# Patient Record
Sex: MALE | Race: OTHER | Employment: UNEMPLOYED | URBAN - METROPOLITAN AREA
[De-identification: names, ages, dates, MRNs, and addresses within clinical notes are randomized per-mention and may not be internally consistent; named-entity substitution may affect disease eponyms.]

---

## 2017-03-31 PROBLEM — G89.29 CHRONIC BILATERAL LOW BACK PAIN WITH SCIATICA: Chronic | Status: ACTIVE | Noted: 2017-03-31

## 2017-03-31 PROBLEM — M54.40 CHRONIC BILATERAL LOW BACK PAIN WITH SCIATICA: Chronic | Status: ACTIVE | Noted: 2017-03-31

## 2018-03-28 ENCOUNTER — OFFICE VISIT (OUTPATIENT)
Dept: INTERNAL MEDICINE | Age: 53
End: 2018-03-28
Payer: COMMERCIAL

## 2018-03-28 VITALS
HEIGHT: 72 IN | BODY MASS INDEX: 33.44 KG/M2 | SYSTOLIC BLOOD PRESSURE: 131 MMHG | RESPIRATION RATE: 16 BRPM | DIASTOLIC BLOOD PRESSURE: 82 MMHG | HEART RATE: 86 BPM | WEIGHT: 246.9 LBS

## 2018-03-28 DIAGNOSIS — Z02.89 ENCOUNTER FOR PHYSICAL EXAMINATION RELATED TO EMPLOYMENT: Primary | ICD-10-CM

## 2018-03-28 DIAGNOSIS — K43.9 VENTRAL HERNIA WITHOUT OBSTRUCTION OR GANGRENE: ICD-10-CM

## 2018-03-28 PROCEDURE — 3017F COLORECTAL CA SCREEN DOC REV: CPT | Performed by: INTERNAL MEDICINE

## 2018-03-28 PROCEDURE — 99213 OFFICE O/P EST LOW 20 MIN: CPT | Performed by: INTERNAL MEDICINE

## 2018-03-28 PROCEDURE — G8417 CALC BMI ABV UP PARAM F/U: HCPCS | Performed by: INTERNAL MEDICINE

## 2018-03-28 PROCEDURE — 99212 OFFICE O/P EST SF 10 MIN: CPT | Performed by: INTERNAL MEDICINE

## 2018-03-28 PROCEDURE — G8482 FLU IMMUNIZE ORDER/ADMIN: HCPCS | Performed by: INTERNAL MEDICINE

## 2018-03-28 PROCEDURE — G8427 DOCREV CUR MEDS BY ELIG CLIN: HCPCS | Performed by: INTERNAL MEDICINE

## 2018-03-28 PROCEDURE — 1036F TOBACCO NON-USER: CPT | Performed by: INTERNAL MEDICINE

## 2018-03-28 ASSESSMENT — ENCOUNTER SYMPTOMS
DIARRHEA: 0
ABDOMINAL PAIN: 0
CONSTIPATION: 0
SHORTNESS OF BREATH: 0
PHOTOPHOBIA: 0
COUGH: 0
NAUSEA: 0

## 2018-03-28 NOTE — PROGRESS NOTES
Subjective:      Patient ID: Deanne Parekh is a 47 y.o. male. HPI  Chief Complaint   Patient presents with    Employment Physical     during work physical pft abnormal and needs checked for abdominal hernia     Here at  to follow about pre employment exam. He was interviewing for job at paint [employer] was asked to blow in tube and told he did not pass test. He denied any respiratory symptoms. He walk with his daughter every other day for bout 2 miles. Denied any GARCIA or chest pain. He never smoked. His mother has emphysema and was smoker so he had second hand exposure here and there. No chest pain or orthopnea or LL swelling     Review of Systems   Constitutional: Negative for fever and unexpected weight change. HENT: Negative. Eyes: Negative for photophobia and visual disturbance. Respiratory: Negative for cough and shortness of breath. Cardiovascular: Negative for chest pain, palpitations and leg swelling. Gastrointestinal: Negative for abdominal pain, constipation, diarrhea and nausea. Genitourinary: Negative for dysuria. Objective:   Physical Exam   Constitutional: He is oriented to person, place, and time. HENT:   Head: Normocephalic and atraumatic. Eyes: Pupils are equal, round, and reactive to light. Cardiovascular: Normal rate and regular rhythm. No murmur heard. Musculoskeletal: Normal range of motion. He exhibits no edema. Neurological: He is alert and oriented to person, place, and time.        Assessment:      Abnormal pre employment exam:  Asymptomatic abdominal wall hernia       Plan:      /82   Pulse 86   Resp 16   Ht 6' (1.829 m)   Wt 246 lb 14.4 oz (112 kg)   BMI 33.49 kg/m²       Abnormal pre employment exam:  -Pt can start job with no restriction  -Letter provided  -will obtain PFT to rule out any disease     Asymptomatic abdominal wall hernia   -advised about weight loss  -Report any symptoms      F/u with PCP as scheduled

## 2018-04-04 DIAGNOSIS — G43.009 MIGRAINE WITHOUT AURA AND WITHOUT STATUS MIGRAINOSUS, NOT INTRACTABLE: ICD-10-CM

## 2018-04-05 RX ORDER — IBUPROFEN 800 MG/1
800 TABLET ORAL EVERY 6 HOURS PRN
Qty: 30 TABLET | Refills: 0 | Status: SHIPPED | OUTPATIENT
Start: 2018-04-05 | End: 2018-06-05

## 2018-06-04 DIAGNOSIS — E11.42 TYPE 2 DIABETES MELLITUS WITH DIABETIC POLYNEUROPATHY, WITHOUT LONG-TERM CURRENT USE OF INSULIN (HCC): Chronic | ICD-10-CM

## 2018-06-04 DIAGNOSIS — E78.5 HYPERLIPIDEMIA ASSOCIATED WITH TYPE 2 DIABETES MELLITUS (HCC): Chronic | ICD-10-CM

## 2018-06-04 DIAGNOSIS — E11.69 HYPERLIPIDEMIA ASSOCIATED WITH TYPE 2 DIABETES MELLITUS (HCC): Chronic | ICD-10-CM

## 2018-06-05 RX ORDER — SIMVASTATIN 20 MG
20 TABLET ORAL EVERY EVENING
Qty: 30 TABLET | Refills: 1 | Status: SHIPPED | OUTPATIENT
Start: 2018-06-05 | End: 2018-08-07 | Stop reason: SDUPTHER

## 2018-06-05 RX ORDER — ALOGLIPTIN 25 MG/1
TABLET, FILM COATED ORAL
Qty: 30 TABLET | Refills: 1 | Status: SHIPPED | OUTPATIENT
Start: 2018-06-05 | End: 2018-08-07 | Stop reason: SDUPTHER

## 2018-08-07 DIAGNOSIS — E78.5 HYPERLIPIDEMIA ASSOCIATED WITH TYPE 2 DIABETES MELLITUS (HCC): Chronic | ICD-10-CM

## 2018-08-07 DIAGNOSIS — E11.42 TYPE 2 DIABETES MELLITUS WITH DIABETIC POLYNEUROPATHY, WITHOUT LONG-TERM CURRENT USE OF INSULIN (HCC): Chronic | ICD-10-CM

## 2018-08-07 DIAGNOSIS — E11.69 HYPERLIPIDEMIA ASSOCIATED WITH TYPE 2 DIABETES MELLITUS (HCC): Chronic | ICD-10-CM

## 2018-08-09 RX ORDER — ACETAMINOPHEN/DIPHENHYDRAMINE 500MG-25MG
TABLET ORAL
Qty: 30 TABLET | Refills: 0 | Status: SHIPPED | OUTPATIENT
Start: 2018-08-09 | End: 2018-09-05 | Stop reason: SDUPTHER

## 2018-08-09 RX ORDER — SIMVASTATIN 20 MG
20 TABLET ORAL EVERY EVENING
Qty: 30 TABLET | Refills: 0 | Status: SHIPPED | OUTPATIENT
Start: 2018-08-09 | End: 2018-09-05 | Stop reason: SDUPTHER

## 2018-08-09 RX ORDER — ALOGLIPTIN 25 MG/1
TABLET, FILM COATED ORAL
Qty: 30 TABLET | Refills: 0 | Status: SHIPPED | OUTPATIENT
Start: 2018-08-09 | End: 2018-09-05 | Stop reason: SDUPTHER

## 2018-08-15 ENCOUNTER — TELEPHONE (OUTPATIENT)
Dept: INTERNAL MEDICINE | Age: 53
End: 2018-08-15

## 2018-08-15 NOTE — TELEPHONE ENCOUNTER
Received notice needed prior auth for Alogliptin. After [facility] and forth [facility] it was determined [employer] is current and no prior auth required.

## 2018-09-05 ENCOUNTER — OFFICE VISIT (OUTPATIENT)
Dept: INTERNAL MEDICINE | Age: 53
End: 2018-09-05
Payer: COMMERCIAL

## 2018-09-05 VITALS
SYSTOLIC BLOOD PRESSURE: 138 MMHG | OXYGEN SATURATION: 97 % | DIASTOLIC BLOOD PRESSURE: 82 MMHG | RESPIRATION RATE: 20 BRPM | HEART RATE: 80 BPM | WEIGHT: 246 LBS | BODY MASS INDEX: 33.36 KG/M2

## 2018-09-05 DIAGNOSIS — E11.42 TYPE 2 DIABETES MELLITUS WITH DIABETIC POLYNEUROPATHY, WITHOUT LONG-TERM CURRENT USE OF INSULIN (HCC): Chronic | ICD-10-CM

## 2018-09-05 DIAGNOSIS — Z23 NEED FOR PROPHYLACTIC VACCINATION AND INOCULATION AGAINST VARICELLA: Primary | ICD-10-CM

## 2018-09-05 DIAGNOSIS — E78.5 HYPERLIPIDEMIA ASSOCIATED WITH TYPE 2 DIABETES MELLITUS (HCC): Chronic | ICD-10-CM

## 2018-09-05 DIAGNOSIS — K64.9 HEMORRHOIDS, UNSPECIFIED HEMORRHOID TYPE: ICD-10-CM

## 2018-09-05 DIAGNOSIS — M75.51 BURSITIS OF RIGHT SHOULDER: ICD-10-CM

## 2018-09-05 DIAGNOSIS — E11.69 HYPERLIPIDEMIA ASSOCIATED WITH TYPE 2 DIABETES MELLITUS (HCC): Chronic | ICD-10-CM

## 2018-09-05 LAB — HBA1C MFR BLD: 6.7 %

## 2018-09-05 PROCEDURE — 99213 OFFICE O/P EST LOW 20 MIN: CPT | Performed by: INTERNAL MEDICINE

## 2018-09-05 PROCEDURE — 83036 HEMOGLOBIN GLYCOSYLATED A1C: CPT | Performed by: INTERNAL MEDICINE

## 2018-09-05 PROCEDURE — 3044F HG A1C LEVEL LT 7.0%: CPT | Performed by: INTERNAL MEDICINE

## 2018-09-05 PROCEDURE — G8417 CALC BMI ABV UP PARAM F/U: HCPCS | Performed by: INTERNAL MEDICINE

## 2018-09-05 PROCEDURE — 2022F DILAT RTA XM EVC RTNOPTHY: CPT | Performed by: INTERNAL MEDICINE

## 2018-09-05 PROCEDURE — 3017F COLORECTAL CA SCREEN DOC REV: CPT | Performed by: INTERNAL MEDICINE

## 2018-09-05 PROCEDURE — 99214 OFFICE O/P EST MOD 30 MIN: CPT | Performed by: INTERNAL MEDICINE

## 2018-09-05 PROCEDURE — 1036F TOBACCO NON-USER: CPT | Performed by: INTERNAL MEDICINE

## 2018-09-05 PROCEDURE — G8427 DOCREV CUR MEDS BY ELIG CLIN: HCPCS | Performed by: INTERNAL MEDICINE

## 2018-09-05 RX ORDER — ASPIRIN 81 MG/1
TABLET ORAL
Qty: 30 TABLET | Refills: 0 | Status: SHIPPED | OUTPATIENT
Start: 2018-09-05 | End: 2018-10-12 | Stop reason: SDUPTHER

## 2018-09-05 RX ORDER — ALOGLIPTIN 25 MG/1
TABLET, FILM COATED ORAL
Qty: 30 TABLET | Refills: 0 | Status: SHIPPED | OUTPATIENT
Start: 2018-09-05 | End: 2018-10-12 | Stop reason: SDUPTHER

## 2018-09-05 RX ORDER — SIMVASTATIN 20 MG
20 TABLET ORAL EVERY EVENING
Qty: 30 TABLET | Refills: 0 | Status: SHIPPED | OUTPATIENT
Start: 2018-09-05 | End: 2018-10-12 | Stop reason: SDUPTHER

## 2018-09-05 RX ORDER — GABAPENTIN 300 MG/1
300 CAPSULE ORAL 2 TIMES DAILY
Qty: 60 CAPSULE | Refills: 2 | Status: SHIPPED | OUTPATIENT
Start: 2018-09-05 | End: 2019-01-07 | Stop reason: CLARIF

## 2018-09-05 ASSESSMENT — PATIENT HEALTH QUESTIONNAIRE - PHQ9
2. FEELING DOWN, DEPRESSED OR HOPELESS: 0
SUM OF ALL RESPONSES TO PHQ QUESTIONS 1-9: 0
1. LITTLE INTEREST OR PLEASURE IN DOING THINGS: 0
SUM OF ALL RESPONSES TO PHQ QUESTIONS 1-9: 0
SUM OF ALL RESPONSES TO PHQ9 QUESTIONS 1 & 2: 0

## 2018-09-05 NOTE — PROGRESS NOTES
[facility]    Attending Physician Statement:  Deana Carver M.D., F.A.C.P. I have discussed the case, including pertinent history and exam findings with the resident. I have seen and examined the patient and the key elements of the encounter have been performed by me. I agree with the assessment, plan and orders as documented by the resident. Patient is seen for fu visit today. Last office notes reviewed, relative labs and imaging. R SAB, he didn't follow up PT-- will represcribe    dm2-- aic 6.7  Same metformin/gliptan  ascvd 11% - statin and asa-- ldl 70-- defer inc to moderate or high intensity    New rectal mass, possbile non thrombosed hemorrhoid -he would like rectal surgical opinion  Health maintenance, shingrix  Remainder of medical problems as per resident note.
masses, organomegaly, no guarding rebound or rigidity. Extremities: No edema, Peripheral pulses palpable 2/4    ASSESSMENT/PLAN:  Macario Jimenez was seen today for established new doctor, hemorrhoids and fatigue. Diagnoses and all orders for this visit:    Need for prophylactic vaccination and inoculation against varicella  -     zoster recombinant adjuvanted vaccine (SHINGRIX) 50 MCG SUSR injection; 50 MCG IM then repeat 2-6 months. Type 2 diabetes mellitus with diabetic polyneuropathy, without long-term current use of insulin (HCC)  -     aspirin (RA ASPIRIN EC) 81 MG EC tablet; take 1 tablet by mouth once daily  -     metFORMIN (GLUCOPHAGE) 1000 MG tablet; take 1 tablet by mouth twice a day with food  -     alogliptin (NESINA) 25 MG TABS tablet; take 1 tablet by mouth once daily  -     gabapentin (NEURONTIN) 300 MG capsule; Take 1 capsule by mouth 2 times daily for 90 days. .  -     POCT glycosylated hemoglobin (Hb A1C)  -      DIABETES FOOT EXAM    Hyperlipidemia associated with type 2 diabetes mellitus (HCC)  -     simvastatin (ZOCOR) 20 MG tablet;  Take 1 tablet by mouth every evening  The 10-year ASCVD risk score (Syl Will., et al., 2013) is: 11.8%    Values used to calculate the score:      Age: 47 years      Sex: Male      Is Non- : Yes      Diabetic: Yes      Tobacco smoker: No      Systolic Blood Pressure: 002 mmHg      Is BP treated: No      HDL Cholesterol: 47 mg/dL      Total Cholesterol: 140 mg/dL    Hemorrhoids, unspecified hemorrhoid type  -     [facility]- Osmin Sarkar MD    Bursitis of right shoulder  -     External Referral To Physical Therapy      RTC: 3 months    I have reviewed my findings and recommendations with Ivy Stoddard and Dr Kevon Stapleton MD PGY-1   9/5/2018 2:40 PM

## 2018-09-07 ENCOUNTER — TELEPHONE (OUTPATIENT)
Dept: SURGERY | Age: 53
End: 2018-09-07

## 2018-09-07 NOTE — TELEPHONE ENCOUNTER
Patient was referred by Dr. Natacha Moses for hemorrhoids, previous colonoscopy by Dr. Emre Kennedy on 9/30/18. Patient was scheduled on 10/19/18 in [facility] office @ 10:15am with Dr. Emre Kennedy. Patient instructed that information packet with appointment letter and health questionnaire will be put in the mail today. Patient instructed to complete health questionnaire prior to scheduled appointment and use return envelope in packet to mail it in; if patient cannot mail it back to us prior to appointment, pt instructed to bring it with him to scheduled appt on 10/19/18. Patient also instructed that a co-pay is due at the time of visit, and if the patient is not insured there is a $40.00 fee at the time of the appointment. Patient was instructed to bring a photo ID, insurance card (if applicable), and list of any current medications. Patient instructed to arrive 15 minutes prior for registration. Patient verbalized understanding of instructions.     Electronically signed by Cony Braun CMA on 9/7/18 at 2:51 PM

## 2018-10-10 ENCOUNTER — OFFICE VISIT (OUTPATIENT)
Dept: INTERNAL MEDICINE | Age: 53
End: 2018-10-10
Payer: COMMERCIAL

## 2018-10-10 VITALS
RESPIRATION RATE: 16 BRPM | SYSTOLIC BLOOD PRESSURE: 132 MMHG | DIASTOLIC BLOOD PRESSURE: 76 MMHG | TEMPERATURE: 98.7 F | BODY MASS INDEX: 33.59 KG/M2 | HEIGHT: 72 IN | WEIGHT: 248 LBS | HEART RATE: 91 BPM

## 2018-10-10 DIAGNOSIS — G43.009 MIGRAINE WITHOUT AURA AND WITHOUT STATUS MIGRAINOSUS, NOT INTRACTABLE: ICD-10-CM

## 2018-10-10 DIAGNOSIS — M75.41 ROTATOR CUFF IMPINGEMENT SYNDROME OF RIGHT SHOULDER: Primary | ICD-10-CM

## 2018-10-10 DIAGNOSIS — E11.42 TYPE 2 DIABETES MELLITUS WITH DIABETIC POLYNEUROPATHY, WITHOUT LONG-TERM CURRENT USE OF INSULIN (HCC): Chronic | ICD-10-CM

## 2018-10-10 PROCEDURE — 1036F TOBACCO NON-USER: CPT | Performed by: INTERNAL MEDICINE

## 2018-10-10 PROCEDURE — 3017F COLORECTAL CA SCREEN DOC REV: CPT | Performed by: INTERNAL MEDICINE

## 2018-10-10 PROCEDURE — G8484 FLU IMMUNIZE NO ADMIN: HCPCS | Performed by: INTERNAL MEDICINE

## 2018-10-10 PROCEDURE — 20610 DRAIN/INJ JOINT/BURSA W/O US: CPT | Performed by: INTERNAL MEDICINE

## 2018-10-10 PROCEDURE — G8417 CALC BMI ABV UP PARAM F/U: HCPCS | Performed by: INTERNAL MEDICINE

## 2018-10-10 PROCEDURE — 3044F HG A1C LEVEL LT 7.0%: CPT | Performed by: INTERNAL MEDICINE

## 2018-10-10 PROCEDURE — G8427 DOCREV CUR MEDS BY ELIG CLIN: HCPCS | Performed by: INTERNAL MEDICINE

## 2018-10-10 PROCEDURE — 99212 OFFICE O/P EST SF 10 MIN: CPT | Performed by: INTERNAL MEDICINE

## 2018-10-10 PROCEDURE — 99214 OFFICE O/P EST MOD 30 MIN: CPT | Performed by: INTERNAL MEDICINE

## 2018-10-10 PROCEDURE — 2022F DILAT RTA XM EVC RTNOPTHY: CPT | Performed by: INTERNAL MEDICINE

## 2018-10-10 RX ORDER — TRIAMCINOLONE ACETONIDE 40 MG/ML
40 INJECTION, SUSPENSION INTRA-ARTICULAR; INTRAMUSCULAR ONCE
Status: COMPLETED | OUTPATIENT
Start: 2018-10-10 | End: 2018-10-10

## 2018-10-10 RX ORDER — LIDOCAINE HYDROCHLORIDE 10 MG/ML
1 INJECTION, SOLUTION EPIDURAL; INFILTRATION; INTRACAUDAL; PERINEURAL ONCE
Status: COMPLETED | OUTPATIENT
Start: 2018-10-10 | End: 2018-10-10

## 2018-10-10 RX ADMIN — TRIAMCINOLONE ACETONIDE 40 MG: 40 INJECTION, SUSPENSION INTRA-ARTICULAR; INTRAMUSCULAR at 14:20

## 2018-10-10 RX ADMIN — LIDOCAINE HYDROCHLORIDE 1 ML: 10 INJECTION, SOLUTION EPIDURAL; INFILTRATION; INTRACAUDAL; PERINEURAL at 14:20

## 2018-10-10 NOTE — PROGRESS NOTES
Discharge instructions reviewed with patient. Patient verbalizes understanding. AVS given to patient.

## 2018-10-10 NOTE — PROGRESS NOTES
Right Shoulder Pain  Patient is here for chronic right shoulder pain for a few years. worsening  Last few months. daily anterior shoulder pain 8-9/10 pain when attempts to lift above shoulder.- repetitve acitivities  Also constant ache when lying on it sleepyin    +[facility]   injection  1.5 years ago [facility]-- 2-2.5months  He reports he did do few visits PT       Previous injections provided relief for 3 months. Has daily pain that is constant all day. Cannot lay on right shoulder. Pain is worse raising arm over his head. Pain improves when laying in bed and lifting arm into external rotation. Takes 800 mg ibprofeun twice every day.      Review of Systems   Constitutional: Negative for fatigue and fever. HENT: Negative for rhinorrhea and sore throat. Eyes: Negative for visual disturbance. Respiratory: Negative for cough and shortness of breath. Cardiovascular: Negative for chest pain. Gastrointestinal: Negative for constipation and diarrhea. Positive for acid reflux. Endocrine: Negative for polyuria. Genitourinary: Negative for difficulty urinating. Musculoskeletal: Positive for arthralgias (right shoulder pain). Neurological: Negative for light-headedness.       /76 (Site: Left Upper Arm, Position: Sitting, Cuff Size: Large Adult)   Pulse 91   Temp 98.7 °F (37.1 °C) (Oral)   Resp 16   Ht 6' (1.829 m)   Wt 248 lb (112.5 kg)   BMI 33.63 kg/m²   Constitutional: He is oriented to person, place, and time. He appears well-developed and well-nourished. HENT:   Head: Normocephalic and atraumatic. Eyes: Conjunctivae are normal. Pupils are equal, round, and reactive to light. No scleral icterus. Neck: Normal range of motion. Cardiovascular: Normal rate, regular rhythm and normal heart sounds. Pulmonary/Chest: Effort normal. He has no wheezes. Abdominal: Soft. There is no tenderness. Musculoskeletal: Normal range of motion. He exhibits no edema.  Tenderness: over

## 2018-10-10 NOTE — PATIENT INSTRUCTIONS
Administration of injection may cause short term discomfort, soreness or redness to area. However if area worsens with increased redness or swelling after 3 days of injection, call us at [phone]. Ask for the nurse or go to the Emergency Room for possible antibiotic therapy.

## 2018-10-12 DIAGNOSIS — E11.69 HYPERLIPIDEMIA ASSOCIATED WITH TYPE 2 DIABETES MELLITUS (HCC): Chronic | ICD-10-CM

## 2018-10-12 DIAGNOSIS — E78.5 HYPERLIPIDEMIA ASSOCIATED WITH TYPE 2 DIABETES MELLITUS (HCC): Chronic | ICD-10-CM

## 2018-10-12 DIAGNOSIS — E11.42 TYPE 2 DIABETES MELLITUS WITH DIABETIC POLYNEUROPATHY, WITHOUT LONG-TERM CURRENT USE OF INSULIN (HCC): Chronic | ICD-10-CM

## 2018-10-12 RX ORDER — ASPIRIN 81 MG/1
TABLET ORAL
Qty: 30 TABLET | Refills: 2 | Status: SHIPPED | OUTPATIENT
Start: 2018-10-12 | End: 2019-01-12 | Stop reason: SDUPTHER

## 2018-10-12 RX ORDER — ALOGLIPTIN 25 MG/1
TABLET, FILM COATED ORAL
Qty: 30 TABLET | Refills: 2 | Status: SHIPPED | OUTPATIENT
Start: 2018-10-12 | End: 2019-01-12 | Stop reason: SDUPTHER

## 2018-10-12 RX ORDER — SIMVASTATIN 20 MG
20 TABLET ORAL EVERY EVENING
Qty: 30 TABLET | Refills: 2 | Status: SHIPPED | OUTPATIENT
Start: 2018-10-12 | End: 2019-01-12 | Stop reason: SDUPTHER

## 2018-11-15 ENCOUNTER — APPOINTMENT (OUTPATIENT)
Dept: GENERAL RADIOLOGY | Age: 53
End: 2018-11-15
Payer: COMMERCIAL

## 2018-11-15 ENCOUNTER — HOSPITAL ENCOUNTER (EMERGENCY)
Age: 53
Discharge: HOME | End: 2018-11-15
Payer: COMMERCIAL

## 2018-11-15 VITALS
SYSTOLIC BLOOD PRESSURE: 132 MMHG | BODY MASS INDEX: 33.18 KG/M2 | DIASTOLIC BLOOD PRESSURE: 79 MMHG | HEIGHT: 72 IN | WEIGHT: 245 LBS | OXYGEN SATURATION: 96 % | HEART RATE: 86 BPM | RESPIRATION RATE: 18 BRPM | TEMPERATURE: 98.1 F

## 2018-11-15 DIAGNOSIS — M25.562 ACUTE PAIN OF LEFT KNEE: Primary | ICD-10-CM

## 2018-11-15 DIAGNOSIS — M23.91 INTERNAL DERANGEMENT OF RIGHT KNEE: ICD-10-CM

## 2018-11-15 PROCEDURE — 99283 EMERGENCY DEPT VISIT LOW MDM: CPT

## 2018-11-15 PROCEDURE — 73562 X-RAY EXAM OF KNEE 3: CPT

## 2018-11-15 RX ORDER — NAPROXEN 500 MG/1
500 TABLET ORAL 2 TIMES DAILY
Qty: 30 TABLET | Refills: 0 | Status: SHIPPED | OUTPATIENT
Start: 2018-11-15 | End: 2019-01-07 | Stop reason: CLARIF

## 2018-11-15 RX ORDER — HYDROCODONE BITARTRATE AND ACETAMINOPHEN 5; 325 MG/1; MG/1
1 TABLET ORAL EVERY 6 HOURS PRN
Qty: 6 TABLET | Refills: 0 | Status: SHIPPED | OUTPATIENT
Start: 2018-11-15 | End: 2018-11-18

## 2018-11-15 ASSESSMENT — PAIN SCALES - GENERAL: PAINLEVEL_OUTOF10: 8

## 2018-11-15 NOTE — ED PROVIDER NOTES
Independent MLP      HPI:  11/15/18,   Time: 12:38 PM         Desi Coelho is a 46 y.o. male presenting to the ED for left knee pain, beginning few days ago. The complaint has been persistent, moderate in severity, and worsened by walking. Patient states that he's had issues with his left knee before. He reports he had surgery by Dr. Andrez Albarado in the past on a small tear in his meniscus. He states the other day he was working in the garden and thinks he might have tweaked it has now he's having increased knee pain. There is no marked swelling or redness. He states that it hurts more when he bears weight and bends the joint. Sometimes it feels like it's can of pop or snap. The patient has tried ibuprofen wiithout relief. No fall or direct trauma. States he has appointment to see the orthopedist on Monday      ROS:     Constitutional: Negative for fever and chills  HENT: Negative for ear pain, sore throat and sinus pressure  Eyes: Negative for pain, discharge and redness  Respiratory:  Negative for shortness of breath, cough and wheezing  Cardiovascular: Negative for CP, edema or palpitations  Gastrointestinal: Negative for nausea, vomiting, diarrhea and abdominal distention  Genitourinary: Negative for dysuria and frequency  Musculoskeletal: See HPI  Skin: Negative for rash and wound  Neurological: Negative for weakness and headaches  Hematological: Negative for adenopathy    All other systems reviewed and are negative      -------------------------------- PAST HISTORY ----------------------------------  Past Medical History:  has a past medical history of Anxiety; Blood in stool; Chronic back pain; Constipation; Headache(784.0); History of dental problems; Hyperlipidemia; Musculoskeletal symptom; Neck pain; Neck stiffness; Type II or unspecified type diabetes mellitus without mention of complication, not stated as uncontrolled; and Weakness.     Past Surgical History:  has a past surgical history that

## 2018-12-14 ENCOUNTER — TELEPHONE (OUTPATIENT)
Dept: INTERNAL MEDICINE | Age: 53
End: 2018-12-14

## 2018-12-19 NOTE — TELEPHONE ENCOUNTER
Called pt and informed Dr. Minerva Meyers did get his message and referred him to either f/u with his PCP or Ortho

## 2019-01-07 ENCOUNTER — OFFICE VISIT (OUTPATIENT)
Dept: FAMILY MEDICINE CLINIC | Age: 54
End: 2019-01-07
Payer: COMMERCIAL

## 2019-01-07 VITALS
HEART RATE: 84 BPM | DIASTOLIC BLOOD PRESSURE: 84 MMHG | BODY MASS INDEX: 34 KG/M2 | OXYGEN SATURATION: 98 % | TEMPERATURE: 98.5 F | RESPIRATION RATE: 20 BRPM | HEIGHT: 72 IN | WEIGHT: 251 LBS | SYSTOLIC BLOOD PRESSURE: 150 MMHG

## 2019-01-07 DIAGNOSIS — M25.511 ACUTE PAIN OF RIGHT SHOULDER: ICD-10-CM

## 2019-01-07 DIAGNOSIS — Z23 NEEDS FLU SHOT: ICD-10-CM

## 2019-01-07 DIAGNOSIS — R03.0 ELEVATED BLOOD PRESSURE READING: ICD-10-CM

## 2019-01-07 DIAGNOSIS — M54.2 NECK PAIN: ICD-10-CM

## 2019-01-07 DIAGNOSIS — Z76.89 ENCOUNTER TO ESTABLISH CARE: Primary | ICD-10-CM

## 2019-01-07 DIAGNOSIS — E78.5 HYPERLIPIDEMIA ASSOCIATED WITH TYPE 2 DIABETES MELLITUS (HCC): ICD-10-CM

## 2019-01-07 DIAGNOSIS — E11.69 HYPERLIPIDEMIA ASSOCIATED WITH TYPE 2 DIABETES MELLITUS (HCC): ICD-10-CM

## 2019-01-07 DIAGNOSIS — E66.9 OBESITY (BMI 30.0-34.9): Chronic | ICD-10-CM

## 2019-01-07 DIAGNOSIS — E11.42 TYPE 2 DIABETES MELLITUS WITH DIABETIC POLYNEUROPATHY, WITHOUT LONG-TERM CURRENT USE OF INSULIN (HCC): Chronic | ICD-10-CM

## 2019-01-07 LAB — HBA1C MFR BLD: 8.1 %

## 2019-01-07 PROCEDURE — 2022F DILAT RTA XM EVC RTNOPTHY: CPT | Performed by: FAMILY MEDICINE

## 2019-01-07 PROCEDURE — 3045F PR MOST RECENT HEMOGLOBIN A1C LEVEL 7.0-9.0%: CPT | Performed by: FAMILY MEDICINE

## 2019-01-07 PROCEDURE — G8427 DOCREV CUR MEDS BY ELIG CLIN: HCPCS | Performed by: FAMILY MEDICINE

## 2019-01-07 PROCEDURE — 83036 HEMOGLOBIN GLYCOSYLATED A1C: CPT | Performed by: FAMILY MEDICINE

## 2019-01-07 PROCEDURE — 90686 IIV4 VACC NO PRSV 0.5 ML IM: CPT | Performed by: FAMILY MEDICINE

## 2019-01-07 PROCEDURE — G8417 CALC BMI ABV UP PARAM F/U: HCPCS | Performed by: FAMILY MEDICINE

## 2019-01-07 PROCEDURE — 99214 OFFICE O/P EST MOD 30 MIN: CPT | Performed by: FAMILY MEDICINE

## 2019-01-07 PROCEDURE — 3017F COLORECTAL CA SCREEN DOC REV: CPT | Performed by: FAMILY MEDICINE

## 2019-01-07 PROCEDURE — 1036F TOBACCO NON-USER: CPT | Performed by: FAMILY MEDICINE

## 2019-01-07 PROCEDURE — G8482 FLU IMMUNIZE ORDER/ADMIN: HCPCS | Performed by: FAMILY MEDICINE

## 2019-01-07 PROCEDURE — 90471 IMMUNIZATION ADMIN: CPT | Performed by: FAMILY MEDICINE

## 2019-01-07 RX ORDER — NAPROXEN 500 MG/1
500 TABLET ORAL 2 TIMES DAILY WITH MEALS
Qty: 60 TABLET | Refills: 0 | Status: SHIPPED | OUTPATIENT
Start: 2019-01-07 | End: 2019-02-18 | Stop reason: CLARIF

## 2019-01-07 ASSESSMENT — PATIENT HEALTH QUESTIONNAIRE - PHQ9
2. FEELING DOWN, DEPRESSED OR HOPELESS: 0
SUM OF ALL RESPONSES TO PHQ9 QUESTIONS 1 & 2: 0
1. LITTLE INTEREST OR PLEASURE IN DOING THINGS: 0
SUM OF ALL RESPONSES TO PHQ QUESTIONS 1-9: 0
SUM OF ALL RESPONSES TO PHQ QUESTIONS 1-9: 0

## 2019-01-08 ASSESSMENT — ENCOUNTER SYMPTOMS
CONSTIPATION: 0
NAUSEA: 0
COUGH: 0
RHINORRHEA: 0
SINUS PAIN: 0
VOMITING: 0
SORE THROAT: 0
ABDOMINAL PAIN: 0
SHORTNESS OF BREATH: 0
DIARRHEA: 0

## 2019-01-11 ENCOUNTER — HOSPITAL ENCOUNTER (OUTPATIENT)
Age: 54
Discharge: HOME | End: 2019-01-13
Payer: COMMERCIAL

## 2019-01-11 DIAGNOSIS — E11.42 TYPE 2 DIABETES MELLITUS WITH DIABETIC POLYNEUROPATHY, WITHOUT LONG-TERM CURRENT USE OF INSULIN (HCC): Chronic | ICD-10-CM

## 2019-01-11 LAB
ALBUMIN SERPL-MCNC: 4.7 G/DL (ref 3.5–5.2)
ALP BLD-CCNC: 75 U/L (ref 40–129)
ALT SERPL-CCNC: 61 U/L (ref 0–40)
ANION GAP SERPL CALCULATED.3IONS-SCNC: 18 MMOL/L (ref 7–16)
AST SERPL-CCNC: 34 U/L (ref 0–39)
BILIRUB SERPL-MCNC: 0.5 MG/DL (ref 0–1.2)
BUN BLDV-MCNC: 14 MG/DL (ref 6–20)
CALCIUM SERPL-MCNC: 9 MG/DL (ref 8.6–10.2)
CHLORIDE BLD-SCNC: 104 MMOL/L (ref 98–107)
CHOLESTEROL, TOTAL: 145 MG/DL (ref 0–199)
CO2: 21 MMOL/L (ref 22–29)
CREAT SERPL-MCNC: 0.7 MG/DL (ref 0.7–1.2)
GFR AFRICAN AMERICAN: >60
GFR NON-AFRICAN AMERICAN: >60 ML/MIN/1.73
GLUCOSE BLD-MCNC: 114 MG/DL (ref 74–99)
HCT VFR BLD CALC: 42.5 % (ref 37–54)
HDLC SERPL-MCNC: 46 MG/DL
HEMOGLOBIN: 14.1 G/DL (ref 12.5–16.5)
LDL CHOLESTEROL CALCULATED: 77 MG/DL (ref 0–99)
MCH RBC QN AUTO: 28.7 PG (ref 26–35)
MCHC RBC AUTO-ENTMCNC: 33.2 % (ref 32–34.5)
MCV RBC AUTO: 86.6 FL (ref 80–99.9)
PDW BLD-RTO: 12.9 FL (ref 11.5–15)
PLATELET # BLD: 264 E9/L (ref 130–450)
PMV BLD AUTO: 11.4 FL (ref 7–12)
POTASSIUM SERPL-SCNC: 4.2 MMOL/L (ref 3.5–5)
RBC # BLD: 4.91 E12/L (ref 3.8–5.8)
SODIUM BLD-SCNC: 143 MMOL/L (ref 132–146)
TOTAL PROTEIN: 7.5 G/DL (ref 6.4–8.3)
TRIGL SERPL-MCNC: 109 MG/DL (ref 0–149)
TSH SERPL DL<=0.05 MIU/L-ACNC: 2.06 UIU/ML (ref 0.27–4.2)
VLDLC SERPL CALC-MCNC: 22 MG/DL
WBC # BLD: 7.9 E9/L (ref 4.5–11.5)

## 2019-01-11 PROCEDURE — 86703 HIV-1/HIV-2 1 RESULT ANTBDY: CPT

## 2019-01-11 PROCEDURE — 85027 COMPLETE CBC AUTOMATED: CPT

## 2019-01-11 PROCEDURE — 36415 COLL VENOUS BLD VENIPUNCTURE: CPT

## 2019-01-11 PROCEDURE — 80053 COMPREHEN METABOLIC PANEL: CPT

## 2019-01-11 PROCEDURE — 80061 LIPID PANEL: CPT

## 2019-01-11 PROCEDURE — 84443 ASSAY THYROID STIM HORMONE: CPT

## 2019-01-12 DIAGNOSIS — E11.42 TYPE 2 DIABETES MELLITUS WITH DIABETIC POLYNEUROPATHY, WITHOUT LONG-TERM CURRENT USE OF INSULIN (HCC): Chronic | ICD-10-CM

## 2019-01-12 DIAGNOSIS — E78.5 HYPERLIPIDEMIA ASSOCIATED WITH TYPE 2 DIABETES MELLITUS (HCC): Chronic | ICD-10-CM

## 2019-01-12 DIAGNOSIS — E11.69 HYPERLIPIDEMIA ASSOCIATED WITH TYPE 2 DIABETES MELLITUS (HCC): Chronic | ICD-10-CM

## 2019-01-14 LAB — HIV-1 AND HIV-2 ANTIBODIES: NORMAL

## 2019-01-18 RX ORDER — SIMVASTATIN 20 MG
20 TABLET ORAL EVERY EVENING
Qty: 30 TABLET | Refills: 1 | Status: SHIPPED | OUTPATIENT
Start: 2019-01-18 | End: 2019-03-25 | Stop reason: SDUPTHER

## 2019-01-18 RX ORDER — ACETAMINOPHEN/DIPHENHYDRAMINE 500MG-25MG
TABLET ORAL
Qty: 30 TABLET | Refills: 1 | Status: SHIPPED | OUTPATIENT
Start: 2019-01-18 | End: 2019-03-25 | Stop reason: SDUPTHER

## 2019-01-18 RX ORDER — ALOGLIPTIN 25 MG/1
TABLET, FILM COATED ORAL
Qty: 30 TABLET | Refills: 1 | Status: SHIPPED | OUTPATIENT
Start: 2019-01-18 | End: 2019-03-25 | Stop reason: SDUPTHER

## 2019-01-23 ENCOUNTER — TELEPHONE (OUTPATIENT)
Dept: RHEUMATOLOGY | Age: 54
End: 2019-01-23

## 2019-01-23 PROBLEM — M25.511 ACUTE PAIN OF RIGHT SHOULDER: Status: ACTIVE | Noted: 2019-01-23

## 2019-01-23 PROBLEM — M54.2 NECK PAIN: Status: ACTIVE | Noted: 2019-01-23

## 2019-02-18 ENCOUNTER — OFFICE VISIT (OUTPATIENT)
Dept: FAMILY MEDICINE CLINIC | Age: 54
End: 2019-02-18
Payer: COMMERCIAL

## 2019-02-18 VITALS
OXYGEN SATURATION: 98 % | BODY MASS INDEX: 33.18 KG/M2 | DIASTOLIC BLOOD PRESSURE: 78 MMHG | HEIGHT: 72 IN | HEART RATE: 83 BPM | SYSTOLIC BLOOD PRESSURE: 144 MMHG | WEIGHT: 245 LBS | RESPIRATION RATE: 18 BRPM | TEMPERATURE: 98.2 F

## 2019-02-18 DIAGNOSIS — E11.42 TYPE 2 DIABETES MELLITUS WITH DIABETIC POLYNEUROPATHY, WITHOUT LONG-TERM CURRENT USE OF INSULIN (HCC): Primary | Chronic | ICD-10-CM

## 2019-02-18 DIAGNOSIS — E78.5 HYPERLIPIDEMIA ASSOCIATED WITH TYPE 2 DIABETES MELLITUS (HCC): Chronic | ICD-10-CM

## 2019-02-18 DIAGNOSIS — E66.9 OBESITY (BMI 30.0-34.9): Chronic | ICD-10-CM

## 2019-02-18 DIAGNOSIS — E11.69 HYPERLIPIDEMIA ASSOCIATED WITH TYPE 2 DIABETES MELLITUS (HCC): Chronic | ICD-10-CM

## 2019-02-18 DIAGNOSIS — M47.22 OSTEOARTHRITIS OF SPINE WITH RADICULOPATHY, CERVICAL REGION: ICD-10-CM

## 2019-02-18 DIAGNOSIS — M25.511 ACUTE PAIN OF RIGHT SHOULDER: ICD-10-CM

## 2019-02-18 DIAGNOSIS — R03.0 ELEVATED BLOOD PRESSURE READING: ICD-10-CM

## 2019-02-18 PROCEDURE — 99214 OFFICE O/P EST MOD 30 MIN: CPT | Performed by: FAMILY MEDICINE

## 2019-02-18 PROCEDURE — 3017F COLORECTAL CA SCREEN DOC REV: CPT | Performed by: FAMILY MEDICINE

## 2019-02-18 PROCEDURE — G8427 DOCREV CUR MEDS BY ELIG CLIN: HCPCS | Performed by: FAMILY MEDICINE

## 2019-02-18 PROCEDURE — 3045F PR MOST RECENT HEMOGLOBIN A1C LEVEL 7.0-9.0%: CPT | Performed by: FAMILY MEDICINE

## 2019-02-18 PROCEDURE — G8482 FLU IMMUNIZE ORDER/ADMIN: HCPCS | Performed by: FAMILY MEDICINE

## 2019-02-18 PROCEDURE — 2022F DILAT RTA XM EVC RTNOPTHY: CPT | Performed by: FAMILY MEDICINE

## 2019-02-18 PROCEDURE — 1036F TOBACCO NON-USER: CPT | Performed by: FAMILY MEDICINE

## 2019-02-18 PROCEDURE — G8417 CALC BMI ABV UP PARAM F/U: HCPCS | Performed by: FAMILY MEDICINE

## 2019-02-18 RX ORDER — CYCLOBENZAPRINE HCL 10 MG
10 TABLET ORAL 3 TIMES DAILY PRN
Qty: 30 TABLET | Refills: 0 | Status: SHIPPED | OUTPATIENT
Start: 2019-02-18 | End: 2019-02-20 | Stop reason: SINTOL

## 2019-02-18 RX ORDER — IBUPROFEN 800 MG/1
TABLET ORAL
Refills: 0 | COMMUNITY
Start: 2019-02-04 | End: 2019-03-01

## 2019-02-18 ASSESSMENT — ENCOUNTER SYMPTOMS
DIARRHEA: 0
SHORTNESS OF BREATH: 0
SINUS PAIN: 0
CONSTIPATION: 0
VOMITING: 0
NAUSEA: 0
ABDOMINAL PAIN: 0
RHINORRHEA: 0
COUGH: 0
SORE THROAT: 0

## 2019-02-20 ENCOUNTER — TELEPHONE (OUTPATIENT)
Dept: FAMILY MEDICINE CLINIC | Age: 54
End: 2019-02-20

## 2019-02-27 ENCOUNTER — TELEPHONE (OUTPATIENT)
Dept: ORTHOPEDIC SURGERY | Age: 54
End: 2019-02-27

## 2019-02-27 NOTE — TELEPHONE ENCOUNTER
Jude cannot make it to 11:00 am new patient due to conflicting appointment.   He has been rescheduled for 3/1/19 at 2:00 pm.

## 2019-03-01 ENCOUNTER — OFFICE VISIT (OUTPATIENT)
Dept: ORTHOPEDIC SURGERY | Age: 54
End: 2019-03-01
Payer: COMMERCIAL

## 2019-03-01 VITALS — HEIGHT: 72 IN | WEIGHT: 245 LBS | BODY MASS INDEX: 33.18 KG/M2

## 2019-03-01 DIAGNOSIS — M75.101 TEAR OF RIGHT ROTATOR CUFF, UNSPECIFIED TEAR EXTENT: Primary | ICD-10-CM

## 2019-03-01 DIAGNOSIS — M54.12 CERVICAL RADICULOPATHY: ICD-10-CM

## 2019-03-01 DIAGNOSIS — S83.242A TEAR OF MEDIAL MENISCUS OF LEFT KNEE, CURRENT, UNSPECIFIED TEAR TYPE, INITIAL ENCOUNTER: ICD-10-CM

## 2019-03-01 PROCEDURE — G8482 FLU IMMUNIZE ORDER/ADMIN: HCPCS | Performed by: ORTHOPAEDIC SURGERY

## 2019-03-01 PROCEDURE — 99204 OFFICE O/P NEW MOD 45 MIN: CPT | Performed by: ORTHOPAEDIC SURGERY

## 2019-03-01 PROCEDURE — G8417 CALC BMI ABV UP PARAM F/U: HCPCS | Performed by: ORTHOPAEDIC SURGERY

## 2019-03-01 PROCEDURE — 3017F COLORECTAL CA SCREEN DOC REV: CPT | Performed by: ORTHOPAEDIC SURGERY

## 2019-03-01 PROCEDURE — 1036F TOBACCO NON-USER: CPT | Performed by: ORTHOPAEDIC SURGERY

## 2019-03-01 PROCEDURE — G8427 DOCREV CUR MEDS BY ELIG CLIN: HCPCS | Performed by: ORTHOPAEDIC SURGERY

## 2019-03-01 RX ORDER — MELOXICAM 15 MG/1
15 TABLET ORAL DAILY
Qty: 30 TABLET | Refills: 3 | Status: SHIPPED | OUTPATIENT
Start: 2019-03-01 | End: 2019-07-23 | Stop reason: ALTCHOICE

## 2019-03-20 ENCOUNTER — HOSPITAL ENCOUNTER (OUTPATIENT)
Dept: MRI IMAGING | Age: 54
Discharge: HOME | End: 2019-03-22
Payer: COMMERCIAL

## 2019-03-20 DIAGNOSIS — S83.242A TEAR OF MEDIAL MENISCUS OF LEFT KNEE, CURRENT, UNSPECIFIED TEAR TYPE, INITIAL ENCOUNTER: ICD-10-CM

## 2019-03-20 DIAGNOSIS — M75.101 TEAR OF RIGHT ROTATOR CUFF, UNSPECIFIED TEAR EXTENT: ICD-10-CM

## 2019-03-20 PROCEDURE — 73221 MRI JOINT UPR EXTREM W/O DYE: CPT

## 2019-03-20 PROCEDURE — 73721 MRI JNT OF LWR EXTRE W/O DYE: CPT

## 2019-03-25 DIAGNOSIS — E11.42 TYPE 2 DIABETES MELLITUS WITH DIABETIC POLYNEUROPATHY, WITHOUT LONG-TERM CURRENT USE OF INSULIN (HCC): Chronic | ICD-10-CM

## 2019-03-25 DIAGNOSIS — E11.69 HYPERLIPIDEMIA ASSOCIATED WITH TYPE 2 DIABETES MELLITUS (HCC): Chronic | ICD-10-CM

## 2019-03-25 DIAGNOSIS — E78.5 HYPERLIPIDEMIA ASSOCIATED WITH TYPE 2 DIABETES MELLITUS (HCC): Chronic | ICD-10-CM

## 2019-03-25 RX ORDER — ALOGLIPTIN 25 MG/1
TABLET, FILM COATED ORAL
Qty: 30 TABLET | Refills: 3 | Status: SHIPPED | OUTPATIENT
Start: 2019-03-25 | End: 2019-05-20 | Stop reason: SDUPTHER

## 2019-03-25 RX ORDER — AMMONIUM LACTATE 12 G/100G
LOTION TOPICAL
Refills: 0 | COMMUNITY
Start: 2019-02-17 | End: 2020-04-30

## 2019-03-25 RX ORDER — ASPIRIN 81 MG/1
TABLET ORAL
Qty: 30 TABLET | Refills: 3 | Status: SHIPPED | OUTPATIENT
Start: 2019-03-25 | End: 2019-07-20 | Stop reason: SDUPTHER

## 2019-03-25 RX ORDER — SIMVASTATIN 20 MG
20 TABLET ORAL EVERY EVENING
Qty: 30 TABLET | Refills: 3 | Status: SHIPPED | OUTPATIENT
Start: 2019-03-25 | End: 2019-05-20 | Stop reason: SDUPTHER

## 2019-03-28 ENCOUNTER — TELEPHONE (OUTPATIENT)
Dept: ADMINISTRATIVE | Age: 54
End: 2019-03-28

## 2019-03-28 NOTE — TELEPHONE ENCOUNTER
Patient called today stating he is not able to keep the appointment and needed to r/s. He is r/s for 4/16 at 3:30. He needed an afternoon appointment.

## 2019-04-04 ENCOUNTER — OFFICE VISIT (OUTPATIENT)
Dept: ORTHOPEDIC SURGERY | Age: 54
End: 2019-04-04
Payer: COMMERCIAL

## 2019-04-04 VITALS — BODY MASS INDEX: 33.18 KG/M2 | HEIGHT: 72 IN | WEIGHT: 245 LBS

## 2019-04-04 DIAGNOSIS — M17.12 PRIMARY OSTEOARTHRITIS OF LEFT KNEE: ICD-10-CM

## 2019-04-04 DIAGNOSIS — M75.111 INCOMPLETE TEAR OF RIGHT ROTATOR CUFF: Primary | ICD-10-CM

## 2019-04-04 PROCEDURE — G8427 DOCREV CUR MEDS BY ELIG CLIN: HCPCS | Performed by: ORTHOPAEDIC SURGERY

## 2019-04-04 PROCEDURE — 1036F TOBACCO NON-USER: CPT | Performed by: ORTHOPAEDIC SURGERY

## 2019-04-04 PROCEDURE — 3017F COLORECTAL CA SCREEN DOC REV: CPT | Performed by: ORTHOPAEDIC SURGERY

## 2019-04-04 PROCEDURE — 20610 DRAIN/INJ JOINT/BURSA W/O US: CPT | Performed by: ORTHOPAEDIC SURGERY

## 2019-04-04 PROCEDURE — 99214 OFFICE O/P EST MOD 30 MIN: CPT | Performed by: ORTHOPAEDIC SURGERY

## 2019-04-04 PROCEDURE — 99401 PREV MED CNSL INDIV APPRX 15: CPT | Performed by: ORTHOPAEDIC SURGERY

## 2019-04-04 PROCEDURE — G8417 CALC BMI ABV UP PARAM F/U: HCPCS | Performed by: ORTHOPAEDIC SURGERY

## 2019-04-04 RX ORDER — TRIAMCINOLONE ACETONIDE 40 MG/ML
40 INJECTION, SUSPENSION INTRA-ARTICULAR; INTRAMUSCULAR ONCE
Status: COMPLETED | OUTPATIENT
Start: 2019-04-04 | End: 2019-04-04

## 2019-04-04 RX ADMIN — TRIAMCINOLONE ACETONIDE 40 MG: 40 INJECTION, SUSPENSION INTRA-ARTICULAR; INTRAMUSCULAR at 16:05

## 2019-04-04 NOTE — PROGRESS NOTES
Chief Complaint   Patient presents with    Knee Pain     Left knee MRI follow up    Shoulder Pain     Right shoulder MRI follow up       HPI:    Patient is 46 y.o. male complaining of right shoulder and arm pain. He  admits to constant pain for 6+ months without injury. He also states that all his fingers are tingling. He denies any specific injury. Previous treatments include injections Dr. Michelle Turcios. He states that he did receive relief after the shoulder injections but it increasingly started to diminish. He states the only thing that makes him feel relief at this point is elevating his hand over his head. Patient also complains of left knee pain for years. Patient states he was diagnosed with a meniscus tear and treated by another physician but patient still complains of pain and instability despite taking ibuprofen. He returns today for results of MRI of shoulder and knee. ROS:    Skin: (-) rash,(-) psoriasis,(-) eczema, (-)skin cancer. Neurologic: (-)numbness, (-)tingling, (-)headaches, (-) LOC. Cardiovascular: (-) Chest pain, (-) swelling in legs/feet, (-) SOB, (-) cramping in legs/feet with walking.     All other review of systems negative except stated above or in HPI      Past Medical History:   Diagnosis Date    Anxiety     Blood in stool     Chronic back pain     Constipation     Headache(784.0)     History of dental problems     Hyperlipidemia     Musculoskeletal symptom     Neck pain     Neck stiffness     Type II or unspecified type diabetes mellitus without mention of complication, not stated as uncontrolled     Weakness      Past Surgical History:   Procedure Laterality Date    ENDOSCOPY, COLON, DIAGNOSTIC      FRACTURE SURGERY Left     femur    KNEE ARTHROSCOPY Left 07/18/2016    medial lateral menisectomy/synovectomy/chodroplasty    TONSILLECTOMY         Current Outpatient Medications:     metFORMIN (GLUCOPHAGE) 1000 MG tablet, take 1 tablet by mouth twice a day with food, Disp: 60 tablet, Rfl: 3    simvastatin (ZOCOR) 20 MG tablet, Take 1 tablet by mouth every evening, Disp: 30 tablet, Rfl: 3    aspirin (RA ASPIRIN EC ADULT LOW ST) 81 MG EC tablet, take 1 tablet by mouth once daily, Disp: 30 tablet, Rfl: 3    alogliptin (NESINA) 25 MG TABS tablet, take 1 tablet by mouth once daily, Disp: 30 tablet, Rfl: 3    ammonium lactate (LAC-HYDRIN) 12 % lotion, apply to affected area twice a day, Disp: , Rfl: 0    meloxicam (MOBIC) 15 MG tablet, Take 1 tablet by mouth daily, Disp: 30 tablet, Rfl: 3    diclofenac sodium 1 % GEL, Apply 2 g topically 4 times daily, Disp: 2 Tube, Rfl: 0    Glucose Blood (BLOOD GLUCOSE TEST STRIPS) STRP, FBS daily. Please dispense Freestyle brand, Disp: 100 strip, Rfl: 11    Alcohol Swabs PADS, 1 Units by Does not apply route as needed (checking glucose), Disp: 100 each, Rfl: 11    Lancets MISC, Test once daily, Disp: 100 each, Rfl: 3  Allergies   Allergen Reactions    Tobramycin Swelling     Social History     Socioeconomic History    Marital status:      Spouse name: Not on file    Number of children: Not on file    Years of education: Not on file    Highest education level: Not on file   Occupational History    Occupation: [employer]   Social Needs    Financial resource strain: Not on file    Food insecurity:     Worry: Not on file     Inability: Not on file    Transportation needs:     Medical: Not on file     Non-medical: Not on file   Tobacco Use    Smoking status: Never Smoker    Smokeless tobacco: Never Used   Substance and Sexual Activity    Alcohol use:  Yes     Alcohol/week: 0.0 oz     Comment: rare    Drug use: No    Sexual activity: Yes     Partners: Female   Lifestyle    Physical activity:     Days per week: Not on file     Minutes per session: Not on file    Stress: Not on file   Relationships    Social connections:     Talks on phone: Not on file     Gets together: Not on file     Attends Oriental orthodox service: Not on file     Active member of club or organization: Not on file     Attends meetings of clubs or organizations: Not on file     Relationship status: Not on file    Intimate partner violence:     Fear of current or ex partner: Not on file     Emotionally abused: Not on file     Physically abused: Not on file     Forced sexual activity: Not on file   Other Topics Concern    Not on file   Social History Narrative    Not on file     Family History   Problem Relation Age of Onset    Diabetes Father     High Blood Pressure Father     Heart Disease Father     Stroke Father     Diabetes Mother     Heart Disease Mother     Cancer Brother 54        colon    High Blood Pressure Brother     Cancer Maternal Cousin 40        colon    Cancer Sister     Diabetes Sister     Heart Disease Sister     Stroke Sister     Diabetes Maternal Aunt     Heart Disease Maternal Aunt     High Blood Pressure Maternal Aunt     High Cholesterol Maternal Aunt     Stroke Maternal Aunt     Diabetes Maternal Uncle     Heart Disease Maternal Uncle     High Blood Pressure Maternal Uncle     High Cholesterol Maternal Uncle     Stroke Maternal Uncle     Diabetes Paternal Aunt     Heart Disease Paternal Aunt     High Blood Pressure Paternal Aunt     High Cholesterol Paternal Aunt     Stroke Paternal Aunt     Diabetes Paternal Uncle     Heart Disease Paternal Uncle     High Blood Pressure Paternal Uncle     High Cholesterol Paternal Uncle     Stroke Paternal Uncle     Diabetes Maternal Grandmother     High Blood Pressure Maternal Grandmother     High Cholesterol Maternal Grandmother     Cancer Maternal Grandfather     Diabetes Maternal Grandfather     High Blood Pressure Maternal Grandfather     High Cholesterol Maternal Grandfather     Stroke Maternal Grandfather     Diabetes Paternal Grandfather     High Blood Pressure Paternal Grandfather     High Cholesterol Paternal Grandfather            Physical Exam:    Ht 6' (1.829 m)   Wt 245 lb (111.1 kg)   BMI 33.23 kg/m²     GENERAL: alert, appears stated age, cooperative, no acute distress    HEENT: Head is normocephalic, atraumatic. PERRLA. SKIN: Clean, dry, intact. There is not any cellulitis or cutaneous lesions noted in the lower extremities    PULMONARY: breathing is regular and unlabored, no acute distress    CV: The bilateral upper and lower extremities are warm and well-perfused with brisk capillary refill. 2+ pulses UE and LE bilateral.     PSYCHIATRY: Pleasant mood, appropriate behavior, follows commands    NEURO: Sensation is intact distally with light touch with no alteration. Motor exam of the lower extremities show quadriceps, hamstrings, foot dorsiflexion and plantarflexion grossly intact 5/5. LYMPH: No lymphedema present distally in upper or lower extremity. MUSCULOSKELETAL:  Shoulder Exam:  Examination of the right shoulder shows: There is not a deformity. There is not erythema. There is not soft tissue swelling. Deltoid region is not tender to palpation. AC Joint is not tender to palpation. Clavicle is not tender to palpation. Bicipital Groove is not tender to palpation. Pectoralis  is not tender to palpation. Scapula/ trapezius is  tender to palpation. There is  weakness with supraspinatus testing. There is  pain with supraspinatus testing. Yergason Test negative. Drop Arm Test positive. Apprehension Test negative. Cross Arm Test positive. Supraspinatus Test positive. Shoulder ROM- 140/140/60    Spurling test is positive      Examination left knee reveals range motion 0- 120° with pain. Pain along the posterior medial joint line. Celestina's test is positive. No varus valgus instability. Lachman test is negative.     [facility]agin2019  LOCATION: 100       EXAM: XR SHOULDER RIGHT (MIN 2 VIEWS)       COMPARISON: None       HISTORY: Extremity injury with pain.        TECHNIQUE: 3 views of the right shoulder were obtained.      FINDINGS:   The bones, joints, and soft tissues are within normal limits. There is   no evidence of fracture or malalignment. No radiopaque foreign body   noted.          1/15/2018    Impression   No acute findings. Reading location:  200       HISTORY: left knee pain       3 views of the left knee were obtained. There is no acute fracture   dislocation left knee. No joint effusion is noted. There are mild   degenerative changes of the right knee involving the patellofemoral   joint and medial compartment.       There is stable exostosis arising from the superior medial femoral   condyle likely representing  old trauma.           Impression   1. There is no acute fracture or dislocation of the left knee. .   2. Mild degenerative changes of the left knee. Mri Shoulder Right Wo Contrast    Result Date: 3/20/2019  Reading location:  200 Indication: Right shoulder pain. Comparison: None available. Technique: Multiplanar, multisequence MR imaging of the right shoulder was performed without administration of intravenous or intra-articular contrast. FINDINGS: The supraspinatus, subscapularis, and teres minor tendons are intact. There is mild increased thickening of the distal fibers of the infraspinatus tendon, compatible with moderate tendinopathy. No full-thickness rotator cuff tear is identified. The muscles of the rotator cuff demonstrate normal muscular volume. The extra-articular biceps tendon is within the bicipital groove. There is mild increased signal of the intra-articular biceps tendon, suggestive of mild tendinopathy. Lack of intra-articular contrast limits evaluation of the labrum. There is heterogeneity of the superior labrum extending anteriorly and posteriorly with a questionable paralabral cyst along the anterosuperior labrum. There is suggestion of an additional small paralabral cyst along the posterior labrum (series 3, image 15). The articular cartilage is maintained.  There is no significant joint effusion. There is a trace amount of fluid within the subacromial/subdeltoid bursa. The inferior glenohumeral ligament is intact. There is moderate acromioclavicular joint arthrosis. There is no evidence of fracture or osteonecrosis. 1. Moderate tendinopathy of the infraspinatus tendon. No full-thickness rotator cuff tear is identified. 2. Mild tendinopathy of the intra-articular biceps tendon. 3. Heterogeneity of the labrum with a small paralabral cyst along the posterior labrum and likely additional paralabral cyst along the anterosuperior labrum, compatible with a tear. 3. Moderate acromioclavicular joint arthrosis with trace subacromial/subdeltoid bursitis. Mri Knee Left Wo Contrast    Result Date: 3/21/2019  Reading location:  200 Indication: Left knee pain, evaluate for medial meniscus tear, history of previous surgery. Comparison: Left knee radiograph from 11/15/2018. Technique: Multiplanar, multisequence MR imaging of the left knee was performed without administration of intravenous contrast. FINDINGS: The anterior cruciate ligament is intact with diffuse increased signals, suggestive of mucoid degeneration. The posterior cruciate ligament is intact. There is susceptibility artifact at the femoral attachment of the medial collateral ligament related to previous repair. The medial collateral ligament is grossly intact. The iliotibial band, fibular collateral ligament, and biceps femoris tendon are intact. The popliteus muscle and tendon are intact. There is increased signal within the body and posterior horn of the medial meniscus, possibly related to postsurgical changes. There is blunting of the free edge of the body. There is increased linear fluid signal within the free edge of the posterior horn (series 6, image 11), suspicious for a recurrent tear. The posterior root attachment is intact.  There is diffuse full-thickness cartilage loss along the medial femoral condyle and lateral tibial plateau with focal areas of subchondral reactive change. The lateral meniscus and posterior root attachment are intact. The lateral compartment cartilage is intact. There is diffuse full-thickness cartilage loss along the medial patellar facet. The trochlear cartilage is intact. The quadriceps and patellar tendons are intact. There is a small joint effusion. There is no Baker's cyst. There are linear areas of fibrosis within Hoffa's fat pad related to previous surgery. There is no evidence of fracture or osteonecrosis. There is an area of heterogeneous signal within the distal femur medially with susceptibility artifact (series 4, image 17), possibly related to postsurgical changes or an enchondroma. No definite corresponding sclerotic lesion is identified on the previous left knee radiographs. 1. Postsurgical changes from prior medial collateral ligament repair. 2. Suggestion of postsurgical changes of the medial meniscus with a fluid-filled defect at the free edge of the posterior horn, suspicious for a recurrent tear. 3. Diffuse grade 3/4 chondrosis within the medial compartment and diffuse grade 3 chondrosis along the medial patellar facet. 4. Heterogeneous signal within the distal femur medially with susceptibility artifact, possibly related to postsurgical changes or an enchondroma. 5. Mucoid degeneration of the anterior cruciate ligament. Akila Tho was seen today for knee pain and shoulder pain. Diagnoses and all orders for this visit:    Incomplete tear of right rotator cuff  -     MN ARTHROCENTESIS ASPIR&/INJ MAJOR JT/BURSA W/O US    Primary osteoarthritis of left knee  -     MN ARTHROCENTESIS ASPIR&/INJ MAJOR JT/BURSA W/O US    BMI 33.0-33.9,adult    Other orders  -     triamcinolone acetonide (KENALOG-40) injection 40 mg  -     triamcinolone acetonide (KENALOG-40) injection 40 mg      Patient seen and examined. X-rays reviewed of the left knee and right shoulder.  Patient appears to have pain consistent with cervical radiculopathy but also may have a component of rotator cuff tendinitis due to having good results with cortisone injections in the past. At this point however patient should follow-up with physical medicine for evaluation of cervical radiculopathy before proceeding with anything surgical regards to the shoulder. MRI recommended the meantime for evaluation of shoulder pathology. Patient complains of left knee pain and instability stemming from a possible meniscus tear several years ago. MRI were reviewed today in detail. She will options discussed. Patient like to see if cortisone will relieve his pain. Procedure Note Cortisone Injection to Shoulder    The right shoulder was identified as the injection site. The risk and benefits of a cortisone injection were explained and the patient consented to the injection. Under sterile conditions, the shoulder subacromial space was injected with a mixture of 40mg of Kenelog and Marcaine without complication. A sterile bandage was applied. Procedure Note Knee Cortisone Injection    The left knee was identified as the injection site. The risk and benefits of a cortisone injection were explained and the patient consented to the injection. Under sterile conditions, the knee was injected with a mixture of 40 mg of Kenalog and Marcaine without complication. A sterile bandage was applied. Saba Pain Cream    Return to clinic as needed    In a 15 minute assessment and discussion, patient was counseled on continued weight loss, diet, and physical activity relating to this condition. He was educated with options in detail including nutrition, joining a health club/ weight loss program, and use of cardio equipment such as the Arc Trainer and the importance of use as well as range of motion and HEP exercises for weight loss. Francisco Daugherty, DO      25 minutes was spent with patient. 50% or greater was spent counseling the patient.

## 2019-05-20 ENCOUNTER — OFFICE VISIT (OUTPATIENT)
Dept: FAMILY MEDICINE CLINIC | Age: 54
End: 2019-05-20
Payer: COMMERCIAL

## 2019-05-20 VITALS
WEIGHT: 241.5 LBS | OXYGEN SATURATION: 98 % | HEART RATE: 97 BPM | BODY MASS INDEX: 32.71 KG/M2 | SYSTOLIC BLOOD PRESSURE: 138 MMHG | TEMPERATURE: 98 F | RESPIRATION RATE: 16 BRPM | DIASTOLIC BLOOD PRESSURE: 82 MMHG | HEIGHT: 72 IN

## 2019-05-20 DIAGNOSIS — Z20.818 STREP THROAT EXPOSURE: ICD-10-CM

## 2019-05-20 DIAGNOSIS — E78.5 HYPERLIPIDEMIA ASSOCIATED WITH TYPE 2 DIABETES MELLITUS (HCC): Chronic | ICD-10-CM

## 2019-05-20 DIAGNOSIS — J02.9 ACUTE PHARYNGITIS, UNSPECIFIED ETIOLOGY: ICD-10-CM

## 2019-05-20 DIAGNOSIS — E11.69 HYPERLIPIDEMIA ASSOCIATED WITH TYPE 2 DIABETES MELLITUS (HCC): Chronic | ICD-10-CM

## 2019-05-20 DIAGNOSIS — E11.42 TYPE 2 DIABETES MELLITUS WITH DIABETIC POLYNEUROPATHY, WITHOUT LONG-TERM CURRENT USE OF INSULIN (HCC): Primary | Chronic | ICD-10-CM

## 2019-05-20 DIAGNOSIS — M12.811 ROTATOR CUFF ARTHROPATHY OF RIGHT SHOULDER: ICD-10-CM

## 2019-05-20 PROBLEM — M12.812 ROTATOR CUFF ARTHROPATHY OF LEFT SHOULDER: Status: ACTIVE | Noted: 2019-05-20

## 2019-05-20 PROBLEM — M25.511 ACUTE PAIN OF RIGHT SHOULDER: Status: RESOLVED | Noted: 2019-01-23 | Resolved: 2019-05-20

## 2019-05-20 LAB
CREATININE URINE POCT: 300
HBA1C MFR BLD: 9.3 %
MICROALBUMIN/CREAT 24H UR: 80 MG/G{CREAT}
MICROALBUMIN/CREAT UR-RTO: NORMAL
S PYO AG THROAT QL: NORMAL

## 2019-05-20 PROCEDURE — 1036F TOBACCO NON-USER: CPT | Performed by: FAMILY MEDICINE

## 2019-05-20 PROCEDURE — 3046F HEMOGLOBIN A1C LEVEL >9.0%: CPT | Performed by: FAMILY MEDICINE

## 2019-05-20 PROCEDURE — 3017F COLORECTAL CA SCREEN DOC REV: CPT | Performed by: FAMILY MEDICINE

## 2019-05-20 PROCEDURE — 2022F DILAT RTA XM EVC RTNOPTHY: CPT | Performed by: FAMILY MEDICINE

## 2019-05-20 PROCEDURE — 99214 OFFICE O/P EST MOD 30 MIN: CPT | Performed by: FAMILY MEDICINE

## 2019-05-20 PROCEDURE — 82044 UR ALBUMIN SEMIQUANTITATIVE: CPT | Performed by: FAMILY MEDICINE

## 2019-05-20 PROCEDURE — G8417 CALC BMI ABV UP PARAM F/U: HCPCS | Performed by: FAMILY MEDICINE

## 2019-05-20 PROCEDURE — 83036 HEMOGLOBIN GLYCOSYLATED A1C: CPT | Performed by: FAMILY MEDICINE

## 2019-05-20 PROCEDURE — G8427 DOCREV CUR MEDS BY ELIG CLIN: HCPCS | Performed by: FAMILY MEDICINE

## 2019-05-20 PROCEDURE — 87880 STREP A ASSAY W/OPTIC: CPT | Performed by: FAMILY MEDICINE

## 2019-05-20 RX ORDER — GLIPIZIDE 2.5 MG/1
2.5 TABLET, EXTENDED RELEASE ORAL DAILY
Qty: 30 TABLET | Refills: 3 | Status: SHIPPED | OUTPATIENT
Start: 2019-05-20 | End: 2019-08-20 | Stop reason: SDUPTHER

## 2019-05-20 RX ORDER — SIMVASTATIN 20 MG
20 TABLET ORAL EVERY EVENING
Qty: 30 TABLET | Refills: 3 | Status: SHIPPED | OUTPATIENT
Start: 2019-05-20 | End: 2019-08-20 | Stop reason: SDUPTHER

## 2019-05-20 RX ORDER — ALOGLIPTIN 25 MG/1
TABLET, FILM COATED ORAL
Qty: 30 TABLET | Refills: 3 | Status: SHIPPED | OUTPATIENT
Start: 2019-05-20 | End: 2019-08-20 | Stop reason: SDUPTHER

## 2019-05-20 ASSESSMENT — ENCOUNTER SYMPTOMS
COUGH: 1
NAUSEA: 0
SINUS PAIN: 0
SINUS PRESSURE: 0
CONSTIPATION: 0
DIARRHEA: 0
RHINORRHEA: 1
VOMITING: 0
SORE THROAT: 1
SHORTNESS OF BREATH: 0

## 2019-05-20 NOTE — PROGRESS NOTES
Negative for chills and fever. HENT: Positive for congestion, rhinorrhea and sore throat. Negative for sinus pressure and sinus pain. Respiratory: Positive for cough. Negative for shortness of breath. Cardiovascular: Negative for chest pain. Gastrointestinal: Negative for constipation, diarrhea, nausea and vomiting. Musculoskeletal: Positive for arthralgias (improved). Prior to Visit Medications    Medication Sig Taking? Authorizing Provider   metFORMIN (GLUCOPHAGE) 1000 MG tablet take 1 tablet by mouth twice a day with food Yes Tae Carbajal DO   simvastatin (ZOCOR) 20 MG tablet Take 1 tablet by mouth every evening Yes Tae Carbajal DO   aspirin (RA ASPIRIN EC ADULT LOW ST) 81 MG EC tablet take 1 tablet by mouth once daily Yes Tae Carbajal DO   alogliptin (NESINA) 25 MG TABS tablet take 1 tablet by mouth once daily Yes Tae Carbajal DO   ammonium lactate (LAC-HYDRIN) 12 % lotion apply to affected area twice a day Yes Historical Provider, MD   diclofenac sodium 1 % GEL Apply 2 g topically 4 times daily Yes Tae Carbajal DO   Glucose Blood (BLOOD GLUCOSE TEST STRIPS) STRP FBS daily. Please dispense Freestyle brand Yes Chiara Cables, DO   Alcohol Swabs PADS 1 Units by Does not apply route as needed (checking glucose) Yes Chiara Cables, DO   Lancets MISC Test once daily Yes Jeraline Asp, DO   meloxicam (MOBIC) 15 MG tablet Take 1 tablet by mouth daily  Yessenia Anna, DO        Social History     Tobacco Use    Smoking status: Never Smoker    Smokeless tobacco: Never Used   Substance Use Topics    Alcohol use:  Yes     Alcohol/week: 0.0 oz     Comment: rare        Past Surgical History:   Procedure Laterality Date    ENDOSCOPY, COLON, DIAGNOSTIC      FRACTURE SURGERY Left     femur    KNEE ARTHROSCOPY Left 07/18/2016    medial lateral menisectomy/synovectomy/chodroplasty    TONSILLECTOMY         Vitals:    05/20/19 1717   BP: 138/82   Pulse: 97   Resp: 16   Temp: 98 °F Date Time Provider Riri Perez   8/20/2019  4:30 PM DO [facility]         --Ovidio Owens DO on 5/20/2019 at 5:26 PM

## 2019-05-20 NOTE — PATIENT INSTRUCTIONS
Patient Education        Learning About Diabetes Food Guidelines  Your Care Instructions    Meal planning is important to manage diabetes. It helps keep your blood sugar at a target level (which you set with your doctor). You don't have to eat special foods. You can eat what your family eats, including sweets once in a while. But you do have to pay attention to how often you eat and how much you eat of certain foods. You may want to work with a dietitian or a certified diabetes educator (CDE) to help you plan meals and snacks. A dietitian or CDE can also help you lose weight if that is one of your goals. What should you know about eating carbs? Managing the amount of carbohydrate (carbs) you eat is an important part of healthy meals when you have diabetes. Carbohydrate is found in many foods. · Learn which foods have carbs. And learn the amounts of carbs in different foods. ? Bread, cereal, pasta, and rice have about 15 grams of carbs in a serving. A serving is 1 slice of bread (1 ounce), ½ cup of cooked cereal, or 1/3 cup of cooked pasta or rice. ? Fruits have 15 grams of carbs in a serving. A serving is 1 small fresh fruit, such as an apple or orange; ½ of a banana; ½ cup of cooked or canned fruit; ½ cup of fruit juice; 1 cup of melon or raspberries; or 2 tablespoons of dried fruit. ? Milk and no-sugar-added yogurt have 15 grams of carbs in a serving. A serving is 1 cup of milk or 2/3 cup of no-sugar-added yogurt. ? Starchy vegetables have 15 grams of carbs in a serving. A serving is ½ cup of mashed potatoes or sweet potato; 1 cup winter squash; ½ of a small baked potato; ½ cup of cooked beans; or ½ cup cooked corn or green peas. · Learn how much carbs to eat each day and at each meal. A dietitian or CDE can teach you how to keep track of the amount of carbs you eat. This is called carbohydrate counting. · If you are not sure how to count carbohydrate grams, use the Plate Method to plan meals.  It is a good, quick way to make sure that you have a balanced meal. It also helps you spread carbs throughout the day. ? Divide your plate by types of foods. Put non-starchy vegetables on half the plate, meat or other protein food on one-quarter of the plate, and a grain or starchy vegetable in the final quarter of the plate. To this you can add a small piece of fruit and 1 cup of milk or yogurt, depending on how many carbs you are supposed to eat at a meal.  · Try to eat about the same amount of carbs at each meal. Do not \"save up\" your daily allowance of carbs to eat at one meal.  · Proteins have very little or no carbs per serving. Examples of proteins are beef, chicken, turkey, fish, eggs, tofu, cheese, cottage cheese, and peanut butter. A serving size of meat is 3 ounces, which is about the size of a deck of cards. Examples of meat substitute serving sizes (equal to 1 ounce of meat) are 1/4 cup of cottage cheese, 1 egg, 1 tablespoon of peanut butter, and ½ cup of tofu. How can you eat out and still eat healthy? · Learn to estimate the serving sizes of foods that have carbohydrate. If you measure food at home, it will be easier to estimate the amount in a serving of restaurant food. · If the meal you order has too much carbohydrate (such as potatoes, corn, or baked beans), ask to have a low-carbohydrate food instead. Ask for a salad or green vegetables. · If you use insulin, check your blood sugar before and after eating out to help you plan how much to eat in the future. · If you eat more carbohydrate at a meal than you had planned, take a walk or do other exercise. This will help lower your blood sugar. What else should you know? · Limit saturated fat, such as the fat from meat and dairy products. This is a healthy choice because people who have diabetes are at higher risk of heart disease. So choose lean cuts of meat and nonfat or low-fat dairy products.  Use olive or canola oil instead of butter or shortening when cooking. · Don't skip meals. Your blood sugar may drop too low if you skip meals and take insulin or certain medicines for diabetes. · Check with your doctor before you drink alcohol. Alcohol can cause your blood sugar to drop too low. Alcohol can also cause a bad reaction if you take certain diabetes medicines. Follow-up care is a key part of your treatment and safety. Be sure to make and go to all appointments, and call your doctor if you are having problems. It's also a good idea to know your test results and keep a list of the medicines you take. Where can you learn more? Go to https://Rocket.Lapepiceweb.[employer]. org and sign in to your [employer] account. Enter H833 in the MoPix box to learn more about \"Learning About Diabetes Food Guidelines. \"     If you do not have an account, please click on the \"Sign Up Now\" link. Current as of: July 25, 2018  Content Version: 12.0  © 4993-9210 [employer]. Care instructions adapted under license by [facility] ([facility]). If you have questions about a medical condition or this instruction, always ask your healthcare professional. [place] [address] any warranty or liability for your use of this information.

## 2019-06-19 ENCOUNTER — TELEPHONE (OUTPATIENT)
Dept: ADMINISTRATIVE | Age: 54
End: 2019-06-19

## 2019-07-03 ENCOUNTER — OFFICE VISIT (OUTPATIENT)
Dept: ORTHOPEDIC SURGERY | Age: 54
End: 2019-07-03
Payer: COMMERCIAL

## 2019-07-03 VITALS — HEIGHT: 72 IN | BODY MASS INDEX: 32.51 KG/M2 | TEMPERATURE: 98 F | WEIGHT: 240 LBS

## 2019-07-03 DIAGNOSIS — M17.12 PRIMARY OSTEOARTHRITIS OF LEFT KNEE: ICD-10-CM

## 2019-07-03 DIAGNOSIS — M75.111 NONTRAUMATIC INCOMPLETE TEAR OF RIGHT ROTATOR CUFF: Primary | ICD-10-CM

## 2019-07-03 PROCEDURE — G8417 CALC BMI ABV UP PARAM F/U: HCPCS | Performed by: ORTHOPAEDIC SURGERY

## 2019-07-03 PROCEDURE — 99214 OFFICE O/P EST MOD 30 MIN: CPT | Performed by: ORTHOPAEDIC SURGERY

## 2019-07-03 PROCEDURE — 3017F COLORECTAL CA SCREEN DOC REV: CPT | Performed by: ORTHOPAEDIC SURGERY

## 2019-07-03 PROCEDURE — 20610 DRAIN/INJ JOINT/BURSA W/O US: CPT | Performed by: ORTHOPAEDIC SURGERY

## 2019-07-03 PROCEDURE — G8427 DOCREV CUR MEDS BY ELIG CLIN: HCPCS | Performed by: ORTHOPAEDIC SURGERY

## 2019-07-03 PROCEDURE — 1036F TOBACCO NON-USER: CPT | Performed by: ORTHOPAEDIC SURGERY

## 2019-07-03 RX ORDER — TRIAMCINOLONE ACETONIDE 40 MG/ML
40 INJECTION, SUSPENSION INTRA-ARTICULAR; INTRAMUSCULAR ONCE
Status: COMPLETED | OUTPATIENT
Start: 2019-07-03 | End: 2019-07-03

## 2019-07-03 RX ADMIN — TRIAMCINOLONE ACETONIDE 40 MG: 40 INJECTION, SUSPENSION INTRA-ARTICULAR; INTRAMUSCULAR at 15:52

## 2019-07-03 NOTE — PROGRESS NOTES
Talks on phone: Not on file     Gets together: Not on file     Attends Hoahaoism service: Not on file     Active member of club or organization: Not on file     Attends meetings of clubs or organizations: Not on file     Relationship status: Not on file    Intimate partner violence:     Fear of current or ex partner: Not on file     Emotionally abused: Not on file     Physically abused: Not on file     Forced sexual activity: Not on file   Other Topics Concern    Not on file   Social History Narrative    Not on file     Family History   Problem Relation Age of Onset    Diabetes Father     High Blood Pressure Father     Heart Disease Father     Stroke Father     Diabetes Mother     Heart Disease Mother     Cancer Brother 54        colon    High Blood Pressure Brother     Cancer Maternal Cousin 40        colon    Cancer Sister     Diabetes Sister     Heart Disease Sister     Stroke Sister     Diabetes Maternal Aunt     Heart Disease Maternal Aunt     High Blood Pressure Maternal Aunt     High Cholesterol Maternal Aunt     Stroke Maternal Aunt     Diabetes Maternal Uncle     Heart Disease Maternal Uncle     High Blood Pressure Maternal Uncle     High Cholesterol Maternal Uncle     Stroke Maternal Uncle     Diabetes Paternal Aunt     Heart Disease Paternal Aunt     High Blood Pressure Paternal Aunt     High Cholesterol Paternal Aunt     Stroke Paternal Aunt     Diabetes Paternal Uncle     Heart Disease Paternal Uncle     High Blood Pressure Paternal Uncle     High Cholesterol Paternal Uncle     Stroke Paternal Uncle     Diabetes Maternal Grandmother     High Blood Pressure Maternal Grandmother     High Cholesterol Maternal Grandmother     Cancer Maternal Grandfather     Diabetes Maternal Grandfather     High Blood Pressure Maternal Grandfather     High Cholesterol Maternal Grandfather     Stroke Maternal Grandfather     Diabetes Paternal Grandfather     High Blood Pressure attachment is intact. There is diffuse full-thickness cartilage loss along the medial femoral condyle and lateral tibial plateau with focal areas of subchondral reactive change. The lateral meniscus and posterior root attachment are intact. The lateral compartment cartilage is intact. There is diffuse full-thickness cartilage loss along the medial patellar facet. The trochlear cartilage is intact. The quadriceps and patellar tendons are intact. There is a small joint effusion. There is no Baker's cyst. There are linear areas of fibrosis within Hoffa's fat pad related to previous surgery. There is no evidence of fracture or osteonecrosis. There is an area of heterogeneous signal within the distal femur medially with susceptibility artifact (series 4, image 17), possibly related to postsurgical changes or an enchondroma. No definite corresponding sclerotic lesion is identified on the previous left knee radiographs. 1. Postsurgical changes from prior medial collateral ligament repair. 2. Suggestion of postsurgical changes of the medial meniscus with a fluid-filled defect at the free edge of the posterior horn, suspicious for a recurrent tear. 3. Diffuse grade 3/4 chondrosis within the medial compartment and diffuse grade 3 chondrosis along the medial patellar facet. 4. Heterogeneous signal within the distal femur medially with susceptibility artifact, possibly related to postsurgical changes or an enchondroma. 5. Mucoid degeneration of the anterior cruciate ligament. Andrew Severino was seen today for shoulder pain and knee pain. Diagnoses and all orders for this visit:    Nontraumatic incomplete tear of right rotator cuff  -     [place] ARTHROCENTESIS ASPIR&/INJ MAJOR JT/BURSA W/O US    Primary osteoarthritis of left knee    Other orders  -     triamcinolone acetonide (KENALOG-40) injection 40 mg      Patient seen and examined. X-rays reviewed of the left knee and right shoulder.  Patient appears to have pain consistent with cervical radiculopathy but also may have a component of rotator cuff tendinitis due to having good results with cortisone injections in the past. At this point however patient should follow-up with physical medicine for evaluation of cervical radiculopathy before proceeding with anything surgical regards to the shoulder. MRI recommended the meantime for evaluation of shoulder pathology. Patient complains of left knee pain and instability stemming from a possible meniscus tear several years ago. MRI were reviewed today in detail. options discussed. Patient like to see if cortisone will relieve his pain. Procedure Note Cortisone Injection to Shoulder    The right shoulder was identified as the injection site. The risk and benefits of a cortisone injection were explained and the patient consented to the injection. Under sterile conditions, the shoulder subacromial space was injected with a mixture of 40mg of Kenelog and Marcaine without complication. A sterile bandage was applied. The patient has failed conservative measures such as NSAIDS, HEP, and cortisone injections. He is an excellent candidate for viscous supplementation injections including Suparz in the Left knee. We will contact the patient's insurance company and see them back in the office once we have received approval.          Nia Rom, DO      25 minutes was spent with patient. 50% or greater was spent counseling the patient.

## 2019-07-23 DIAGNOSIS — M17.12 PRIMARY OSTEOARTHRITIS OF LEFT KNEE: Primary | ICD-10-CM

## 2019-08-05 DIAGNOSIS — M17.12 PRIMARY OSTEOARTHRITIS OF LEFT KNEE: Primary | ICD-10-CM

## 2019-08-05 DIAGNOSIS — G43.009 MIGRAINE WITHOUT AURA AND WITHOUT STATUS MIGRAINOSUS, NOT INTRACTABLE: ICD-10-CM

## 2019-08-05 RX ORDER — IBUPROFEN 800 MG/1
800 TABLET ORAL EVERY 6 HOURS PRN
Qty: 30 TABLET | Refills: 0 | Status: SHIPPED | OUTPATIENT
Start: 2019-08-05 | End: 2019-08-20 | Stop reason: SDUPTHER

## 2019-08-20 ENCOUNTER — OFFICE VISIT (OUTPATIENT)
Dept: FAMILY MEDICINE CLINIC | Age: 54
End: 2019-08-20
Payer: COMMERCIAL

## 2019-08-20 ENCOUNTER — OFFICE VISIT (OUTPATIENT)
Dept: ORTHOPEDIC SURGERY | Age: 54
End: 2019-08-20
Payer: COMMERCIAL

## 2019-08-20 VITALS
WEIGHT: 247 LBS | BODY MASS INDEX: 33.46 KG/M2 | HEIGHT: 72 IN | DIASTOLIC BLOOD PRESSURE: 66 MMHG | HEART RATE: 82 BPM | TEMPERATURE: 97.1 F | OXYGEN SATURATION: 96 % | SYSTOLIC BLOOD PRESSURE: 126 MMHG

## 2019-08-20 DIAGNOSIS — E66.09 CLASS 1 OBESITY DUE TO EXCESS CALORIES WITH SERIOUS COMORBIDITY AND BODY MASS INDEX (BMI) OF 33.0 TO 33.9 IN ADULT: ICD-10-CM

## 2019-08-20 DIAGNOSIS — E11.69 HYPERLIPIDEMIA ASSOCIATED WITH TYPE 2 DIABETES MELLITUS (HCC): Chronic | ICD-10-CM

## 2019-08-20 DIAGNOSIS — M19.90 ARTHRITIS: ICD-10-CM

## 2019-08-20 DIAGNOSIS — E11.42 TYPE 2 DIABETES MELLITUS WITH DIABETIC POLYNEUROPATHY, WITHOUT LONG-TERM CURRENT USE OF INSULIN (HCC): Primary | Chronic | ICD-10-CM

## 2019-08-20 DIAGNOSIS — E78.5 HYPERLIPIDEMIA ASSOCIATED WITH TYPE 2 DIABETES MELLITUS (HCC): Chronic | ICD-10-CM

## 2019-08-20 DIAGNOSIS — M17.12 PRIMARY OSTEOARTHRITIS OF LEFT KNEE: Primary | ICD-10-CM

## 2019-08-20 LAB — HBA1C MFR BLD: 8.1 %

## 2019-08-20 PROCEDURE — 99214 OFFICE O/P EST MOD 30 MIN: CPT | Performed by: FAMILY MEDICINE

## 2019-08-20 PROCEDURE — 2022F DILAT RTA XM EVC RTNOPTHY: CPT | Performed by: FAMILY MEDICINE

## 2019-08-20 PROCEDURE — 99999 PR OFFICE/OUTPT VISIT,PROCEDURE ONLY: CPT | Performed by: ORTHOPAEDIC SURGERY

## 2019-08-20 PROCEDURE — 3045F PR MOST RECENT HEMOGLOBIN A1C LEVEL 7.0-9.0%: CPT | Performed by: FAMILY MEDICINE

## 2019-08-20 PROCEDURE — 1036F TOBACCO NON-USER: CPT | Performed by: FAMILY MEDICINE

## 2019-08-20 PROCEDURE — 3017F COLORECTAL CA SCREEN DOC REV: CPT | Performed by: FAMILY MEDICINE

## 2019-08-20 PROCEDURE — 20610 DRAIN/INJ JOINT/BURSA W/O US: CPT | Performed by: ORTHOPAEDIC SURGERY

## 2019-08-20 PROCEDURE — G8427 DOCREV CUR MEDS BY ELIG CLIN: HCPCS | Performed by: FAMILY MEDICINE

## 2019-08-20 PROCEDURE — 83036 HEMOGLOBIN GLYCOSYLATED A1C: CPT | Performed by: FAMILY MEDICINE

## 2019-08-20 PROCEDURE — G8417 CALC BMI ABV UP PARAM F/U: HCPCS | Performed by: FAMILY MEDICINE

## 2019-08-20 RX ORDER — LANCETS 30 GAUGE
EACH MISCELLANEOUS
Qty: 100 EACH | Refills: 3 | Status: SHIPPED | OUTPATIENT
Start: 2019-08-20

## 2019-08-20 RX ORDER — ALOGLIPTIN 25 MG/1
TABLET, FILM COATED ORAL
Qty: 30 TABLET | Refills: 3 | Status: SHIPPED
Start: 2019-08-20 | End: 2020-03-24

## 2019-08-20 RX ORDER — GLIPIZIDE 2.5 MG/1
2.5 TABLET, EXTENDED RELEASE ORAL DAILY
Qty: 30 TABLET | Refills: 3 | Status: SHIPPED | OUTPATIENT
Start: 2019-08-20 | End: 2019-12-02

## 2019-08-20 RX ORDER — SIMVASTATIN 20 MG
20 TABLET ORAL EVERY EVENING
Qty: 30 TABLET | Refills: 3 | Status: SHIPPED
Start: 2019-08-20 | End: 2020-03-24

## 2019-08-20 RX ORDER — IBUPROFEN 800 MG/1
800 TABLET ORAL EVERY 6 HOURS PRN
Qty: 30 TABLET | Refills: 0 | Status: SHIPPED | OUTPATIENT
Start: 2019-08-20 | End: 2019-12-02 | Stop reason: SDUPTHER

## 2019-08-20 RX ORDER — GLUCOSAMINE HCL/CHONDROITIN SU 500-400 MG
CAPSULE ORAL
Qty: 100 STRIP | Refills: 11 | Status: SHIPPED | OUTPATIENT
Start: 2019-08-20

## 2019-08-20 RX ORDER — ASPIRIN 81 MG/1
TABLET ORAL
Qty: 30 TABLET | Refills: 11 | Status: SHIPPED
Start: 2019-08-20 | End: 2020-04-30 | Stop reason: SDUPTHER

## 2019-08-20 RX ORDER — BLOOD PRESSURE TEST KIT
1 KIT MISCELLANEOUS PRN
Qty: 100 EACH | Refills: 11 | Status: SHIPPED | OUTPATIENT
Start: 2019-08-20

## 2019-08-20 ASSESSMENT — ENCOUNTER SYMPTOMS
NAUSEA: 0
RHINORRHEA: 0
SHORTNESS OF BREATH: 0
SINUS PRESSURE: 0
DIARRHEA: 0
CONSTIPATION: 0
COUGH: 0
SORE THROAT: 0
VOMITING: 0
SINUS PAIN: 0

## 2019-08-20 NOTE — PROGRESS NOTES
Chief Complaint   Patient presents with    Injections         The following is a well known to me that is here for Left injections. He is here for Supartz#1. The knee was prepped in sterile fashion. Euflexxa 20 mg injected to Left knee. The patient tolerated the injections well and I will see the patient back in 1 week for repeat injections. Sarkis Doan was seen today for injections.     Diagnoses and all orders for this visit:    Primary osteoarthritis of left knee  -     [place] ARTHROCENTESIS ASPIR&/INJ MAJOR JT/BURSA W/O US    Other orders  -     sodium hyaluronate (SUPARTZ) injection 25 mg

## 2019-08-20 NOTE — PROGRESS NOTES
Past Surgical History:   Procedure Laterality Date    ENDOSCOPY, COLON, DIAGNOSTIC      FRACTURE SURGERY Left     femur    KNEE ARTHROSCOPY Left 07/18/2016    medial lateral menisectomy/synovectomy/chodroplasty    TONSILLECTOMY         Vitals:    08/20/19 1624   BP: 126/66   Pulse: 82   Temp: 97.1 °F (36.2 °C)   SpO2: 96%   Weight: 247 lb (112 kg)   Height: 6' (1.829 m)     Estimated body mass index is 33.5 kg/m² as calculated from the following:    Height as of this encounter: 6' (1.829 m). Weight as of this encounter: 247 lb (112 kg). Physical Exam   Constitutional: He is oriented to person, place, and time. He appears well-developed and well-nourished. No distress. HENT:   Head: Normocephalic and atraumatic. Right Ear: Hearing, tympanic membrane, external ear and ear canal normal.   Left Ear: Hearing, tympanic membrane and ear canal normal.   Mouth/Throat: Posterior oropharyngeal erythema present. No oropharyngeal exudate. Eyes: EOM are normal.   Neck: Normal range of motion. Cardiovascular: Normal rate and regular rhythm. No murmur heard. Pulmonary/Chest: Effort normal and breath sounds normal. No respiratory distress. He has no wheezes. Abdominal: Soft. He exhibits no distension. There is no tenderness. Musculoskeletal: He exhibits no edema or deformity. Neurological: He is alert and oriented to person, place, and time. Skin: Skin is warm. Psychiatric: He has a normal mood and affect. ASSESSMENT/PLAN:  Lisa Lipscomb was seen today for diabetes. Diagnoses and all orders for this visit:    Type 2 diabetes mellitus with diabetic polyneuropathy, without long-term current use of insulin (HCC)  -     Alcohol Swabs PADS; 1 Units by Does not apply route as needed (checking glucose)  -     alogliptin (NESINA) 25 MG TABS tablet; take 1 tablet by mouth once daily  -     blood glucose monitor strips; FBS daily.  Please dispense Freestyle brand  -     aspirin (RA ASPIRIN EC ADULT LOW ST)

## 2019-08-20 NOTE — PATIENT INSTRUCTIONS
studies, dulaglutide caused thyroid tumors or thyroid cancer. It is not known whether these effects would occur in people using regular doses. Ask your doctor about your risk. It is not known whether this medicine will harm an unborn baby. Tell your doctor if you are pregnant or plan to become pregnant. It may not be safe to breast-feed while using this medicine. Ask your doctor about any risk. Dulaglutide is not approved for use by anyone younger than 25years old. How should I use dulaglutide? Follow all directions on your prescription label and read all medication guides or instruction sheets. Use the medicine exactly as directed. Dulaglutide is injected under the skin once per week. Use dulaglutide on the same day each week at the same time of day. If you change your dosing day, allow at least 3 days to pass between doses. You may use dulaglutide with or without food. Read and carefully follow any Instructions for Use provided with your medicine. Do not use dulaglutide if you don't understand all instructions for proper use. Ask your doctor or pharmacist if you have questions. Your care provider will show you where to on your body to inject dulaglutide. Use a different place each time you give an injection. Do not inject into the same place two times in a row. Low blood sugar (hypoglycemia) can happen to everyone who has diabetes. Symptoms include headache, hunger, sweating, irritability, dizziness, nausea, fast heart rate, and feeling anxious or shaky. To quickly treat low blood sugar, always keep a fast-acting source of sugar with you such as fruit juice, hard candy, crackers, raisins, or non-diet soda. Your doctor can prescribe a glucagon emergency injection kit to use in case you have severe hypoglycemia and cannot eat or drink. Be sure your family and close friends know how to give you this injection in an emergency.   Also watch for signs of high blood sugar (hyperglycemia) such as increased

## 2019-08-27 ENCOUNTER — OFFICE VISIT (OUTPATIENT)
Dept: ORTHOPEDIC SURGERY | Age: 54
End: 2019-08-27
Payer: COMMERCIAL

## 2019-08-27 DIAGNOSIS — M17.12 PRIMARY OSTEOARTHRITIS OF LEFT KNEE: Primary | ICD-10-CM

## 2019-08-27 PROCEDURE — 20610 DRAIN/INJ JOINT/BURSA W/O US: CPT | Performed by: ORTHOPAEDIC SURGERY

## 2019-08-27 PROCEDURE — 99024 POSTOP FOLLOW-UP VISIT: CPT | Performed by: ORTHOPAEDIC SURGERY

## 2019-08-27 NOTE — PROGRESS NOTES
Chief Complaint   Patient presents with    Injections         The following is a well known to me that is here for Left injections. He is here for Supartz#1. The knee was prepped in sterile fashion. Supartz 25 mg injected to Left knee. The patient tolerated the injections well and I will see the patient back in 1 week for repeat injections. Aravind Reid was seen today for injections.     Diagnoses and all orders for this visit:    Primary osteoarthritis of left knee  -     [place] ARTHROCENTESIS ASPIR&/INJ MAJOR JT/BURSA W/O US    Other orders  -     sodium hyaluronate (SUPARTZ) injection 25 mg

## 2019-09-06 ENCOUNTER — OFFICE VISIT (OUTPATIENT)
Dept: ORTHOPEDIC SURGERY | Age: 54
End: 2019-09-06
Payer: COMMERCIAL

## 2019-09-06 DIAGNOSIS — M17.12 PRIMARY OSTEOARTHRITIS OF LEFT KNEE: Primary | ICD-10-CM

## 2019-09-06 PROCEDURE — 20610 DRAIN/INJ JOINT/BURSA W/O US: CPT | Performed by: ORTHOPAEDIC SURGERY

## 2019-09-06 NOTE — PROGRESS NOTES
Chief Complaint   Patient presents with    Injections     left knee supartz         The following is a well known to me that is here for Left injections. He is here for Supartz#3. The knee was prepped in sterile fashion. Supartz 25 mg injected to Left knee. The patient tolerated the injections well and I will see the patient back in 1 week for repeat injections. Obed Alvarez was seen today for injections.     Diagnoses and all orders for this visit:    Primary osteoarthritis of left knee  -     [place] ARTHROCENTESIS ASPIR&/INJ MAJOR JT/BURSA W/O US    Other orders  -     sodium hyaluronate (SUPARTZ) injection 25 mg

## 2019-09-13 ENCOUNTER — OFFICE VISIT (OUTPATIENT)
Dept: ORTHOPEDIC SURGERY | Age: 54
End: 2019-09-13
Payer: COMMERCIAL

## 2019-09-13 DIAGNOSIS — M17.12 PRIMARY OSTEOARTHRITIS OF LEFT KNEE: Primary | ICD-10-CM

## 2019-09-13 PROCEDURE — 99999 PR OFFICE/OUTPT VISIT,PROCEDURE ONLY: CPT | Performed by: ORTHOPAEDIC SURGERY

## 2019-09-13 PROCEDURE — 20610 DRAIN/INJ JOINT/BURSA W/O US: CPT | Performed by: ORTHOPAEDIC SURGERY

## 2019-09-20 ENCOUNTER — OFFICE VISIT (OUTPATIENT)
Dept: ORTHOPEDIC SURGERY | Age: 54
End: 2019-09-20
Payer: COMMERCIAL

## 2019-09-20 DIAGNOSIS — M17.12 PRIMARY OSTEOARTHRITIS OF LEFT KNEE: Primary | ICD-10-CM

## 2019-09-20 PROCEDURE — 20610 DRAIN/INJ JOINT/BURSA W/O US: CPT | Performed by: ORTHOPAEDIC SURGERY

## 2019-10-09 ENCOUNTER — OFFICE VISIT (OUTPATIENT)
Dept: ORTHOPEDIC SURGERY | Age: 54
End: 2019-10-09
Payer: COMMERCIAL

## 2019-10-09 VITALS — WEIGHT: 244 LBS | HEIGHT: 72 IN | BODY MASS INDEX: 33.05 KG/M2 | TEMPERATURE: 98.5 F

## 2019-10-09 DIAGNOSIS — M75.41 IMPINGEMENT SYNDROME OF RIGHT SHOULDER: Primary | ICD-10-CM

## 2019-10-09 PROCEDURE — G8428 CUR MEDS NOT DOCUMENT: HCPCS | Performed by: ORTHOPAEDIC SURGERY

## 2019-10-09 PROCEDURE — 1036F TOBACCO NON-USER: CPT | Performed by: ORTHOPAEDIC SURGERY

## 2019-10-09 PROCEDURE — G8417 CALC BMI ABV UP PARAM F/U: HCPCS | Performed by: ORTHOPAEDIC SURGERY

## 2019-10-09 PROCEDURE — 20610 DRAIN/INJ JOINT/BURSA W/O US: CPT | Performed by: ORTHOPAEDIC SURGERY

## 2019-10-09 PROCEDURE — 99214 OFFICE O/P EST MOD 30 MIN: CPT | Performed by: ORTHOPAEDIC SURGERY

## 2019-10-09 PROCEDURE — G8484 FLU IMMUNIZE NO ADMIN: HCPCS | Performed by: ORTHOPAEDIC SURGERY

## 2019-10-09 PROCEDURE — 3017F COLORECTAL CA SCREEN DOC REV: CPT | Performed by: ORTHOPAEDIC SURGERY

## 2019-10-09 RX ORDER — TRIAMCINOLONE ACETONIDE 40 MG/ML
40 INJECTION, SUSPENSION INTRA-ARTICULAR; INTRAMUSCULAR ONCE
Status: COMPLETED | OUTPATIENT
Start: 2019-10-09 | End: 2019-10-09

## 2019-10-09 RX ADMIN — TRIAMCINOLONE ACETONIDE 40 MG: 40 INJECTION, SUSPENSION INTRA-ARTICULAR; INTRAMUSCULAR at 16:27

## 2019-11-29 DIAGNOSIS — M17.12 PRIMARY OSTEOARTHRITIS OF LEFT KNEE: ICD-10-CM

## 2019-12-02 ENCOUNTER — OFFICE VISIT (OUTPATIENT)
Dept: FAMILY MEDICINE CLINIC | Age: 54
End: 2019-12-02
Payer: COMMERCIAL

## 2019-12-02 VITALS
TEMPERATURE: 97.3 F | HEIGHT: 72 IN | SYSTOLIC BLOOD PRESSURE: 136 MMHG | DIASTOLIC BLOOD PRESSURE: 70 MMHG | WEIGHT: 246 LBS | BODY MASS INDEX: 33.32 KG/M2 | OXYGEN SATURATION: 97 % | HEART RATE: 81 BPM

## 2019-12-02 DIAGNOSIS — M62.08 RECTUS DIASTASIS: ICD-10-CM

## 2019-12-02 DIAGNOSIS — E11.69 HYPERLIPIDEMIA ASSOCIATED WITH TYPE 2 DIABETES MELLITUS (HCC): Chronic | ICD-10-CM

## 2019-12-02 DIAGNOSIS — Z23 FLU VACCINE NEED: ICD-10-CM

## 2019-12-02 DIAGNOSIS — E78.5 HYPERLIPIDEMIA ASSOCIATED WITH TYPE 2 DIABETES MELLITUS (HCC): Chronic | ICD-10-CM

## 2019-12-02 DIAGNOSIS — E66.09 CLASS 1 OBESITY DUE TO EXCESS CALORIES WITH SERIOUS COMORBIDITY AND BODY MASS INDEX (BMI) OF 33.0 TO 33.9 IN ADULT: ICD-10-CM

## 2019-12-02 DIAGNOSIS — K21.9 GASTROESOPHAGEAL REFLUX DISEASE, ESOPHAGITIS PRESENCE NOT SPECIFIED: ICD-10-CM

## 2019-12-02 DIAGNOSIS — E11.42 TYPE 2 DIABETES MELLITUS WITH DIABETIC POLYNEUROPATHY, WITHOUT LONG-TERM CURRENT USE OF INSULIN (HCC): Primary | Chronic | ICD-10-CM

## 2019-12-02 DIAGNOSIS — M19.90 ARTHRITIS: ICD-10-CM

## 2019-12-02 LAB — HBA1C MFR BLD: 7.3 %

## 2019-12-02 PROCEDURE — 3051F HG A1C>EQUAL 7.0%<8.0%: CPT | Performed by: FAMILY MEDICINE

## 2019-12-02 PROCEDURE — G8427 DOCREV CUR MEDS BY ELIG CLIN: HCPCS | Performed by: FAMILY MEDICINE

## 2019-12-02 PROCEDURE — 90686 IIV4 VACC NO PRSV 0.5 ML IM: CPT | Performed by: FAMILY MEDICINE

## 2019-12-02 PROCEDURE — G8417 CALC BMI ABV UP PARAM F/U: HCPCS | Performed by: FAMILY MEDICINE

## 2019-12-02 PROCEDURE — 2022F DILAT RTA XM EVC RTNOPTHY: CPT | Performed by: FAMILY MEDICINE

## 2019-12-02 PROCEDURE — 3017F COLORECTAL CA SCREEN DOC REV: CPT | Performed by: FAMILY MEDICINE

## 2019-12-02 PROCEDURE — 83036 HEMOGLOBIN GLYCOSYLATED A1C: CPT | Performed by: FAMILY MEDICINE

## 2019-12-02 PROCEDURE — 1036F TOBACCO NON-USER: CPT | Performed by: FAMILY MEDICINE

## 2019-12-02 PROCEDURE — G8482 FLU IMMUNIZE ORDER/ADMIN: HCPCS | Performed by: FAMILY MEDICINE

## 2019-12-02 PROCEDURE — 99214 OFFICE O/P EST MOD 30 MIN: CPT | Performed by: FAMILY MEDICINE

## 2019-12-02 PROCEDURE — 90471 IMMUNIZATION ADMIN: CPT | Performed by: FAMILY MEDICINE

## 2019-12-02 RX ORDER — PANTOPRAZOLE SODIUM 20 MG/1
20 TABLET, DELAYED RELEASE ORAL NIGHTLY PRN
Qty: 30 TABLET | Refills: 3 | Status: SHIPPED
Start: 2019-12-02 | End: 2021-01-25 | Stop reason: SDUPTHER

## 2019-12-02 RX ORDER — IBUPROFEN 800 MG/1
800 TABLET ORAL EVERY 6 HOURS PRN
Qty: 30 TABLET | Refills: 3 | Status: SHIPPED | OUTPATIENT
Start: 2019-12-02 | End: 2019-12-03 | Stop reason: SDUPTHER

## 2019-12-03 RX ORDER — IBUPROFEN 800 MG/1
TABLET ORAL
Qty: 30 TABLET | Refills: 0 | Status: ON HOLD
Start: 2019-12-03 | End: 2020-02-11 | Stop reason: HOSPADM

## 2019-12-03 ASSESSMENT — ENCOUNTER SYMPTOMS
COUGH: 0
ABDOMINAL PAIN: 0
SHORTNESS OF BREATH: 0
NAUSEA: 0
DIARRHEA: 0
VOMITING: 0
CONSTIPATION: 0

## 2019-12-19 ENCOUNTER — OFFICE VISIT (OUTPATIENT)
Dept: ORTHOPEDIC SURGERY | Age: 54
End: 2019-12-19
Payer: COMMERCIAL

## 2019-12-19 VITALS — HEIGHT: 72 IN | BODY MASS INDEX: 33.05 KG/M2 | WEIGHT: 244 LBS

## 2019-12-19 DIAGNOSIS — M75.41 IMPINGEMENT SYNDROME OF RIGHT SHOULDER: Primary | ICD-10-CM

## 2019-12-19 PROCEDURE — G8482 FLU IMMUNIZE ORDER/ADMIN: HCPCS | Performed by: ORTHOPAEDIC SURGERY

## 2019-12-19 PROCEDURE — 1036F TOBACCO NON-USER: CPT | Performed by: ORTHOPAEDIC SURGERY

## 2019-12-19 PROCEDURE — G8417 CALC BMI ABV UP PARAM F/U: HCPCS | Performed by: ORTHOPAEDIC SURGERY

## 2019-12-19 PROCEDURE — G8427 DOCREV CUR MEDS BY ELIG CLIN: HCPCS | Performed by: ORTHOPAEDIC SURGERY

## 2019-12-19 PROCEDURE — 20610 DRAIN/INJ JOINT/BURSA W/O US: CPT | Performed by: ORTHOPAEDIC SURGERY

## 2019-12-19 PROCEDURE — 99214 OFFICE O/P EST MOD 30 MIN: CPT | Performed by: ORTHOPAEDIC SURGERY

## 2019-12-19 PROCEDURE — 3017F COLORECTAL CA SCREEN DOC REV: CPT | Performed by: ORTHOPAEDIC SURGERY

## 2019-12-19 RX ORDER — TRIAMCINOLONE ACETONIDE 40 MG/ML
40 INJECTION, SUSPENSION INTRA-ARTICULAR; INTRAMUSCULAR ONCE
Status: COMPLETED | OUTPATIENT
Start: 2019-12-19 | End: 2019-12-19

## 2019-12-19 RX ADMIN — TRIAMCINOLONE ACETONIDE 40 MG: 40 INJECTION, SUSPENSION INTRA-ARTICULAR; INTRAMUSCULAR at 15:15

## 2019-12-30 DIAGNOSIS — E11.42 TYPE 2 DIABETES MELLITUS WITH DIABETIC POLYNEUROPATHY, WITHOUT LONG-TERM CURRENT USE OF INSULIN (HCC): Chronic | ICD-10-CM

## 2019-12-31 RX ORDER — DULAGLUTIDE 0.75 MG/.5ML
INJECTION, SOLUTION SUBCUTANEOUS
Qty: 2 ML | Refills: 3 | Status: SHIPPED
Start: 2019-12-31 | End: 2020-04-30 | Stop reason: DRUGHIGH

## 2020-01-03 ENCOUNTER — OFFICE VISIT (OUTPATIENT)
Dept: ORTHOPEDIC SURGERY | Age: 55
End: 2020-01-03
Payer: COMMERCIAL

## 2020-01-03 VITALS — BODY MASS INDEX: 33.05 KG/M2 | WEIGHT: 244 LBS | HEIGHT: 72 IN

## 2020-01-03 PROCEDURE — 99213 OFFICE O/P EST LOW 20 MIN: CPT | Performed by: ORTHOPAEDIC SURGERY

## 2020-01-03 PROCEDURE — G8427 DOCREV CUR MEDS BY ELIG CLIN: HCPCS | Performed by: ORTHOPAEDIC SURGERY

## 2020-01-03 PROCEDURE — G8417 CALC BMI ABV UP PARAM F/U: HCPCS | Performed by: ORTHOPAEDIC SURGERY

## 2020-01-03 PROCEDURE — 20610 DRAIN/INJ JOINT/BURSA W/O US: CPT | Performed by: ORTHOPAEDIC SURGERY

## 2020-01-03 PROCEDURE — 1036F TOBACCO NON-USER: CPT | Performed by: ORTHOPAEDIC SURGERY

## 2020-01-03 PROCEDURE — 3017F COLORECTAL CA SCREEN DOC REV: CPT | Performed by: ORTHOPAEDIC SURGERY

## 2020-01-03 PROCEDURE — G8482 FLU IMMUNIZE ORDER/ADMIN: HCPCS | Performed by: ORTHOPAEDIC SURGERY

## 2020-01-03 RX ORDER — TRIAMCINOLONE ACETONIDE 40 MG/ML
40 INJECTION, SUSPENSION INTRA-ARTICULAR; INTRAMUSCULAR ONCE
Status: COMPLETED | OUTPATIENT
Start: 2020-01-03 | End: 2020-01-03

## 2020-01-03 RX ADMIN — TRIAMCINOLONE ACETONIDE 40 MG: 40 INJECTION, SUSPENSION INTRA-ARTICULAR; INTRAMUSCULAR at 16:08

## 2020-01-03 NOTE — PROGRESS NOTES
Chief Complaint   Patient presents with    Follow-up     follow up fromleft knee pain, patient is requesting any injection and he states his sholder injection did not help       HPI:    Patient is 53 y.o. male complaining of right shoulder and arm pain. He  admits to constant pain for 6+ months without injury. He also states that all his fingers are tingling. He denies any specific injury. Previous treatments include injections Dr. Selma Sanchez. He states that he did receive relief after the shoulder injections but it increasingly started to diminish. He states the only thing that makes him feel relief at this point is elevating his hand over his head. Patient also complains of left knee pain for years. Patient states he was diagnosed with a meniscus tear and treated by another physician but patient still complains of pain and instability despite taking ibuprofen. ROS:    Skin: (-) rash,(-) psoriasis,(-) eczema, (-)skin cancer. Neurologic: (-)numbness, (-)tingling, (-)headaches, (-) LOC. Cardiovascular: (-) Chest pain, (-) swelling in legs/feet, (-) SOB, (-) cramping in legs/feet with walking.     All other review of systems negative except stated above or in HPI      Past Medical History:   Diagnosis Date    Anxiety     Blood in stool     Chronic back pain     Constipation     Headache(784.0)     History of dental problems     Hyperlipidemia     Neck pain     Neck stiffness     Type II or unspecified type diabetes mellitus without mention of complication, not stated as uncontrolled      Past Surgical History:   Procedure Laterality Date    ENDOSCOPY, COLON, DIAGNOSTIC      FRACTURE SURGERY Left     femur    KNEE ARTHROSCOPY Left 07/18/2016    medial lateral menisectomy/synovectomy/chodroplasty    TONSILLECTOMY         Current Outpatient Medications:     TRULICITY 1.07 RH/2.5JV SOPN, INJECT 0.75 MILLIGRAMS INTO THE SKIN ONCE A WEEK, Disp: 2 mL, Rfl: 3    ibuprofen (ADVIL;MOTRIN) 800 MG tablet, per session: Not on file    Stress: Not on file   Relationships    Social connections:     Talks on phone: Not on file     Gets together: Not on file     Attends Taoism service: Not on file     Active member of club or organization: Not on file     Attends meetings of clubs or organizations: Not on file     Relationship status: Not on file    Intimate partner violence:     Fear of current or ex partner: Not on file     Emotionally abused: Not on file     Physically abused: Not on file     Forced sexual activity: Not on file   Other Topics Concern    Not on file   Social History Narrative    Not on file     Family History   Problem Relation Age of Onset    Diabetes Father     High Blood Pressure Father     Heart Disease Father     Stroke Father     Diabetes Mother     Heart Disease Mother     Cancer Brother 54        colon    High Blood Pressure Brother     Cancer Maternal Cousin 40        colon    Cancer Sister     Diabetes Sister     Heart Disease Sister     Stroke Sister     Diabetes Maternal Aunt     Heart Disease Maternal Aunt     High Blood Pressure Maternal Aunt     High Cholesterol Maternal Aunt     Stroke Maternal Aunt     Diabetes Maternal Uncle     Heart Disease Maternal Uncle     High Blood Pressure Maternal Uncle     High Cholesterol Maternal Uncle     Stroke Maternal Uncle     Diabetes Paternal Aunt     Heart Disease Paternal Aunt     High Blood Pressure Paternal Aunt     High Cholesterol Paternal Aunt     Stroke Paternal Aunt     Diabetes Paternal Uncle     Heart Disease Paternal Uncle     High Blood Pressure Paternal Uncle     High Cholesterol Paternal Uncle     Stroke Paternal Uncle     Diabetes Maternal Grandmother     High Blood Pressure Maternal Grandmother     High Cholesterol Maternal Grandmother     Cancer Maternal Grandfather     Diabetes Maternal Grandfather     High Blood Pressure Maternal Grandfather     High Cholesterol Maternal Grandfather  Stroke Maternal Grandfather     Diabetes Paternal Grandfather     High Blood Pressure Paternal Grandfather     High Cholesterol Paternal Grandfather            Physical Exam:    Ht 6' (1.829 m)   Wt 244 lb (110.7 kg)   BMI 33.09 kg/m²     GENERAL: alert, appears stated age, cooperative, no acute distress    HEENT: Head is normocephalic, atraumatic. PERRLA. SKIN: Clean, dry, intact. There is not any cellulitis or cutaneous lesions noted in the lower extremities    PULMONARY: breathing is regular and unlabored, no acute distress    CV: The bilateral upper and lower extremities are warm and well-perfused with brisk capillary refill. 2+ pulses UE and LE bilateral.     PSYCHIATRY: Pleasant mood, appropriate behavior, follows commands    NEURO: Sensation is intact distally with light touch with no alteration. Motor exam of the lower extremities show quadriceps, hamstrings, foot dorsiflexion and plantarflexion grossly intact 5/5. LYMPH: No lymphedema present distally in upper or lower extremity. MUSCULOSKELETAL:  Shoulder Exam:  Examination of the right shoulder shows: There is not a deformity. There is not erythema. There is not soft tissue swelling. Deltoid region is not tender to palpation. AC Joint is not tender to palpation. Clavicle is not tender to palpation. Bicipital Groove is not tender to palpation. Pectoralis  is not tender to palpation. Scapula/ trapezius is  tender to palpation. There is  weakness with supraspinatus testing. There is  pain with supraspinatus testing. Yergason Test negative. Drop Arm Test positive. Apprehension Test negative. Cross Arm Test positive. Supraspinatus Test positive. Shoulder ROM- 140/140/60    Spurling test is positive      Examination left knee reveals range motion 0- 120° with pain. Pain along the posterior medial joint line. Celestina's test is positive. No varus valgus instability. Lachman test is negative.     Imaging:

## 2020-01-22 ENCOUNTER — OFFICE VISIT (OUTPATIENT)
Dept: ORTHOPEDIC SURGERY | Age: 55
End: 2020-01-22
Payer: COMMERCIAL

## 2020-01-22 VITALS — HEIGHT: 72 IN | WEIGHT: 244 LBS | BODY MASS INDEX: 33.05 KG/M2

## 2020-01-22 PROCEDURE — 1036F TOBACCO NON-USER: CPT | Performed by: ORTHOPAEDIC SURGERY

## 2020-01-22 PROCEDURE — G8482 FLU IMMUNIZE ORDER/ADMIN: HCPCS | Performed by: ORTHOPAEDIC SURGERY

## 2020-01-22 PROCEDURE — G8417 CALC BMI ABV UP PARAM F/U: HCPCS | Performed by: ORTHOPAEDIC SURGERY

## 2020-01-22 PROCEDURE — 3017F COLORECTAL CA SCREEN DOC REV: CPT | Performed by: ORTHOPAEDIC SURGERY

## 2020-01-22 PROCEDURE — G8427 DOCREV CUR MEDS BY ELIG CLIN: HCPCS | Performed by: ORTHOPAEDIC SURGERY

## 2020-01-22 PROCEDURE — 99214 OFFICE O/P EST MOD 30 MIN: CPT | Performed by: ORTHOPAEDIC SURGERY

## 2020-01-22 NOTE — PROGRESS NOTES
Chief Complaint   Patient presents with    Shoulder Pain     Right shoulder follow up. No relief from injection. HPI:    Patient is 53 y.o. male complaining of right shoulder and arm pain. He  admits to constant pain for 6+ months without injury. He also states that all his fingers are tingling. He denies any specific injury. Previous treatments include injections Dr. Ivy Jha. He states that he did receive relief after the shoulder injections but it increasingly started to diminish. Patient states the last injection provided little to no relief. ROS:    Skin: (-) rash,(-) psoriasis,(-) eczema, (-)skin cancer. Neurologic: (-)numbness, (-)tingling, (-)headaches, (-) LOC. Cardiovascular: (-) Chest pain, (-) swelling in legs/feet, (-) SOB, (-) cramping in legs/feet with walking.     All other review of systems negative except stated above or in HPI      Past Medical History:   Diagnosis Date    Anxiety     Blood in stool     Chronic back pain     Constipation     Headache(784.0)     History of dental problems     Hyperlipidemia     Neck pain     Neck stiffness     Type II or unspecified type diabetes mellitus without mention of complication, not stated as uncontrolled      Past Surgical History:   Procedure Laterality Date    ENDOSCOPY, COLON, DIAGNOSTIC      FRACTURE SURGERY Left     femur    KNEE ARTHROSCOPY Left 07/18/2016    medial lateral menisectomy/synovectomy/chodroplasty    TONSILLECTOMY         Current Outpatient Medications:     TRULICITY 7.71 NH/8.5HR SOPN, INJECT 0.75 MILLIGRAMS INTO THE SKIN ONCE A WEEK, Disp: 2 mL, Rfl: 3    ibuprofen (ADVIL;MOTRIN) 800 MG tablet, take 1 tablet by mouth every 6 hours if needed for pain, Disp: 30 tablet, Rfl: 0    pantoprazole (PROTONIX) 20 MG tablet, Take 1 tablet by mouth nightly as needed (heartburn), Disp: 30 tablet, Rfl: 3    Alcohol Swabs PADS, 1 Units by Does not apply route as needed (checking glucose), Disp: 100 each, Rfl: 11   Relationship status: Not on file    Intimate partner violence:     Fear of current or ex partner: Not on file     Emotionally abused: Not on file     Physically abused: Not on file     Forced sexual activity: Not on file   Other Topics Concern    Not on file   Social History Narrative    Not on file     Family History   Problem Relation Age of Onset    Diabetes Father     High Blood Pressure Father     Heart Disease Father     Stroke Father     Diabetes Mother     Heart Disease Mother     Cancer Brother 54        colon    High Blood Pressure Brother     Cancer Maternal Cousin 40        colon    Cancer Sister     Diabetes Sister     Heart Disease Sister     Stroke Sister     Diabetes Maternal Aunt     Heart Disease Maternal Aunt     High Blood Pressure Maternal Aunt     High Cholesterol Maternal Aunt     Stroke Maternal Aunt     Diabetes Maternal Uncle     Heart Disease Maternal Uncle     High Blood Pressure Maternal Uncle     High Cholesterol Maternal Uncle     Stroke Maternal Uncle     Diabetes Paternal Aunt     Heart Disease Paternal Aunt     High Blood Pressure Paternal Aunt     High Cholesterol Paternal Adam Veronica     Stroke Paternal Aunt     Diabetes Paternal Uncle     Heart Disease Paternal Uncle     High Blood Pressure Paternal Uncle     High Cholesterol Paternal Uncle     Stroke Paternal Uncle     Diabetes Maternal Grandmother     High Blood Pressure Maternal Grandmother     High Cholesterol Maternal Grandmother     Cancer Maternal Grandfather     Diabetes Maternal Grandfather     High Blood Pressure Maternal Grandfather     High Cholesterol Maternal Grandfather     Stroke Maternal Grandfather     Diabetes Paternal Grandfather     High Blood Pressure Paternal Grandfather     High Cholesterol Paternal Grandfather            Physical Exam:    Ht 6' (1.829 m)   Wt 244 lb (110.7 kg)   BMI 33.09 kg/m²     GENERAL: alert, appears stated age, cooperative, no acute distress    HEENT: Head is normocephalic, atraumatic. PERRLA. SKIN: Clean, dry, intact. There is not any cellulitis or cutaneous lesions noted in the lower extremities    PULMONARY: breathing is regular and unlabored, no acute distress    CV: The bilateral upper and lower extremities are warm and well-perfused with brisk capillary refill. 2+ pulses UE and LE bilateral.     PSYCHIATRY: Pleasant mood, appropriate behavior, follows commands    NEURO: Sensation is intact distally with light touch with no alteration. Motor exam of the lower extremities show quadriceps, hamstrings, foot dorsiflexion and plantarflexion grossly intact 5/5. LYMPH: No lymphedema present distally in upper or lower extremity. MUSCULOSKELETAL:  Shoulder Exam:  Examination of the right shoulder shows: There is not a deformity. There is not erythema. There is not soft tissue swelling. Deltoid region is not tender to palpation. AC Joint is not tender to palpation. Clavicle is not tender to palpation. Bicipital Groove is not tender to palpation. Pectoralis  is not tender to palpation. Scapula/ trapezius is  tender to palpation. There is  weakness with supraspinatus testing. There is  pain with supraspinatus testing. Yergason Test negative. Drop Arm Test positive. Apprehension Test negative. Cross Arm Test positive. Supraspinatus Test positive. Shoulder ROM- 140/140/60      [facility]  LOCATION: 100       EXAM: XR SHOULDER RIGHT (MIN 2 VIEWS)       COMPARISON: None       HISTORY: Extremity injury with pain.        TECHNIQUE: 3 views of the right shoulder were obtained.        FINDINGS:   The bones, joints, and soft tissues are within normal limits. There is   no evidence of fracture or malalignment. No radiopaque foreign body   noted.          1/15/2018    Impression   No acute findings. Mri Shoulder Right Wo Contrast    Result Date: 3/20/2019  Reading location:  200 Indication: Right shoulder pain. Comparison: None available. Technique: Multiplanar, multisequence MR imaging of the right shoulder was performed without administration of intravenous or intra-articular contrast. FINDINGS: The supraspinatus, subscapularis, and teres minor tendons are intact. There is mild increased thickening of the distal fibers of the infraspinatus tendon, compatible with moderate tendinopathy. No full-thickness rotator cuff tear is identified. The muscles of the rotator cuff demonstrate normal muscular volume. The extra-articular biceps tendon is within the bicipital groove. There is mild increased signal of the intra-articular biceps tendon, suggestive of mild tendinopathy. Lack of intra-articular contrast limits evaluation of the labrum. There is heterogeneity of the superior labrum extending anteriorly and posteriorly with a questionable paralabral cyst along the anterosuperior labrum. There is suggestion of an additional small paralabral cyst along the posterior labrum (series 3, image 15). The articular cartilage is maintained. There is no significant joint effusion. There is a trace amount of fluid within the subacromial/subdeltoid bursa. The inferior glenohumeral ligament is intact. There is moderate acromioclavicular joint arthrosis. There is no evidence of fracture or osteonecrosis. 1. Moderate tendinopathy of the infraspinatus tendon. No full-thickness rotator cuff tear is identified. 2. Mild tendinopathy of the intra-articular biceps tendon. 3. Heterogeneity of the labrum with a small paralabral cyst along the posterior labrum and likely additional paralabral cyst along the anterosuperior labrum, compatible with a tear. 3. Moderate acromioclavicular joint arthrosis with trace subacromial/subdeltoid bursitis. Virginia Wilkerson was seen today for shoulder pain. Diagnoses and all orders for this visit:    Nontraumatic incomplete tear of right rotator cuff      Patient seen and examined.   MRI reviewed with patient in

## 2020-01-22 NOTE — PATIENT INSTRUCTIONS
Patient Education        Shoulder Arthroscopy: Before Your Surgery  What is shoulder arthroscopy? Shoulder arthroscopy is a type of surgery. It lets a doctor repair shoulder problems without making a large cut (incision). To do this surgery, the doctor puts a lighted tube through small incisions in your shoulder. The tube is called an arthroscope or scope. Next, the doctor puts some surgical tools in the scope to help make any repairs. The incisions will leave scars that usually fade with time. This type of surgery is used to treat many shoulder problems. · Osteoarthritis happens when your cartilage breaks down. Cartilage is the hard, thick tissue that cushions the joints. For this problem, the doctor shaves and smooths rough surfaces on the shoulder joint. · A loose body is a loose piece of bone or cartilage. It's often caused by an injury. The doctor may put the loose piece back in place. Sometimes the piece is removed. · Impingement syndrome happens when shoulder tissue begins to swell and rub against a bone. This can occur in the tendons of the rotator cuff. Or it may happen in the tendons that connect the bicep to the shoulder. It can also occur in the bursa, the sac between the rotator cuff and the top of the shoulder blade. To fix this problem, your doctor removes the bursa and part of the bone from the point of your shoulder. This increases the space in the shoulder area. In a few weeks, the bursa re-forms. Shoulder arthroscopy is also used for other problems. These include rotator cuff problems, bicep tendon tears, and shoulder instability. This information does not cover these surgeries. Most people go home on the day of the surgery. When you can go back to work or your usual activities depends on your shoulder problem. You will probably need about 6 weeks or longer to recover. Follow-up care is a key part of your treatment and safety.  Be sure to make and go to all appointments, and call your doctor if you are having problems. It's also a good idea to know your test results and keep a list of the medicines you take. What happens before surgery?   Surgery can be stressful. This information will help you understand what you can expect. And it will help you safely prepare for surgery.   Preparing for surgery    · Understand exactly what surgery is planned, along with the risks, benefits, and other options. · Tell your doctors ALL the medicines, vitamins, supplements, and herbal remedies you take. Some of these can increase the risk of bleeding or interact with anesthesia.     · If you take aspirin or some other blood thinner, ask your doctor if you should stop taking it before your surgery. Make sure that you understand exactly what your doctor wants you to do. These medicines increase the risk of bleeding.     · Your doctor will tell you which medicines to take or stop before your surgery. You may need to stop taking certain medicines a week or more before surgery. So talk to your doctor as soon as you can.     · If you have an advance directive, let your doctor know. It may include a living will and a durable power of  for health care. Bring a copy to the hospital. If you don't have one, you may want to prepare one. It lets your doctor and loved ones know your health care wishes. Doctors advise that everyone prepare these papers before any type of surgery or procedure. What happens on the day of surgery? · Follow the instructions exactly about when to stop eating and drinking. If you don't, your surgery may be canceled. If your doctor told you to take your medicines on the day of surgery, take them with only a sip of water.     · Take a bath or shower before you come in for your surgery. Do not apply lotions, perfumes, deodorants, or nail polish.     · Do not shave the surgical site yourself.     · Take off all jewelry and piercings.  And take out contact lenses, if you wear them.    At the

## 2020-01-29 ENCOUNTER — OFFICE VISIT (OUTPATIENT)
Dept: FAMILY MEDICINE CLINIC | Age: 55
End: 2020-01-29
Payer: COMMERCIAL

## 2020-01-29 VITALS
HEART RATE: 88 BPM | SYSTOLIC BLOOD PRESSURE: 126 MMHG | DIASTOLIC BLOOD PRESSURE: 82 MMHG | OXYGEN SATURATION: 98 % | WEIGHT: 244 LBS | TEMPERATURE: 97.2 F | BODY MASS INDEX: 33.05 KG/M2 | HEIGHT: 72 IN

## 2020-01-29 PROCEDURE — G8482 FLU IMMUNIZE ORDER/ADMIN: HCPCS | Performed by: FAMILY MEDICINE

## 2020-01-29 PROCEDURE — 93000 ELECTROCARDIOGRAM COMPLETE: CPT | Performed by: FAMILY MEDICINE

## 2020-01-29 PROCEDURE — G8417 CALC BMI ABV UP PARAM F/U: HCPCS | Performed by: FAMILY MEDICINE

## 2020-01-29 PROCEDURE — 99214 OFFICE O/P EST MOD 30 MIN: CPT | Performed by: FAMILY MEDICINE

## 2020-01-29 PROCEDURE — 3017F COLORECTAL CA SCREEN DOC REV: CPT | Performed by: FAMILY MEDICINE

## 2020-01-29 PROCEDURE — 1036F TOBACCO NON-USER: CPT | Performed by: FAMILY MEDICINE

## 2020-01-29 PROCEDURE — 3046F HEMOGLOBIN A1C LEVEL >9.0%: CPT | Performed by: FAMILY MEDICINE

## 2020-01-29 PROCEDURE — 2022F DILAT RTA XM EVC RTNOPTHY: CPT | Performed by: FAMILY MEDICINE

## 2020-01-29 PROCEDURE — G8427 DOCREV CUR MEDS BY ELIG CLIN: HCPCS | Performed by: FAMILY MEDICINE

## 2020-01-29 ASSESSMENT — ENCOUNTER SYMPTOMS
SINUS PAIN: 0
CONSTIPATION: 0
SHORTNESS OF BREATH: 0
NAUSEA: 0
ABDOMINAL PAIN: 0
RHINORRHEA: 0
SORE THROAT: 0
DIARRHEA: 0
COUGH: 0
VOMITING: 0

## 2020-01-29 ASSESSMENT — PATIENT HEALTH QUESTIONNAIRE - PHQ9
SUM OF ALL RESPONSES TO PHQ QUESTIONS 1-9: 0
1. LITTLE INTEREST OR PLEASURE IN DOING THINGS: 0
SUM OF ALL RESPONSES TO PHQ QUESTIONS 1-9: 0
SUM OF ALL RESPONSES TO PHQ9 QUESTIONS 1 & 2: 0
2. FEELING DOWN, DEPRESSED OR HOPELESS: 0

## 2020-01-29 NOTE — PROGRESS NOTES
numbness. Hematological: Does not bruise/bleed easily. Prior to Visit Medications    Medication Sig Taking? Authorizing Provider   TRULICITY 6.33 FG/4.1MD SOPN INJECT 0.75 MILLIGRAMS INTO THE SKIN ONCE A WEEK Yes Tae Carbajal DO   ibuprofen (ADVIL;MOTRIN) 800 MG tablet take 1 tablet by mouth every 6 hours if needed for pain Yes Kanika Mccollume, DO   pantoprazole (PROTONIX) 20 MG tablet Take 1 tablet by mouth nightly as needed (heartburn) Yes Tae Carbajal DO   Alcohol Swabs PADS 1 Units by Does not apply route as needed (checking glucose) Yes Tae Carbajal DO   alogliptin (NESINA) 25 MG TABS tablet take 1 tablet by mouth once daily Yes Tae Carbajal DO   diclofenac sodium 1 % GEL Apply 2 g topically 4 times daily Yes Tae Carbajal, DO   blood glucose monitor strips FBS daily. Please dispense Freestyle brand Yes Tae Carbajal DO   aspirin (RA ASPIRIN EC ADULT LOW ST) 81 MG EC tablet take 1 tablet by mouth once daily Yes Tracy Carlisle,    Lancets MISC Test once daily Yes Tae Carbajal DO   metFORMIN (GLUCOPHAGE) 1000 MG tablet take 1 tablet by mouth twice a day with food Yes Tae Carbajal DO   simvastatin (ZOCOR) 20 MG tablet Take 1 tablet by mouth every evening Yes Tae Carbajal DO   ammonium lactate (LAC-HYDRIN) 12 % lotion apply to affected area twice a day Yes Historical Provider, MD        Social History     Tobacco Use    Smoking status: Never Smoker    Smokeless tobacco: Never Used   Substance Use Topics    Alcohol use:  Yes     Alcohol/week: 0.0 standard drinks     Comment: rare        Past Surgical History:   Procedure Laterality Date    ENDOSCOPY, COLON, DIAGNOSTIC      FRACTURE SURGERY Left     femur    KNEE ARTHROSCOPY Left 07/18/2016    medial lateral menisectomy/synovectomy/chodroplasty    TONSILLECTOMY         Vitals:    01/29/20 1448   BP: 126/82   Site: Left Upper Arm   Position: Sitting   Pulse: 88   Temp: 97.2 °F (36.2 °C)   TempSrc: Temporal   SpO2: 98%   Weight: 244 lb (110.7 kg)   Height: 6' (1.829 m)     Estimated body mass index is 33.09 kg/m² as calculated from the following:    Height as of this encounter: 6' (1.829 m). Weight as of this encounter: 244 lb (110.7 kg). Physical Exam  Constitutional:       General: He is not in acute distress. Appearance: He is well-developed. HENT:      Head: Normocephalic and atraumatic. Right Ear: External ear normal.      Left Ear: External ear normal.      Nose: Nose normal. No congestion or rhinorrhea. Eyes:      Extraocular Movements: Extraocular movements intact. Pupils: Pupils are equal, round, and reactive to light. Neck:      Musculoskeletal: Normal range of motion. Thyroid: No thyromegaly. Cardiovascular:      Rate and Rhythm: Normal rate and regular rhythm. Pulmonary:      Effort: Pulmonary effort is normal. No respiratory distress. Breath sounds: Normal breath sounds. No wheezing or rales. Abdominal:      General: There is no distension. Palpations: Abdomen is soft. Tenderness: There is no abdominal tenderness. Musculoskeletal:      Right lower leg: No edema. Left lower leg: No edema. Skin:     General: Skin is warm. Findings: No rash. Neurological:      General: No focal deficit present. Mental Status: He is alert. Mental status is at baseline. Psychiatric:         Mood and Affect: Mood normal.         Behavior: Behavior normal.       Revised Cardiac Risk Index for Pre-Operative Risk from [email or web address]  on 1/29/2020  ** All calculations should be rechecked by clinician prior to use **    RESULT SUMMARY:  0 points  Class I Risk    3.9 %  30-day risk of death, MI, or cardiac arrest    From [employer] 2017, based on pooled data from 5 high quality external validations (4 prospective). These numbers are higher than those often quoted from the now-outdated original study ([employer] 3963). See Evidence for details.       INPUTS:  High-risk surgery --> 0 = No  History of

## 2020-01-31 ENCOUNTER — HOSPITAL ENCOUNTER (OUTPATIENT)
Age: 55
Discharge: HOME | End: 2020-02-02
Payer: COMMERCIAL

## 2020-01-31 LAB
ALBUMIN SERPL-MCNC: 4.2 G/DL (ref 3.5–5.2)
ALP BLD-CCNC: 80 U/L (ref 40–129)
ALT SERPL-CCNC: 51 U/L (ref 0–40)
ANION GAP SERPL CALCULATED.3IONS-SCNC: 17 MMOL/L (ref 7–16)
AST SERPL-CCNC: 36 U/L (ref 0–39)
BILIRUB SERPL-MCNC: 0.4 MG/DL (ref 0–1.2)
BUN BLDV-MCNC: 17 MG/DL (ref 6–20)
CALCIUM SERPL-MCNC: 9 MG/DL (ref 8.6–10.2)
CHLORIDE BLD-SCNC: 102 MMOL/L (ref 98–107)
CHOLESTEROL, TOTAL: 151 MG/DL (ref 0–199)
CO2: 22 MMOL/L (ref 22–29)
CREAT SERPL-MCNC: 0.7 MG/DL (ref 0.7–1.2)
GFR AFRICAN AMERICAN: >60
GFR NON-AFRICAN AMERICAN: >60 ML/MIN/1.73
GLUCOSE BLD-MCNC: 150 MG/DL (ref 74–99)
HBA1C MFR BLD: 8 % (ref 4–5.6)
HCT VFR BLD CALC: 44.9 % (ref 37–54)
HDLC SERPL-MCNC: 44 MG/DL
HEMOGLOBIN: 13.9 G/DL (ref 12.5–16.5)
LDL CHOLESTEROL CALCULATED: 78 MG/DL (ref 0–99)
MCH RBC QN AUTO: 28.4 PG (ref 26–35)
MCHC RBC AUTO-ENTMCNC: 31 % (ref 32–34.5)
MCV RBC AUTO: 91.6 FL (ref 80–99.9)
PDW BLD-RTO: 12.9 FL (ref 11.5–15)
PLATELET # BLD: 248 E9/L (ref 130–450)
PMV BLD AUTO: 11 FL (ref 7–12)
POTASSIUM SERPL-SCNC: 4.5 MMOL/L (ref 3.5–5)
RBC # BLD: 4.9 E12/L (ref 3.8–5.8)
SODIUM BLD-SCNC: 141 MMOL/L (ref 132–146)
TOTAL PROTEIN: 7.3 G/DL (ref 6.4–8.3)
TRIGL SERPL-MCNC: 147 MG/DL (ref 0–149)
TSH SERPL DL<=0.05 MIU/L-ACNC: 2.17 UIU/ML (ref 0.27–4.2)
VLDLC SERPL CALC-MCNC: 29 MG/DL
WBC # BLD: 8.2 E9/L (ref 4.5–11.5)

## 2020-01-31 PROCEDURE — 85027 COMPLETE CBC AUTOMATED: CPT

## 2020-01-31 PROCEDURE — 80053 COMPREHEN METABOLIC PANEL: CPT

## 2020-01-31 PROCEDURE — 84443 ASSAY THYROID STIM HORMONE: CPT

## 2020-01-31 PROCEDURE — 80061 LIPID PANEL: CPT

## 2020-01-31 PROCEDURE — 83036 HEMOGLOBIN GLYCOSYLATED A1C: CPT

## 2020-01-31 PROCEDURE — 36415 COLL VENOUS BLD VENIPUNCTURE: CPT

## 2020-02-10 ENCOUNTER — ANESTHESIA EVENT (OUTPATIENT)
Dept: OPERATING ROOM | Age: 55
End: 2020-02-10
Payer: COMMERCIAL

## 2020-02-10 NOTE — ANESTHESIA PRE PROCEDURE
Department of Anesthesiology  Preprocedure Note       Name:  Chivo Cartwright   Age:  53 y.o.  :  1965                                          MRN:  [id number]         Date:  2/10/2020      Surgeon: Katie Bates): Kenya Barron DO    Procedure: RIGHT SHOULDER ARTHROSCOPY SUBACROMIAL DECOMPRESSION DEBRIDEMENT (Right )    Medications prior to admission:   Prior to Admission medications    Medication Sig Start Date End Date Taking? Authorizing Provider   TRULICITY 6.01 CY/3.9FW SOPN INJECT 0.75 MILLIGRAMS INTO THE SKIN ONCE A WEEK  Patient taking differently: 0.75 mg once a week 19  Yes Tae Carbajal DO   ibuprofen (ADVIL;MOTRIN) 800 MG tablet take 1 tablet by mouth every 6 hours if needed for pain 12/3/19  Yes Kenya Barron, DO   pantoprazole (PROTONIX) 20 MG tablet Take 1 tablet by mouth nightly as needed (heartburn) 19  Yes Tae Carbajal DO   alogliptin (NESINA) 25 MG TABS tablet take 1 tablet by mouth once daily 19  Yes Tae Carbajal DO   aspirin (RA ASPIRIN EC ADULT LOW ST) 81 MG EC tablet take 1 tablet by mouth once daily  Patient taking differently: Take 81 mg by mouth daily Stopped 6 days pre op 19  Yes Mya Sewell DO   metFORMIN (GLUCOPHAGE) 1000 MG tablet take 1 tablet by mouth twice a day with food 19  Yes Tae Carbajal DO   simvastatin (ZOCOR) 20 MG tablet Take 1 tablet by mouth every evening 19  Yes Tae Carbajal DO   Alcohol Swabs PADS 1 Units by Does not apply route as needed (checking glucose) 19   Tae Carbajal DO   diclofenac sodium 1 % GEL Apply 2 g topically 4 times daily 19   Tae Carbajal DO   blood glucose monitor strips FBS daily.  Please dispense Freestyle brand 19   Mya Sewell, DO   Lancets MISC Test once daily 19   Mya Sewell DO   ammonium lactate (LAC-HYDRIN) 12 % lotion apply to affected area twice a day 19   Historical Provider, MD       Current medications:    No current facility-administered medications for this encounter. Current Outpatient Medications   Medication Sig Dispense Refill    TRULICITY 6.47 AO/5.1WD SOPN INJECT 0.75 MILLIGRAMS INTO THE SKIN ONCE A WEEK (Patient taking differently: 0.75 mg once a week mondays) 2 mL 3    ibuprofen (ADVIL;MOTRIN) 800 MG tablet take 1 tablet by mouth every 6 hours if needed for pain 30 tablet 0    pantoprazole (PROTONIX) 20 MG tablet Take 1 tablet by mouth nightly as needed (heartburn) 30 tablet 3    alogliptin (NESINA) 25 MG TABS tablet take 1 tablet by mouth once daily 30 tablet 3    aspirin (RA ASPIRIN EC ADULT LOW ST) 81 MG EC tablet take 1 tablet by mouth once daily (Patient taking differently: Take 81 mg by mouth daily Stopped 6 days pre op) 30 tablet 11    metFORMIN (GLUCOPHAGE) 1000 MG tablet take 1 tablet by mouth twice a day with food 60 tablet 3    simvastatin (ZOCOR) 20 MG tablet Take 1 tablet by mouth every evening 30 tablet 3    Alcohol Swabs PADS 1 Units by Does not apply route as needed (checking glucose) 100 each 11    diclofenac sodium 1 % GEL Apply 2 g topically 4 times daily 2 Tube 0    blood glucose monitor strips FBS daily. Please dispense Freestyle brand 100 strip 11    Lancets MISC Test once daily 100 each 3    ammonium lactate (LAC-HYDRIN) 12 % lotion apply to affected area twice a day  0       Allergies:     Allergies   Allergen Reactions    Tobramycin Swelling       Problem List:    Patient Active Problem List   Diagnosis Code    Migraine G43.909    Type 2 diabetes mellitus with diabetic polyneuropathy, without long-term current use of insulin ([facility]) E11.42    Hyperlipidemia associated with type 2 diabetes mellitus ([facility]) E11.69, E78.5    Chronic bilateral low back pain with sciatica M54.40, G89.29    Neck pain M54.2    Rotator cuff arthropathy of right shoulder M12.811    Class 1 obesity due to excess calories with serious comorbidity and body mass index (BMI) of 33.0 to 33.9 in adult E66.09, Z68.33       Past ALT 51 01/31/2020       POC Tests: No results for input(s): POCGLU, POCNA, POCK, POCCL, POCBUN, POCHEMO, POCHCT in the last 72 hours. Coags: No results found for: PROTIME, INR, APTT    HCG (If Applicable): No results found for: PREGTESTUR, PREGSERUM, HCG, HCGQUANT     ABGs: No results found for: PHART, PO2ART, VVY9CZL, BJN4DIE, BEART, [id number]     Type & Screen (If Applicable):  No results found for: LABABO, [address]    Anesthesia Evaluation  Patient summary reviewed and Nursing notes reviewed no history of anesthetic complications:   Airway: Mallampati: II  TM distance: >3 FB   Neck ROM: full  Mouth opening: > = 3 FB Dental: normal exam         Pulmonary:Negative Pulmonary ROS breath sounds clear to auscultation                             Cardiovascular:  Exercise tolerance: good (>4 METS),   (+) hyperlipidemia        Rhythm: regular  Rate: normal                    Neuro/Psych:   (+) neuromuscular disease:, headaches:,              ROS comment: Bilateral chronic low back pain with sciatica  polyneuropahty GI/Hepatic/Renal:   (+) GERD:,           Endo/Other:    (+) DiabetesType II DM, using insulin, . Abdominal:           Vascular: negative vascular ROS. Anesthesia Plan      general, other and regional     ASA 2       Induction: intravenous. MIPS: Postoperative opioids intended and Prophylactic antiemetics administered. Anesthetic plan and risks discussed with patient, spouse and child/children.       Plan discussed with CRNA and surgical team.    Attending anesthesiologist reviewed and agrees with Pre Eval content      PAT Chart Review:  Chart reviewed per routine on February 10, 2020 at 7:32 AM by Gena Mcardle, DO.  (Final assessment and plan per day of surgery team.)      Gena Mcardle, DO   2/10/2020      DOS STAFF ADDENDUM:    Pt seen and examined, physical exam updated, chart reviewed including anesthesia, drug and allergy

## 2020-02-11 ENCOUNTER — HOSPITAL ENCOUNTER (OUTPATIENT)
Age: 55
Setting detail: OUTPATIENT SURGERY
Discharge: HOME | End: 2020-02-11
Attending: ORTHOPAEDIC SURGERY | Admitting: ORTHOPAEDIC SURGERY
Payer: COMMERCIAL

## 2020-02-11 ENCOUNTER — ANESTHESIA (OUTPATIENT)
Dept: OPERATING ROOM | Age: 55
End: 2020-02-11
Payer: COMMERCIAL

## 2020-02-11 VITALS
TEMPERATURE: 98 F | HEART RATE: 97 BPM | OXYGEN SATURATION: 93 % | SYSTOLIC BLOOD PRESSURE: 148 MMHG | HEIGHT: 72 IN | RESPIRATION RATE: 16 BRPM | WEIGHT: 244 LBS | BODY MASS INDEX: 33.05 KG/M2 | DIASTOLIC BLOOD PRESSURE: 88 MMHG

## 2020-02-11 VITALS
TEMPERATURE: 97.2 F | SYSTOLIC BLOOD PRESSURE: 140 MMHG | RESPIRATION RATE: 21 BRPM | DIASTOLIC BLOOD PRESSURE: 84 MMHG | OXYGEN SATURATION: 94 %

## 2020-02-11 LAB — METER GLUCOSE: 119 MG/DL (ref 74–99)

## 2020-02-11 PROCEDURE — 3700000000 HC ANESTHESIA ATTENDED CARE: Performed by: ORTHOPAEDIC SURGERY

## 2020-02-11 PROCEDURE — 2500000003 HC RX 250 WO HCPCS: Performed by: NURSE ANESTHETIST, CERTIFIED REGISTERED

## 2020-02-11 PROCEDURE — 29823 SHO ARTHRS SRG XTNSV DBRDMT: CPT | Performed by: ORTHOPAEDIC SURGERY

## 2020-02-11 PROCEDURE — 2720000010 HC SURG SUPPLY STERILE: Performed by: ORTHOPAEDIC SURGERY

## 2020-02-11 PROCEDURE — 2500000003 HC RX 250 WO HCPCS: Performed by: ORTHOPAEDIC SURGERY

## 2020-02-11 PROCEDURE — 29824 SHO ARTHRS SRG DSTL CLAVICLC: CPT | Performed by: ORTHOPAEDIC SURGERY

## 2020-02-11 PROCEDURE — 6360000002 HC RX W HCPCS: Performed by: NURSE ANESTHETIST, CERTIFIED REGISTERED

## 2020-02-11 PROCEDURE — 3600000014 HC SURGERY LEVEL 4 ADDTL 15MIN: Performed by: ORTHOPAEDIC SURGERY

## 2020-02-11 PROCEDURE — 82962 GLUCOSE BLOOD TEST: CPT | Performed by: ORTHOPAEDIC SURGERY

## 2020-02-11 PROCEDURE — 6370000000 HC RX 637 (ALT 250 FOR IP): Performed by: ANESTHESIOLOGY

## 2020-02-11 PROCEDURE — C1713 ANCHOR/SCREW BN/BN,TIS/BN: HCPCS | Performed by: ORTHOPAEDIC SURGERY

## 2020-02-11 PROCEDURE — 29828 SHO ARTHRS SRG BICP TENODSIS: CPT | Performed by: ORTHOPAEDIC SURGERY

## 2020-02-11 PROCEDURE — 6360000002 HC RX W HCPCS: Performed by: ORTHOPAEDIC SURGERY

## 2020-02-11 PROCEDURE — 2709999900 HC NON-CHARGEABLE SUPPLY: Performed by: ORTHOPAEDIC SURGERY

## 2020-02-11 PROCEDURE — 3600000004 HC SURGERY LEVEL 4 BASE: Performed by: ORTHOPAEDIC SURGERY

## 2020-02-11 PROCEDURE — 6360000002 HC RX W HCPCS: Performed by: ANESTHESIOLOGY

## 2020-02-11 PROCEDURE — 7100000010 HC PHASE II RECOVERY - FIRST 15 MIN: Performed by: ORTHOPAEDIC SURGERY

## 2020-02-11 PROCEDURE — 3700000001 HC ADD 15 MINUTES (ANESTHESIA): Performed by: ORTHOPAEDIC SURGERY

## 2020-02-11 PROCEDURE — 2580000003 HC RX 258: Performed by: ORTHOPAEDIC SURGERY

## 2020-02-11 PROCEDURE — 29826 SHO ARTHRS SRG DECOMPRESSION: CPT | Performed by: ORTHOPAEDIC SURGERY

## 2020-02-11 PROCEDURE — 82962 GLUCOSE BLOOD TEST: CPT

## 2020-02-11 PROCEDURE — 2580000003 HC RX 258: Performed by: ANESTHESIOLOGY

## 2020-02-11 PROCEDURE — 7100000000 HC PACU RECOVERY - FIRST 15 MIN: Performed by: ORTHOPAEDIC SURGERY

## 2020-02-11 PROCEDURE — 76942 ECHO GUIDE FOR BIOPSY: CPT | Performed by: ANESTHESIOLOGY

## 2020-02-11 PROCEDURE — 7100000001 HC PACU RECOVERY - ADDTL 15 MIN: Performed by: ORTHOPAEDIC SURGERY

## 2020-02-11 PROCEDURE — 7100000011 HC PHASE II RECOVERY - ADDTL 15 MIN: Performed by: ORTHOPAEDIC SURGERY

## 2020-02-11 DEVICE — ANCHOR SUTURE BIOCOMP 4.75X22 MM DBL LD WHT SWIVELOCK C: Type: IMPLANTABLE DEVICE | Status: FUNCTIONAL

## 2020-02-11 RX ORDER — OXYCODONE HYDROCHLORIDE AND ACETAMINOPHEN 5; 325 MG/1; MG/1
1 TABLET ORAL ONCE
Status: COMPLETED | OUTPATIENT
Start: 2020-02-11 | End: 2020-02-11

## 2020-02-11 RX ORDER — LIDOCAINE HYDROCHLORIDE 20 MG/ML
INJECTION, SOLUTION INTRAVENOUS PRN
Status: DISCONTINUED | OUTPATIENT
Start: 2020-02-11 | End: 2020-02-11 | Stop reason: SDUPTHER

## 2020-02-11 RX ORDER — KETOROLAC TROMETHAMINE 30 MG/ML
INJECTION, SOLUTION INTRAMUSCULAR; INTRAVENOUS PRN
Status: DISCONTINUED | OUTPATIENT
Start: 2020-02-11 | End: 2020-02-11 | Stop reason: SDUPTHER

## 2020-02-11 RX ORDER — PROMETHAZINE HYDROCHLORIDE 25 MG/ML
6.25 INJECTION, SOLUTION INTRAMUSCULAR; INTRAVENOUS
Status: DISCONTINUED | OUTPATIENT
Start: 2020-02-11 | End: 2020-02-11 | Stop reason: HOSPADM

## 2020-02-11 RX ORDER — GLYCOPYRROLATE 1 MG/5 ML
SYRINGE (ML) INTRAVENOUS PRN
Status: DISCONTINUED | OUTPATIENT
Start: 2020-02-11 | End: 2020-02-11 | Stop reason: SDUPTHER

## 2020-02-11 RX ORDER — BUPIVACAINE HYDROCHLORIDE AND EPINEPHRINE 2.5; 5 MG/ML; UG/ML
INJECTION, SOLUTION EPIDURAL; INFILTRATION; INTRACAUDAL; PERINEURAL PRN
Status: DISCONTINUED | OUTPATIENT
Start: 2020-02-11 | End: 2020-02-11 | Stop reason: ALTCHOICE

## 2020-02-11 RX ORDER — ROCURONIUM BROMIDE 10 MG/ML
INJECTION, SOLUTION INTRAVENOUS PRN
Status: DISCONTINUED | OUTPATIENT
Start: 2020-02-11 | End: 2020-02-11 | Stop reason: SDUPTHER

## 2020-02-11 RX ORDER — HYDROMORPHONE HYDROCHLORIDE 1 MG/ML
0.25 INJECTION, SOLUTION INTRAMUSCULAR; INTRAVENOUS; SUBCUTANEOUS EVERY 5 MIN PRN
Status: DISCONTINUED | OUTPATIENT
Start: 2020-02-11 | End: 2020-02-11 | Stop reason: HOSPADM

## 2020-02-11 RX ORDER — KETOROLAC TROMETHAMINE 10 MG/1
10 TABLET, FILM COATED ORAL EVERY 6 HOURS PRN
Qty: 20 TABLET | Refills: 0 | Status: SHIPPED | OUTPATIENT
Start: 2020-02-11 | End: 2020-02-26

## 2020-02-11 RX ORDER — ONDANSETRON 4 MG/1
4 TABLET, FILM COATED ORAL EVERY 6 HOURS PRN
Qty: 20 TABLET | Refills: 1 | Status: SHIPPED | OUTPATIENT
Start: 2020-02-11 | End: 2020-04-30

## 2020-02-11 RX ORDER — PROPOFOL 10 MG/ML
INJECTION, EMULSION INTRAVENOUS PRN
Status: DISCONTINUED | OUTPATIENT
Start: 2020-02-11 | End: 2020-02-11 | Stop reason: SDUPTHER

## 2020-02-11 RX ORDER — DOCUSATE SODIUM 100 MG/1
100 CAPSULE, LIQUID FILLED ORAL 2 TIMES DAILY PRN
Qty: 20 CAPSULE | Refills: 1 | Status: SHIPPED | OUTPATIENT
Start: 2020-02-11 | End: 2020-04-30

## 2020-02-11 RX ORDER — CEFAZOLIN SODIUM 2 G/50ML
2 SOLUTION INTRAVENOUS ONCE
Status: COMPLETED | OUTPATIENT
Start: 2020-02-11 | End: 2020-02-11

## 2020-02-11 RX ORDER — DEXAMETHASONE SODIUM PHOSPHATE 10 MG/ML
INJECTION, SOLUTION INTRAMUSCULAR; INTRAVENOUS PRN
Status: DISCONTINUED | OUTPATIENT
Start: 2020-02-11 | End: 2020-02-11 | Stop reason: SDUPTHER

## 2020-02-11 RX ORDER — OXYCODONE HYDROCHLORIDE AND ACETAMINOPHEN 5; 325 MG/1; MG/1
1 TABLET ORAL EVERY 6 HOURS PRN
Qty: 28 TABLET | Refills: 0 | Status: SHIPPED | OUTPATIENT
Start: 2020-02-11 | End: 2020-02-18

## 2020-02-11 RX ORDER — NEOSTIGMINE METHYLSULFATE 1 MG/ML
INJECTION, SOLUTION INTRAVENOUS PRN
Status: DISCONTINUED | OUTPATIENT
Start: 2020-02-11 | End: 2020-02-11 | Stop reason: SDUPTHER

## 2020-02-11 RX ORDER — HYDROMORPHONE HYDROCHLORIDE 1 MG/ML
0.5 INJECTION, SOLUTION INTRAMUSCULAR; INTRAVENOUS; SUBCUTANEOUS EVERY 5 MIN PRN
Status: DISCONTINUED | OUTPATIENT
Start: 2020-02-11 | End: 2020-02-11 | Stop reason: HOSPADM

## 2020-02-11 RX ORDER — MIDAZOLAM HYDROCHLORIDE 1 MG/ML
INJECTION INTRAMUSCULAR; INTRAVENOUS PRN
Status: DISCONTINUED | OUTPATIENT
Start: 2020-02-11 | End: 2020-02-11 | Stop reason: SDUPTHER

## 2020-02-11 RX ORDER — FENTANYL CITRATE 50 UG/ML
INJECTION, SOLUTION INTRAMUSCULAR; INTRAVENOUS PRN
Status: DISCONTINUED | OUTPATIENT
Start: 2020-02-11 | End: 2020-02-11 | Stop reason: SDUPTHER

## 2020-02-11 RX ORDER — SODIUM CHLORIDE, SODIUM LACTATE, POTASSIUM CHLORIDE, CALCIUM CHLORIDE 600; 310; 30; 20 MG/100ML; MG/100ML; MG/100ML; MG/100ML
INJECTION, SOLUTION INTRAVENOUS CONTINUOUS
Status: DISCONTINUED | OUTPATIENT
Start: 2020-02-11 | End: 2020-02-11 | Stop reason: HOSPADM

## 2020-02-11 RX ORDER — ONDANSETRON 2 MG/ML
INJECTION INTRAMUSCULAR; INTRAVENOUS PRN
Status: DISCONTINUED | OUTPATIENT
Start: 2020-02-11 | End: 2020-02-11 | Stop reason: SDUPTHER

## 2020-02-11 RX ADMIN — Medication 0.6 MG: at 11:32

## 2020-02-11 RX ADMIN — DEXAMETHASONE SODIUM PHOSPHATE 10 MG: 10 INJECTION, SOLUTION INTRAMUSCULAR; INTRAVENOUS at 09:50

## 2020-02-11 RX ADMIN — LIDOCAINE HYDROCHLORIDE 50 MG: 20 INJECTION, SOLUTION INTRAVENOUS at 09:43

## 2020-02-11 RX ADMIN — PROPOFOL 200 MG: 10 INJECTION, EMULSION INTRAVENOUS at 09:43

## 2020-02-11 RX ADMIN — FENTANYL CITRATE 50 MCG: 50 INJECTION, SOLUTION INTRAMUSCULAR; INTRAVENOUS at 09:20

## 2020-02-11 RX ADMIN — MIDAZOLAM 2 MG: 1 INJECTION INTRAMUSCULAR; INTRAVENOUS at 09:18

## 2020-02-11 RX ADMIN — ONDANSETRON HYDROCHLORIDE 4 MG: 2 INJECTION, SOLUTION INTRAMUSCULAR; INTRAVENOUS at 10:37

## 2020-02-11 RX ADMIN — HYDROMORPHONE HYDROCHLORIDE 1 MG: 1 INJECTION, SOLUTION INTRAMUSCULAR; INTRAVENOUS; SUBCUTANEOUS at 12:19

## 2020-02-11 RX ADMIN — Medication 50 MG: at 09:43

## 2020-02-11 RX ADMIN — OXYCODONE HYDROCHLORIDE AND ACETAMINOPHEN 1 TABLET: 5; 325 TABLET ORAL at 13:24

## 2020-02-11 RX ADMIN — Medication 3 MG: at 11:32

## 2020-02-11 RX ADMIN — CEFAZOLIN SODIUM 2 G: 2 SOLUTION INTRAVENOUS at 09:15

## 2020-02-11 RX ADMIN — FENTANYL CITRATE 50 MCG: 50 INJECTION, SOLUTION INTRAMUSCULAR; INTRAVENOUS at 09:23

## 2020-02-11 RX ADMIN — FENTANYL CITRATE 50 MCG: 50 INJECTION, SOLUTION INTRAMUSCULAR; INTRAVENOUS at 09:29

## 2020-02-11 RX ADMIN — KETOROLAC TROMETHAMINE 30 MG: 30 INJECTION, SOLUTION INTRAMUSCULAR; INTRAVENOUS at 11:28

## 2020-02-11 RX ADMIN — FENTANYL CITRATE 50 MCG: 50 INJECTION, SOLUTION INTRAMUSCULAR; INTRAVENOUS at 09:26

## 2020-02-11 RX ADMIN — SODIUM CHLORIDE, POTASSIUM CHLORIDE, SODIUM LACTATE AND CALCIUM CHLORIDE: 600; 310; 30; 20 INJECTION, SOLUTION INTRAVENOUS at 08:35

## 2020-02-11 ASSESSMENT — PAIN DESCRIPTION - DESCRIPTORS
DESCRIPTORS: ACHING
DESCRIPTORS: CRUSHING;DISCOMFORT
DESCRIPTORS: ACHING;BURNING
DESCRIPTORS: ACHING
DESCRIPTORS: ACHING;BURNING
DESCRIPTORS: ACHING
DESCRIPTORS: ACHING

## 2020-02-11 ASSESSMENT — PULMONARY FUNCTION TESTS
PIF_VALUE: 28
PIF_VALUE: 24
PIF_VALUE: 24
PIF_VALUE: 23
PIF_VALUE: 24
PIF_VALUE: 23
PIF_VALUE: 23
PIF_VALUE: 22
PIF_VALUE: 27
PIF_VALUE: 23
PIF_VALUE: 24
PIF_VALUE: 23
PIF_VALUE: 23
PIF_VALUE: 24
PIF_VALUE: 23
PIF_VALUE: 0
PIF_VALUE: 24
PIF_VALUE: 24
PIF_VALUE: 23
PIF_VALUE: 25
PIF_VALUE: 23
PIF_VALUE: 23
PIF_VALUE: 20
PIF_VALUE: 24
PIF_VALUE: 23
PIF_VALUE: 24
PIF_VALUE: 0
PIF_VALUE: 25
PIF_VALUE: 0
PIF_VALUE: 0
PIF_VALUE: 23
PIF_VALUE: 3
PIF_VALUE: 0
PIF_VALUE: 23
PIF_VALUE: 37
PIF_VALUE: 3
PIF_VALUE: 23
PIF_VALUE: 24
PIF_VALUE: 0
PIF_VALUE: 23
PIF_VALUE: 22
PIF_VALUE: 2
PIF_VALUE: 3
PIF_VALUE: 23
PIF_VALUE: 24
PIF_VALUE: 23
PIF_VALUE: 21
PIF_VALUE: 23
PIF_VALUE: 24
PIF_VALUE: 23
PIF_VALUE: 24
PIF_VALUE: 23
PIF_VALUE: 24
PIF_VALUE: 23
PIF_VALUE: 1
PIF_VALUE: 3
PIF_VALUE: 0
PIF_VALUE: 3
PIF_VALUE: 24
PIF_VALUE: 23
PIF_VALUE: 24
PIF_VALUE: 23
PIF_VALUE: 23
PIF_VALUE: 0
PIF_VALUE: 24
PIF_VALUE: 3
PIF_VALUE: 24
PIF_VALUE: 23
PIF_VALUE: 22
PIF_VALUE: 23
PIF_VALUE: 22
PIF_VALUE: 24
PIF_VALUE: 23
PIF_VALUE: 24
PIF_VALUE: 23
PIF_VALUE: 0
PIF_VALUE: 0
PIF_VALUE: 24
PIF_VALUE: 23
PIF_VALUE: 22
PIF_VALUE: 24
PIF_VALUE: 24
PIF_VALUE: 22
PIF_VALUE: 0
PIF_VALUE: 24
PIF_VALUE: 24
PIF_VALUE: 23
PIF_VALUE: 0
PIF_VALUE: 0
PIF_VALUE: 23
PIF_VALUE: 3
PIF_VALUE: 23
PIF_VALUE: 24
PIF_VALUE: 0
PIF_VALUE: 7
PIF_VALUE: 37
PIF_VALUE: 1
PIF_VALUE: 24
PIF_VALUE: 2
PIF_VALUE: 24
PIF_VALUE: 24
PIF_VALUE: 23
PIF_VALUE: 23
PIF_VALUE: 24
PIF_VALUE: 1
PIF_VALUE: 24
PIF_VALUE: 25
PIF_VALUE: 23
PIF_VALUE: 23
PIF_VALUE: 22
PIF_VALUE: 23
PIF_VALUE: 23
PIF_VALUE: 0
PIF_VALUE: 0
PIF_VALUE: 24
PIF_VALUE: 3
PIF_VALUE: 0
PIF_VALUE: 24
PIF_VALUE: 21
PIF_VALUE: 27
PIF_VALUE: 22
PIF_VALUE: 23
PIF_VALUE: 23
PIF_VALUE: 24
PIF_VALUE: 23
PIF_VALUE: 23
PIF_VALUE: 24
PIF_VALUE: 1
PIF_VALUE: 4
PIF_VALUE: 0
PIF_VALUE: 24
PIF_VALUE: 24
PIF_VALUE: 23
PIF_VALUE: 23
PIF_VALUE: 24
PIF_VALUE: 1
PIF_VALUE: 0
PIF_VALUE: 23
PIF_VALUE: 0

## 2020-02-11 ASSESSMENT — PAIN SCALES - GENERAL
PAINLEVEL_OUTOF10: 10
PAINLEVEL_OUTOF10: 8
PAINLEVEL_OUTOF10: 6
PAINLEVEL_OUTOF10: 10
PAINLEVEL_OUTOF10: 5
PAINLEVEL_OUTOF10: 5
PAINLEVEL_OUTOF10: 7
PAINLEVEL_OUTOF10: 8
PAINLEVEL_OUTOF10: 7

## 2020-02-11 ASSESSMENT — PAIN DESCRIPTION - PROGRESSION
CLINICAL_PROGRESSION: NOT CHANGED
CLINICAL_PROGRESSION: GRADUALLY IMPROVING
CLINICAL_PROGRESSION: NOT CHANGED
CLINICAL_PROGRESSION: GRADUALLY IMPROVING
CLINICAL_PROGRESSION: GRADUALLY WORSENING

## 2020-02-11 ASSESSMENT — PAIN DESCRIPTION - FREQUENCY
FREQUENCY: CONTINUOUS

## 2020-02-11 ASSESSMENT — PAIN DESCRIPTION - ONSET
ONSET: ON-GOING

## 2020-02-11 ASSESSMENT — PAIN - FUNCTIONAL ASSESSMENT
PAIN_FUNCTIONAL_ASSESSMENT: PREVENTS OR INTERFERES SOME ACTIVE ACTIVITIES AND ADLS
PAIN_FUNCTIONAL_ASSESSMENT: 0-10
PAIN_FUNCTIONAL_ASSESSMENT: PREVENTS OR INTERFERES SOME ACTIVE ACTIVITIES AND ADLS

## 2020-02-11 ASSESSMENT — PAIN DESCRIPTION - LOCATION
LOCATION: SHOULDER

## 2020-02-11 ASSESSMENT — PAIN DESCRIPTION - PAIN TYPE
TYPE: SURGICAL PAIN

## 2020-02-11 ASSESSMENT — PAIN DESCRIPTION - ORIENTATION
ORIENTATION: RIGHT

## 2020-02-11 NOTE — H&P
I have reviewed the history and physical and examined the patient and find no relevant changes. I have reviewed with the patient and/or family the risks, benefits, and alternatives to the procedure.     Kanika Nava,   2/11/2020

## 2020-02-11 NOTE — OP NOTE
Pre-operative Diagnosis:   1. Right rotator cuff tear  2. Right shoulder impingement syndrome  3. Right shoulder AC OA  4. Right glenohumeral arthritis        Post-operative Diagnosis:  1. Right rotator cuff tear  2. Right shoulder impingement syndrome  3. Right shoulder AC OA  4. Right glenohumeral arthritis  5. Right biceps long head tendon tear        Procedure:   1. Right arthroscopic rotator cuff repair  2. Right shoulder arthroscopic Subacromial decompression  3. Right shoulder arthroscopic distal clavicle resection  4. Right shoulder arthroscopic partial synovectomy  5. Right shoulder extensive debridement  6. Right shoulder arthroscopic long head biceps tenodesis        Anesthesia: General and Nerve Block     Surgeons/Assistants: Dewey Resendiz DO        Estimated Blood Loss: less than 50      Complications: None     Specimens: Was Not Obtained        INDICATIONS:   The patient is a 53 male who has progressive pain and weakness, loss of function of his right shoulder. MRI demonstrated near full-thickness tear of the rotator cuff with tendinosis and impingment. He has failed conservative management . He elects to undergo the above-stated procedure after understanding the risks and benefits including but not limited to neurovascular injury, infection, continued pain, failed repair, need for revision, and loss of function. PROCEDURE:   The patient was taken to the operating room, placed supine on the operating table, underwent general anesthesia without difficulty. Right shoulder motion was ranged to check for any adhesive capsulitis under anesthesia. he received preoperative antibiotics. Right shoulder was prepped and draped in a sterile fashion. The arthroscope was inserted into the posterior portal, anterior portal was established in the rotator interval. The biceps tendon and anchor were intact but extensive fraying and inflammation noted with 50% involvement.   Noting this, the biceps tendon was condition.

## 2020-02-11 NOTE — H&P
Smoking status: Never Smoker    Smokeless tobacco: Never Used   Substance and Sexual Activity    Alcohol use:  Yes     Alcohol/week: 0.0 standard drinks     Comment: rare    Drug use: No    Sexual activity: Yes     Partners: Female   Lifestyle    Physical activity:     Days per week: Not on file     Minutes per session: Not on file    Stress: Not on file   Relationships    Social connections:     Talks on phone: Not on file     Gets together: Not on file     Attends Holiness service: Not on file     Active member of club or organization: Not on file     Attends meetings of clubs or organizations: Not on file     Relationship status: Not on file    Intimate partner violence:     Fear of current or ex partner: Not on file     Emotionally abused: Not on file     Physically abused: Not on file     Forced sexual activity: Not on file   Other Topics Concern    Not on file   Social History Narrative    Not on file     Family History   Problem Relation Age of Onset    Diabetes Father     High Blood Pressure Father     Heart Disease Father     Stroke Father     Diabetes Mother     Heart Disease Mother     Cancer Brother 54        colon    High Blood Pressure Brother     Cancer Maternal Cousin 40        colon    Cancer Sister     Diabetes Sister     Heart Disease Sister     Stroke Sister     Diabetes Maternal Aunt     Heart Disease Maternal Aunt     High Blood Pressure Maternal Aunt     High Cholesterol Maternal Aunt     Stroke Maternal Aunt     Diabetes Maternal Uncle     Heart Disease Maternal Uncle     High Blood Pressure Maternal Uncle     High Cholesterol Maternal Uncle     Stroke Maternal Uncle     Diabetes Paternal Aunt     Heart Disease Paternal Aunt     High Blood Pressure Paternal Aunt     High Cholesterol Paternal Aunt     Stroke Paternal Aunt     Diabetes Paternal Uncle     Heart Disease Paternal Uncle     High Blood Pressure Paternal Uncle     High Cholesterol Paternal Uncle     Stroke Paternal Uncle     Diabetes Maternal Grandmother     High Blood Pressure Maternal Grandmother     High Cholesterol Maternal Grandmother     Cancer Maternal Grandfather     Diabetes Maternal Grandfather     High Blood Pressure Maternal Grandfather     High Cholesterol Maternal Grandfather     Stroke Maternal Grandfather     Diabetes Paternal Grandfather     High Blood Pressure Paternal Grandfather     High Cholesterol Paternal Grandfather            Physical Exam:    /80   Pulse 80   Temp 98 °F (36.7 °C) (Skin)   Resp 16   Ht 6' (1.829 m)   Wt 244 lb (110.7 kg)   SpO2 96%   BMI 33.09 kg/m²     GENERAL: alert, appears stated age, cooperative, no acute distress    HEENT: Head is normocephalic, atraumatic. PERRLA. SKIN: Clean, dry, intact. There is not any cellulitis or cutaneous lesions noted in the lower extremities    PULMONARY: breathing is regular and unlabored, no acute distress    CV: The bilateral upper and lower extremities are warm and well-perfused with brisk capillary refill. 2+ pulses UE and LE bilateral.     PSYCHIATRY: Pleasant mood, appropriate behavior, follows commands    NEURO: Sensation is intact distally with light touch with no alteration. Motor exam of the lower extremities show quadriceps, hamstrings, foot dorsiflexion and plantarflexion grossly intact 5/5. LYMPH: No lymphedema present distally in upper or lower extremity. MUSCULOSKELETAL:  Shoulder Exam:  Examination of the right shoulder shows: There is not a deformity. There is not erythema. There is not soft tissue swelling. Deltoid region is not tender to palpation. AC Joint is not tender to palpation. Clavicle is not tender to palpation. Bicipital Groove is not tender to palpation. Pectoralis  is not tender to palpation. Scapula/ trapezius is  tender to palpation. There is  weakness with supraspinatus testing. There is  pain with supraspinatus testing. Yergason Test negative. no evidence of fracture or osteonecrosis. 1. Moderate tendinopathy of the infraspinatus tendon. No full-thickness rotator cuff tear is identified. 2. Mild tendinopathy of the intra-articular biceps tendon. 3. Heterogeneity of the labrum with a small paralabral cyst along the posterior labrum and likely additional paralabral cyst along the anterosuperior labrum, compatible with a tear. 3. Moderate acromioclavicular joint arthrosis with trace subacromial/subdeltoid bursitis. Tala Gilmore was seen today for shoulder pain. Diagnoses and all orders for this visit:    Nontraumatic incomplete tear of right rotator cuff      Patient seen and examined. MRI reviewed with patient in detail. Natural history and course discussed with patient in long discussion  Treatment options discussed with patient in detail including risks and benefits. I discussed the risks and benefits of the shoulder arthroscopy with the patient. The risks include but are not limited to: infection, injuries to blood vessels and nerves, non relief of symptoms, intraoperative fracture, need for further operative intervention, blood loss, arthrofibrosis of shoulder, DVT/PE, MI and death. The patient understands these risks and wishes to proceed with surgery. I will perform a Right shoulder arthroscopy, SAD and debridement. Medical clearance will be obtained from PCP prior to procedure. Yary Santana DO      25 minutes was spent with patient. 50% or greater was spent counseling the patient.

## 2020-02-11 NOTE — BRIEF OP NOTE
Brief Postoperative Note  ______________________________________________________________    Patient: Joseph Christine  YOB: 1965  MRN: [id number]  Date of Procedure: 2/11/2020    Pre-Op Diagnosis: RIGHT ROTATOR CUFF TEAR    Post-Op Diagnosis: Same       Procedure(s):  RIGHT SHOULDER ARTHROSCOPY SUBACHROMIAL DECOMPRESSION DEBRIDEMENT, BICEP TENODESIS ,LABRAL DEBRIDEMENT AND KHALIDA    Anesthesia: Regional, General, Other    Surgeon(s): Forest Handy DO    Assistant:     Estimated Blood Loss (mL): less than 50     Complications: None    Specimens:   * No specimens in log *    Implants:  Implant Name Type Inv.  Item Serial No.  Lot No. LRB No. Used   [facility]/[address] [place] [address] [id number] BIOCOMPOSITE Fastener [address] 4.75X22 [address] [place] [address] [id number] Right 1         Drains: * No LDAs found *    Findings: see report    Forest Handy DO  Date: 2/11/2020  Time: 12:07 PM

## 2020-02-11 NOTE — ANESTHESIA POSTPROCEDURE EVALUATION
Department of Anesthesiology  Postprocedure Note    Patient: Adriana Mendiola  MRN: [id number]  YOB: 1965  Date of evaluation: 2/11/2020  Time:  1:12 PM     Procedure Summary     Date:  02/11/20 Room / Location:  [address] [place] [address] 01 / [facility]    Anesthesia Start:  0915 Anesthesia Stop:  8489    Procedure:  RIGHT SHOULDER ARTHROSCOPY SUBACHROMIAL DECOMPRESSION DEBRIDEMENT, BICEP TENODESIS ,LABRAL DEBRIDEMENT AND KHALIDA (Right ) Diagnosis:  (RIGHT ROTATOR CUFF TEAR)    Surgeon:  Anat Mesa DO Responsible Provider:  Joaquin Parker MD    Anesthesia Type:  general, other, regional ASA Status:  2          Anesthesia Type: general, other, regional    Yesi Phase I: Yesi Score: 7    Yesi Phase II: Yesi Score: 10    Last vitals: Reviewed and per EMR flowsheets.        Anesthesia Post Evaluation    Patient location during evaluation: PACU  Patient participation: complete - patient participated  Level of consciousness: awake and alert  Pain score: 5  Airway patency: patent  Nausea & Vomiting: no vomiting and no nausea  Complications: no  Cardiovascular status: hemodynamically stable  Respiratory status: spontaneous ventilation  Hydration status: stable

## 2020-02-11 NOTE — ANESTHESIA PROCEDURE NOTES
Peripheral Block    Patient location during procedure: OR  Start time: 2/11/2020 9:23 AM  End time: 2/11/2020 9:41 AM  Staffing  Anesthesiologist: Dawn Still MD  Performed: anesthesiologist   Preanesthetic Checklist  Completed: patient identified, site marked, surgical consent, pre-op evaluation, timeout performed, IV checked, risks and benefits discussed, monitors and equipment checked, anesthesia consent given, oxygen available and patient being monitored  Peripheral Block  Patient position: supine  Prep: ChloraPrep  Patient monitoring: cardiac monitor, continuous pulse ox, continuous capnometry, frequent blood pressure checks and IV access  Block type: Brachial plexus  Laterality: right  Injection technique: single-shot  Procedures: ultrasound guided  Local infiltration: ropivacaine  Infiltration strength: 0.5 %  Dose: 30 mL  Interscalene  Provider prep: mask and sterile gloves  Local infiltration: ropivacaine  Needle  Needle type: combined needle/nerve stimulator   Needle gauge: 21 G  Needle length: 2 inch. Needle localization: ultrasound guidance  Assessment  Injection assessment: negative aspiration for heme, no paresthesia on injection and local visualized surrounding nerve on ultrasound  Paresthesia pain: none  Slow fractionated injection: yes  Hemodynamics: stable  Additional Notes  Patient tolerated procedure well. VSS.    Reason for block: post-op pain management and at surgeon's request

## 2020-02-26 ENCOUNTER — OFFICE VISIT (OUTPATIENT)
Dept: ORTHOPEDIC SURGERY | Age: 55
End: 2020-02-26

## 2020-02-26 VITALS — WEIGHT: 244 LBS | BODY MASS INDEX: 33.05 KG/M2 | HEIGHT: 72 IN

## 2020-02-26 PROCEDURE — 99024 POSTOP FOLLOW-UP VISIT: CPT | Performed by: ORTHOPAEDIC SURGERY

## 2020-02-26 RX ORDER — ONDANSETRON 4 MG/1
4 TABLET, FILM COATED ORAL EVERY 6 HOURS PRN
Qty: 20 TABLET | Refills: 1 | Status: SHIPPED
Start: 2020-02-26 | End: 2020-04-30

## 2020-02-26 RX ORDER — DOCUSATE SODIUM 100 MG/1
100 CAPSULE, LIQUID FILLED ORAL 2 TIMES DAILY PRN
Qty: 20 CAPSULE | Refills: 1 | Status: SHIPPED
Start: 2020-02-26 | End: 2020-04-30

## 2020-02-26 RX ORDER — DOXYCYCLINE HYCLATE 100 MG
100 TABLET ORAL 2 TIMES DAILY
Qty: 14 TABLET | Refills: 0 | Status: SHIPPED | OUTPATIENT
Start: 2020-02-26 | End: 2020-03-04

## 2020-02-26 NOTE — PROGRESS NOTES
Chief Complaint   Patient presents with    Shoulder Pain     Right shoulder 2 week post op arthroscopy follow up. DOS 2/11/2020. Would like refill on colace, Mateusz Cesar is here for follow-up after right shoulder arthroscopy. Findings at surgery:  Biceps long head tendon tear. Pain is controlled without any medications. The patient denies fever, wound drainage, increasing redness, pus, increasing pain, increasing swelling. Post op problems reported: none per patient's wife states that he has been scratching his torso and chest.       Shoulder exam - The incisions are clean, dry and intact. right normal; 100/100/60 range of motion, no pain on motion, no tenderness or deformity noted. Motor and sensory exam is grossly intact in B/L upper extremities. Special test results are as follow:  Impingement negative, Arrington negative, Speeds negative, Apprehension negative, Ross negative, Load Shiftnegative, Cole manuver negative, Cross arm test negative. Encounter Diagnosis   Name Primary?  Status post arthroscopy of shoulder Yes       Plan:  Patient seen examined. Surgical pictures reviewed with patient today. Sutures removed under sterile conditions. Local wound care discussed with education  especially for scratching around the incisions. The patient will continue with gentle ROM exercises and being activities as tolerated. The patient was educated about natural history and postoperative course and to continue physical therapy. Sling will be used for comfort ONLY otherwise discontinue. Patient is to continue analgesics and needed and use ice for pain. We will see the pain back in 4 weeks time for repeat evaluation.

## 2020-03-02 ENCOUNTER — EVALUATION (OUTPATIENT)
Dept: PHYSICAL THERAPY | Age: 55
End: 2020-03-02
Payer: COMMERCIAL

## 2020-03-02 PROCEDURE — 97110 THERAPEUTIC EXERCISES: CPT | Performed by: PHYSICAL THERAPIST

## 2020-03-02 PROCEDURE — 97162 PT EVAL MOD COMPLEX 30 MIN: CPT | Performed by: PHYSICAL THERAPIST

## 2020-03-02 NOTE — PROGRESS NOTES
ondansetron (ZOFRAN) 4 MG tablet Take 1 tablet by mouth every 6 hours as needed for Nausea or Vomiting 20 tablet 1    docusate sodium (COLACE) 100 MG capsule Take 1 capsule by mouth 2 times daily as needed for Constipation 20 capsule 1    doxycycline hyclate (VIBRA-TABS) 100 MG tablet Take 1 tablet by mouth 2 times daily for 7 days 14 tablet 0    docusate sodium (COLACE) 100 MG capsule Take 1 capsule by mouth 2 times daily as needed for Constipation 20 capsule 1    ondansetron (ZOFRAN) 4 MG tablet Take 1 tablet by mouth every 6 hours as needed for Nausea or Vomiting 20 tablet 1    TRULICITY 1.08 FO/6.7IL SOPN INJECT 0.75 MILLIGRAMS INTO THE SKIN ONCE A WEEK (Patient taking differently: 0.75 mg once a week mondays) 2 mL 3    pantoprazole (PROTONIX) 20 MG tablet Take 1 tablet by mouth nightly as needed (heartburn) 30 tablet 3    Alcohol Swabs PADS 1 Units by Does not apply route as needed (checking glucose) 100 each 11    alogliptin (NESINA) 25 MG TABS tablet take 1 tablet by mouth once daily 30 tablet 3    blood glucose monitor strips FBS daily. Please dispense Freestyle brand 100 strip 11    aspirin (RA ASPIRIN EC ADULT LOW ST) 81 MG EC tablet take 1 tablet by mouth once daily (Patient taking differently: Take 81 mg by mouth daily Stopped 6 days pre op) 30 tablet 11    Lancets MISC Test once daily 100 each 3    metFORMIN (GLUCOPHAGE) 1000 MG tablet take 1 tablet by mouth twice a day with food 60 tablet 3    simvastatin (ZOCOR) 20 MG tablet Take 1 tablet by mouth every evening 30 tablet 3    ammonium lactate (LAC-HYDRIN) 12 % lotion apply to affected area twice a day  0     No current facility-administered medications for this visit. Occupation: not working due to present condition . Physical demands include: lifting, carrying, pushing, pulling heavy weighted items. Status: full time.     Exercise regimen: walks 2 miles a day when weather permits     Hobbies: yard work    Patient Goals: pain relief, full use of arm    Precautions/Contraindications: recent surgery    OBJECTIVE:     Observations: well nourished male, normal affect    Inspection: normal orthopedic exam.  Incision: edges approximated, no purulent drainage, no redness, no swelling, no tenderness and not hot to touch. Palpation: Tender to palpation AC joint     Joint/Motion:  Right Shoulder:  AROM: 60° Forward elevation,  45° ER,  IR to R PSIS  PROM: 90° Forward elevation,  50° ER,  20° IR    Left Shoulder:  AROM: 155° Forward elevation,  90° ER,  IR to T11  PROM: 175° Forward elevation,  95° ER , 55° IR    Strength:  Right Shoulder: 3-4/5 marked pain on testing    Left Shoulder: Flexion 5/5,  Abduction 5/5, ER 5/5, IR 5/5       Special Tests/Functional Screens:  unable to assess secondary to pain  [] Vinicio []+ / [] -  [] Ashley's []+ / [] -   []  Tony's []+ / [] -    []GH drawer []+ / [] -    [] Bicep Load []+ / [] -   [] Crank []+ / [] -  [] Adriana Mallet []+ / [] -   [] Elbow Varus []+ / [] -  [] Neer's []+ / [] -      [] Speed's []+ / [] -   []Sulcus Sign []+ / [] -   [] Apprehension []+ / [] -   [] Bicep Load II []+ / [] -   [] Bowie [place] []+ / [] -   [] Elbow Valgus []+ / [] -     [] Other: []+ / [] -         ASSESSMENT     Outcome Measure:   QuickDASH (Disorders of the Arm, Shoulder, and Hand) 82% disability    Problems:    Pain reported 10/10   ROM decreased   Strength decreased   Decreased functional ability with use of right upper extremity    [x] There are no barriers affecting plan of care or recovery    [] Barriers to this patient's plan of care or recovery include.     Domestic Concerns:  [x] No  [] Yes:    Short Term goals (2-3 weeks)   Decrease reported pain to 5/10   Increase ROM to 135/75/L4   Increase Strength to 4/5    QuickDASH (Disorders of the Arm, Shoulder, and Hand) 54% disability    Long Term goals (4-6 weeks)   Decrease reported pain to 2/10   Increase ROM to WNl w/o pain   Increase

## 2020-03-05 ENCOUNTER — TELEPHONE (OUTPATIENT)
Dept: ORTHOPEDIC SURGERY | Age: 55
End: 2020-03-05

## 2020-03-05 RX ORDER — IBUPROFEN 800 MG/1
800 TABLET ORAL EVERY 8 HOURS PRN
Qty: 90 TABLET | Refills: 0 | Status: SHIPPED
Start: 2020-03-05 | End: 2020-04-30 | Stop reason: SDUPTHER

## 2020-03-05 NOTE — TELEPHONE ENCOUNTER
Patient called in asking if he could have a prescription for Ibuprofen 800 to help with his pain instead of taking any pain medication.      He uses pharmacy:    [facility] [address] [place] [address] [id number] Kenna RODRIGUEZ [phone]

## 2020-03-06 ENCOUNTER — TREATMENT (OUTPATIENT)
Dept: PHYSICAL THERAPY | Age: 55
End: 2020-03-06
Payer: COMMERCIAL

## 2020-03-06 PROCEDURE — 97110 THERAPEUTIC EXERCISES: CPT | Performed by: PHYSICAL THERAPIST

## 2020-03-06 PROCEDURE — G0283 ELEC STIM OTHER THAN WOUND: HCPCS | Performed by: PHYSICAL THERAPIST

## 2020-03-06 NOTE — PROGRESS NOTES
[facility]  Physical Therapy Daily Treatment Note    Date: 3/6/2020  Patient Name: Lisette Liriano  : 1965   MRN: [id number]  DOInjury:  Insidious   DOSx: 2020  Referring Provider: Medical Diagnosis:  Dewey Resendiz, [address] [facility] [address], [facility]         Diagnosis Orders   1. Status post arthroscopy of right shoulder           S:  Pt reports no change in c/o soreness/pain. O: Pt given written HEP  Time 1401-4576     Visit 2/  Repeat outcome measure at mid point and end. Pain 6 /10     ROM      Modalities      MH + ES to R shoulder X 15 min  Post ex's  MO   Manual                  Stretch      Table slides      Wall Flexion      Wall ER stretch      Towel IR stretch      IR reaching behind back      Exercise      Shrugs AROM      Pendulum Ex      UBE      Pulleys - flex X 5 min   TE   Pulleys-IR            Supine wand chest press 2 x 20   TE   Supine wand flex 2 x 20  TE   Supine wand ER/IR 2 x 20  TE   Supine flexion      S-lying ER      Standing wand flex      Standing flexion            ROWS: H Functional activities  To aid in ROM and strength needed for reaching , lifting ,pushing and pulling at home/work    ROWS: M  \"    ROWS: L  \"    ER  \"    IR  \"                A:  Tolerated well. Above added to written HEP.   P: Continue with rehab plan  Danelle Romero PTA    Treatment Charges: Mins Units   Initial Evaluation     Re-Evaluation     Ther Exercise         TE 30 2   Manual Therapy     MT     Ther Activities        TA     Gait Training          GT     Neuro Re-education NR     Modalities ES x 15  1   Non-Billable Service Time     Other     Total Time/Units 45 3

## 2020-03-10 ENCOUNTER — TELEPHONE (OUTPATIENT)
Dept: PHYSICAL THERAPY | Age: 55
End: 2020-03-10

## 2020-03-17 ENCOUNTER — TREATMENT (OUTPATIENT)
Dept: PHYSICAL THERAPY | Age: 55
End: 2020-03-17
Payer: COMMERCIAL

## 2020-03-17 PROBLEM — Z98.890 STATUS POST ARTHROSCOPY OF RIGHT SHOULDER: Status: ACTIVE | Noted: 2020-03-17

## 2020-03-17 PROCEDURE — 97110 THERAPEUTIC EXERCISES: CPT

## 2020-03-17 PROCEDURE — G0283 ELEC STIM OTHER THAN WOUND: HCPCS

## 2020-03-17 NOTE — PROGRESS NOTES
[facility]  Physical Therapy Daily Treatment Note    Date: 3/17/2020  Patient Name: Josesito Del Rio  : 1965   MRN: [id number]  [facility]:  Insidious   DOSx: 2020  Referring Provider: Medical Diagnosis:  Kristi Garcia, [address] [facility] [address], [facility]         Diagnosis Orders   1. Status post arthroscopy of right shoulder           S:  Pt reports being sore . O: Pt given written HEP  Time 5893-0335      Visit 3 /  Repeat outcome measure at mid point and end. Pain 6 /10     ROM      Modalities      MH + ES to R shoulder X 15 min  Post ex's  MO   Manual                  Stretch      Table slides      Wall Flexion      Wall ER stretch      Towel IR stretch      IR reaching behind back      Exercise      Shrugs AROM      Pendulum Ex      UBE      Pulleys - flex X 5 min   TE    Wand swing  X 3 min  TE         Supine wand chest press 2 x 20   TE   Supine wand flex 2 x 20  TE   Supine wand ER/IR 2 x 20  TE   Supine flexion      S-lying ER      Standing wand flex      Standing flexion            ROWS: H Functional activities                  Green 2 x 20   To aid in ROM and strength needed for reaching , lifting ,pushing and pulling at home/work    ROWS: M Green 2 x 20   pt given green/ red t tubing for HEP     ROWS: L Green 2 x 20      ER      IR  \"                A:  Tolerated well. Above added to written HEP.   P: Continue with rehab plan  Kamryn Nj PTA    Treatment Charges: Mins Units   Initial Evaluation     Re-Evaluation     Ther Exercise         TE 35 2   Manual Therapy     MT     Ther Activities        TA     Gait Training          GT     Neuro Re-education NR     Modalities ES x 15  1   Non-Billable Service Time     Other     Total Time/Units 50 3

## 2020-03-19 ENCOUNTER — TREATMENT (OUTPATIENT)
Dept: PHYSICAL THERAPY | Age: 55
End: 2020-03-19
Payer: COMMERCIAL

## 2020-03-19 PROCEDURE — 97110 THERAPEUTIC EXERCISES: CPT

## 2020-03-19 PROCEDURE — G0283 ELEC STIM OTHER THAN WOUND: HCPCS

## 2020-03-20 ENCOUNTER — TELEPHONE (OUTPATIENT)
Dept: ORTHOPEDIC SURGERY | Age: 55
End: 2020-03-20

## 2020-03-20 RX ORDER — OXYCODONE HYDROCHLORIDE AND ACETAMINOPHEN 5; 325 MG/1; MG/1
1 TABLET ORAL EVERY 6 HOURS PRN
Qty: 24 TABLET | Refills: 0 | Status: SHIPPED | OUTPATIENT
Start: 2020-03-20 | End: 2020-03-27 | Stop reason: SDUPTHER

## 2020-03-20 NOTE — TELEPHONE ENCOUNTER
Last appointment Visit date not found  Next appointment   Future Appointments   Date Time Provider Riri Perez   3/24/2020  1:00 PM Elisabet Sanchez [facility] PT [facility]   3/25/2020  2:30 PM DO Bri Rodrigues [facility]   3/26/2020  1:00 PM Elisabet Sanchez [facility] PT [facility]   4/30/2020  2:00 PM DO Andie Pandya [facility]      Last refill 2/11/2020  DOS: 2/11/2020       Patient called in requesting pain medication           Patient was informed of the potential for addiction of long term use of narcotic medication for pain control. I encouraged the patient to gradually lessen the frequency of the dosage due to the long term addictive effects.

## 2020-03-24 ENCOUNTER — TELEPHONE (OUTPATIENT)
Dept: PHYSICAL THERAPY | Age: 55
End: 2020-03-24

## 2020-03-24 RX ORDER — ALOGLIPTIN 25 MG/1
TABLET, FILM COATED ORAL
Qty: 30 TABLET | Refills: 3 | Status: SHIPPED
Start: 2020-03-24 | End: 2020-04-30 | Stop reason: SDUPTHER

## 2020-03-24 RX ORDER — SIMVASTATIN 20 MG
20 TABLET ORAL EVERY EVENING
Qty: 30 TABLET | Refills: 3 | Status: SHIPPED
Start: 2020-03-24 | End: 2020-04-30 | Stop reason: SDUPTHER

## 2020-03-26 ENCOUNTER — TELEPHONE (OUTPATIENT)
Dept: PHYSICAL THERAPY | Age: 55
End: 2020-03-26

## 2020-03-27 ENCOUNTER — OFFICE VISIT (OUTPATIENT)
Dept: ORTHOPEDIC SURGERY | Age: 55
End: 2020-03-27

## 2020-03-27 VITALS — BODY MASS INDEX: 33.05 KG/M2 | WEIGHT: 244 LBS | HEIGHT: 72 IN

## 2020-03-27 PROCEDURE — 99024 POSTOP FOLLOW-UP VISIT: CPT | Performed by: ORTHOPAEDIC SURGERY

## 2020-03-27 RX ORDER — OXYCODONE HYDROCHLORIDE AND ACETAMINOPHEN 5; 325 MG/1; MG/1
1 TABLET ORAL EVERY 6 HOURS PRN
Qty: 28 TABLET | Refills: 0 | Status: SHIPPED | OUTPATIENT
Start: 2020-03-27 | End: 2020-04-03

## 2020-03-31 ENCOUNTER — TREATMENT (OUTPATIENT)
Dept: PHYSICAL THERAPY | Age: 55
End: 2020-03-31
Payer: COMMERCIAL

## 2020-03-31 PROCEDURE — 97110 THERAPEUTIC EXERCISES: CPT

## 2020-03-31 PROCEDURE — G0283 ELEC STIM OTHER THAN WOUND: HCPCS

## 2020-04-02 ENCOUNTER — TREATMENT (OUTPATIENT)
Dept: PHYSICAL THERAPY | Age: 55
End: 2020-04-02
Payer: COMMERCIAL

## 2020-04-02 PROCEDURE — 97530 THERAPEUTIC ACTIVITIES: CPT

## 2020-04-02 PROCEDURE — G0283 ELEC STIM OTHER THAN WOUND: HCPCS

## 2020-04-02 PROCEDURE — 97110 THERAPEUTIC EXERCISES: CPT

## 2020-04-02 NOTE — PROGRESS NOTES
[facility]  Physical Therapy Daily Treatment Note    Date: 2020  Patient Name: Yesi Giordano  : 1965   MRN: [id number]  DOInjury:  Insidious   DOSx: 2020  Referring Provider: Medical Diagnosis:  Talia Hills, [address], [facility]         Diagnosis Orders   1. Status post arthroscopy of right shoulder           S:  Pt reports performing HEP, sore . O: Pt given written HEP  Time 6092-9836     Visit  Repeat outcome measure at mid point and end. Pain 7 /10     ROM      Modalities      MH + ES to R shoulder X 20 min  Post ex's  MO   Manual                  Stretch      Table slides      Wall Flexion X 5   TE   Wall ER stretch X 5   TE   Towel IR stretch      IR reaching behind back      Exercise      Shrugs AROM      Pendulum Ex      UBE      Pulleys - flex X 5 min   TE    Wand swing  X 3 min  TE         Supine wand chest press 2 x 20   TE   Supine wand flex 2 x 20  TE   Supine wand ER/IR 2 x 20  TE   Supine flexion      S-lying ER      Standing wand flex      Standing flexion            ROWS: H Functional activities                  Green 2 x 20   To aid in ROM and strength needed for reaching , lifting ,pushing and pulling at home/work    ROWS: M Green 2 x 20   pt given green/ red t tubing for HEP     ROWS: L Green 2 x 20      ER      IR  \"          Biceps curls  Green 2 x 20      Tricep ext  Green 2 x 20     A:  Tolerated well. Above added to written HEP.   P: Continue with rehab plan  Marilee Christiansen PTA    Treatment Charges: Mins Units   Initial Evaluation     Re-Evaluation     Ther Exercise         TE 15 1   Manual Therapy     MT     Ther Activities        TA 20 1   Gait Training          GT     Neuro Re-education NR     Modalities ES x 20  1   Non-Billable Service Time     Other     Total Time/Units 55 3

## 2020-04-09 ENCOUNTER — TREATMENT (OUTPATIENT)
Dept: PHYSICAL THERAPY | Age: 55
End: 2020-04-09
Payer: COMMERCIAL

## 2020-04-09 PROCEDURE — G0283 ELEC STIM OTHER THAN WOUND: HCPCS

## 2020-04-09 PROCEDURE — 97530 THERAPEUTIC ACTIVITIES: CPT

## 2020-04-09 PROCEDURE — 97110 THERAPEUTIC EXERCISES: CPT

## 2020-04-14 ENCOUNTER — TREATMENT (OUTPATIENT)
Dept: PHYSICAL THERAPY | Age: 55
End: 2020-04-14
Payer: COMMERCIAL

## 2020-04-14 PROCEDURE — G0283 ELEC STIM OTHER THAN WOUND: HCPCS

## 2020-04-14 PROCEDURE — 97530 THERAPEUTIC ACTIVITIES: CPT

## 2020-04-14 PROCEDURE — 97110 THERAPEUTIC EXERCISES: CPT

## 2020-04-14 NOTE — PROGRESS NOTES
[facility]  Physical Therapy Daily Treatment Note    Date: 2020  Patient Name: Ifeanyi Jasso  : 1965   MRN: [id number]  DOInjury:  Insidious   DOSx: 2020  Referring Provider: Medical Diagnosis:  Lorenzo Sarmiento, [address], [facility]         Diagnosis Orders   1. Status post arthroscopy of right shoulder           S:  Pt reports feeling better since the start of rx sessions. .  O: Pt given written HEP  Time 4221-5218      Visit  Repeat outcome measure at mid point and end. Pain 3-4  10     ROM      Modalities      MH + ES to R shoulder X 20 min  Post ex's  MO   Manual                  Stretch      Table slides      Wall Flexion X 5   TE   Wall ER stretch X 5   TE   Towel IR stretch      IR reaching behind back      Exercise      Shrugs AROM      Pendulum Ex      UBE        TE    Wand swing   TE         Shoulder press ups  2 x 20 seated      Supine wand chest press 2 x 20   TE   Supine wand flex 2 x 20  TE   Supine wand ER/IR 2 x 20  TE   Supine flexion      S-lying ER      Standing wand flex      Standing flexion            ROWS: H Functional activities                  Green 2 x 20   To aid in ROM and strength needed for reaching , lifting ,pushing and pulling at home/work    ROWS: M Green 2 x 20   pt given green/ red t tubing for HEP     ROWS: L Green 2 x 20      ER Red 2 x 20      IR Red 2 x 20 \"          Biceps curls  Green 2 x 20      Tricep ext  Green 2 x 20     A:  Tolerated well. Above added to written HEP.   P: Continue with rehab plan  Fely Almonte PTA    Treatment Charges: Mins Units   Initial Evaluation     Re-Evaluation     Ther Exercise         TE 20 1   Manual Therapy     MT     Ther Activities        TA 20 1   Gait Training          GT     Neuro Re-education NR     Modalities ES x 20  1   Non-Billable Service Time     Other     Total Time/Units 60 3

## 2020-04-16 ENCOUNTER — TREATMENT (OUTPATIENT)
Dept: PHYSICAL THERAPY | Age: 55
End: 2020-04-16
Payer: COMMERCIAL

## 2020-04-16 PROCEDURE — 97530 THERAPEUTIC ACTIVITIES: CPT

## 2020-04-16 PROCEDURE — 97110 THERAPEUTIC EXERCISES: CPT

## 2020-04-16 PROCEDURE — G0283 ELEC STIM OTHER THAN WOUND: HCPCS

## 2020-04-29 ENCOUNTER — TELEPHONE (OUTPATIENT)
Dept: PHYSICAL THERAPY | Age: 55
End: 2020-04-29

## 2020-04-30 ENCOUNTER — VIRTUAL VISIT (OUTPATIENT)
Dept: FAMILY MEDICINE CLINIC | Age: 55
End: 2020-04-30
Payer: COMMERCIAL

## 2020-04-30 VITALS — BODY MASS INDEX: 33.18 KG/M2 | WEIGHT: 245 LBS | HEIGHT: 72 IN

## 2020-04-30 PROCEDURE — 2022F DILAT RTA XM EVC RTNOPTHY: CPT | Performed by: FAMILY MEDICINE

## 2020-04-30 PROCEDURE — 3017F COLORECTAL CA SCREEN DOC REV: CPT | Performed by: FAMILY MEDICINE

## 2020-04-30 PROCEDURE — 3052F HG A1C>EQUAL 8.0%<EQUAL 9.0%: CPT | Performed by: FAMILY MEDICINE

## 2020-04-30 PROCEDURE — G8427 DOCREV CUR MEDS BY ELIG CLIN: HCPCS | Performed by: FAMILY MEDICINE

## 2020-04-30 PROCEDURE — 99214 OFFICE O/P EST MOD 30 MIN: CPT | Performed by: FAMILY MEDICINE

## 2020-04-30 RX ORDER — ALOGLIPTIN 25 MG/1
25 TABLET, FILM COATED ORAL DAILY
Qty: 30 TABLET | Refills: 5 | Status: SHIPPED
Start: 2020-04-30 | End: 2020-07-30 | Stop reason: SDUPTHER

## 2020-04-30 RX ORDER — IBUPROFEN 800 MG/1
800 TABLET ORAL EVERY 8 HOURS PRN
Qty: 90 TABLET | Refills: 0 | Status: SHIPPED
Start: 2020-04-30 | End: 2020-07-30 | Stop reason: SDUPTHER

## 2020-04-30 RX ORDER — SIMVASTATIN 20 MG
20 TABLET ORAL EVERY EVENING
Qty: 30 TABLET | Refills: 5 | Status: SHIPPED
Start: 2020-04-30 | End: 2021-02-02 | Stop reason: SDUPTHER

## 2020-04-30 RX ORDER — ASPIRIN 81 MG/1
TABLET ORAL
Qty: 30 TABLET | Refills: 11 | Status: SHIPPED
Start: 2020-04-30 | End: 2020-07-30 | Stop reason: SDUPTHER

## 2020-04-30 NOTE — PROGRESS NOTES
TeleMedicine Patient Consent    This visit was performed as a virtual video visit using a synchronous, two-way, audio-video telehealth technology platform. Patient identification was verified at the start of the visit, including the patient's telephone number and physical location. I discussed with the patient the nature of our telehealth visits, that:     1. Due to the nature of an audio- video modality, the only components of a physical exam that could be done are the elements supported by direct observation. 2. I would evaluate the patient and recommend diagnostics and treatments based on my assessment. 3. If it was felt that the patient should be evaluated in clinic or an emergency room setting, then they would be directed there. 4. Our sessions are not being recorded and that personal health information is protected. 5. Our team would provide follow up care in person if/when the patient needs it. Patient does agree to proceed with telemedicine consultation. Patient's location: home address in [place] [address]  location other address in [facility]) other people involved in call: none    2020     Sheldon Chowdhury (:  1965) is a 53 y.o. male, with a:  Past Medical History:   Diagnosis Date    Anxiety     Blood in stool     Chronic back pain     Constipation     Headache(784.0)     History of dental problems     Hyperlipidemia     Neck pain     Neck stiffness     Type II or unspecified type diabetes mellitus without mention of complication, not stated as uncontrolled        Here for evaluation of the following medical concerns:  Chief Complaint   Patient presents with    3 Month Follow-Up     Patient is requesting a stress test due to family hx of heart issues. Would also like to be tested for factor 5 blood clotting disease.       He also follows with orthopedics     F/U of chronic problem(s) and recent complaint of GARCIA, family history of heart disease and blood clots  Chronic problems reviewed today include:  Hyperlipidemia, Diabetes mellitus and Obesity  Current status of this/these condition(s):  stable  Tolerating meds: Yes     Diabetes Mellitus Type 2: Current symptoms/problems include none.     Current regimen includes Metformin 1000 mg BID, alogliptin 25 mg daily, and trulicity 7.01 mg weekly  Last HbA1c 8.0     Medication compliance:  compliant all of the time  Diabetic diet compliance:  compliant most of the time,  Weight trend: increasing due to pandemic (currently 248)  Current exercise: no regular exercise, walking more frequently     Home blood sugar records: worsening, typically 250s  Any episodes of hypoglycemia? no  Eye exam current (within one year): unknown   reports that he has never smoked. He has never used smokeless tobacco.   Daily Aspirin? Yes:     Hyperlipidemia:  No new myalgias or GI upset on atorvastatin (Lipitor). Medication compliance: compliant all of the time. Patient is not following a low fat, low cholesterol diet.  He is not exercising regularly. The 10-year ASCVD risk score (Re Pablo, et al., 2013) is: 10.9%    Values used to calculate the score:      Age: 53 years      Sex: Male      Is Non- : No      Diabetic: Yes      Tobacco smoker: No      Systolic Blood Pressure: 506 mmHg      Is BP treated: No      HDL Cholesterol: 44 mg/dL      Total Cholesterol: 151 mg/dL      Review of Systems   Constitutional: Negative for chills and fever. Respiratory: Positive for shortness of breath. Negative for cough. Cardiovascular: Negative for chest pain and leg swelling. Gastrointestinal: Negative for abdominal pain, constipation, diarrhea, nausea and vomiting. Genitourinary: Negative for dysuria. Musculoskeletal: Positive for arthralgias. Neurological: Negative for headaches. Prior to Visit Medications    Medication Sig Taking?  Authorizing Provider   metFORMIN (GLUCOPHAGE) 1000 MG tablet

## 2020-05-01 ENCOUNTER — TELEPHONE (OUTPATIENT)
Dept: FAMILY MEDICINE CLINIC | Age: 55
End: 2020-05-01

## 2020-05-01 ENCOUNTER — TREATMENT (OUTPATIENT)
Dept: PHYSICAL THERAPY | Age: 55
End: 2020-05-01
Payer: COMMERCIAL

## 2020-05-01 PROCEDURE — 97530 THERAPEUTIC ACTIVITIES: CPT

## 2020-05-01 PROCEDURE — 97110 THERAPEUTIC EXERCISES: CPT

## 2020-05-01 PROCEDURE — G0283 ELEC STIM OTHER THAN WOUND: HCPCS

## 2020-05-01 ASSESSMENT — ENCOUNTER SYMPTOMS
SHORTNESS OF BREATH: 1
NAUSEA: 0
VOMITING: 0
DIARRHEA: 0
CONSTIPATION: 0
COUGH: 0
ABDOMINAL PAIN: 0

## 2020-05-01 NOTE — TELEPHONE ENCOUNTER
I called the patient to schedule his follow up apt and  Left him a message to call me back    RTO in 3 months or sooner if needed

## 2020-05-01 NOTE — PROGRESS NOTES
[facility]  Physical Therapy Daily Treatment Note    Date: 2020  Patient Name: Janet Martin  : 1965   MRN: [id number]  DOInjury:  Insidious   DOSx: 2020  Referring Provider: Medical Diagnosis:  Lidya Andres, [address], [facility]      Medical Diagnosis:     S05.766 (ICD-10-CM) - Status post arthroscopy of shoulder         S:  Pt reports 8/10 pain, reports feeling like his pain is getting worse. Pt states he ices daily at home. O: Pt given written HEP  Time 3430-9160654     Visit 10  / 12-18 Repeat outcome measure at mid point and end. Pain 8   /10     ROM      Modalities      MH + ES to R shoulder X 20 min  Post ex's  MO   Manual                  Stretch      Table slides      Wall Flexion 10x/10s  TE   Wall ER stretch 10x/10s  TE   Towel IR stretch 10/x/10s     IR reaching behind back      Exercise      Shrugs AROM      Pendulum Ex      UBE X 3/3      Pulleys - flex X 5 min   TE   Pulleys-IR   X 5 min  TE         Shoulder press ups      Supine wand chest press  TE   Supine wand flex  TE   Supine wand ER/IR  TE   Supine flexion      S-lying ER      Standing wand flex      Standing flexion            ROWS: H Functional activities                  Green 2 x 20   To aid in ROM and strength needed for reaching , lifting ,pushing and pulling at home/work    ROWS: M Green 2 x 20   pt given green/ red t tubing for HEP     ROWS: L Green 2 x 20      ER Red 2 x 20      IR Red 2 x 20 \"          Biceps curls      Tricep ext      A:  Tolerated well 4-5/10 pain after skilled interventions. Above added to written HEP.   P: Continue with rehab plan  Prasanna Dove PTA    Treatment Charges: Mins Units   Initial Evaluation     Re-Evaluation     Ther Exercise         TE 15 1   Manual Therapy     MT     Ther Activities        TA 25 2   Gait Training          GT     Neuro Re-education NR     Modalities ES x 20  1   Non-Billable Service Time     Other     Total Time/Units 60 4

## 2020-05-05 ENCOUNTER — TELEPHONE (OUTPATIENT)
Dept: CARDIOLOGY CLINIC | Age: 55
End: 2020-05-05

## 2020-05-08 ENCOUNTER — TREATMENT (OUTPATIENT)
Dept: PHYSICAL THERAPY | Age: 55
End: 2020-05-08
Payer: COMMERCIAL

## 2020-05-08 PROCEDURE — 97530 THERAPEUTIC ACTIVITIES: CPT | Performed by: PHYSICAL THERAPIST

## 2020-05-08 PROCEDURE — 97110 THERAPEUTIC EXERCISES: CPT | Performed by: PHYSICAL THERAPIST

## 2020-05-08 NOTE — PROGRESS NOTES
[facility]  Physical Therapy Daily Treatment Note    Date: 2020  Patient Name: Alejandro Jasmine  : 1965   MRN: [id number]  DOInjury:  Insidious   DOSx: 2020  Referring Provider: Medical Diagnosis:  Darcy Magdaleno, [address], [facility]      Medical Diagnosis:     F57.450 (ICD-10-CM) - Status post arthroscopy of shoulder         S:  Pt reports doing exercises maybe 1x/day and states exercise makes him feel better  O: A/PROM see below, stiff and painful end range  Time 4059-9266     Visit - Repeat outcome measure at mid point and end. Pain 6  /10     ROM A/PROM  125/ 138 FE , 70/90 ER, L1/45     Modalities      MH + ES to R shoulder X 20 min  Post ex's  MO   Manual                  Stretch      Table slides      Wall Flexion 2 x 30sec  TE   Wall ER stretch 2 x 30sec  TE   Towel IR stretch 2 x 30sec     IR reaching behind back      Exercise      Shrugs AROM      Pendulum Ex      UBE X 3/3      Pulleys - flex X 5 min   TE   Pulleys-IR   X 5 min  TE         Shoulder press weighted wand 5lb 3 x 10 TE      TE   Supine 5lb wand flex 2 x 15  TE   Supine 5 lb wand ER/IR 2 x 15  TE   Supine flexion      S-lying ER      Standing wand flex      Standing flexion            ROWS: H Functional activities                Blue 2 x 20   To aid in ROM and strength needed for reaching , lifting ,pushing and pulling at home/work TA   ROWS: M Blue 2 x 20   pt given green/ red t tubing for HEP  TA   ROWS: L Blue 2 x 20      ER Red 2 x 20      IR Red 2 x 20 \"          Biceps curls      Tricep ext      A:  Tolerated well.  Improving ROM just needs to exercise more, discussed this with him at length  P: Continue with rehab plan  Floyd Merida, PT    Treatment Charges: Mins Units   Initial Evaluation     Re-Evaluation     Ther Exercise         TE 40 3   Manual Therapy     MT     Ther Activities        TA 20 1   Gait Training          GT     Neuro Re-education NR Modalities     Non-Billable Service Time     Other     Total Time/Units 60 4

## 2020-05-15 ENCOUNTER — TELEPHONE (OUTPATIENT)
Dept: PHYSICAL THERAPY | Age: 55
End: 2020-05-15

## 2020-06-03 ENCOUNTER — OFFICE VISIT (OUTPATIENT)
Dept: ORTHOPEDIC SURGERY | Age: 55
End: 2020-06-03
Payer: COMMERCIAL

## 2020-06-03 VITALS — WEIGHT: 244 LBS | HEIGHT: 72 IN | TEMPERATURE: 98 F | BODY MASS INDEX: 33.05 KG/M2

## 2020-06-03 PROCEDURE — 3017F COLORECTAL CA SCREEN DOC REV: CPT | Performed by: ORTHOPAEDIC SURGERY

## 2020-06-03 PROCEDURE — 20610 DRAIN/INJ JOINT/BURSA W/O US: CPT | Performed by: ORTHOPAEDIC SURGERY

## 2020-06-03 PROCEDURE — G8427 DOCREV CUR MEDS BY ELIG CLIN: HCPCS | Performed by: ORTHOPAEDIC SURGERY

## 2020-06-03 PROCEDURE — 1036F TOBACCO NON-USER: CPT | Performed by: ORTHOPAEDIC SURGERY

## 2020-06-03 PROCEDURE — G8417 CALC BMI ABV UP PARAM F/U: HCPCS | Performed by: ORTHOPAEDIC SURGERY

## 2020-06-03 PROCEDURE — 99214 OFFICE O/P EST MOD 30 MIN: CPT | Performed by: ORTHOPAEDIC SURGERY

## 2020-06-03 RX ORDER — OXYCODONE HYDROCHLORIDE AND ACETAMINOPHEN 5; 325 MG/1; MG/1
1 TABLET ORAL EVERY 8 HOURS PRN
Qty: 21 TABLET | Refills: 0 | Status: SHIPPED | OUTPATIENT
Start: 2020-06-03 | End: 2020-06-10

## 2020-06-03 RX ORDER — TRIAMCINOLONE ACETONIDE 40 MG/ML
40 INJECTION, SUSPENSION INTRA-ARTICULAR; INTRAMUSCULAR ONCE
Status: COMPLETED | OUTPATIENT
Start: 2020-06-03 | End: 2020-06-03

## 2020-06-03 RX ADMIN — TRIAMCINOLONE ACETONIDE 40 MG: 40 INJECTION, SUSPENSION INTRA-ARTICULAR; INTRAMUSCULAR at 14:53

## 2020-06-03 NOTE — PROGRESS NOTES
Chief Complaint   Patient presents with    Shoulder Pain     Right Shoulder Arthroscopy DOS 2/11/2020         HPI:    Patient is 53 y.o. malepresents today for 3-month follow-up after Right shoulder arthroscopy. Patient reports pain and clicking in the shoulder. Patient states his pain is somewhat better overall but continues to complain of deep-seated shoulder pain consistent with arthritis. He is also here today for follow-up of left knee arthritis. ROS:    Skin: (-) rash,(-) psoriasis,(-) eczema, (-)skin cancer. Neurologic: (-)numbness, (-)tingling, (-)headaches, (-) LOC. Cardiovascular: (-) Chest pain, (-) swelling in legs/feet, (-) SOB, (-) cramping in legs/feet with walking. All other review of systems negative except stated above or in HPI      Past Medical History:   Diagnosis Date    Anxiety     Blood in stool     Chronic back pain     Constipation     Headache(784.0)     History of dental problems     Hyperlipidemia     Neck pain     Neck stiffness     Type II or unspecified type diabetes mellitus without mention of complication, not stated as uncontrolled      Past Surgical History:   Procedure Laterality Date    ENDOSCOPY, COLON, DIAGNOSTIC      FRACTURE SURGERY Left     femur    KNEE ARTHROSCOPY Left 07/18/2016    medial lateral menisectomy/synovectomy/chodroplasty    SHOULDER ARTHROSCOPY Right 02/11/2020    SHOULDER ARTHROSCOPY Right 2/11/2020    RIGHT SHOULDER ARTHROSCOPY SUBACHROMIAL DECOMPRESSION DEBRIDEMENT, BICEP TENODESIS ,LABRAL DEBRIDEMENT AND KHALIDA performed by Tamera Rodriguez DO at [facility]         Current Outpatient Medications:     oxyCODONE-acetaminophen (PERCOCET) 5-325 MG per tablet, Take 1 tablet by mouth every 8 hours as needed for Pain for up to 7 days. , Disp: 21 tablet, Rfl: 0    metFORMIN (GLUCOPHAGE) 1000 MG tablet, Take 1 tablet by mouth 2 times daily (with meals), Disp: 60 tablet, Rfl: 5    aspirin (RA ASPIRIN EC ADULT LOW

## 2020-06-08 ENCOUNTER — TELEPHONE (OUTPATIENT)
Dept: CARDIOLOGY CLINIC | Age: 55
End: 2020-06-08

## 2020-06-26 ENCOUNTER — HOSPITAL ENCOUNTER (OUTPATIENT)
Age: 55
Discharge: HOME | End: 2020-06-28
Payer: COMMERCIAL

## 2020-06-26 ENCOUNTER — OFFICE VISIT (OUTPATIENT)
Dept: PAIN MANAGEMENT | Age: 55
End: 2020-06-26
Payer: COMMERCIAL

## 2020-06-26 VITALS
HEIGHT: 72 IN | RESPIRATION RATE: 16 BRPM | WEIGHT: 248 LBS | TEMPERATURE: 97.8 F | HEART RATE: 72 BPM | BODY MASS INDEX: 33.59 KG/M2 | DIASTOLIC BLOOD PRESSURE: 76 MMHG | SYSTOLIC BLOOD PRESSURE: 144 MMHG

## 2020-06-26 PROBLEM — M47.816 LUMBAR SPONDYLOSIS: Status: ACTIVE | Noted: 2020-06-26

## 2020-06-26 PROBLEM — M19.011 PRIMARY OSTEOARTHRITIS OF RIGHT SHOULDER: Status: ACTIVE | Noted: 2020-06-26

## 2020-06-26 PROBLEM — M47.816 LUMBAR FACET ARTHROPATHY: Status: ACTIVE | Noted: 2020-06-26

## 2020-06-26 PROBLEM — M17.12 PRIMARY OSTEOARTHRITIS OF LEFT KNEE: Status: ACTIVE | Noted: 2020-06-26

## 2020-06-26 PROBLEM — G89.4 CHRONIC PAIN SYNDROME: Status: ACTIVE | Noted: 2020-06-26

## 2020-06-26 LAB — SPECIFIC GRAVITY UA: >=1.03 (ref 1–1.03)

## 2020-06-26 PROCEDURE — G0480 DRUG TEST DEF 1-7 CLASSES: HCPCS

## 2020-06-26 PROCEDURE — 1036F TOBACCO NON-USER: CPT | Performed by: PAIN MEDICINE

## 2020-06-26 PROCEDURE — 3017F COLORECTAL CA SCREEN DOC REV: CPT | Performed by: PAIN MEDICINE

## 2020-06-26 PROCEDURE — 80307 DRUG TEST PRSMV CHEM ANLYZR: CPT

## 2020-06-26 PROCEDURE — G8427 DOCREV CUR MEDS BY ELIG CLIN: HCPCS | Performed by: PAIN MEDICINE

## 2020-06-26 PROCEDURE — G8417 CALC BMI ABV UP PARAM F/U: HCPCS | Performed by: PAIN MEDICINE

## 2020-06-26 PROCEDURE — 99204 OFFICE O/P NEW MOD 45 MIN: CPT | Performed by: PAIN MEDICINE

## 2020-06-26 RX ORDER — OXYCODONE HYDROCHLORIDE AND ACETAMINOPHEN 5; 325 MG/1; MG/1
1 TABLET ORAL PRN
COMMUNITY
End: 2020-07-30

## 2020-06-26 NOTE — PROGRESS NOTES
[address]        [address] [facility], [address] [facility]      [phone]          Consult Note      Patient:  Danika Alex,  1965    Date of Service:  20    Requesting Physician:  Dalia Ceballos DO    Reason for Consult:      Patient presents with complaints of right shoulder and Left knee pain that started a long time ago and has been progressively getting worse. HISTORY OF PRESENT ILLNESS:      Pain does not radiate. He  has numbness, tingling of bilateral hands and does not have bladder or bowel dysfunction. He has been on anticoagulation medications to include ASA. The patient  has not been on herbal supplements. The patient is diabetic. Imaging:   Left knee MRI 2019:  1. Postsurgical changes from prior medial collateral ligament repair. 2. Suggestion of postsurgical changes of the medial meniscus with a   fluid-filled defect at the free edge of the posterior horn, suspicious   for a recurrent tear. 3. Diffuse grade 3/4 chondrosis within the medial compartment and   diffuse grade 3 chondrosis along the medial patellar facet. 4. Heterogeneous signal within the distal femur medially with   susceptibility artifact, possibly related to postsurgical changes or   an enchondroma. 5. Mucoid degeneration of the anterior cruciate ligament. Right shoulder MRI 2019  1. Moderate tendinopathy of the infraspinatus tendon. No   full-thickness rotator cuff tear is identified. 2. Mild tendinopathy of the intra-articular biceps tendon. 3. Heterogeneity of the labrum with a small paralabral cyst along the   posterior labrum and likely additional paralabral cyst along the   anterosuperior labrum, compatible with a tear. 3. Moderate acromioclavicular joint arthrosis with trace   subacromial/subdeltoid bursitis. Previous treatments: Physical Therapy, Surgery right shoulder arthroscopy and medications.   Right shoulder and knee Nakul Espinoza, DO   Lancets MISC Test once daily 8/20/19  Yes Tae Carbajal DO   ibuprofen (ADVIL;MOTRIN) 800 MG tablet Take 1 tablet by mouth every 8 hours as needed for Pain 4/30/20 5/30/20  Nakul Espinoza DO     Allergies   Allergen Reactions    Tobramycin Swelling     Social History     Socioeconomic History    Marital status:      Spouse name: Not on file    Number of children: Not on file    Years of education: Not on file    Highest education level: Not on file   Occupational History    Occupation: [employer]   Social Needs    Financial resource strain: Not on file    Food insecurity     Worry: Not on file     Inability: Not on file   [employer] needs     Medical: Not on file     Non-medical: Not on file   Tobacco Use    Smoking status: Never Smoker    Smokeless tobacco: Never Used   Substance and Sexual Activity    Alcohol use:  Yes     Alcohol/week: 0.0 standard drinks     Comment: rare    Drug use: No    Sexual activity: Yes     Partners: Female   Lifestyle    Physical activity     Days per week: Not on file     Minutes per session: Not on file    Stress: Not on file   Relationships    Social connections     Talks on phone: Not on file     Gets together: Not on file     Attends [employer]ism service: Not on file     Active member of club or organization: Not on file     Attends meetings of clubs or organizations: Not on file     Relationship status: Not on file    Intimate partner violence     Fear of current or ex partner: Not on file     Emotionally abused: Not on file     Physically abused: Not on file     Forced sexual activity: Not on file   Other Topics Concern    Not on file   Social History Narrative    Not on file     Family History   Problem Relation Age of Onset    Diabetes Father     High Blood Pressure Father     Heart Disease Father     Stroke Father     Diabetes Mother     Heart Disease Mother     Cancer Brother 54        colon    High Blood Pressure reflex2+  Right Achilles reflex2+  Left Achilles reflex2+  Gait:antalgic    Dermatology:    Skin:no unusual rashes and no skin lesions    Impression:  Right shoulder/Left knee  And low back pain with h/o right shoulder and Left knee surgery. Patient is a candidate for right shoulder replacement. Patient had tried and failed interventional procedures for his shoulder and knee pain. Plan:  Multiple joint and axial low back pain. Reviewed imaging studies and discussed with the patient. Reviewed orthopedic notes and recommendations. Discussed long term side effects of opioids and expectations from pain management. Will schedule patient for lumbar spine Xray. Will start patient on Percocet 5/325 QD PRN after reviewing his urine screen. Urine screen today. OARRS report reviewed 06/2020. Patient encouraged to stay active and to lose weight. Patient had tried and failed physical therapy in the past.  Treatment plan discussed with the patient including medications side effects. We discussed with the patient that combining opioids, benzodiazepines, alcohol, illicit drugs or sleep aids increases the risk of respiratory depression including death. We discussed that these medications may cause drowsiness, sedation or dizziness and have counseled the patient not to drive or operate machinery. We have discussed that these medications will be prescribed only by one provider. We have discussed with the patient about age related risk factors and have thoroughly discussed the importance of taking these medications as prescribed. The patient verbalizes understanding. melanie Cole M.D.

## 2020-06-26 NOTE — PROGRESS NOTES
Do you currently have any of the following:    Fever: No  Headache:  Yes  Cough: No  Shortness of breath: No  Exposed to anyone with these symptoms: No                                                                                                                Shwetha Krishnamurthy presents to the [facility] on 6/26/2020. Selma Reyes is complaining of pain in his right shoulder and left knee. The pain is intermittent. The pain is described as throbbing in his knee and throbbing in his shoulder. Pain is rated on his best day at a 7, on his worst day at a 10, and on average at a 8 on the VAS scale. He took his last dose of Percocet 4 days ago. Olman Villarreal does not have issues with constipation. Any procedures since your last visit: No    He is  on NSAIDS and  is not on anticoagulation medications to include ASA and is managed by Dr Dennise Mejia.     Pacemaker or defibrilator: No.       BP (!) 144/76   Pulse 72   Temp 97.8 °F (36.6 °C) (Oral)   Resp 16   Ht 6' (1.829 m)   Wt 248 lb (112.5 kg)   BMI 33.63 kg/m²      No LMP for male patient.

## 2020-06-30 LAB
7-AMINOCLONAZEPAM, URINE: <5 NG/ML
ALPHA-HYDROXYALPRAZOLAM, URINE: <5 NG/ML
ALPHA-HYDROXYMIDAZOLAM, URINE: <20 NG/ML
ALPRAZOLAM, URINE: <5 NG/ML
CHLORDIAZEPOXIDE, URINE: <20 NG/ML
CLONAZEPAM, URINE: <5 NG/ML
DIAZEPAM, URINE: <20 NG/ML
LORAZEPAM, URINE: <20 NG/ML
Lab: NORMAL
MIDAZOLAM, URINE: <20 NG/ML
NORDIAZEPAM, URINE: <20 NG/ML
OXAZEPAM, URINE: <20 NG/ML
REPORT: NORMAL
TEMAZEPAM, URINE: <20 NG/ML
THIS TEST SENT TO: NORMAL

## 2020-07-02 LAB
6AM URINE: <10 NG/ML
CODEINE, URINE: <20 NG/ML
HYDROCODONE, URINE: <20 NG/ML
HYDROMORPHONE, URINE: <20 NG/ML
MORPHINE URINE: <20 NG/ML
NORHYDROCODONE, URINE: <20 NG/ML
NOROXYCODONE, URINE: <20 NG/ML
NOROXYMORPHONE, URINE: <20 NG/ML
OXYCODONE, URINE CONFIRMATION: <20 NG/ML
OXYMORPHONE, URINE: <20 NG/ML

## 2020-07-22 ENCOUNTER — HOSPITAL ENCOUNTER (OUTPATIENT)
Age: 55
Discharge: HOME | End: 2020-07-24
Payer: COMMERCIAL

## 2020-07-22 LAB
ANION GAP SERPL CALCULATED.3IONS-SCNC: 13 MMOL/L (ref 7–16)
BUN BLDV-MCNC: 16 MG/DL (ref 6–20)
CALCIUM SERPL-MCNC: 9.2 MG/DL (ref 8.6–10.2)
CHLORIDE BLD-SCNC: 102 MMOL/L (ref 98–107)
CO2: 23 MMOL/L (ref 22–29)
CREAT SERPL-MCNC: 0.7 MG/DL (ref 0.7–1.2)
GFR AFRICAN AMERICAN: >60
GFR NON-AFRICAN AMERICAN: >60 ML/MIN/1.73
GLUCOSE BLD-MCNC: 175 MG/DL (ref 74–99)
HBA1C MFR BLD: 9.6 % (ref 4–5.6)
POTASSIUM SERPL-SCNC: 4.2 MMOL/L (ref 3.5–5)
SODIUM BLD-SCNC: 138 MMOL/L (ref 132–146)

## 2020-07-22 PROCEDURE — 36415 COLL VENOUS BLD VENIPUNCTURE: CPT

## 2020-07-22 PROCEDURE — 81241 F5 GENE: CPT

## 2020-07-22 PROCEDURE — 83036 HEMOGLOBIN GLYCOSYLATED A1C: CPT

## 2020-07-22 PROCEDURE — 80048 BASIC METABOLIC PNL TOTAL CA: CPT

## 2020-07-29 ENCOUNTER — OFFICE VISIT (OUTPATIENT)
Dept: PAIN MANAGEMENT | Age: 55
End: 2020-07-29
Payer: COMMERCIAL

## 2020-07-29 VITALS
WEIGHT: 244 LBS | TEMPERATURE: 98.6 F | HEART RATE: 72 BPM | HEIGHT: 72 IN | DIASTOLIC BLOOD PRESSURE: 74 MMHG | SYSTOLIC BLOOD PRESSURE: 118 MMHG | OXYGEN SATURATION: 97 % | RESPIRATION RATE: 16 BRPM | BODY MASS INDEX: 33.05 KG/M2

## 2020-07-29 LAB
FACTOR V LEIDEN: NEGATIVE
SPECIMEN: NORMAL

## 2020-07-29 PROCEDURE — G8417 CALC BMI ABV UP PARAM F/U: HCPCS | Performed by: PAIN MEDICINE

## 2020-07-29 PROCEDURE — 99214 OFFICE O/P EST MOD 30 MIN: CPT | Performed by: PAIN MEDICINE

## 2020-07-29 PROCEDURE — 3017F COLORECTAL CA SCREEN DOC REV: CPT | Performed by: PAIN MEDICINE

## 2020-07-29 PROCEDURE — 99213 OFFICE O/P EST LOW 20 MIN: CPT | Performed by: PAIN MEDICINE

## 2020-07-29 PROCEDURE — G8427 DOCREV CUR MEDS BY ELIG CLIN: HCPCS | Performed by: PAIN MEDICINE

## 2020-07-29 PROCEDURE — 1036F TOBACCO NON-USER: CPT | Performed by: PAIN MEDICINE

## 2020-07-29 RX ORDER — OXYCODONE HYDROCHLORIDE AND ACETAMINOPHEN 5; 325 MG/1; MG/1
1 TABLET ORAL DAILY PRN
Qty: 30 TABLET | Refills: 0 | Status: SHIPPED | OUTPATIENT
Start: 2020-07-29 | End: 2020-08-28

## 2020-07-29 NOTE — PROGRESS NOTES
Do you currently have any of the following:    Fever: No  Headache:  No  Cough: No  Shortness of breath: No  Exposed to anyone with these symptoms: No                                                                                                                Cee Dahl presents to the [address] [place] [address] on 7/29/2020. Milton Weston is complaining of pain right shoulder and left knee. . The pain is constant. The pain is described as aching and throbbing. Pain is rated on his best day at a 4, on his worst day at a 10, and on average at a 6 on the VAS scale. He took his last dose of Percocet a couple weeks. Chloe Artist does not have issues with constipation. Any procedures since your last visit: No,       He is not on NSAIDS and  is on anticoagulation medications to include ASA and is managed by Amy Masterson DO  . Pacemaker or defibrilator: No Physician managing device is NA.       /74   Pulse 72   Temp 98.6 °F (37 °C) (Infrared)   Resp 16   Ht 6' (1.829 m)   Wt 244 lb (110.7 kg)   SpO2 97%   BMI 33.09 kg/m²      No LMP for male patient.

## 2020-07-29 NOTE — PROGRESS NOTES
[address] [facility], [address] [place] [address] [place]  [phone]    Follow up Note      Cee Dahl     Date of Visit:  7/29/2020    CC:  Patient presents for follow up   Chief Complaint   Patient presents with    Shoulder Pain     knee pain     HPI:    Pain is unchanged. Appropriate analgesia with current medications regimen: yes - fair. Change in quality of symptoms:no. Medication side effects:none. Recent diagnostic testing:Lumbar spine Xray. Recent interventional procedures:none. .    He has been on anticoagulation medications to include ASA. The patient  has not been on herbal supplements. The patient is diabetic.     Imaging:   Left knee MRI 2019:  1. Postsurgical changes from prior medial collateral ligament repair. 2. Suggestion of postsurgical changes of the medial meniscus with a   fluid-filled defect at the free edge of the posterior horn, suspicious   for a recurrent tear. 3. Diffuse grade 3/4 chondrosis within the medial compartment and   diffuse grade 3 chondrosis along the medial patellar facet. 4. Heterogeneous signal within the distal femur medially with   susceptibility artifact, possibly related to postsurgical changes or   an enchondroma. 5. Mucoid degeneration of the anterior cruciate ligament.      Right shoulder MRI 2019  1. Moderate tendinopathy of the infraspinatus tendon. No   full-thickness rotator cuff tear is identified. 2. Mild tendinopathy of the intra-articular biceps tendon. 3. Heterogeneity of the labrum with a small paralabral cyst along the   posterior labrum and likely additional paralabral cyst along the   anterosuperior labrum, compatible with a tear. 3. Moderate acromioclavicular joint arthrosis with trace   subacromial/subdeltoid bursitis.      Lumbar spine Xray 2020  Spondylosis most prominent at the L3-4 and L4-5 levels.     Previous treatments: Physical Therapy, Surgery right shoulder arthroscopy and medications. Right shoulder and knee injections. Potential Aberrant Drug-Related Behavior:    None    Urine Drug Screening:  First office visit urine screen showed no narcotics which is consistent. OARRS report:  07/2020 consistent. Past Medical History:   Diagnosis Date    Anxiety     Blood in stool     Chronic back pain     Constipation     Headache(784.0)     History of dental problems     Hyperlipidemia     Neck pain     Neck stiffness     Type II or unspecified type diabetes mellitus without mention of complication, not stated as uncontrolled      Past Surgical History:   Procedure Laterality Date    ENDOSCOPY, COLON, DIAGNOSTIC      FRACTURE SURGERY Left     femur    KNEE ARTHROSCOPY Left 07/18/2016    medial lateral menisectomy/synovectomy/chodroplasty    SHOULDER ARTHROSCOPY Right 02/11/2020    SHOULDER ARTHROSCOPY Right 2/11/2020    RIGHT SHOULDER ARTHROSCOPY SUBACHROMIAL DECOMPRESSION DEBRIDEMENT, BICEP TENODESIS ,LABRAL DEBRIDEMENT AND KHALIDA performed by Simone Villatoro DO at [facility]       Prior to Admission medications    Medication Sig Start Date End Date Taking? Authorizing Provider   oxyCODONE-acetaminophen (PERCOCET) 5-325 MG per tablet Take 1 tablet by mouth as needed for Pain.    Yes Historical Provider, MD   metFORMIN (GLUCOPHAGE) 1000 MG tablet Take 1 tablet by mouth 2 times daily (with meals) 4/30/20  Yes Tae Carbajal DO   aspirin (RA ASPIRIN EC ADULT LOW ST) 81 MG EC tablet take 1 tablet by mouth once daily 4/30/20  Yes Tae Carbajal DO   simvastatin (ZOCOR) 20 MG tablet Take 1 tablet by mouth every evening 4/30/20  Yes Tae Carbajal DO   alogliptin (NESINA) 25 MG TABS tablet Take 1 tablet by mouth daily 4/30/20  Yes Tae Carbajal DO   Dulaglutide 1.5 MG/0.5ML SOPN Inject 1.5 mg into the skin once a week 4/30/20  Yes Tae Carbajal DO   pantoprazole (PROTONIX) 20 MG tablet Take 1 tablet by mouth nightly as needed (heartburn) 12/2/19 Relation Age of Onset    Diabetes Father     High Blood Pressure Father     Heart Disease Father     Stroke Father     Diabetes Mother     Heart Disease Mother     Cancer Brother 54        colon    High Blood Pressure Brother     Cancer Maternal Cousin 40        colon    Cancer Sister     Diabetes Sister     Heart Disease Sister     Stroke Sister     Diabetes Maternal Aunt     Heart Disease Maternal Aunt     High Blood Pressure Maternal Aunt     High Cholesterol Maternal Aunt     Stroke Maternal Aunt     Diabetes Maternal Uncle     Heart Disease Maternal Uncle     High Blood Pressure Maternal Uncle     High Cholesterol Maternal Uncle     Stroke Maternal Uncle     Diabetes Paternal Aunt     Heart Disease Paternal Aunt     High Blood Pressure Paternal Aunt     High Cholesterol Paternal Aunt     Stroke Paternal Aunt     Diabetes Paternal Uncle     Heart Disease Paternal Uncle     High Blood Pressure Paternal Uncle     High Cholesterol Paternal Uncle     Stroke Paternal Uncle     Diabetes Maternal Grandmother     High Blood Pressure Maternal Grandmother     High Cholesterol Maternal Grandmother     Cancer Maternal Grandfather     Diabetes Maternal Grandfather     High Blood Pressure Maternal Grandfather     High Cholesterol Maternal Grandfather     Stroke Maternal Grandfather     Diabetes Paternal Grandfather     High Blood Pressure Paternal Grandfather     High Cholesterol Paternal Grandfather      REVIEW OF SYSTEMS:     [place] denies fever/chills, chest pain, shortness of breath, new bowel or bladder complaints. All other review of systems was negative. PHYSICAL EXAMINATION:      /74   Pulse 72   Temp 98.6 °F (37 °C) (Infrared)   Resp 16   Ht 6' (1.829 m)   Wt 244 lb (110.7 kg)   SpO2 97%   BMI 33.09 kg/m²     General:       General appearance:   pleasant and well-hydrated.    , in moderate discomfort and A & O x3  Build:Normal Weight     HEENT:     Head:normocephalic and atraumatic  Sclera: icterus absent,      Lungs:     Breathing:Breathing Pattern: regular, no distress     Abdomen:     Shape:non-distended and normal  Tenderness:none     Lumbar spine:     Spine inspection:normal   CVA tenderness:No   Palpation:tenderness paravertebral muscles, facet loading, left, right, positive and tenderness. Range of motion:abnormal moderately Lateral bending, flexion, extension rotation bilateral and is  painful.     Musculoskeletal:     Trigger points in Paraveteral:absent bilaterally  SI joint tenderness:negative right, negative left              NIKOLAY test:negative right, negative  left  Piriformis tenderness:negative right, negative left  Trochanteric bursa tenderness:negative right, negative left  SLR:negative right, negative left, sitting      Extremities:     Tremors:None bilaterally upper and lower  Range of motion:Limited ROM of right shoulder/negative drop test/negative AC joint tenderness and bicipital groove tenderness. , pain with internal rotation of hips negative. Intact:Yes  Edema:Normal     Knee:     Inspection:symmetric, swelling none bilaterally  Tenderness of Bony Landmarks:Medial, left  Drawer Test:negative  Effusion:absent bilaterally  Crepitus:absent bilaterally  ROM:Left limited by pain  Right normal      Neurological:     Sensory:normal to light touch bilateral upper and lower extremities.   Motor:              Right Bicep5/5           Left Bicep5/5              Right Triceps5/5       Left Triceps5/5          Right Deltoid5/5     Left Deltoid5/5                  Right Quadriceps5/5          Left Quadriceps5/5           Right Gastrocnemius5/5    Left Gastrocnemius5/5  Right Ant Tibialis5/5  Left Ant Tibialis5/5  Reflexes:    Right Brachioradialis reflex2+  Left Brachioradialis reflex2+  Right Biceps reflex2+  Left Biceps reflex2+  Right Triceps reflex2+  Left Triceps reflex2+  Right Quadriceps reflex2+  Left Quadriceps reflex2+  Right Achilles reflex2+  Left Achilles reflex2+  Gait:antalgic     Dermatology:     Skin:no unusual rashes and no skin lesions     Impression:  Right shoulder/Left knee  and low back pain with h/o right shoulder and Left knee surgery. Patient is a candidate for right shoulder replacement. Patient had tried and failed interventional procedures for his shoulder and knee pain. Plan:  Follow up on his multiple joint and axial low back pain. Results of lumbar spine Xray were discussed with the patient. Discussed treatment options will manage conservatively. Will start Percocet 5/325 QD PRN for moderate to severe pain. Re-discussed long term side effects of opioids and expectations from pain management. Reviewed first office visit urine screen. OARRS report reviewed 07/2020. Patient encouraged to stay active and to lose weight. Patient had tried and failed physical therapy in the past.  Treatment plan discussed with the patient including medications side effects. Time spend with the patient 25 minutes. We discussed with the patient that combining opioids, benzodiazepines, alcohol, illicit drugs or sleep aids increases the risk of respiratory depression including death. We discussed that these medications may cause drowsiness, sedation or dizziness and have counseled the patient not to drive or operate machinery. We have discussed that these medications will be prescribed only by one provider. We have discussed with the patient about age related risk factors and have thoroughly discussed the importance of taking these medications as prescribed. The patient verbalizes understanding. melanie Dumont M.D.

## 2020-07-30 ENCOUNTER — OFFICE VISIT (OUTPATIENT)
Dept: FAMILY MEDICINE CLINIC | Age: 55
End: 2020-07-30
Payer: COMMERCIAL

## 2020-07-30 VITALS
TEMPERATURE: 98.4 F | WEIGHT: 243 LBS | OXYGEN SATURATION: 98 % | HEIGHT: 72 IN | HEART RATE: 77 BPM | RESPIRATION RATE: 18 BRPM | DIASTOLIC BLOOD PRESSURE: 82 MMHG | SYSTOLIC BLOOD PRESSURE: 136 MMHG | BODY MASS INDEX: 32.91 KG/M2

## 2020-07-30 PROCEDURE — G8427 DOCREV CUR MEDS BY ELIG CLIN: HCPCS | Performed by: FAMILY MEDICINE

## 2020-07-30 PROCEDURE — 3017F COLORECTAL CA SCREEN DOC REV: CPT | Performed by: FAMILY MEDICINE

## 2020-07-30 PROCEDURE — G8417 CALC BMI ABV UP PARAM F/U: HCPCS | Performed by: FAMILY MEDICINE

## 2020-07-30 PROCEDURE — 3046F HEMOGLOBIN A1C LEVEL >9.0%: CPT | Performed by: FAMILY MEDICINE

## 2020-07-30 PROCEDURE — 2022F DILAT RTA XM EVC RTNOPTHY: CPT | Performed by: FAMILY MEDICINE

## 2020-07-30 PROCEDURE — 1036F TOBACCO NON-USER: CPT | Performed by: FAMILY MEDICINE

## 2020-07-30 PROCEDURE — 99214 OFFICE O/P EST MOD 30 MIN: CPT | Performed by: FAMILY MEDICINE

## 2020-07-30 RX ORDER — ASPIRIN 81 MG/1
TABLET ORAL
Qty: 30 TABLET | Refills: 11 | Status: SHIPPED
Start: 2020-07-30 | End: 2021-07-26

## 2020-07-30 RX ORDER — ALOGLIPTIN 25 MG/1
25 TABLET, FILM COATED ORAL DAILY
Qty: 30 TABLET | Refills: 5 | Status: SHIPPED
Start: 2020-07-30 | End: 2021-02-02 | Stop reason: SDUPTHER

## 2020-07-30 RX ORDER — GLIPIZIDE 5 MG/1
5 TABLET, FILM COATED, EXTENDED RELEASE ORAL DAILY
Qty: 30 TABLET | Refills: 5 | Status: SHIPPED
Start: 2020-07-30 | End: 2021-02-02 | Stop reason: SDUPTHER

## 2020-07-30 RX ORDER — IBUPROFEN 800 MG/1
800 TABLET ORAL EVERY 8 HOURS PRN
Qty: 90 TABLET | Refills: 0 | Status: SHIPPED
Start: 2020-07-30 | End: 2020-11-05 | Stop reason: SDUPTHER

## 2020-07-30 ASSESSMENT — ENCOUNTER SYMPTOMS
NAUSEA: 0
SHORTNESS OF BREATH: 0
VOMITING: 0
COUGH: 0
DIARRHEA: 0
ABDOMINAL PAIN: 0
CONSTIPATION: 0

## 2020-07-30 NOTE — PROGRESS NOTES
2020     Pam Cross (:  1965) is a 53 y.o. male, with a:  Past Medical History:   Diagnosis Date    Anxiety     Blood in stool     Chronic back pain     Constipation     Headache(784.0)     History of dental problems     Hyperlipidemia     Neck pain     Neck stiffness     Type II or unspecified type diabetes mellitus without mention of complication, not stated as uncontrolled        Here for evaluation of the following medical concerns:  Chief Complaint   Patient presents with   Naty Davalos at pain [facility].     Diabetes    Results     He also follows with orthopedics  He has previously seen Rheumatology  He recently established with pain management    F/U of chronic problem(s)   Chronic problems reviewed today include:  Hyperlipidemia, Diabetes mellitus and Obesity  Current status of this/these condition(s):  stable  Tolerating meds: Yes    Diabetes Mellitus Type 2   Current treatment: Metformin 1000 mg BID, alogliptin 25 mg daily, and trulicity 1.5 mg weekly  Recent medication changes: trulicity increased  Last HbA1c: 9.6    Home blood sugar records: every 2-3 days, random readings 200s    Has done diabetic education years ago, would be willing to go back    Medication compliance:  compliant most of the time  Diabetic diet compliance:  noncompliant: :      Lab Results   Component Value Date    LABA1C 9.6 (H) 2020    LABA1C 8.0 (H) 2020    LABA1C 7.3 2019     Lab Results   Component Value Date    LABMICR <12.0 2018    CREATININE 0.7 2020     Lab Results   Component Value Date    ALT 51 (H) 2020    AST 36 2020     Lab Results   Component Value Date    CHOL 151 2020    TRIG 147 2020    HDL 44 2020    LDLCALC 78 2020          Hyperlipidemia  Current treatment: Simvastatin 20 mg nightly  Recent medication changes: None  No new myalgias or GI upset    Lab Results   Component Value Date    CHOL 151 01/31/2020    TRIG 147 01/31/2020    HDL 44 01/31/2020    LDLCALC 78 01/31/2020     Lab Results   Component Value Date    ALT 51 (H) 01/31/2020    AST 36 01/31/2020          Obesity with BMI and comorbidities as noted above. Signs of hypothyroidism: none  Signs of hypercortisolism: none  Contraindications to weight loss: none  Patient readiness to commit to diet and activity changes: fair  · Barriers to weight loss: none       Review of Systems   Constitutional: Negative for chills and fever. Respiratory: Negative for cough and shortness of breath. Cardiovascular: Negative for chest pain and leg swelling. Gastrointestinal: Negative for abdominal pain, constipation, diarrhea, nausea and vomiting. Genitourinary: Negative for dysuria. Musculoskeletal: Positive for arthralgias. Neurological: Positive for headaches. Prior to Visit Medications    Medication Sig Taking? Authorizing Provider   oxyCODONE-acetaminophen (PERCOCET) 5-325 MG per tablet Take 1 tablet by mouth daily as needed for Pain for up to 30 days.  Yes Elva Lutz MD   ibuprofen (ADVIL;MOTRIN) 800 MG tablet Take 1 tablet by mouth every 8 hours as needed for Pain Yes Tae Carbajal DO   metFORMIN (GLUCOPHAGE) 1000 MG tablet Take 1 tablet by mouth 2 times daily (with meals) Yes Tae Carbajal DO   aspirin (RA ASPIRIN EC ADULT LOW ST) 81 MG EC tablet take 1 tablet by mouth once daily Yes Tae Carbajal DO   simvastatin (ZOCOR) 20 MG tablet Take 1 tablet by mouth every evening Yes Tae Carbajal DO   alogliptin (NESINA) 25 MG TABS tablet Take 1 tablet by mouth daily Yes Tae Carbajal DO   Dulaglutide 1.5 MG/0.5ML SOPN Inject 1.5 mg into the skin once a week Yes Tae Carbajal DO   pantoprazole (PROTONIX) 20 MG tablet Take 1 tablet by mouth nightly as needed (heartburn) Yes Tae Carbajal DO   Alcohol Swabs PADS 1 Units by Does not apply route as needed (checking glucose) Yes Tae Carbajal, DO   blood glucose monitor strips FBS Abdomen is soft. Tenderness: There is no abdominal tenderness. Musculoskeletal:         General: No swelling or deformity. Skin:     General: Skin is warm. Findings: No rash. Neurological:      General: No focal deficit present. Mental Status: He is alert. Mental status is at baseline. Psychiatric:         Mood and Affect: Mood normal.         Behavior: Behavior normal.         ASSESSMENT/PLAN:  Nando Vela was seen today for 3 month follow-up, diabetes and results. Diagnoses and all orders for this visit:    Hyperlipidemia associated with type 2 diabetes mellitus ([facility] [address]    Type 2 diabetes mellitus with diabetic polyneuropathy, without long-term current use of insulin (HCC)  -     alogliptin (NESINA) 25 MG TABS tablet; Take 1 tablet by mouth daily  -     aspirin (RA ASPIRIN EC ADULT LOW ST) 81 MG EC tablet; take 1 tablet by mouth once daily  -     Dulaglutide 1.5 MG/0.5ML SOPN; Inject 1.5 mg into the skin once a week  -     metFORMIN (GLUCOPHAGE) 1000 MG tablet; Take 1 tablet by mouth 2 times daily (with meals)  -     glipiZIDE (GLUCOTROL XL) 5 MG extended release tablet; Take 1 tablet by mouth daily  -     [facility]    Class 1 obesity due to excess calories with serious comorbidity and body mass index (BMI) of 33.0 to 33.9 in adult    Medication refill  -     ibuprofen (ADVIL;MOTRIN) 800 MG tablet; Take 1 tablet by mouth every 8 hours as needed for Pain         Additional plan and future considerations:   As above. Start glipizide and refer back to diabetic education. Counseling on improve diet, regular exercise, and weight loss. Return to office in 3 months for diabetes follow-up or sooner if needed    Educational materials and/or home exercises printed for patient's review and were included in patient instructions on his/her After Visit Summary and given to patient at the end of visit.       Counseled regarding above diagnosis, including possible risks and

## 2020-07-30 NOTE — PATIENT INSTRUCTIONS
Patient Education        glipizide  Pronunciation:  GLISHELLEY hinojosa  Brand:  Glucotrol  What is the most important information I should know about glipizide? You should not use glipizide if you have diabetic ketoacidosis (call your doctor for treatment). What is glipizide? Glipizide is an oral diabetes medicine that helps control blood sugar levels by helping your pancreas produce insulin. Glipizide is used together with diet and exercise to improve blood sugar control in adults with type 2 diabetes mellitus. Glipizide is not for treating type 1 diabetes. Glipizide may also be used for purposes not listed in this medication guide. What should I discuss with my healthcare provider before taking glipizide? You should not use this medicine if you are allergic to glipizide, or if you have diabetic ketoacidosis (call your doctor for treatment). Tell your doctor if you have ever had:  · liver or kidney disease;  · chronic diarrhea, or a blockage in your intestines; or  · an enzyme deficiency called glucose-6-phosphate dehydrogenase deficiency (G6PD). Follow your doctor's instructions about using this medicine if you are pregnant. Blood sugar control is very important during pregnancy, and your dose needs may be different during each trimester. You should not take glipizide during the last 2 weeks of pregnancy. It may not be safe to breast-feed while using this medicine. Ask your doctor about any risk. How should I take glipizide? Follow all directions on your prescription label. Your doctor may occasionally change your dose. Do not take this medicine in larger or smaller amounts or for longer than recommended. Take the glipizide regular tablet 30 minutes before your first meal of the day. Take the glipizide extended-release tablet with your first meal of the day. Swallow the tablet whole and do not crush, chew, or break it.   Your blood sugar will need to be checked often, and you may need other blood tests at your doctor's office. Low blood sugar (hypoglycemia) can happen to everyone who has diabetes. Symptoms include headache, hunger, sweating, irritability, dizziness, nausea, fast heart rate, and feeling anxious or shaky. To quickly treat low blood sugar, always keep a fast-acting source of sugar with you such as fruit juice, hard candy, crackers, raisins, or non-diet soda. Your doctor can prescribe a glucagon emergency injection kit to use in case you have severe hypoglycemia and cannot eat or drink. Be sure your family and close friends know how to give you this injection in an emergency. Also watch for signs of high blood sugar (hyperglycemia) such as increased thirst or urination, blurred vision, headache, and tiredness. Blood sugar levels can be affected by stress, illness, surgery, exercise, alcohol use, or skipping meals. Ask your doctor before changing your dose or medication schedule. Some forms of glipizide are made with a shell that is not absorbed or melted in the body. Part of the tablet shell may appear in your stool. This is a normal side effect and will not make the medication less effective. Store at room temperature away from moisture, heat, and light. What happens if I miss a dose? Take your dose as soon as you can, but only if you are getting ready to eat a meal.  If you skip a meal, skip the missed dose and wait until your next meal. Do not take two doses at one time. Get your prescription refilled before you run out of medicine completely. What happens if I overdose? Seek emergency medical attention or call the Poison Help line at 1-226.269.1258. A glipizide overdose can cause life-threatening hypoglycemia. Symptoms of severe hypoglycemia include extreme weakness, blurred vision, sweating, trouble speaking, tremors, stomach pain, confusion, and seizure (convulsions). What should I avoid while taking glipizide? Avoid drinking alcohol.  It lowers blood sugar and can cause side effects. Avoid driving or operating machinery until you know how this medicine will affect you. What are the possible side effects of glipizide? Get emergency medical help if you have signs of an allergic reaction: hives; difficulty breathing; swelling of your face, lips, tongue, or throat. Call your doctor at once if you have symptoms of low blood sugar:  · headache, irritability  · sweating, fast heart rate;  · dizziness, nausea; or  · hunger, feeling anxious or shaky. Common side effects may include:  · diarrhea, constipation, gas;  · dizziness, drowsiness;  · tremors; or  · skin rash, redness, or itching. This is not a complete list of side effects and others may occur. Call your doctor for medical advice about side effects. You may report side effects to FDA at 5-598-FDA-0162. What other drugs will affect glipizide? Sometimes it is not safe to use certain medications at the same time. Some drugs can affect your blood levels of other drugs you take, which may increase side effects or make the medications less effective. Many drugs can affect glipizide. This includes prescription and over-the-counter medicines, vitamins, and herbal products. Not all possible interactions are listed here. Tell your doctor about all your current medicines and any medicine you start or stop using. Where can I get more information? Your pharmacist can provide more information about glipizide. Remember, keep this and all other medicines out of the reach of children, never share your medicines with others, and use this medication only for the indication prescribed. Every effort has been made to ensure that the information provided by [address] is accurate, up-to-date, and complete, but no guarantee is made to that effect. Drug information contained herein may be time sensitive.  [employer] information has been compiled for use by healthcare practitioners and consumers in the [place] and therefore [employer] does not warrant that uses outside of the [place] are appropriate, unless specifically indicated otherwise. [facility]'s drug information does not endorse drugs, diagnose patients or recommend therapy. [facility] drug information is an informational resource designed to assist licensed healthcare practitioners in caring for their patients and/or to serve consumers viewing this service as a supplement to, and not a substitute for, the expertise, skill, knowledge and judgment of healthcare practitioners. The absence of a warning for a given drug or drug combination in no way should be construed to indicate that the drug or drug combination is safe, effective or appropriate for any given patient. [facility] does not assume any responsibility for any aspect of healthcare administered with the aid of information [facility] provides. The information contained herein is not intended to cover all possible uses, directions, precautions, warnings, drug interactions, allergic reactions, or adverse effects. If you have questions about the drugs you are taking, check with your doctor, nurse or pharmacist.  Copyright 5877-3978 [address]. Version: 11.01. Revision date: 8/16/2018. Care instructions adapted under license by [facility] ([facility] If you have questions about a medical condition or this instruction, always ask your healthcare professional. [place] [address] any warranty or liability for your use of this information.

## 2020-08-14 ENCOUNTER — OFFICE VISIT (OUTPATIENT)
Dept: CARDIOLOGY CLINIC | Age: 55
End: 2020-08-14
Payer: COMMERCIAL

## 2020-08-14 VITALS
HEART RATE: 91 BPM | DIASTOLIC BLOOD PRESSURE: 78 MMHG | RESPIRATION RATE: 16 BRPM | HEIGHT: 72 IN | BODY MASS INDEX: 33.18 KG/M2 | OXYGEN SATURATION: 96 % | WEIGHT: 245 LBS | SYSTOLIC BLOOD PRESSURE: 136 MMHG

## 2020-08-14 PROBLEM — R07.2 PRECORDIAL PAIN: Status: ACTIVE | Noted: 2020-08-14

## 2020-08-14 PROCEDURE — G8417 CALC BMI ABV UP PARAM F/U: HCPCS | Performed by: INTERNAL MEDICINE

## 2020-08-14 PROCEDURE — 3017F COLORECTAL CA SCREEN DOC REV: CPT | Performed by: INTERNAL MEDICINE

## 2020-08-14 PROCEDURE — 99204 OFFICE O/P NEW MOD 45 MIN: CPT | Performed by: INTERNAL MEDICINE

## 2020-08-14 PROCEDURE — 93000 ELECTROCARDIOGRAM COMPLETE: CPT | Performed by: INTERNAL MEDICINE

## 2020-08-14 PROCEDURE — 1036F TOBACCO NON-USER: CPT | Performed by: INTERNAL MEDICINE

## 2020-08-14 PROCEDURE — G8427 DOCREV CUR MEDS BY ELIG CLIN: HCPCS | Performed by: INTERNAL MEDICINE

## 2020-08-14 NOTE — PROGRESS NOTES
Chief Complaint   Patient presents with    Chest Pain       Patient Active Problem List    Diagnosis Date Noted    Precordial pain 08/14/2020    Lumbar spondylosis 06/26/2020    Lumbar facet arthropathy 06/26/2020    Chronic pain syndrome 06/26/2020    Primary osteoarthritis of left knee 06/26/2020    Primary osteoarthritis of right shoulder 06/26/2020    Status post arthroscopy of right shoulder 03/17/2020    Class 1 obesity due to excess calories with serious comorbidity and body mass index (BMI) of 33.0 to 33.9 in adult 08/20/2019    Rotator cuff arthropathy of right shoulder 05/20/2019    Neck pain 01/23/2019    Chronic bilateral low back pain with sciatica 03/31/2017    Type 2 diabetes mellitus with diabetic polyneuropathy, without long-term current use of insulin ([facility] [address] 08/12/2016    Hyperlipidemia associated with type 2 diabetes mellitus ([facility] 08/12/2016    Migraine 01/20/2016       Current Outpatient Medications   Medication Sig Dispense Refill    alogliptin (NESINA) 25 MG TABS tablet Take 1 tablet by mouth daily 30 tablet 5    aspirin (RA ASPIRIN EC ADULT LOW ST) 81 MG EC tablet take 1 tablet by mouth once daily 30 tablet 11    Dulaglutide 1.5 MG/0.5ML SOPN Inject 1.5 mg into the skin once a week 4 pen 2    ibuprofen (ADVIL;MOTRIN) 800 MG tablet Take 1 tablet by mouth every 8 hours as needed for Pain 90 tablet 0    metFORMIN (GLUCOPHAGE) 1000 MG tablet Take 1 tablet by mouth 2 times daily (with meals) 60 tablet 5    glipiZIDE (GLUCOTROL XL) 5 MG extended release tablet Take 1 tablet by mouth daily 30 tablet 5    oxyCODONE-acetaminophen (PERCOCET) 5-325 MG per tablet Take 1 tablet by mouth daily as needed for Pain for up to 30 days.  30 tablet 0    simvastatin (ZOCOR) 20 MG tablet Take 1 tablet by mouth every evening 30 tablet 5    pantoprazole (PROTONIX) 20 MG tablet Take 1 tablet by mouth nightly as needed (heartburn) 30 tablet 3    Alcohol Swabs PADS 1 Units by Does not apply route as needed (checking glucose) 100 each 11    blood glucose monitor strips FBS daily. Please dispense Freestyle brand 100 strip 11    Lancets MISC Test once daily 100 each 3     No current facility-administered medications for this visit. Allergies   Allergen Reactions    Tobramycin Swelling       Vitals:    08/14/20 1317   BP: 136/78   Site: Right Upper Arm   Position: Sitting   Cuff Size: Large Adult   Pulse: 91   Resp: 16   SpO2: 96%   Weight: 245 lb (111.1 kg)   Height: 6' (1.829 m)       Social History     Socioeconomic History    Marital status:      Spouse name: Not on file    Number of children: Not on file    Years of education: Not on file    Highest education level: Not on file   Occupational History    Occupation: [employer]   Social Needs    Financial resource strain: Not on file    Food insecurity     Worry: Not on file     Inability: Not on file   [employer] needs     Medical: Not on file     Non-medical: Not on file   Tobacco Use    Smoking status: Never Smoker    Smokeless tobacco: Never Used   Substance and Sexual Activity    Alcohol use: Yes     Alcohol/week: 0.0 standard drinks     Comment: rarely .   Coffee occasionally    Drug use: No    Sexual activity: Yes     Partners: Female   Lifestyle    Physical activity     Days per week: Not on file     Minutes per session: Not on file    Stress: Not on file   Relationships    Social connections     Talks on phone: Not on file     Gets together: Not on file     Attends Mu-ism service: Not on file     Active member of club or organization: Not on file     Attends meetings of clubs or organizations: Not on file     Relationship status: Not on file    Intimate partner violence     Fear of current or ex partner: Not on file     Emotionally abused: Not on file     Physically abused: Not on file     Forced sexual activity: Not on file   Other Topics Concern    Not on file   Social History Narrative    Not on file Family History   Problem Relation Age of Onset    Diabetes Father     High Blood Pressure Father     Heart Disease Father     Stroke Father     Heart Attack Father     Diabetes Mother     Heart Disease Mother     Emphysema Mother     Cancer Brother 54        colon    High Blood Pressure Brother     Cancer Maternal Cousin 40        colon    Cancer Sister     Diabetes Sister     Heart Disease Sister     Stroke Sister     Diabetes Maternal Aunt     Heart Disease Maternal Aunt     High Blood Pressure Maternal Aunt     High Cholesterol Maternal Aunt     Stroke Maternal Aunt     Diabetes Maternal Uncle     Heart Disease Maternal Uncle     High Blood Pressure Maternal Uncle     High Cholesterol Maternal Uncle     Stroke Maternal Uncle     Diabetes Paternal Aunt     Heart Disease Paternal Aunt     High Blood Pressure Paternal Aunt     High Cholesterol Paternal [de-identified]     Stroke Paternal Aunt     Diabetes Paternal Uncle     Heart Disease Paternal Uncle     High Blood Pressure Paternal Uncle     High Cholesterol Paternal Uncle     Stroke Paternal Uncle     Diabetes Maternal Grandmother     High Blood Pressure Maternal Grandmother     High Cholesterol Maternal Grandmother     Cancer Maternal Grandfather     Diabetes Maternal Grandfather     High Blood Pressure Maternal Grandfather     High Cholesterol Maternal Grandfather     Stroke Maternal Grandfather     Diabetes Paternal Grandfather     High Blood Pressure Paternal Grandfather     High Cholesterol Paternal Grandfather          SUBJECTIVE: Angélica Alexandra presents to the office today for consult - dr. Tommy Workman - for cardiac evaluation. Multiple risk factors for CAD, no hx of ACS. He is concerned about his cardiac risk, as his sister  not too long ago, and there are other family members with CAD. Collette Ruts   He complains of no cardiac symptoms and denies   chest pain, chest pressure/discomfort, claudication, dyspnea, exertional chest pressure/discomfort, fatigue, irregular heart beat, lower extremity edema, near-syncope, orthopnea, palpitations, paroxysmal nocturnal dyspnea, syncope and tachypnea. Walks occasionally two miles at a time with his daughter with no issue. Does AODLs   Poor diet. Review of Systems:   Heart: as above   Lungs: as above   Eyes: denies changes in vision or discharge. Ears: denies changes in hearing or pain. Nose: denies epistaxis or masses   Throat: denies sore throat or trouble swallowing. Neuro: denies numbness, tingling, tremors. Skin: denies rashes or itching. : denies hematuria, dysuria   GI: denies vomiting, diarrhea   Psych: denies mood changed, anxiety, depression. all others negative. Physical Exam   /78 (Site: Right Upper Arm, Position: Sitting, Cuff Size: Large Adult)   Pulse 91   Resp 16   Ht 6' (1.829 m)   Wt 245 lb (111.1 kg)   SpO2 96%   BMI 33.23 kg/m²   Constitutional: Oriented to person, place, and time. Obese No distress. Head: Normocephalic and atraumatic. Eyes: EOM are normal. Pupils are equal, round, and reactive to light. Neck: Normal range of motion. Neck supple. No hepatojugular reflux and no JVD present. Carotid bruit is not present. No tracheal deviation present. No thyromegaly present. Cardiovascular: Normal rate, regular rhythm, normal heart sounds and intact distal pulses. Exam reveals no gallop and no friction rub. No murmur heard. Pulmonary/Chest: Effort normal and breath sounds normal. No respiratory distress. No wheezes. No rales. No tenderness. Abdominal: Soft. Bowel sounds are normal. No distension and no mass. No tenderness. No rebound and no guarding. Musculoskeletal: Normal range of motion. No edema and no tenderness. Neurological: Alert and oriented to person, place, and time. Skin: Skin is warm and dry. No rash noted. Not diaphoretic. No erythema. Psychiatric: Normal mood and affect.  Behavior is normal.     EKG: normal EKG, normal sinus rhythm. ASSESSMENT AND PLAN:  Patient Active Problem List   Diagnosis    Migraine    Type 2 diabetes mellitus with diabetic polyneuropathy, without long-term current use of insulin ([facility] [address]    Hyperlipidemia associated with type 2 diabetes mellitus (HCC)    Chronic bilateral low back pain with sciatica    Neck pain    Rotator cuff arthropathy of right shoulder    Class 1 obesity due to excess calories with serious comorbidity and body mass index (BMI) of 33.0 to 33.9 in adult    Status post arthroscopy of right shoulder    Lumbar spondylosis    Lumbar facet arthropathy    Chronic pain syndrome    Primary osteoarthritis of left knee    Primary osteoarthritis of right shoulder    Precordial pain     Asymptomatic cardiac status with normal exam and ekg  AHA10 year CV predictor : 10.9%  Multiple risk factors being addressed by PCP, last LDL 1/2020 at target  Non smoker  BP controlled  Long discussion about AHA guidelines for diet and exercise. He needs to lose > 50lbs and exercise at least 150 min/week  No role for testing in asymptomatic patient.      Eloy Ayers M.D  [id number] [facility]

## 2020-08-18 ENCOUNTER — OFFICE VISIT (OUTPATIENT)
Dept: ORTHOPEDIC SURGERY | Age: 55
End: 2020-08-18
Payer: COMMERCIAL

## 2020-08-18 VITALS — TEMPERATURE: 98 F | WEIGHT: 244 LBS | BODY MASS INDEX: 33.05 KG/M2 | HEIGHT: 72 IN

## 2020-08-18 PROCEDURE — 3017F COLORECTAL CA SCREEN DOC REV: CPT | Performed by: ORTHOPAEDIC SURGERY

## 2020-08-18 PROCEDURE — 99203 OFFICE O/P NEW LOW 30 MIN: CPT | Performed by: ORTHOPAEDIC SURGERY

## 2020-08-18 PROCEDURE — 1036F TOBACCO NON-USER: CPT | Performed by: ORTHOPAEDIC SURGERY

## 2020-08-18 PROCEDURE — G8427 DOCREV CUR MEDS BY ELIG CLIN: HCPCS | Performed by: ORTHOPAEDIC SURGERY

## 2020-08-18 PROCEDURE — G8417 CALC BMI ABV UP PARAM F/U: HCPCS | Performed by: ORTHOPAEDIC SURGERY

## 2020-08-18 NOTE — PROGRESS NOTES
Chief Complaint   Patient presents with    Shoulder Pain     Right Shoulder, previously seen India Williamson is a 53y.o. year old   male who is seen today  for evaluation of right shoulder pain. He reports the pain has been ongoing for the past 6 months. Following his arthroscopic surgery the patient felt a pop in his arm and an immediate pain that shot down his shoulder. He has had 10/10 pain with no ability to perform ADL's.     Chief Complaint   Patient presents with    Shoulder Pain     Right Shoulder, previously seen      Past Medical History:   Diagnosis Date    Anxiety     Blood in stool     Chronic back pain     Constipation     Headache(784.0)     History of dental problems     Hyperlipidemia     Neck pain     Neck stiffness     Type II or unspecified type diabetes mellitus without mention of complication, not stated as uncontrolled      Past Surgical History:   Procedure Laterality Date    ENDOSCOPY, COLON, DIAGNOSTIC      FRACTURE SURGERY Left     femur    KNEE ARTHROSCOPY Left 07/18/2016    medial lateral menisectomy/synovectomy/chodroplasty    SHOULDER ARTHROSCOPY Right 02/11/2020    SHOULDER ARTHROSCOPY Right 2/11/2020    RIGHT SHOULDER ARTHROSCOPY SUBACHROMIAL DECOMPRESSION DEBRIDEMENT, BICEP TENODESIS ,LABRAL DEBRIDEMENT AND KHALIDA performed by Jaswinder Mtz DO at [facility]         Current Outpatient Medications:     alogliptin (NESINA) 25 MG TABS tablet, Take 1 tablet by mouth daily, Disp: 30 tablet, Rfl: 5    aspirin (RA ASPIRIN EC ADULT LOW ST) 81 MG EC tablet, take 1 tablet by mouth once daily, Disp: 30 tablet, Rfl: 11    Dulaglutide 1.5 MG/0.5ML SOPN, Inject 1.5 mg into the skin once a week, Disp: 4 pen, Rfl: 2    ibuprofen (ADVIL;MOTRIN) 800 MG tablet, Take 1 tablet by mouth every 8 hours as needed for Pain, Disp: 90 tablet, Rfl: 0    metFORMIN (GLUCOPHAGE) 1000 MG tablet, Take 1 tablet by mouth 2 times daily (with meals), Disp: 60 tablet, Rfl: 5    glipiZIDE (GLUCOTROL XL) 5 MG extended release tablet, Take 1 tablet by mouth daily, Disp: 30 tablet, Rfl: 5    oxyCODONE-acetaminophen (PERCOCET) 5-325 MG per tablet, Take 1 tablet by mouth daily as needed for Pain for up to 30 days. , Disp: 30 tablet, Rfl: 0    simvastatin (ZOCOR) 20 MG tablet, Take 1 tablet by mouth every evening, Disp: 30 tablet, Rfl: 5    pantoprazole (PROTONIX) 20 MG tablet, Take 1 tablet by mouth nightly as needed (heartburn), Disp: 30 tablet, Rfl: 3    Alcohol Swabs PADS, 1 Units by Does not apply route as needed (checking glucose), Disp: 100 each, Rfl: 11    blood glucose monitor strips, FBS daily. Please dispense Freestyle brand, Disp: 100 strip, Rfl: 11    Lancets MISC, Test once daily, Disp: 100 each, Rfl: 3  Allergies   Allergen Reactions    Tobramycin Swelling     Social History     Socioeconomic History    Marital status:      Spouse name: Not on file    Number of children: Not on file    Years of education: Not on file    Highest education level: Not on file   Occupational History    Occupation: [employer]   Social Needs    Financial resource strain: Not on file    Food insecurity     Worry: Not on file     Inability: Not on file   [employer] needs     Medical: Not on file     Non-medical: Not on file   Tobacco Use    Smoking status: Never Smoker    Smokeless tobacco: Never Used   Substance and Sexual Activity    Alcohol use: Yes     Alcohol/week: 0.0 standard drinks     Comment: rarely .   Coffee occasionally    Drug use: No    Sexual activity: Yes     Partners: Female   Lifestyle    Physical activity     Days per week: Not on file     Minutes per session: Not on file    Stress: Not on file   Relationships    Social connections     Talks on phone: Not on file     Gets together: Not on file     Attends Latter day service: Not on file     Active member of club or organization: Not on file     Attends meetings of clubs or organizations: Not on file     Relationship status: Not on file    Intimate partner violence     Fear of current or ex partner: Not on file     Emotionally abused: Not on file     Physically abused: Not on file     Forced sexual activity: Not on file   Other Topics Concern    Not on file   Social History Narrative    Not on file     Family History   Problem Relation Age of Onset    Diabetes Father     High Blood Pressure Father     Heart Disease Father     Stroke Father     Heart Attack Father     Diabetes Mother     Heart Disease Mother     Emphysema Mother     Cancer Brother 54        colon    High Blood Pressure Brother     Cancer Maternal Cousin 40        colon    Cancer Sister     Diabetes Sister     Heart Disease Sister     Stroke Sister     Diabetes Maternal Aunt     Heart Disease Maternal Aunt     High Blood Pressure Maternal Aunt     High Cholesterol Maternal Aunt     Stroke Maternal Aunt     Diabetes Maternal Uncle     Heart Disease Maternal Uncle     High Blood Pressure Maternal Uncle     High Cholesterol Maternal Uncle     Stroke Maternal Uncle     Diabetes Paternal Aunt     Heart Disease Paternal Aunt     High Blood Pressure Paternal Aunt     High Cholesterol Paternal [de-identified]     Stroke Paternal Aunt     Diabetes Paternal Uncle     Heart Disease Paternal Uncle     High Blood Pressure Paternal Uncle     High Cholesterol Paternal Uncle     Stroke Paternal Uncle     Diabetes Maternal Grandmother     High Blood Pressure Maternal Grandmother     High Cholesterol Maternal Grandmother     Cancer Maternal Grandfather     Diabetes Maternal Grandfather     High Blood Pressure Maternal Grandfather     High Cholesterol Maternal Grandfather     Stroke Maternal Grandfather     Diabetes Paternal Grandfather     High Blood Pressure Paternal Grandfather     High Cholesterol Paternal Grandfather        REVIEW OF SYSTEMS:     General/Constitution:  (-)weight loss, today. Radiographic findings reviewed with patient    Impression:   Encounter Diagnosis   Name Primary?  Traumatic tear of left rotator cuff, unspecified tear extent, subsequent encounter Yes       Plan: Natural history and expected course discussed. Questions answered. Educational material distributed. Reduction in offending activity. Based on my exam and history from the patient, I am concerned he may have torn his rotator cuff since his prior surgery. I will order an MRI and see him back with the results.

## 2020-09-08 ENCOUNTER — OFFICE VISIT (OUTPATIENT)
Dept: ORTHOPEDIC SURGERY | Age: 55
End: 2020-09-08
Payer: COMMERCIAL

## 2020-09-08 VITALS — TEMPERATURE: 98 F | BODY MASS INDEX: 33.05 KG/M2 | WEIGHT: 244 LBS | HEIGHT: 72 IN

## 2020-09-08 PROCEDURE — G8427 DOCREV CUR MEDS BY ELIG CLIN: HCPCS | Performed by: ORTHOPAEDIC SURGERY

## 2020-09-08 PROCEDURE — G8417 CALC BMI ABV UP PARAM F/U: HCPCS | Performed by: ORTHOPAEDIC SURGERY

## 2020-09-08 PROCEDURE — 99214 OFFICE O/P EST MOD 30 MIN: CPT | Performed by: ORTHOPAEDIC SURGERY

## 2020-09-08 PROCEDURE — 1036F TOBACCO NON-USER: CPT | Performed by: ORTHOPAEDIC SURGERY

## 2020-09-08 PROCEDURE — 3017F COLORECTAL CA SCREEN DOC REV: CPT | Performed by: ORTHOPAEDIC SURGERY

## 2020-09-08 NOTE — PROGRESS NOTES
Chief Complaint   Patient presents with    Shoulder Pain     Right Shoulder MRI F/U        Shaila Simon is a 53y.o. year old   male who is seen today  for evaluation of right shoulder pain. He reports the pain has been ongoing for the past 6 months. Following his arthroscopic surgery the patient felt a pop in his arm and an immediate pain that shot down his shoulder. He has had 10/10 pain with no ability to perform ADL's. He is here today for mri results.      Chief Complaint   Patient presents with    Shoulder Pain     Right Shoulder MRI F/U     Past Medical History:   Diagnosis Date    Anxiety     Blood in stool     Chronic back pain     Constipation     Headache(784.0)     History of dental problems     Hyperlipidemia     Neck pain     Neck stiffness     Type II or unspecified type diabetes mellitus without mention of complication, not stated as uncontrolled      Past Surgical History:   Procedure Laterality Date    ENDOSCOPY, COLON, DIAGNOSTIC      FRACTURE SURGERY Left     femur    KNEE ARTHROSCOPY Left 07/18/2016    medial lateral menisectomy/synovectomy/chodroplasty    SHOULDER ARTHROSCOPY Right 02/11/2020    SHOULDER ARTHROSCOPY Right 2/11/2020    RIGHT SHOULDER ARTHROSCOPY SUBACHROMIAL DECOMPRESSION DEBRIDEMENT, BICEP TENODESIS ,LABRAL DEBRIDEMENT AND KHALIDA performed by Temitope Layne DO at [facility]         Current Outpatient Medications:     alogliptin (NESINA) 25 MG TABS tablet, Take 1 tablet by mouth daily, Disp: 30 tablet, Rfl: 5    aspirin (RA ASPIRIN EC ADULT LOW ST) 81 MG EC tablet, take 1 tablet by mouth once daily, Disp: 30 tablet, Rfl: 11    Dulaglutide 1.5 MG/0.5ML SOPN, Inject 1.5 mg into the skin once a week, Disp: 4 pen, Rfl: 2    metFORMIN (GLUCOPHAGE) 1000 MG tablet, Take 1 tablet by mouth 2 times daily (with meals), Disp: 60 tablet, Rfl: 5    glipiZIDE (GLUCOTROL XL) 5 MG extended release tablet, Take 1 tablet by mouth daily, Disp: 30 tablet, Rfl: 5    simvastatin (ZOCOR) 20 MG tablet, Take 1 tablet by mouth every evening, Disp: 30 tablet, Rfl: 5    pantoprazole (PROTONIX) 20 MG tablet, Take 1 tablet by mouth nightly as needed (heartburn), Disp: 30 tablet, Rfl: 3    Alcohol Swabs PADS, 1 Units by Does not apply route as needed (checking glucose), Disp: 100 each, Rfl: 11    blood glucose monitor strips, FBS daily. Please dispense Freestyle brand, Disp: 100 strip, Rfl: 11    Lancets MISC, Test once daily, Disp: 100 each, Rfl: 3    ibuprofen (ADVIL;MOTRIN) 800 MG tablet, Take 1 tablet by mouth every 8 hours as needed for Pain, Disp: 90 tablet, Rfl: 0  Allergies   Allergen Reactions    Tobramycin Swelling     Social History     Socioeconomic History    Marital status:      Spouse name: Not on file    Number of children: Not on file    Years of education: Not on file    Highest education level: Not on file   Occupational History    Occupation: [employer]   Social Needs    Financial resource strain: Not on file    Food insecurity     Worry: Not on file     Inability: Not on file   [employer] needs     Medical: Not on file     Non-medical: Not on file   Tobacco Use    Smoking status: Never Smoker    Smokeless tobacco: Never Used   Substance and Sexual Activity    Alcohol use: Yes     Alcohol/week: 0.0 standard drinks     Comment: rarely .   Coffee occasionally    Drug use: No    Sexual activity: Yes     Partners: Female   Lifestyle    Physical activity     Days per week: Not on file     Minutes per session: Not on file    Stress: Not on file   Relationships    Social connections     Talks on phone: Not on file     Gets together: Not on file     Attends Synagogue service: Not on file     Active member of club or organization: Not on file     Attends meetings of clubs or organizations: Not on file     Relationship status: Not on file    Intimate partner violence     Fear of current or ex partner: Not on file Emotionally abused: Not on file     Physically abused: Not on file     Forced sexual activity: Not on file   Other Topics Concern    Not on file   Social History Narrative    Not on file     Family History   Problem Relation Age of Onset    Diabetes Father     High Blood Pressure Father     Heart Disease Father     Stroke Father     Heart Attack Father     Diabetes Mother     Heart Disease Mother     Emphysema Mother     Cancer Brother 54        colon    High Blood Pressure Brother     Cancer Maternal Cousin 40        colon    Cancer Sister     Diabetes Sister     Heart Disease Sister     Stroke Sister     Diabetes Maternal Aunt     Heart Disease Maternal Aunt     High Blood Pressure Maternal Aunt     High Cholesterol Maternal Aunt     Stroke Maternal Aunt     Diabetes Maternal Uncle     Heart Disease Maternal Uncle     High Blood Pressure Maternal Uncle     High Cholesterol Maternal Uncle     Stroke Maternal Uncle     Diabetes Paternal Aunt     Heart Disease Paternal Aunt     High Blood Pressure Paternal Aunt     High Cholesterol Paternal [de-identified]     Stroke Paternal Aunt     Diabetes Paternal Uncle     Heart Disease Paternal Uncle     High Blood Pressure Paternal Uncle     High Cholesterol Paternal Uncle     Stroke Paternal Uncle     Diabetes Maternal Grandmother     High Blood Pressure Maternal Grandmother     High Cholesterol Maternal Grandmother     Cancer Maternal Grandfather     Diabetes Maternal Grandfather     High Blood Pressure Maternal Grandfather     High Cholesterol Maternal Grandfather     Stroke Maternal Grandfather     Diabetes Paternal Grandfather     High Blood Pressure Paternal Grandfather     High Cholesterol Paternal Grandfather        REVIEW OF SYSTEMS:     General/Constitution:  (-)weight loss, (-)fever, (-)chills, (-)weakness. Skin: (-) rash,(-) psoriasis,(-) eczema, (-)skin cancer.    Musculoskeletal: (-) fractures,  (-) dislocations,(-) collagen vascular disease, (-) fibromyalgia, (-) multiple sclerosis, (-) muscular dystrophy, (-) RSD,(-) joint pain (-)swelling, (-) joint pain,swelling. Neurologic: (-) epilepsy, (-)seizures,(-) brain tumor,(-) TIA, (-)stroke, (-)headaches, (-)Parkinson disease,(-) memory loss, (-) LOC. Cardiovascular: (-) Chest pain, (-) swelling in legs/feet, (-) SOB, (-) cramping in legs/feet with walking. Respiratory: (-) SOB, (-) Coughing, (-) night sweats. GI: (-) nausea, (-) vomiting, (-) diarrhea, (-) blood in stool, (-) gastric ulcer. Psychiatric: (-) Depression, (-) Anxiety, (-) bipolar disease, (-) Alzheimer's Disease  Allergic/Immunologic: (-) allergies latex, (-) allergies metal, (-) skin sensitivity. Hematlogic: (-) anemia, (-) blood transfusion, (-) DVT/PE, (-) Clotting disorders      Subjective:  _Temp 98 °F (36.7 °C)   Ht 6' (1.829 m)   Wt 244 lb (110.7 kg)   BMI 33.09 kg/m²  Vital signs are stable. In general, patient is awake, alert and oriented X3, in no apparent distress. Examination of HENT reveals normocephalic, atraumatic. PERRLA/EOMI sclera are white. Conjunctivae are clear. TM's are intact. Pharynx is pink and moist.  Uvula and tongue are midline. Heart: Positive S1 and positive S2 with regular rate and rhythm. Lungs: Clear to auscultation bilaterally without rales, rhonchi or wheezes. Abdomen: soft, nontender. Positive bowel sounds. No organomegaly. No guarding or rigidity. Constitution:  Temp 98 °F (36.7 °C)   Ht 6' (1.829 m)   Wt 244 lb (110.7 kg)   BMI 33.09 kg/m²     Psycihatric:  The patient is alert and oriented x 3, appears to be stated age and in no distress. Respiratory:  Respiratory effort is not labored. Patient is not gasping. Palpation of the chest reveals no tactile fremitus. Skin:  Upon inspection: the skin appears warm, dry and intact. There is not a previous scar over the affected area. There is not any cellulitis, lymphedema or cutaneous lesions noted in the lower extremities. Upon palpation there is no induration noted. Neurologic:  Motor exam of the upper extremities show: The reflexes in biceps/triceps/brachioradialis are equal and symmetric. Sensory exam C5-T1 are normal bilaterally. Cardiovascular: The vascular exam is normal and is well perfused to distal extremities. There are 2+ radial pulses bilaterally, and motor and sensation is intact to median, ulnar, and radial, musclocutaneus, and axillary nerve distribution and grossly symmetric bilaterally. There is cap refill noted less than two seconds in all digits. There is not edema of the bilateral upper extremities. There is not varicosities noted in the distal extremities. Lymph:  Upon palpation,  there is no lymphadenopathy noted in bilateral upper extremities. Musculoskeletal:  Gait: normal; examination of the nails and digits reveal no cyanosis or clubbing. Cervical Exam:  On physical exam, Jerson Rios is well-developed, well-nourished, oriented to person, place and time. his gait is normal.  On evaluation of hiscervical spine, He has full range of motion of the cervical spine without pain. There is no cervical tenderness to palpation. Shoulder Exam:  On evaluation of his bilaterally upper extremities, his right shoulder has no deformity. There is tenderness upon palpation of the anterior and lateral shoulder. There is not evidence of scapular dyskinesis. There is not muscle atrophy in shoulder girdle. The range of motion for the Right Shoulder is 100/30/BL and for the Left shoulder is 160/45/T8. Right shoulder Motor strength is 4/5 in the supraspinatus, 4/5 internal rotation and 4/5 in external rotation, and Left shoulder motor strength 5/5 in supraspinatus, 5/5 in internal rotation, 5/5 in external rotation.         Right shoulder:  positive Impingement , positive Arrington ,negative  Speeds,negative  Apprehension ,positive Ross Load Shift, negative Cloyde Hailstone manuver, positiveCross arm test.     Left shoulder:  negative Impingement , negative Arrington ,negative  Speeds,negative  Apprehension ,negative Ross Load Shift, negative Cole manuver, negative Cross arm test.     XRAY:    There is no fracture or dislocation of the right shoulder.  The glenohumeral    joint is unremarkable.  There are minimal degenerative changes of the right    AC joint.  The right upper lobe is clear. MRI:  Not performed today. Radiographic findings reviewed with patient    Impression:   Encounter Diagnoses   Name Primary?  Arthritis of right acromioclavicular joint Yes    Shoulder impingement, right        Plan: Natural history and expected course discussed. Questions answered. Educational material distributed. Reduction in offending activity. I had a lengthy discussion with the patient regarding their diagnosis. I explained treatment options including surgical vs non surgical treatment. I reviewed in detail the risks and benefits and outlined the procedure in detail with expected outcomes and possible complications. I also discussed non surgical treatment such as injections (CSI and visco supplementation), physical therapy, topical creams and NSAID's. They have elected for surgical management at this time. I discussed the risks and benefits of the shoulder arthroscopy with the patient. The risks include but are not limited to: infection, injuries to blood vessels and nerves, non relief of symptoms, intraoperative fracture, need for further operative intervention, blood loss, arthrofibrosis of shoulder, DVT/PE, MI and death. The patient understands these risks and wishes to proceed with surgery. I will perform a Right shoulder arthroscopy, SAD and debridement and catrina procedure on 10/2/2020.    At least 25 minutes was spent discussing the diagnosis and treatment options with the patient with at least 50% of the time was spent with decision making and counseling the patient. The patient was counseled at length about the risks of ramsey Covid-19 during their perioperative period and any recovery window from their procedure. The patient was made aware that ramsey Covid-19  may worsen their prognosis for recovering from their procedure  and lend to a higher morbidity and/or mortality risk. All material risks, benefits, and reasonable alternatives including postponing the procedure were discussed. The patient does wish to proceed with the procedure at this time.

## 2020-09-17 ENCOUNTER — OFFICE VISIT (OUTPATIENT)
Dept: FAMILY MEDICINE CLINIC | Age: 55
End: 2020-09-17
Payer: COMMERCIAL

## 2020-09-17 VITALS
DIASTOLIC BLOOD PRESSURE: 80 MMHG | TEMPERATURE: 97.6 F | RESPIRATION RATE: 18 BRPM | SYSTOLIC BLOOD PRESSURE: 130 MMHG | WEIGHT: 248 LBS | HEART RATE: 76 BPM | OXYGEN SATURATION: 97 % | HEIGHT: 72 IN | BODY MASS INDEX: 33.59 KG/M2

## 2020-09-17 PROCEDURE — 2022F DILAT RTA XM EVC RTNOPTHY: CPT | Performed by: FAMILY MEDICINE

## 2020-09-17 PROCEDURE — G8417 CALC BMI ABV UP PARAM F/U: HCPCS | Performed by: FAMILY MEDICINE

## 2020-09-17 PROCEDURE — 1036F TOBACCO NON-USER: CPT | Performed by: FAMILY MEDICINE

## 2020-09-17 PROCEDURE — 99213 OFFICE O/P EST LOW 20 MIN: CPT | Performed by: FAMILY MEDICINE

## 2020-09-17 PROCEDURE — 3017F COLORECTAL CA SCREEN DOC REV: CPT | Performed by: FAMILY MEDICINE

## 2020-09-17 PROCEDURE — G8427 DOCREV CUR MEDS BY ELIG CLIN: HCPCS | Performed by: FAMILY MEDICINE

## 2020-09-17 PROCEDURE — 3046F HEMOGLOBIN A1C LEVEL >9.0%: CPT | Performed by: FAMILY MEDICINE

## 2020-09-17 PROCEDURE — 93000 ELECTROCARDIOGRAM COMPLETE: CPT | Performed by: FAMILY MEDICINE

## 2020-09-17 NOTE — PROGRESS NOTES
2020     Lizzy Malave (:  1965) is a 53 y.o. male, with a:  Past Medical History:   Diagnosis Date    Anxiety     Blood in stool     Chronic back pain     Constipation     Headache(784.0)     History of dental problems     Hyperlipidemia     Neck pain     Neck stiffness     Type II or unspecified type diabetes mellitus without mention of complication, not stated as uncontrolled        Here for evaluation of the following medical concerns:  Chief Complaint   Patient presents with    Pre-op Exam     right shoulder Dr Ronnell Moritz 10/2/2020       Pre-Operative Risk assessment using 2014 ACC/AHA guidelines     Emergent procedure No  Active Cardiac Condition No (decompensated HF, Arrhythmia, MI <3 weeks, severe valve disease)  Risk Level of Procedure Intermediate Risk (intraperitoneal, intrathoracic, HENT, orthopedic, or carotid endarterectomy, etc.)  Revised Cardiac Risk Index Risk factors: None  Measurement of Exercise Tolerance before Surgery >4 Yes    Review of Systems   Constitutional: Negative for chills and fever. Respiratory: Negative for cough and shortness of breath. Cardiovascular: Negative for chest pain and leg swelling. Gastrointestinal: Negative for abdominal pain, constipation, diarrhea, nausea and vomiting. Genitourinary: Negative for dysuria. Musculoskeletal: Positive for arthralgias. Neurological: Negative for headaches. Prior to Visit Medications    Medication Sig Taking?  Authorizing Provider   alogliptin (NESINA) 25 MG TABS tablet Take 1 tablet by mouth daily Yes Tae Carbajal DO   aspirin (RA ASPIRIN EC ADULT LOW ST) 81 MG EC tablet take 1 tablet by mouth once daily Yes Tae Carbajal DO   Dulaglutide 1.5 MG/0.5ML SOPN Inject 1.5 mg into the skin once a week Yes Tae Carbajal DO   ibuprofen (ADVIL;MOTRIN) 800 MG tablet Take 1 tablet by mouth every 8 hours as needed for Pain Yes Tae Carbajal DO   metFORMIN (GLUCOPHAGE) 1000 MG tablet Take 1 tablet by mouth 2 times daily (with meals) Yes Tae Carbajal DO   glipiZIDE (GLUCOTROL XL) 5 MG extended release tablet Take 1 tablet by mouth daily Yes Tae Carbajal DO   simvastatin (ZOCOR) 20 MG tablet Take 1 tablet by mouth every evening Yes Tae Carbajal DO   pantoprazole (PROTONIX) 20 MG tablet Take 1 tablet by mouth nightly as needed (heartburn) Yes Tae Carbajal DO   Alcohol Swabs PADS 1 Units by Does not apply route as needed (checking glucose) Yes Tae Carbajal DO   blood glucose monitor strips FBS daily. Please dispense Freestyle brand Yes Yuli Groves DO   Lancets MISC Test once daily Yes Yuli Groves DO        Social History     Tobacco Use    Smoking status: Never Smoker    Smokeless tobacco: Never Used   Substance Use Topics    Alcohol use: Yes     Alcohol/week: 0.0 standard drinks     Comment: rarely . Coffee occasionally        Past Surgical History:   Procedure Laterality Date    ENDOSCOPY, COLON, DIAGNOSTIC      FRACTURE SURGERY Left     femur    KNEE ARTHROSCOPY Left 07/18/2016    medial lateral menisectomy/synovectomy/chodroplasty    SHOULDER ARTHROSCOPY Right 02/11/2020    SHOULDER ARTHROSCOPY Right 2/11/2020    RIGHT SHOULDER ARTHROSCOPY SUBACHROMIAL DECOMPRESSION DEBRIDEMENT, BICEP TENODESIS ,LABRAL DEBRIDEMENT AND KHALIDA performed by Andrade Penaloza DO at [address] [place] [address] Highway:    09/17/20 1557   BP: 130/80   Pulse: 76   Resp: 18   Temp: 97.6 °F (36.4 °C)   TempSrc: Temporal   SpO2: 97%   Weight: 248 lb (112.5 kg)   Height: 6' (1.829 m)     Estimated body mass index is 33.63 kg/m² as calculated from the following:    Height as of this encounter: 6' (1.829 m). Weight as of this encounter: 248 lb (112.5 kg). Physical Exam  Constitutional:       General: He is not in acute distress. Appearance: He is well-developed. HENT:      Head: Normocephalic and atraumatic.       Right Ear: External ear normal.      Left Ear: External ear normal. Nose: Nose normal. No congestion or rhinorrhea. Eyes:      Extraocular Movements: Extraocular movements intact. Pupils: Pupils are equal, round, and reactive to light. Neck:      Musculoskeletal: Normal range of motion. Thyroid: No thyromegaly. Cardiovascular:      Rate and Rhythm: Normal rate and regular rhythm. Pulmonary:      Effort: Pulmonary effort is normal. No respiratory distress. Breath sounds: Normal breath sounds. No wheezing or rales. Abdominal:      General: There is no distension. Palpations: Abdomen is soft. Tenderness: There is no abdominal tenderness. Musculoskeletal:         General: No swelling or deformity. Skin:     General: Skin is warm. Findings: No rash. Neurological:      General: No focal deficit present. Mental Status: He is alert. Mental status is at baseline. Psychiatric:         Mood and Affect: Mood normal.         Behavior: Behavior normal.         ASSESSMENT/PLAN:  Anabell Saldana was seen today for pre-op exam.    Diagnoses and all orders for this visit:    Pre-op exam  -     EKG 12 lead; Future  -     EKG 12 lead    Type 2 diabetes mellitus with diabetic polyneuropathy, without long-term current use of insulin ([facility] [address]           Additional plan and future considerations: According to the 2014 ACC/AHA pre-operative risk assessment guidelines Kassidy Charles is a low risk for major cardiac complications during a intermediate risk procedure and may continue as planned. Specific medication recommendations are listed below. Medications recommended to continue should be taken with a sip of water even when NPO.      Further recommendations from consultants: None      Future Appointments   Date Time Provider Riri Perez   11/5/2020  3:30 PM Albertina Dailey DO [facility]         --Albertina Dailey DO on 9/17/2020 at 4:08 PM

## 2020-09-18 ASSESSMENT — ENCOUNTER SYMPTOMS
VOMITING: 0
CONSTIPATION: 0
ABDOMINAL PAIN: 0
COUGH: 0
SHORTNESS OF BREATH: 0
DIARRHEA: 0
NAUSEA: 0

## 2020-09-24 ENCOUNTER — TELEPHONE (OUTPATIENT)
Dept: FAMILY MEDICINE CLINIC | Age: 55
End: 2020-09-24

## 2020-09-24 NOTE — TELEPHONE ENCOUNTER
Chery Found is having surgery next Friday. He is currently on aspirin and will need to hold at least 5 -7 days before . Is this ok.

## 2020-09-28 ENCOUNTER — HOSPITAL ENCOUNTER (OUTPATIENT)
Age: 55
Discharge: HOME | End: 2020-09-30
Payer: COMMERCIAL

## 2020-09-28 LAB
ANION GAP SERPL CALCULATED.3IONS-SCNC: 17 MMOL/L (ref 7–16)
BUN BLDV-MCNC: 17 MG/DL (ref 6–20)
CALCIUM SERPL-MCNC: 9.2 MG/DL (ref 8.6–10.2)
CHLORIDE BLD-SCNC: 103 MMOL/L (ref 98–107)
CO2: 20 MMOL/L (ref 22–29)
CREAT SERPL-MCNC: 0.7 MG/DL (ref 0.7–1.2)
GFR AFRICAN AMERICAN: >60
GFR NON-AFRICAN AMERICAN: >60 ML/MIN/1.73
GLUCOSE BLD-MCNC: 177 MG/DL (ref 74–99)
HCT VFR BLD CALC: 42.9 % (ref 37–54)
HEMOGLOBIN: 14 G/DL (ref 12.5–16.5)
MCH RBC QN AUTO: 28.6 PG (ref 26–35)
MCHC RBC AUTO-ENTMCNC: 32.6 % (ref 32–34.5)
MCV RBC AUTO: 87.7 FL (ref 80–99.9)
PDW BLD-RTO: 13 FL (ref 11.5–15)
PLATELET # BLD: 263 E9/L (ref 130–450)
PMV BLD AUTO: 11.5 FL (ref 7–12)
POTASSIUM SERPL-SCNC: 4.2 MMOL/L (ref 3.5–5)
RBC # BLD: 4.89 E12/L (ref 3.8–5.8)
SODIUM BLD-SCNC: 140 MMOL/L (ref 132–146)
WBC # BLD: 8 E9/L (ref 4.5–11.5)

## 2020-09-28 PROCEDURE — 85027 COMPLETE CBC AUTOMATED: CPT

## 2020-09-28 PROCEDURE — U0003 INFECTIOUS AGENT DETECTION BY NUCLEIC ACID (DNA OR RNA); SEVERE ACUTE RESPIRATORY SYNDROME CORONAVIRUS 2 (SARS-COV-2) (CORONAVIRUS DISEASE [COVID-19]), AMPLIFIED PROBE TECHNIQUE, MAKING USE OF HIGH THROUGHPUT TECHNOLOGIES AS DESCRIBED BY CMS-2020-01-R: HCPCS

## 2020-09-28 PROCEDURE — 80048 BASIC METABOLIC PNL TOTAL CA: CPT

## 2020-09-28 PROCEDURE — 36415 COLL VENOUS BLD VENIPUNCTURE: CPT

## 2020-09-30 LAB
SARS-COV-2: NOT DETECTED
SOURCE: NORMAL

## 2020-10-01 ENCOUNTER — ANESTHESIA EVENT (OUTPATIENT)
Dept: OPERATING ROOM | Age: 55
End: 2020-10-01
Payer: COMMERCIAL

## 2020-10-01 NOTE — ANESTHESIA PRE PROCEDURE
Department of Anesthesiology  Preprocedure Note       Name:  Samantha Del Angel   Age:  53 y.o.  :  1965                                          MRN:  [id number]         Date:  10/1/2020      Surgeon: Yesi Chakraborty): Justina Dakins, DO    Procedure: Procedure(s):  RIGHT SHOULDER ARTHROSCOPY SUBACROMIAL DECOMPRESSION AND DEBRIDEMENT    Medications prior to admission:   Prior to Admission medications    Medication Sig Start Date End Date Taking? Authorizing Provider   alogliptin (NESINA) 25 MG TABS tablet Take 1 tablet by mouth daily 20   Chelsea Gilmore, DO   aspirin (RA ASPIRIN EC ADULT LOW ST) 81 MG EC tablet take 1 tablet by mouth once daily 20   Chelsea Gilmore, DO   Dulaglutide 1.5 MG/0.5ML SOPN Inject 1.5 mg into the skin once a week 20   Chelsea Gilmore, DO   ibuprofen (ADVIL;MOTRIN) 800 MG tablet Take 1 tablet by mouth every 8 hours as needed for Pain 20  Chelsea Gilmore, DO   metFORMIN (GLUCOPHAGE) 1000 MG tablet Take 1 tablet by mouth 2 times daily (with meals) 20   Chelsea Gilmore, DO   glipiZIDE (GLUCOTROL XL) 5 MG extended release tablet Take 1 tablet by mouth daily 20   Chelsea Gilmore, DO   simvastatin (ZOCOR) 20 MG tablet Take 1 tablet by mouth every evening 20   Tae Carbajal,    pantoprazole (PROTONIX) 20 MG tablet Take 1 tablet by mouth nightly as needed (heartburn) 19   Chelsea Gilmore, DO   Alcohol Swabs PADS 1 Units by Does not apply route as needed (checking glucose) 19   Tae Carbajal,    blood glucose monitor strips FBS daily. Please dispense Freestyle brand 19   Chelsea Gilmore, DO   Lancets MISC Test once daily 19   Chelsea Gilmore DO       Current medications:    No current facility-administered medications for this encounter.       Current Outpatient Medications   Medication Sig Dispense Refill    alogliptin (NESINA) 25 MG TABS tablet Take 1 tablet by mouth daily 30 tablet 5    aspirin (RA ASPIRIN EC ADULT LOW ST) 81 MG EC tablet take 1 tablet by mouth once daily 30 tablet 11    Dulaglutide 1.5 MG/0.5ML SOPN Inject 1.5 mg into the skin once a week 4 pen 2    ibuprofen (ADVIL;MOTRIN) 800 MG tablet Take 1 tablet by mouth every 8 hours as needed for Pain 90 tablet 0    metFORMIN (GLUCOPHAGE) 1000 MG tablet Take 1 tablet by mouth 2 times daily (with meals) 60 tablet 5    glipiZIDE (GLUCOTROL XL) 5 MG extended release tablet Take 1 tablet by mouth daily 30 tablet 5    simvastatin (ZOCOR) 20 MG tablet Take 1 tablet by mouth every evening 30 tablet 5    pantoprazole (PROTONIX) 20 MG tablet Take 1 tablet by mouth nightly as needed (heartburn) 30 tablet 3    Alcohol Swabs PADS 1 Units by Does not apply route as needed (checking glucose) 100 each 11    blood glucose monitor strips FBS daily. Please dispense Freestyle brand 100 strip 11    Lancets MISC Test once daily 100 each 3       Allergies:     Allergies   Allergen Reactions    Tobramycin Swelling       Problem List:    Patient Active Problem List   Diagnosis Code    Migraine G43.909    Type 2 diabetes mellitus with diabetic polyneuropathy, without long-term current use of insulin (HCC) E11.42    Hyperlipidemia associated with type 2 diabetes mellitus (HCC) E11.69, E78.5    Chronic bilateral low back pain with sciatica M54.40, G89.29    Neck pain M54.2    Rotator cuff arthropathy of right shoulder M12.811    Class 1 obesity due to excess calories with serious comorbidity and body mass index (BMI) of 33.0 to 33.9 in adult E66.09, Z68.33    Status post arthroscopy of right shoulder Z98.890    Lumbar spondylosis M47.816    Lumbar facet arthropathy M47.816    Chronic pain syndrome G89.4    Primary osteoarthritis of left knee M17.12    Primary osteoarthritis of right shoulder M19.011    Precordial pain R07.2       Past Medical History:        Diagnosis Date    Anxiety     Blood in stool     Chronic back pain     Constipation     Headache(784.0)     History of dental problems     Hyperlipidemia     Neck pain     Neck stiffness     Type II or unspecified type diabetes mellitus without mention of complication, not stated as uncontrolled        Past Surgical History:        Procedure Laterality Date    ENDOSCOPY, COLON, DIAGNOSTIC      FRACTURE SURGERY Left     femur    KNEE ARTHROSCOPY Left 07/18/2016    medial lateral menisectomy/synovectomy/chodroplasty    SHOULDER ARTHROSCOPY Right 2/11/2020    RIGHT SHOULDER ARTHROSCOPY SUBACHROMIAL DECOMPRESSION DEBRIDEMENT, BICEP TENODESIS ,LABRAL DEBRIDEMENT AND KHALIDA performed by Ashley Cruz DO at [address] History:    Social History     Tobacco Use    Smoking status: Never Smoker    Smokeless tobacco: Never Used   Substance Use Topics    Alcohol use: Yes     Alcohol/week: 0.0 standard drinks     Comment: rarely . Coffee occasionally                                Counseling given: Not Answered      Vital Signs (Current):   Vitals:    09/24/20 1308   Weight: 248 lb (112.5 kg)   Height: 6' (1.829 m)                                              BP Readings from Last 3 Encounters:   09/17/20 130/80   08/14/20 136/78   07/30/20 136/82       NPO Status:                                                                                 BMI:   Wt Readings from Last 3 Encounters:   09/17/20 248 lb (112.5 kg)   09/08/20 244 lb (110.7 kg)   08/18/20 244 lb (110.7 kg)     Body mass index is 33.63 kg/m².     CBC:   Lab Results   Component Value Date    WBC 8.0 09/28/2020    RBC 4.89 09/28/2020    HGB 14.0 09/28/2020    HCT 42.9 09/28/2020    MCV 87.7 09/28/2020    RDW 13.0 09/28/2020     09/28/2020       CMP:   Lab Results   Component Value Date     09/28/2020    K 4.2 09/28/2020     09/28/2020    CO2 20 09/28/2020    BUN 17 09/28/2020    CREATININE 0.7 09/28/2020    GFRAA >60 09/28/2020    LABGLOM >60 09/28/2020    GLUCOSE 177 09/28/2020    PROT 7.3 01/31/2020    CALCIUM 9.2 09/28/2020    BILITOT 0.4 01/31/2020    ALKPHOS 80 01/31/2020    AST 36 01/31/2020    ALT 51 01/31/2020       POC Tests: No results for input(s): POCGLU, POCNA, POCK, POCCL, POCBUN, POCHEMO, POCHCT in the last 72 hours. Coags: No results found for: PROTIME, INR, APTT    HCG (If Applicable): No results found for: PREGTESTUR, PREGSERUM, HCG, HCGQUANT     ABGs: No results found for: PHART, PO2ART, AXN5IPF, NUZ3IMC, BEART, J0JIUPHC     Type & Screen (If Applicable):  No results found for: LABABO, LABRH    Drug/Infectious Status (If Applicable):  No results found for: HIV, HEPCAB    COVID-19 Screening (If Applicable):   Lab Results   Component Value Date    COVID19 Not Detected 09/28/2020         Anesthesia Evaluation  Patient summary reviewed no history of anesthetic complications:   Airway: Mallampati: II  TM distance: >3 FB   Neck ROM: full  Mouth opening: > = 3 FB Dental: normal exam         Pulmonary:Negative Pulmonary ROS breath sounds clear to auscultation                             Cardiovascular:Negative CV ROS    (+) hyperlipidemia      ECG reviewed  Rhythm: regular  Rate: normal                    Neuro/Psych:   (+) neuromuscular disease:, headaches:,             GI/Hepatic/Renal: Neg GI/Hepatic/Renal ROS  (+) GERD:,      (-) no morbid obesity       Endo/Other:    (+) DiabetesType II DM, , .                 Abdominal:   (+) obese,         Vascular: negative vascular ROS. Anesthesia Plan      general and regional     ASA 3     (Patient refuses right interscalene nerve block-feels last block with prior shoulder surgery was not effective-does not want a block today)        Anesthetic plan and risks discussed with patient. Plan discussed with CRNA.           PAT Chart Review:  Chart reviewed per routine on October 1, 2020 at 7:19 AM by Faraz Degroot MD.  (Final assessment and plan per day of surgery team.)    DOS STAFF ADDENDUM:    Pt seen and examined, chart reviewed (including anesthesia, drug and allergy history). Anesthetic plan, risks, benefits, alternatives, and personnel involved discussed with patient. Patient verbalized an understanding and agrees to proceed. Plan discussed with care team members and agreed upon.     Sylvester Soriano MD  Staff Anesthesiologist  6:23 French Landau, MD   10/1/2020

## 2020-10-02 ENCOUNTER — HOSPITAL ENCOUNTER (OUTPATIENT)
Age: 55
Setting detail: OUTPATIENT SURGERY
Discharge: HOME | End: 2020-10-02
Attending: ORTHOPAEDIC SURGERY | Admitting: ORTHOPAEDIC SURGERY
Payer: COMMERCIAL

## 2020-10-02 ENCOUNTER — ANESTHESIA (OUTPATIENT)
Dept: OPERATING ROOM | Age: 55
End: 2020-10-02
Payer: COMMERCIAL

## 2020-10-02 VITALS
WEIGHT: 247 LBS | TEMPERATURE: 98 F | HEIGHT: 72 IN | DIASTOLIC BLOOD PRESSURE: 88 MMHG | SYSTOLIC BLOOD PRESSURE: 144 MMHG | RESPIRATION RATE: 14 BRPM | OXYGEN SATURATION: 92 % | HEART RATE: 88 BPM | BODY MASS INDEX: 33.46 KG/M2

## 2020-10-02 VITALS
TEMPERATURE: 98.1 F | OXYGEN SATURATION: 91 % | SYSTOLIC BLOOD PRESSURE: 122 MMHG | DIASTOLIC BLOOD PRESSURE: 101 MMHG | RESPIRATION RATE: 3 BRPM

## 2020-10-02 PROBLEM — M19.011 ARTHRITIS OF RIGHT ACROMIOCLAVICULAR JOINT: Status: ACTIVE | Noted: 2020-10-02

## 2020-10-02 PROBLEM — S43.431A TEAR OF RIGHT GLENOID LABRUM: Status: ACTIVE | Noted: 2020-10-02

## 2020-10-02 PROBLEM — M75.41 IMPINGEMENT SYNDROME OF RIGHT SHOULDER: Status: ACTIVE | Noted: 2020-10-02

## 2020-10-02 LAB — METER GLUCOSE: 137 MG/DL (ref 74–99)

## 2020-10-02 PROCEDURE — 2720000010 HC SURG SUPPLY STERILE: Performed by: ORTHOPAEDIC SURGERY

## 2020-10-02 PROCEDURE — 3700000001 HC ADD 15 MINUTES (ANESTHESIA): Performed by: ORTHOPAEDIC SURGERY

## 2020-10-02 PROCEDURE — 6360000002 HC RX W HCPCS

## 2020-10-02 PROCEDURE — 7100000001 HC PACU RECOVERY - ADDTL 15 MIN: Performed by: ORTHOPAEDIC SURGERY

## 2020-10-02 PROCEDURE — 3700000000 HC ANESTHESIA ATTENDED CARE: Performed by: ORTHOPAEDIC SURGERY

## 2020-10-02 PROCEDURE — 2580000003 HC RX 258: Performed by: ANESTHESIOLOGY

## 2020-10-02 PROCEDURE — 6360000002 HC RX W HCPCS: Performed by: NURSE ANESTHETIST, CERTIFIED REGISTERED

## 2020-10-02 PROCEDURE — 2500000003 HC RX 250 WO HCPCS: Performed by: ORTHOPAEDIC SURGERY

## 2020-10-02 PROCEDURE — 2580000003 HC RX 258: Performed by: ORTHOPAEDIC SURGERY

## 2020-10-02 PROCEDURE — 6370000000 HC RX 637 (ALT 250 FOR IP): Performed by: ANESTHESIOLOGY

## 2020-10-02 PROCEDURE — 82962 GLUCOSE BLOOD TEST: CPT | Performed by: ORTHOPAEDIC SURGERY

## 2020-10-02 PROCEDURE — 29823 SHO ARTHRS SRG XTNSV DBRDMT: CPT | Performed by: ORTHOPAEDIC SURGERY

## 2020-10-02 PROCEDURE — 2500000003 HC RX 250 WO HCPCS: Performed by: NURSE ANESTHETIST, CERTIFIED REGISTERED

## 2020-10-02 PROCEDURE — 3600000004 HC SURGERY LEVEL 4 BASE: Performed by: ORTHOPAEDIC SURGERY

## 2020-10-02 PROCEDURE — 29827 SHO ARTHRS SRG RT8TR CUF RPR: CPT | Performed by: ORTHOPAEDIC SURGERY

## 2020-10-02 PROCEDURE — 7100000011 HC PHASE II RECOVERY - ADDTL 15 MIN: Performed by: ORTHOPAEDIC SURGERY

## 2020-10-02 PROCEDURE — 2709999900 HC NON-CHARGEABLE SUPPLY: Performed by: ORTHOPAEDIC SURGERY

## 2020-10-02 PROCEDURE — 29824 SHO ARTHRS SRG DSTL CLAVICLC: CPT | Performed by: ORTHOPAEDIC SURGERY

## 2020-10-02 PROCEDURE — 6360000002 HC RX W HCPCS: Performed by: ANESTHESIOLOGY

## 2020-10-02 PROCEDURE — 3600000014 HC SURGERY LEVEL 4 ADDTL 15MIN: Performed by: ORTHOPAEDIC SURGERY

## 2020-10-02 PROCEDURE — 6360000002 HC RX W HCPCS: Performed by: ORTHOPAEDIC SURGERY

## 2020-10-02 PROCEDURE — 7100000000 HC PACU RECOVERY - FIRST 15 MIN: Performed by: ORTHOPAEDIC SURGERY

## 2020-10-02 PROCEDURE — 82962 GLUCOSE BLOOD TEST: CPT

## 2020-10-02 PROCEDURE — 6360000002 HC RX W HCPCS: Performed by: NURSE PRACTITIONER

## 2020-10-02 PROCEDURE — C1713 ANCHOR/SCREW BN/BN,TIS/BN: HCPCS | Performed by: ORTHOPAEDIC SURGERY

## 2020-10-02 PROCEDURE — 7100000010 HC PHASE II RECOVERY - FIRST 15 MIN: Performed by: ORTHOPAEDIC SURGERY

## 2020-10-02 PROCEDURE — 29826 SHO ARTHRS SRG DECOMPRESSION: CPT | Performed by: ORTHOPAEDIC SURGERY

## 2020-10-02 DEVICE — ANCHOR SUT L24.5MM DIA4.75MM BIOCOMPOSITE SELF PUNCHING: Type: IMPLANTABLE DEVICE | Site: SHOULDER | Status: FUNCTIONAL

## 2020-10-02 RX ORDER — SODIUM CHLORIDE, SODIUM LACTATE, POTASSIUM CHLORIDE, CALCIUM CHLORIDE 600; 310; 30; 20 MG/100ML; MG/100ML; MG/100ML; MG/100ML
INJECTION, SOLUTION INTRAVENOUS CONTINUOUS
Status: DISCONTINUED | OUTPATIENT
Start: 2020-10-02 | End: 2020-10-02 | Stop reason: HOSPADM

## 2020-10-02 RX ORDER — KETOROLAC TROMETHAMINE 30 MG/ML
INJECTION, SOLUTION INTRAMUSCULAR; INTRAVENOUS PRN
Status: DISCONTINUED | OUTPATIENT
Start: 2020-10-02 | End: 2020-10-02 | Stop reason: SDUPTHER

## 2020-10-02 RX ORDER — MORPHINE SULFATE 2 MG/ML
2 INJECTION, SOLUTION INTRAMUSCULAR; INTRAVENOUS EVERY 5 MIN PRN
Status: DISCONTINUED | OUTPATIENT
Start: 2020-10-02 | End: 2020-10-02 | Stop reason: HOSPADM

## 2020-10-02 RX ORDER — OXYCODONE HYDROCHLORIDE AND ACETAMINOPHEN 5; 325 MG/1; MG/1
1 TABLET ORAL EVERY 6 HOURS PRN
Qty: 28 TABLET | Refills: 0 | Status: SHIPPED | OUTPATIENT
Start: 2020-10-02 | End: 2020-10-16 | Stop reason: SDUPTHER

## 2020-10-02 RX ORDER — CEPHALEXIN 500 MG/1
500 CAPSULE ORAL 3 TIMES DAILY
Qty: 10 CAPSULE | Refills: 0 | Status: SHIPPED | OUTPATIENT
Start: 2020-10-02 | End: 2020-10-06

## 2020-10-02 RX ORDER — GLYCOPYRROLATE 1 MG/5 ML
SYRINGE (ML) INTRAVENOUS PRN
Status: DISCONTINUED | OUTPATIENT
Start: 2020-10-02 | End: 2020-10-02 | Stop reason: SDUPTHER

## 2020-10-02 RX ORDER — ROCURONIUM BROMIDE 10 MG/ML
INJECTION, SOLUTION INTRAVENOUS PRN
Status: DISCONTINUED | OUTPATIENT
Start: 2020-10-02 | End: 2020-10-02 | Stop reason: SDUPTHER

## 2020-10-02 RX ORDER — PROMETHAZINE HYDROCHLORIDE 25 MG/ML
12.5 INJECTION, SOLUTION INTRAMUSCULAR; INTRAVENOUS ONCE
Status: COMPLETED | OUTPATIENT
Start: 2020-10-02 | End: 2020-10-02

## 2020-10-02 RX ORDER — MEPERIDINE HYDROCHLORIDE 25 MG/ML
12.5 INJECTION INTRAMUSCULAR; INTRAVENOUS; SUBCUTANEOUS EVERY 5 MIN PRN
Status: DISCONTINUED | OUTPATIENT
Start: 2020-10-02 | End: 2020-10-02

## 2020-10-02 RX ORDER — DIPHENHYDRAMINE HYDROCHLORIDE 50 MG/ML
INJECTION INTRAMUSCULAR; INTRAVENOUS PRN
Status: DISCONTINUED | OUTPATIENT
Start: 2020-10-02 | End: 2020-10-02 | Stop reason: SDUPTHER

## 2020-10-02 RX ORDER — ONDANSETRON 2 MG/ML
INJECTION INTRAMUSCULAR; INTRAVENOUS PRN
Status: DISCONTINUED | OUTPATIENT
Start: 2020-10-02 | End: 2020-10-02 | Stop reason: SDUPTHER

## 2020-10-02 RX ORDER — HYDROCODONE BITARTRATE AND ACETAMINOPHEN 5; 325 MG/1; MG/1
1 TABLET ORAL PRN
Status: DISCONTINUED | OUTPATIENT
Start: 2020-10-02 | End: 2020-10-02

## 2020-10-02 RX ORDER — DEXAMETHASONE SODIUM PHOSPHATE 10 MG/ML
INJECTION, SOLUTION INTRAMUSCULAR; INTRAVENOUS PRN
Status: DISCONTINUED | OUTPATIENT
Start: 2020-10-02 | End: 2020-10-02 | Stop reason: SDUPTHER

## 2020-10-02 RX ORDER — PROMETHAZINE HYDROCHLORIDE 25 MG/ML
6.25 INJECTION, SOLUTION INTRAMUSCULAR; INTRAVENOUS
Status: DISCONTINUED | OUTPATIENT
Start: 2020-10-02 | End: 2020-10-02

## 2020-10-02 RX ORDER — EPHEDRINE SULFATE/0.9% NACL/PF 50 MG/5 ML
SYRINGE (ML) INTRAVENOUS PRN
Status: DISCONTINUED | OUTPATIENT
Start: 2020-10-02 | End: 2020-10-02 | Stop reason: SDUPTHER

## 2020-10-02 RX ORDER — PROPOFOL 10 MG/ML
INJECTION, EMULSION INTRAVENOUS PRN
Status: DISCONTINUED | OUTPATIENT
Start: 2020-10-02 | End: 2020-10-02 | Stop reason: SDUPTHER

## 2020-10-02 RX ORDER — CEFAZOLIN SODIUM 2 G/50ML
2 SOLUTION INTRAVENOUS ONCE
Status: COMPLETED | OUTPATIENT
Start: 2020-10-02 | End: 2020-10-02

## 2020-10-02 RX ORDER — MEPERIDINE HYDROCHLORIDE 25 MG/ML
INJECTION INTRAMUSCULAR; INTRAVENOUS; SUBCUTANEOUS
Status: COMPLETED
Start: 2020-10-02 | End: 2020-10-02

## 2020-10-02 RX ORDER — FENTANYL CITRATE 50 UG/ML
25 INJECTION, SOLUTION INTRAMUSCULAR; INTRAVENOUS EVERY 5 MIN PRN
Status: DISCONTINUED | OUTPATIENT
Start: 2020-10-02 | End: 2020-10-02 | Stop reason: HOSPADM

## 2020-10-02 RX ORDER — MEPERIDINE HYDROCHLORIDE 50 MG/ML
INJECTION INTRAMUSCULAR; INTRAVENOUS; SUBCUTANEOUS PRN
Status: DISCONTINUED | OUTPATIENT
Start: 2020-10-02 | End: 2020-10-02 | Stop reason: SDUPTHER

## 2020-10-02 RX ORDER — MIDAZOLAM HYDROCHLORIDE 1 MG/ML
INJECTION INTRAMUSCULAR; INTRAVENOUS PRN
Status: DISCONTINUED | OUTPATIENT
Start: 2020-10-02 | End: 2020-10-02 | Stop reason: SDUPTHER

## 2020-10-02 RX ORDER — OXYCODONE HYDROCHLORIDE AND ACETAMINOPHEN 5; 325 MG/1; MG/1
1 TABLET ORAL PRN
Status: COMPLETED | OUTPATIENT
Start: 2020-10-02 | End: 2020-10-02

## 2020-10-02 RX ORDER — NEOSTIGMINE METHYLSULFATE 1 MG/ML
INJECTION, SOLUTION INTRAVENOUS PRN
Status: DISCONTINUED | OUTPATIENT
Start: 2020-10-02 | End: 2020-10-02 | Stop reason: SDUPTHER

## 2020-10-02 RX ORDER — FENTANYL CITRATE 50 UG/ML
INJECTION, SOLUTION INTRAMUSCULAR; INTRAVENOUS PRN
Status: DISCONTINUED | OUTPATIENT
Start: 2020-10-02 | End: 2020-10-02 | Stop reason: SDUPTHER

## 2020-10-02 RX ORDER — OXYCODONE HYDROCHLORIDE AND ACETAMINOPHEN 5; 325 MG/1; MG/1
2 TABLET ORAL PRN
Status: COMPLETED | OUTPATIENT
Start: 2020-10-02 | End: 2020-10-02

## 2020-10-02 RX ORDER — HYDROCODONE BITARTRATE AND ACETAMINOPHEN 5; 325 MG/1; MG/1
2 TABLET ORAL PRN
Status: DISCONTINUED | OUTPATIENT
Start: 2020-10-02 | End: 2020-10-02

## 2020-10-02 RX ORDER — BUPIVACAINE HYDROCHLORIDE 2.5 MG/ML
INJECTION, SOLUTION EPIDURAL; INFILTRATION; INTRACAUDAL PRN
Status: DISCONTINUED | OUTPATIENT
Start: 2020-10-02 | End: 2020-10-02 | Stop reason: ALTCHOICE

## 2020-10-02 RX ORDER — LIDOCAINE HYDROCHLORIDE 20 MG/ML
INJECTION, SOLUTION INTRAVENOUS PRN
Status: DISCONTINUED | OUTPATIENT
Start: 2020-10-02 | End: 2020-10-02 | Stop reason: SDUPTHER

## 2020-10-02 RX ORDER — MEPERIDINE HYDROCHLORIDE 50 MG/ML
50 INJECTION INTRAMUSCULAR; INTRAVENOUS; SUBCUTANEOUS ONCE
Status: DISCONTINUED | OUTPATIENT
Start: 2020-10-02 | End: 2020-10-02 | Stop reason: HOSPADM

## 2020-10-02 RX ORDER — KETAMINE HYDROCHLORIDE 50 MG/ML
INJECTION, SOLUTION, CONCENTRATE INTRAMUSCULAR; INTRAVENOUS PRN
Status: DISCONTINUED | OUTPATIENT
Start: 2020-10-02 | End: 2020-10-02 | Stop reason: SDUPTHER

## 2020-10-02 RX ADMIN — MEPERIDINE HYDROCHLORIDE 25 MG: 50 INJECTION, SOLUTION INTRAMUSCULAR; INTRAVENOUS; SUBCUTANEOUS at 08:30

## 2020-10-02 RX ADMIN — Medication 0.6 MG: at 08:23

## 2020-10-02 RX ADMIN — FENTANYL CITRATE 50 MCG: 50 INJECTION, SOLUTION INTRAMUSCULAR; INTRAVENOUS at 07:08

## 2020-10-02 RX ADMIN — DEXAMETHASONE SODIUM PHOSPHATE 20 MG: 10 INJECTION, SOLUTION INTRAMUSCULAR; INTRAVENOUS at 07:05

## 2020-10-02 RX ADMIN — SODIUM CHLORIDE, POTASSIUM CHLORIDE, SODIUM LACTATE AND CALCIUM CHLORIDE: 600; 310; 30; 20 INJECTION, SOLUTION INTRAVENOUS at 08:04

## 2020-10-02 RX ADMIN — KETAMINE HYDROCHLORIDE 50 MG: 50 INJECTION INTRAMUSCULAR; INTRAVENOUS at 07:09

## 2020-10-02 RX ADMIN — ROCURONIUM BROMIDE 40 MG: 10 SOLUTION INTRAVENOUS at 06:45

## 2020-10-02 RX ADMIN — ROCURONIUM BROMIDE 10 MG: 10 SOLUTION INTRAVENOUS at 07:15

## 2020-10-02 RX ADMIN — ONDANSETRON 4 MG: 2 INJECTION INTRAMUSCULAR; INTRAVENOUS at 07:58

## 2020-10-02 RX ADMIN — LIDOCAINE HYDROCHLORIDE 50 MG: 20 INJECTION, SOLUTION INTRAVENOUS at 06:45

## 2020-10-02 RX ADMIN — FENTANYL CITRATE 100 MCG: 50 INJECTION, SOLUTION INTRAMUSCULAR; INTRAVENOUS at 06:42

## 2020-10-02 RX ADMIN — OXYCODONE HYDROCHLORIDE AND ACETAMINOPHEN 2 TABLET: 5; 325 TABLET ORAL at 09:38

## 2020-10-02 RX ADMIN — FENTANYL CITRATE 100 MCG: 50 INJECTION, SOLUTION INTRAMUSCULAR; INTRAVENOUS at 06:52

## 2020-10-02 RX ADMIN — PROPOFOL 200 MG: 10 INJECTION, EMULSION INTRAVENOUS at 06:45

## 2020-10-02 RX ADMIN — CEFAZOLIN SODIUM 2 G: 2 SOLUTION INTRAVENOUS at 06:40

## 2020-10-02 RX ADMIN — KETOROLAC TROMETHAMINE 30 MG: 30 INJECTION, SOLUTION INTRAMUSCULAR; INTRAVENOUS at 08:23

## 2020-10-02 RX ADMIN — PROMETHAZINE HYDROCHLORIDE 12.5 MG: 25 INJECTION INTRAMUSCULAR; INTRAVENOUS at 09:25

## 2020-10-02 RX ADMIN — MEPERIDINE HYDROCHLORIDE 25 MG: 25 INJECTION INTRAMUSCULAR; INTRAVENOUS; SUBCUTANEOUS at 09:23

## 2020-10-02 RX ADMIN — DIPHENHYDRAMINE HYDROCHLORIDE 12.5 MG: 50 INJECTION, SOLUTION INTRAMUSCULAR; INTRAVENOUS at 07:46

## 2020-10-02 RX ADMIN — Medication 3 MG: at 08:23

## 2020-10-02 RX ADMIN — MIDAZOLAM 2 MG: 1 INJECTION INTRAMUSCULAR; INTRAVENOUS at 06:38

## 2020-10-02 RX ADMIN — SODIUM CHLORIDE, POTASSIUM CHLORIDE, SODIUM LACTATE AND CALCIUM CHLORIDE: 600; 310; 30; 20 INJECTION, SOLUTION INTRAVENOUS at 05:45

## 2020-10-02 RX ADMIN — Medication 10 MG: at 07:35

## 2020-10-02 ASSESSMENT — PULMONARY FUNCTION TESTS
PIF_VALUE: 24
PIF_VALUE: 25
PIF_VALUE: 24
PIF_VALUE: 25
PIF_VALUE: 1
PIF_VALUE: 24
PIF_VALUE: 23
PIF_VALUE: 3
PIF_VALUE: 24
PIF_VALUE: 3
PIF_VALUE: 25
PIF_VALUE: 1
PIF_VALUE: 24
PIF_VALUE: 23
PIF_VALUE: 20
PIF_VALUE: 24
PIF_VALUE: 25
PIF_VALUE: 24
PIF_VALUE: 2
PIF_VALUE: 3
PIF_VALUE: 23
PIF_VALUE: 3
PIF_VALUE: 24
PIF_VALUE: 23
PIF_VALUE: 25
PIF_VALUE: 24
PIF_VALUE: 24
PIF_VALUE: 26
PIF_VALUE: 24
PIF_VALUE: 23
PIF_VALUE: 2
PIF_VALUE: 21
PIF_VALUE: 26
PIF_VALUE: 25
PIF_VALUE: 3
PIF_VALUE: 0
PIF_VALUE: 24
PIF_VALUE: 25
PIF_VALUE: 25
PIF_VALUE: 24
PIF_VALUE: 25
PIF_VALUE: 25
PIF_VALUE: 23
PIF_VALUE: 22
PIF_VALUE: 0
PIF_VALUE: 0
PIF_VALUE: 25
PIF_VALUE: 24
PIF_VALUE: 25
PIF_VALUE: 2
PIF_VALUE: 22
PIF_VALUE: 23
PIF_VALUE: 3
PIF_VALUE: 24
PIF_VALUE: 1
PIF_VALUE: 26
PIF_VALUE: 24
PIF_VALUE: 25
PIF_VALUE: 24
PIF_VALUE: 25
PIF_VALUE: 24
PIF_VALUE: 0
PIF_VALUE: 26
PIF_VALUE: 24
PIF_VALUE: 0
PIF_VALUE: 24
PIF_VALUE: 3
PIF_VALUE: 23
PIF_VALUE: 25
PIF_VALUE: 24
PIF_VALUE: 24
PIF_VALUE: 23
PIF_VALUE: 26
PIF_VALUE: 23
PIF_VALUE: 2
PIF_VALUE: 3
PIF_VALUE: 23
PIF_VALUE: 24
PIF_VALUE: 24
PIF_VALUE: 0
PIF_VALUE: 24
PIF_VALUE: 24
PIF_VALUE: 23
PIF_VALUE: 24
PIF_VALUE: 26
PIF_VALUE: 22
PIF_VALUE: 4
PIF_VALUE: 24
PIF_VALUE: 23
PIF_VALUE: 23
PIF_VALUE: 24
PIF_VALUE: 23
PIF_VALUE: 25
PIF_VALUE: 23
PIF_VALUE: 24
PIF_VALUE: 23
PIF_VALUE: 0
PIF_VALUE: 4
PIF_VALUE: 24
PIF_VALUE: 4
PIF_VALUE: 1
PIF_VALUE: 1
PIF_VALUE: 25
PIF_VALUE: 1
PIF_VALUE: 25
PIF_VALUE: 24
PIF_VALUE: 25
PIF_VALUE: 4
PIF_VALUE: 25
PIF_VALUE: 0
PIF_VALUE: 4
PIF_VALUE: 23
PIF_VALUE: 25
PIF_VALUE: 24
PIF_VALUE: 2
PIF_VALUE: 23
PIF_VALUE: 25
PIF_VALUE: 0
PIF_VALUE: 25
PIF_VALUE: 24
PIF_VALUE: 25
PIF_VALUE: 3
PIF_VALUE: 25
PIF_VALUE: 24

## 2020-10-02 ASSESSMENT — PAIN SCALES - GENERAL
PAINLEVEL_OUTOF10: 10
PAINLEVEL_OUTOF10: 0
PAINLEVEL_OUTOF10: 7
PAINLEVEL_OUTOF10: 7

## 2020-10-02 ASSESSMENT — PAIN - FUNCTIONAL ASSESSMENT: PAIN_FUNCTIONAL_ASSESSMENT: 0-10

## 2020-10-02 ASSESSMENT — PAIN DESCRIPTION - DESCRIPTORS: DESCRIPTORS: ACHING

## 2020-10-02 ASSESSMENT — PAIN DESCRIPTION - PAIN TYPE: TYPE: SURGICAL PAIN

## 2020-10-02 NOTE — ANESTHESIA POSTPROCEDURE EVALUATION
Department of Anesthesiology  Postprocedure Note    Patient: Sumi Bentley  MRN: [id number]  YOB: 1965  Date of evaluation: 10/2/2020  Time:  10:13 AM     Procedure Summary     Date:  10/02/20 Room / Location:  [address] [place] [address] / [address] [place] [address]    Anesthesia Start:  7577 Anesthesia Stop:  1572    Procedure:  RIGHT SHOULDER ARTHROSCOPY SUBACROMIAL DECOMPRESSION AND DEBRIDEMENT LABRIAL DEBRIDEMENT ROTATOR CUFF REPAIR Means (Right ) Diagnosis:  [facility] SCREVEN JOINT ARTHRITIS, RIGHT SHOULDER IMPINGEMENT)    Surgeon:  Karine Patten DO Responsible Provider:  Viri Andrews MD    Anesthesia Type:  general, regional ASA Status:  3          Anesthesia Type: general, regional    Yesi Phase I: Yesi Score: 10    Yesi Phase II: Yesi Score: 10    Last vitals: Reviewed and per EMR flowsheets.        Anesthesia Post Evaluation    Patient location during evaluation: PACU  Patient participation: complete - patient participated  Level of consciousness: awake  Airway patency: patent  Nausea & Vomiting: no nausea and no vomiting  Complications: no  Cardiovascular status: hemodynamically stable  Respiratory status: acceptable  Hydration status: euvolemic

## 2020-10-02 NOTE — H&P
Updated H&P    Chief Complaint   Patient presents with    Shoulder Pain       Right Shoulder MRI F/U         Rickey Hebert is a 53y.o. year old   male who is seen today  for evaluation of right shoulder pain. He reports the pain has been ongoing for the past 6 months. Following his arthroscopic surgery the patient felt a pop in his arm and an immediate pain that shot down his shoulder. He has had 10/10 pain with no ability to perform ADL's.  He is here today for mri results.           Chief Complaint   Patient presents with    Shoulder Pain       Right Shoulder MRI F/U      Past Medical History        Past Medical History:   Diagnosis Date    Anxiety      Blood in stool      Chronic back pain      Constipation      Headache(784.0)      History of dental problems      Hyperlipidemia      Neck pain      Neck stiffness      Type II or unspecified type diabetes mellitus without mention of complication, not stated as uncontrolled          Past Surgical History         Past Surgical History:   Procedure Laterality Date    ENDOSCOPY, COLON, DIAGNOSTIC        FRACTURE SURGERY Left       femur    KNEE ARTHROSCOPY Left 07/18/2016     medial lateral menisectomy/synovectomy/chodroplasty    SHOULDER ARTHROSCOPY Right 02/11/2020    SHOULDER ARTHROSCOPY Right 2/11/2020     RIGHT SHOULDER ARTHROSCOPY SUBACHROMIAL DECOMPRESSION DEBRIDEMENT, BICEP TENODESIS ,LABRAL DEBRIDEMENT AND KHALIDA performed by Matilda Ramirez DO at [facility]            Current Medication     Current Outpatient Medications:     alogliptin (NESINA) 25 MG TABS tablet, Take 1 tablet by mouth daily, Disp: 30 tablet, Rfl: 5    aspirin (RA ASPIRIN EC ADULT LOW ST) 81 MG EC tablet, take 1 tablet by mouth once daily, Disp: 30 tablet, Rfl: 11    Dulaglutide 1.5 MG/0.5ML SOPN, Inject 1.5 mg into the skin once a week, Disp: 4 pen, Rfl: 2    metFORMIN (GLUCOPHAGE) 1000 MG tablet, Take 1 tablet by mouth 2 times daily (with meals), Disp: 60 tablet, Rfl: 5    glipiZIDE (GLUCOTROL XL) 5 MG extended release tablet, Take 1 tablet by mouth daily, Disp: 30 tablet, Rfl: 5    simvastatin (ZOCOR) 20 MG tablet, Take 1 tablet by mouth every evening, Disp: 30 tablet, Rfl: 5    pantoprazole (PROTONIX) 20 MG tablet, Take 1 tablet by mouth nightly as needed (heartburn), Disp: 30 tablet, Rfl: 3    Alcohol Swabs PADS, 1 Units by Does not apply route as needed (checking glucose), Disp: 100 each, Rfl: 11    blood glucose monitor strips, FBS daily. Please dispense Freestyle brand, Disp: 100 strip, Rfl: 11    Lancets MISC, Test once daily, Disp: 100 each, Rfl: 3    ibuprofen (ADVIL;MOTRIN) 800 MG tablet, Take 1 tablet by mouth every 8 hours as needed for Pain, Disp: 90 tablet, Rfl: 0          Allergies   Allergen Reactions    Tobramycin Swelling      Social History               Socioeconomic History    Marital status:        Spouse name: Not on file    Number of children: Not on file    Years of education: Not on file    Highest education level: Not on file   Occupational History    Occupation: [employer]   Social Needs    Financial resource strain: Not on file    Food insecurity       Worry: Not on file       Inability: Not on file    Transportation needs       Medical: Not on file       Non-medical: Not on file   Tobacco Use    Smoking status: Never Smoker    Smokeless tobacco: Never Used   Substance and Sexual Activity    Alcohol use: Yes       Alcohol/week: 0.0 standard drinks       Comment: rarely .   Coffee occasionally    Drug use: No    Sexual activity: Yes       Partners: Female   Lifestyle    Physical activity       Days per week: Not on file       Minutes per session: Not on file    Stress: Not on file   Relationships    Social connections       Talks on phone: Not on file       Gets together: Not on file       Attends Restorationist service: Not on file       Active member of club or organization: Not on file       Attends meetings of clubs or organizations: Not on file       Relationship status: Not on file    Intimate partner violence       Fear of current or ex partner: Not on file       Emotionally abused: Not on file       Physically abused: Not on file       Forced sexual activity: Not on file   Other Topics Concern    Not on file   Social History Narrative    Not on file        Family History         Family History   Problem Relation Age of Onset    Diabetes Father      High Blood Pressure Father      Heart Disease Father      Stroke Father      Heart Attack Father      Diabetes Mother      Heart Disease Mother      Emphysema Mother      Cancer Brother 54         colon    High Blood Pressure Brother      Cancer Maternal Cousin 40         colon    Cancer Sister      Diabetes Sister      Heart Disease Sister      Stroke Sister      Diabetes Maternal Aunt      Heart Disease Maternal Aunt      High Blood Pressure Maternal Aunt      High Cholesterol Maternal Aunt      Stroke Maternal Aunt      Diabetes Maternal Uncle      Heart Disease Maternal Uncle      High Blood Pressure Maternal Uncle      High Cholesterol Maternal Uncle      Stroke Maternal Uncle      Diabetes Paternal Aunt      Heart Disease Paternal Aunt      High Blood Pressure Paternal Aunt      High Cholesterol Paternal Aunt      Stroke Paternal Aunt      Diabetes Paternal Uncle      Heart Disease Paternal Uncle      High Blood Pressure Paternal Uncle      High Cholesterol Paternal Uncle      Stroke Paternal Uncle      Diabetes Maternal Grandmother      High Blood Pressure Maternal Grandmother      High Cholesterol Maternal Grandmother      Cancer Maternal Grandfather      Diabetes Maternal Grandfather      High Blood Pressure Maternal Grandfather      High Cholesterol Maternal Grandfather      Stroke Maternal Grandfather      Diabetes Paternal Grandfather      High Blood Pressure Paternal Grandfather      High gasping. Palpation of the chest reveals no tactile fremitus.     Skin:  Upon inspection: the skin appears warm, dry and intact. There is not a previous scar over the affected area. There is not any cellulitis, lymphedema or cutaneous lesions noted in the lower extremities. Upon palpation there is no induration noted.       Neurologic:  Motor exam of the upper extremities show: The reflexes in biceps/triceps/brachioradialis are equal and symmetric. Sensory exam C5-T1 are normal bilaterally.         Cardiovascular: The vascular exam is normal and is well perfused to distal extremities. There are 2+ radial pulses bilaterally, and motor and sensation is intact to median, ulnar, and radial, musclocutaneus, and axillary nerve distribution and grossly symmetric bilaterally. There is cap refill noted less than two seconds in all digits. There is not edema of the bilateral upper extremities. There is not varicosities noted in the distal extremities.       Lymph:  Upon palpation,  there is no lymphadenopathy noted in bilateral upper extremities.       Musculoskeletal:  Gait: normal; examination of the nails and digits reveal no cyanosis or clubbing.        Cervical Exam:  On physical exam, Elise Mitchell is well-developed, well-nourished, oriented to person, place and time. his gait is normal.  On evaluation of hiscervical spine, He has full range of motion of the cervical spine without pain. There is no cervical tenderness to palpation.      Shoulder Exam:  On evaluation of his bilaterally upper extremities, his right shoulder has no deformity. There is tenderness upon palpation of the anterior and lateral shoulder. There is not evidence of scapular dyskinesis. There is not muscle atrophy in shoulder girdle. The range of motion for the Right Shoulder is 100/30/BL and for the Left shoulder is 160/45/T8.   Right shoulder Motor strength is 4/5 in the supraspinatus, 4/5 internal rotation and 4/5 in external rotation, and Left shoulder motor strength 5/5 in supraspinatus, 5/5 in internal rotation, 5/5 in external rotation.         Right shoulder:  positive Impingement , positive Arrington ,negative  Speeds,negative  Apprehension ,positive Ross Load Shift, negative Cole manuver, positiveCross arm test.      Left shoulder:  negative Impingement , negative Arrington ,negative  Speeds,negative  Apprehension ,negative Ross Load Shift, negative Cole manuver, negative Cross arm test.      XRAY:    There is no fracture or dislocation of the right shoulder.  The glenohumeral    joint is unremarkable.  There are minimal degenerative changes of the right    AC joint.  The right upper lobe is clear.             MRI:  Not performed today.     Radiographic findings reviewed with patient     Impression:        Encounter Diagnoses   Name Primary?  Arthritis of right acromioclavicular joint Yes    Shoulder impingement, right           Plan: Natural history and expected course discussed. Questions answered. Educational material distributed. Reduction in offending activity. I had a lengthy discussion with the patient regarding their diagnosis. I explained treatment options including surgical vs non surgical treatment. I reviewed in detail the risks and benefits and outlined the procedure in detail with expected outcomes and possible complications. I also discussed non surgical treatment such as injections (CSI and visco supplementation), physical therapy, topical creams and NSAID's. They have elected for surgical management at this time. I discussed the risks and benefits of the shoulder arthroscopy with the patient. The risks include but are not limited to: infection, injuries to blood vessels and nerves, non relief of symptoms, intraoperative fracture, need for further operative intervention, blood loss, arthrofibrosis of shoulder, DVT/PE, MI and death. The patient understands these risks and wishes to proceed with surgery.  I will perform a Right shoulder arthroscopy, SAD and debridement and catrina procedure on 10/2/2020. The patient was counseled at length about the risks of ramsey Covid-19 during their perioperative period and any recovery window from their procedure.  The patient was made aware that ramsey Covid-19  may worsen their prognosis for recovering from their procedure  and lend to a higher morbidity and/or mortality risk.  All material risks, benefits, and reasonable alternatives including postponing the procedure were discussed.  The patient does wish to proceed with the procedure at this time.

## 2020-10-02 NOTE — PROGRESS NOTES
Discharge instructions reviewed with patient and family. All questions answered. Patient and family verbalize understanding.

## 2020-10-06 ENCOUNTER — OFFICE VISIT (OUTPATIENT)
Dept: FAMILY MEDICINE CLINIC | Age: 55
End: 2020-10-06
Payer: COMMERCIAL

## 2020-10-06 VITALS
BODY MASS INDEX: 33.86 KG/M2 | TEMPERATURE: 97.4 F | RESPIRATION RATE: 16 BRPM | HEART RATE: 97 BPM | OXYGEN SATURATION: 97 % | HEIGHT: 72 IN | SYSTOLIC BLOOD PRESSURE: 156 MMHG | WEIGHT: 250 LBS | DIASTOLIC BLOOD PRESSURE: 80 MMHG

## 2020-10-06 PROCEDURE — 99213 OFFICE O/P EST LOW 20 MIN: CPT | Performed by: FAMILY MEDICINE

## 2020-10-06 PROCEDURE — G8484 FLU IMMUNIZE NO ADMIN: HCPCS | Performed by: FAMILY MEDICINE

## 2020-10-06 PROCEDURE — G8427 DOCREV CUR MEDS BY ELIG CLIN: HCPCS | Performed by: FAMILY MEDICINE

## 2020-10-06 PROCEDURE — 3017F COLORECTAL CA SCREEN DOC REV: CPT | Performed by: FAMILY MEDICINE

## 2020-10-06 PROCEDURE — G8417 CALC BMI ABV UP PARAM F/U: HCPCS | Performed by: FAMILY MEDICINE

## 2020-10-06 PROCEDURE — 1036F TOBACCO NON-USER: CPT | Performed by: FAMILY MEDICINE

## 2020-10-06 RX ORDER — SENNA PLUS 8.6 MG/1
1 TABLET ORAL 2 TIMES DAILY
Qty: 60 TABLET | Refills: 5 | Status: SHIPPED
Start: 2020-10-06 | End: 2021-02-03

## 2020-10-06 RX ORDER — FUROSEMIDE 20 MG/1
20 TABLET ORAL DAILY PRN
Qty: 14 TABLET | Refills: 0 | Status: SHIPPED
Start: 2020-10-06 | End: 2020-12-02 | Stop reason: SDUPTHER

## 2020-10-06 ASSESSMENT — ENCOUNTER SYMPTOMS
COUGH: 0
SHORTNESS OF BREATH: 0
CONSTIPATION: 0
VOMITING: 0
DIARRHEA: 0
ABDOMINAL PAIN: 0
NAUSEA: 0

## 2020-10-06 NOTE — PROGRESS NOTES
once daily Yes Tae Carbajal DO   Dulaglutide 1.5 MG/0.5ML SOPN Inject 1.5 mg into the skin once a week Yes Tae Carbajal DO   ibuprofen (ADVIL;MOTRIN) 800 MG tablet Take 1 tablet by mouth every 8 hours as needed for Pain Yes Tae Carbajal DO   metFORMIN (GLUCOPHAGE) 1000 MG tablet Take 1 tablet by mouth 2 times daily (with meals) Yes Tae Carbajal DO   glipiZIDE (GLUCOTROL XL) 5 MG extended release tablet Take 1 tablet by mouth daily Yes Tae Carbajal DO   simvastatin (ZOCOR) 20 MG tablet Take 1 tablet by mouth every evening Yes Tae Carbajal DO   pantoprazole (PROTONIX) 20 MG tablet Take 1 tablet by mouth nightly as needed (heartburn) Yes Tae Carbajal DO   Alcohol Swabs PADS 1 Units by Does not apply route as needed (checking glucose) Yes Tae Carbajal DO   Lancets MISC Test once daily Yes Tae Carbajal DO   blood glucose monitor strips FBS daily. Please dispense Freestyle brand  Kyle MedicDO jack        Social History     Tobacco Use    Smoking status: Never Smoker    Smokeless tobacco: Never Used   Substance Use Topics    Alcohol use: Yes     Alcohol/week: 0.0 standard drinks     Comment: rarely .   Coffee occasionally        Past Surgical History:   Procedure Laterality Date    ENDOSCOPY, COLON, DIAGNOSTIC      FRACTURE SURGERY Left     femur    KNEE ARTHROSCOPY Left 07/18/2016    medial lateral menisectomy/synovectomy/chodroplasty    SHOULDER ARTHROSCOPY Right 2/11/2020    RIGHT SHOULDER ARTHROSCOPY SUBACHROMIAL DECOMPRESSION DEBRIDEMENT, BICEP TENODESIS ,LABRAL DEBRIDEMENT AND KHALIDA performed by Ester Mix DO at [address] [facility] ARTHROSCOPY Right 10/02/2020    SHOULDER ARTHROSCOPY Right 10/2/2020    RIGHT SHOULDER ARTHROSCOPY SUBACROMIAL DECOMPRESSION AND DEBRIDEMENT LABRIAL DEBRIDEMENT ROTATOR CUFF REPAIR KHALIDA performed by Uli Reynoso DO at [address]:    10/06/20 1507 10/06/20 1513   BP: (!) 160/80 (!) 156/80   Pulse: times daily    Additional plan and future considerations:   As above. Check U/S. Advised on signs and symptoms to call or seek immediate care. Will notify of results.  RTO as scheduled    Future Appointments   Date Time Provider Riri Perez   10/16/2020 11:15 AM HARPREET De Santiago - K[facility]   11/5/2020  3:30 PM Davon Merida DO [facility]         --Davon Merida DO on 10/6/2020 at 3:21 PM

## 2020-10-13 ENCOUNTER — TELEPHONE (OUTPATIENT)
Dept: FAMILY MEDICINE CLINIC | Age: 55
End: 2020-10-13

## 2020-10-13 NOTE — TELEPHONE ENCOUNTER
Recommend continuing to elevate and compression stockings. I have placed orders for additional lab work as well as a chest x-ray to be done prior to next office visit.   Thanks

## 2020-10-13 NOTE — TELEPHONE ENCOUNTER
Patient called c/o both feet still swollen around ankles, Lt more so than Rt. Pain around both ankles, more so Lt. Patient stated has been taking Lasix as directed since 10/6/20 and also elevating legs. Please advise.       Last seen 10/6/2020  Next appt 11/5/2020  [facility]

## 2020-10-14 NOTE — TELEPHONE ENCOUNTER
Patient informed. Patient stated he has an appt w/another doctor on 10/16/20 and will have labs, Xray done then.

## 2020-10-16 ENCOUNTER — OFFICE VISIT (OUTPATIENT)
Dept: ORTHOPEDIC SURGERY | Age: 55
End: 2020-10-16

## 2020-10-16 ENCOUNTER — HOSPITAL ENCOUNTER (OUTPATIENT)
Age: 55
Discharge: HOME | End: 2020-10-18
Payer: COMMERCIAL

## 2020-10-16 VITALS — WEIGHT: 250 LBS | HEIGHT: 72 IN | BODY MASS INDEX: 33.86 KG/M2

## 2020-10-16 LAB
ANION GAP SERPL CALCULATED.3IONS-SCNC: 13 MMOL/L (ref 7–16)
BASOPHILS ABSOLUTE: 0.04 E9/L (ref 0–0.2)
BASOPHILS RELATIVE PERCENT: 0.5 % (ref 0–2)
BUN BLDV-MCNC: 16 MG/DL (ref 6–20)
CALCIUM SERPL-MCNC: 8.8 MG/DL (ref 8.6–10.2)
CHLORIDE BLD-SCNC: 102 MMOL/L (ref 98–107)
CO2: 24 MMOL/L (ref 22–29)
CREAT SERPL-MCNC: 0.7 MG/DL (ref 0.7–1.2)
EOSINOPHILS ABSOLUTE: 0.16 E9/L (ref 0.05–0.5)
EOSINOPHILS RELATIVE PERCENT: 1.8 % (ref 0–6)
GFR AFRICAN AMERICAN: >60
GFR NON-AFRICAN AMERICAN: >60 ML/MIN/1.73
GLUCOSE BLD-MCNC: 166 MG/DL (ref 74–99)
HBA1C MFR BLD: 8.1 % (ref 4–5.6)
HCT VFR BLD CALC: 41.8 % (ref 37–54)
HEMOGLOBIN: 13.5 G/DL (ref 12.5–16.5)
IMMATURE GRANULOCYTES #: 0.07 E9/L
IMMATURE GRANULOCYTES %: 0.8 % (ref 0–5)
LYMPHOCYTES ABSOLUTE: 1.77 E9/L (ref 1.5–4)
LYMPHOCYTES RELATIVE PERCENT: 20.3 % (ref 20–42)
MCH RBC QN AUTO: 28.5 PG (ref 26–35)
MCHC RBC AUTO-ENTMCNC: 32.3 % (ref 32–34.5)
MCV RBC AUTO: 88.2 FL (ref 80–99.9)
MONOCYTES ABSOLUTE: 0.62 E9/L (ref 0.1–0.95)
MONOCYTES RELATIVE PERCENT: 7.1 % (ref 2–12)
NEUTROPHILS ABSOLUTE: 6.08 E9/L (ref 1.8–7.3)
NEUTROPHILS RELATIVE PERCENT: 69.5 % (ref 43–80)
PDW BLD-RTO: 12.8 FL (ref 11.5–15)
PLATELET # BLD: 244 E9/L (ref 130–450)
PMV BLD AUTO: 11.3 FL (ref 7–12)
POTASSIUM SERPL-SCNC: 4.2 MMOL/L (ref 3.5–5)
PRO-BNP: 9 PG/ML (ref 0–125)
RBC # BLD: 4.74 E12/L (ref 3.8–5.8)
SODIUM BLD-SCNC: 139 MMOL/L (ref 132–146)
WBC # BLD: 8.7 E9/L (ref 4.5–11.5)

## 2020-10-16 PROCEDURE — 99024 POSTOP FOLLOW-UP VISIT: CPT | Performed by: NURSE PRACTITIONER

## 2020-10-16 PROCEDURE — 83036 HEMOGLOBIN GLYCOSYLATED A1C: CPT

## 2020-10-16 PROCEDURE — 83880 ASSAY OF NATRIURETIC PEPTIDE: CPT

## 2020-10-16 PROCEDURE — 85025 COMPLETE CBC W/AUTO DIFF WBC: CPT

## 2020-10-16 PROCEDURE — 36415 COLL VENOUS BLD VENIPUNCTURE: CPT

## 2020-10-16 PROCEDURE — 80048 BASIC METABOLIC PNL TOTAL CA: CPT

## 2020-10-16 RX ORDER — OXYCODONE HYDROCHLORIDE AND ACETAMINOPHEN 5; 325 MG/1; MG/1
1 TABLET ORAL EVERY 6 HOURS PRN
Qty: 28 TABLET | Refills: 0 | Status: SHIPPED | OUTPATIENT
Start: 2020-10-16 | End: 2020-10-23

## 2020-10-16 NOTE — PROGRESS NOTES
Beti Somers is here for follow-up after right shoulder arthroscopy. Findings at surgery: rtc tear, impingement, ac joint arthritis. Pain is controlled with current analgesics. Medication(s) being used: percocet. The patient denies fever, wound drainage, increasing redness, pus, increasing pain, increasing swelling. Post op problems reported: none. He is ambulating with sling. Physical exam:  The wounds are clean, dry, no drainage, healed. He has intact motor and sensory functions, grossly intact in the median, ulnar, radial, musculocutaneous, and axillary nerve distributions. He   has bounding pulses in the wrist.  Capillary refill is less than 2 seconds in all digits. Surgical pictures from the surgery were reveiwed with the patient     Impression:   Encounter Diagnoses   Name Primary?  Impingement syndrome of right shoulder     Tear of right glenoid labrum, initial encounter     Arthritis of right acromioclavicular joint          Plan:    I will remove the surgical sutures. The patient will now D/C the abduction pillow. He  will begin range of motion at the elbow, wrist and hand. He will continue to use analgesics to control the pain and will continue with sling immobilization. I will see them back in 4 weeks for repeat evaluation.

## 2020-10-19 ENCOUNTER — TELEPHONE (OUTPATIENT)
Dept: FAMILY MEDICINE CLINIC | Age: 55
End: 2020-10-19

## 2020-10-19 NOTE — TELEPHONE ENCOUNTER
Patient called stating had labs and Xray done on 10/16/20 and wanted to know results. Patient stated feet still swollen, has been elevating and needs to purchase compression stockings. Patient stated concerned about what to do for swelling.      Last seen 10/6/2020  Next appt 11/5/2020  [facility]

## 2020-10-19 NOTE — TELEPHONE ENCOUNTER
Patient called stating he has a rash under his Rt arm from wearing sling from [address], which is spreading. Patient stated he has to wear sling for a month.   Patient is asking if RX can be sent for anti-fungal.    Last seen 10/6/2020  Next appt 11/5/2020  [facility]

## 2020-10-20 RX ORDER — KETOCONAZOLE 20 MG/G
CREAM TOPICAL
Qty: 30 G | Refills: 0 | Status: SHIPPED
Start: 2020-10-20 | End: 2020-12-02

## 2020-10-20 NOTE — TELEPHONE ENCOUNTER
Please notify patient that his x-ray showed no acute issues and labs are reassuring that the swelling is unlikely to be due to his heart. I recommend compression stockings and elevation and we can discuss further at upcoming office visit.   Thanks

## 2020-11-02 ENCOUNTER — TELEPHONE (OUTPATIENT)
Dept: FAMILY MEDICINE CLINIC | Age: 55
End: 2020-11-02

## 2020-11-02 NOTE — TELEPHONE ENCOUNTER
Patient wants an order for COVID-19. He is currently having absolutely no symptoms per patient but is concerned with his follow up visits for his right shoulder. Also he stated that he is still having swelling in his legs patient is taking the lasix. Anything else?

## 2020-11-03 PROBLEM — M47.816 LUMBAR FACET ARTHROPATHY: Status: RESOLVED | Noted: 2020-06-26 | Resolved: 2020-11-03

## 2020-11-04 DIAGNOSIS — Z20.822 CLOSE EXPOSURE TO COVID-19 VIRUS: ICD-10-CM

## 2020-11-05 ENCOUNTER — VIRTUAL VISIT (OUTPATIENT)
Dept: FAMILY MEDICINE CLINIC | Age: 55
End: 2020-11-05
Payer: COMMERCIAL

## 2020-11-05 PROCEDURE — 99442 PR PHYS/QHP TELEPHONE EVALUATION 11-20 MIN: CPT | Performed by: FAMILY MEDICINE

## 2020-11-05 RX ORDER — IBUPROFEN 800 MG/1
800 TABLET ORAL EVERY 8 HOURS PRN
Qty: 90 TABLET | Refills: 3 | Status: SHIPPED
Start: 2020-11-05 | End: 2021-02-02 | Stop reason: SDUPTHER

## 2020-11-06 LAB
SARS-COV-2: NOT DETECTED
SOURCE: NORMAL

## 2020-11-12 ENCOUNTER — OFFICE VISIT (OUTPATIENT)
Dept: ORTHOPEDIC SURGERY | Age: 55
End: 2020-11-12

## 2020-11-12 VITALS — HEIGHT: 72 IN | BODY MASS INDEX: 35.21 KG/M2 | WEIGHT: 260 LBS

## 2020-11-12 PROCEDURE — 99024 POSTOP FOLLOW-UP VISIT: CPT | Performed by: NURSE PRACTITIONER

## 2020-11-12 RX ORDER — OXYCODONE HYDROCHLORIDE AND ACETAMINOPHEN 5; 325 MG/1; MG/1
1 TABLET ORAL EVERY 6 HOURS PRN
Qty: 28 TABLET | Refills: 0 | Status: SHIPPED
Start: 2020-11-12 | End: 2020-11-30 | Stop reason: SDUPTHER

## 2020-11-30 ENCOUNTER — TELEPHONE (OUTPATIENT)
Dept: ORTHOPEDIC SURGERY | Age: 55
End: 2020-11-30

## 2020-11-30 PROBLEM — M75.121 COMPLETE TEAR OF RIGHT ROTATOR CUFF: Status: ACTIVE | Noted: 2020-11-30

## 2020-11-30 RX ORDER — OXYCODONE HYDROCHLORIDE AND ACETAMINOPHEN 5; 325 MG/1; MG/1
1 TABLET ORAL EVERY 6 HOURS PRN
Qty: 28 TABLET | Refills: 0 | Status: SHIPPED | OUTPATIENT
Start: 2020-11-30 | End: 2020-12-07

## 2020-11-30 NOTE — TELEPHONE ENCOUNTER
Last appointment 9/8/2020  Next appointment   Future Appointments   Date Time Provider Riri Perez   11/30/2020  3:15 PM Justo Peterson, PT [facility] PT [facility]   1/11/2021  2:30 PM Con Hickman DO Con Hickman [facility]   2/2/2021  1:00 PM DO Gilberto Farris [facility]      Last refill 11/12/2020-11/19/2020  DOS: 10/2/2020       Patient called in requesting refill of   oxyCODONE-acetaminophen (PERCOCET) 5-325 MG per tablet [[id number]]    [facility] [id number]      Patient states he needs a refill because he has started PT and states his shoulder is really sore. I explained that I'd send a message back and can't promise that it will be refilled.

## 2020-12-02 ENCOUNTER — OFFICE VISIT (OUTPATIENT)
Dept: FAMILY MEDICINE CLINIC | Age: 55
End: 2020-12-02
Payer: COMMERCIAL

## 2020-12-02 VITALS
OXYGEN SATURATION: 97 % | SYSTOLIC BLOOD PRESSURE: 130 MMHG | HEIGHT: 72 IN | RESPIRATION RATE: 16 BRPM | WEIGHT: 253 LBS | HEART RATE: 84 BPM | TEMPERATURE: 97.1 F | BODY MASS INDEX: 34.27 KG/M2 | DIASTOLIC BLOOD PRESSURE: 80 MMHG

## 2020-12-02 PROBLEM — E66.01 MORBIDLY OBESE (HCC): Status: ACTIVE | Noted: 2020-12-02

## 2020-12-02 PROCEDURE — 99213 OFFICE O/P EST LOW 20 MIN: CPT | Performed by: FAMILY MEDICINE

## 2020-12-02 PROCEDURE — 90471 IMMUNIZATION ADMIN: CPT | Performed by: FAMILY MEDICINE

## 2020-12-02 PROCEDURE — G8482 FLU IMMUNIZE ORDER/ADMIN: HCPCS | Performed by: FAMILY MEDICINE

## 2020-12-02 PROCEDURE — 3017F COLORECTAL CA SCREEN DOC REV: CPT | Performed by: FAMILY MEDICINE

## 2020-12-02 PROCEDURE — G8417 CALC BMI ABV UP PARAM F/U: HCPCS | Performed by: FAMILY MEDICINE

## 2020-12-02 PROCEDURE — G8427 DOCREV CUR MEDS BY ELIG CLIN: HCPCS | Performed by: FAMILY MEDICINE

## 2020-12-02 PROCEDURE — 1036F TOBACCO NON-USER: CPT | Performed by: FAMILY MEDICINE

## 2020-12-02 PROCEDURE — 90686 IIV4 VACC NO PRSV 0.5 ML IM: CPT | Performed by: FAMILY MEDICINE

## 2020-12-02 RX ORDER — FUROSEMIDE 20 MG/1
20 TABLET ORAL DAILY PRN
Qty: 30 TABLET | Refills: 0 | Status: SHIPPED
Start: 2020-12-02 | End: 2021-02-03

## 2020-12-02 ASSESSMENT — ENCOUNTER SYMPTOMS
COUGH: 0
WHEEZING: 0
SHORTNESS OF BREATH: 0

## 2020-12-02 NOTE — PROGRESS NOTES
2020     Sriram Enriquez (:  1965) is a 63 y.o. male, with a:  Past Medical History:   Diagnosis Date    Anxiety     Blood in stool     Chronic back pain     Constipation     Headache(784.0)     History of dental problems     Hyperlipidemia     Neck pain     Neck stiffness     Type II or unspecified type diabetes mellitus without mention of complication, not stated as uncontrolled        Here for evaluation of the following medical concerns:  Chief Complaint   Patient presents with    Swelling     patient c/o ankle and leg swelling mostly left feels like its \"throbbing\"     Left ankle pain and swelling  Onset of symptoms a few weeks prior  He previously underwent right shoulder arthroscopy in early October and subsequently had bilateral LE swelling - work up included negative bilateral LE U/S, CXR, and unremarkable labs  His swelling significantly improved but did not fully resolve  He started to develop left ankle pain a few weeks after the swelling improved  There was no new trauma or inciting event  He does have known history of left meniscus issues with plans for further surgical intervention  The pain is described as throbbing  The pain is worse at night and with standing    Review of Systems   Constitutional: Negative for chills and fever. Respiratory: Negative for cough, shortness of breath and wheezing. Cardiovascular: Positive for leg swelling. Negative for chest pain. Musculoskeletal: Positive for arthralgias and gait problem. Prior to Visit Medications    Medication Sig Taking? Authorizing Provider   oxyCODONE-acetaminophen (PERCOCET) 5-325 MG per tablet Take 1 tablet by mouth every 6 hours as needed for Pain for up to 7 days.  Yes HARPREET Enriquez - CNP   ibuprofen (ADVIL;MOTRIN) 800 MG tablet Take 1 tablet by mouth every 8 hours as needed for Pain Yes Tae Carbajal DO   senna (SENOKOT) 8.6 MG tablet Take 1 tablet by mouth 2 times daily Yes Orestes Ybarra ARTHROSCOPY SUBACROMIAL DECOMPRESSION AND DEBRIDEMENT LABRIAL DEBRIDEMENT ROTATOR CUFF REPAIR [place] performed by Nette Son, DO at [address] [facility]:    12/02/20 1017   BP: 130/80   Pulse: 84   Resp: 16   Temp: 97.1 °F (36.2 °C)   TempSrc: Temporal   SpO2: 97%   Weight: 253 lb (114.8 kg)   Height: 6' (1.829 m)     Estimated body mass index is 34.31 kg/m² as calculated from the following:    Height as of this encounter: 6' (1.829 m). Weight as of this encounter: 253 lb (114.8 kg). Physical Exam  Constitutional:       Appearance: Normal appearance. HENT:      Head: Normocephalic and atraumatic. Right Ear: External ear normal.      Left Ear: External ear normal.   Eyes:      Extraocular Movements: Extraocular movements intact. Conjunctiva/sclera: Conjunctivae normal.   Cardiovascular:      Rate and Rhythm: Normal rate. Pulmonary:      Effort: Pulmonary effort is normal. No respiratory distress. Musculoskeletal:      Left ankle: He exhibits swelling. He exhibits normal range of motion, no ecchymosis, no deformity and normal pulse. Tenderness. Neurological:      General: No focal deficit present. Mental Status: He is alert. Mental status is at baseline. Psychiatric:         Mood and Affect: Mood normal.         Behavior: Behavior normal.         ASSESSMENT/PLAN:  Kenny Chaves was seen today for swelling. Diagnoses and all orders for this visit:    Pain and swelling of left ankle  -     XR ANKLE LEFT (MIN 3 VIEWS); Future    Leg swelling  -     furosemide (LASIX) 20 MG tablet; Take 1 tablet by mouth daily as needed (swelling)    Class 1 obesity due to excess calories with body mass index (BMI) of 34.0 to 34.9 in adult, unspecified whether serious comorbidity present    Need for influenza vaccination  -     INFLUENZA, QUADV, 3 YRS AND OLDER, IM PF, PREFILL SYR OR SDV, 0.5ML (AFLURIA QUADV, PF)    Additional plan and future considerations:   As above.   Will notify of results via [address]   Call if symptoms worsen or fail to improve    Future Appointments   Date Time Provider Riri Perez   12/3/2020  1:00 PM Ale Katz PT [facility]   1/11/2021  2:30 PM DO Jeremy Martinez [facility]   2/2/2021  1:00 PM DO Gilberto Mina [facility]         --DO jame Mina 12/2/2020 at 10:27 AM

## 2020-12-02 NOTE — PROGRESS NOTES
Vaccine Information Sheet, \"Influenza - Inactivated\"  given to Hipolito Rodriguez, or parent/legal guardian of  Hipolito Rodriguez and verbalized understanding. Patient responses:    Have you ever had a reaction to a flu vaccine? No  Do you have any current illness? No  Have you ever had Guillian Hartford Syndrome? No  Do you have a serious allergy to any of the follow: Neomycin, Polymyxin, Thimerosal, eggs or egg products? No    Flu vaccine given per order. Please see immunization tab. Risks and benefits explained. Current VIS given.       Immunizations Administered     Name Date Dose Route    Influenza, Quadv, IM, PF (6 mo and older Fluzone, Flulaval, Fluarix, and 3 yrs and older Afluria) 12/2/2020 0.5 mL Intramuscular    Site: Deltoid- Left    Lot: [id number]    NDC: 09682-098-20

## 2020-12-03 ENCOUNTER — EVALUATION (OUTPATIENT)
Dept: PHYSICAL THERAPY | Age: 55
End: 2020-12-03
Payer: COMMERCIAL

## 2020-12-03 PROCEDURE — 97161 PT EVAL LOW COMPLEX 20 MIN: CPT | Performed by: PHYSICAL THERAPIST

## 2020-12-03 PROCEDURE — 97110 THERAPEUTIC EXERCISES: CPT | Performed by: PHYSICAL THERAPIST

## 2020-12-03 NOTE — PROGRESS NOTES
[facility]  Physical Therapy Daily Treatment Note    Date: 12/3/2020  Patient Name: Eileen Cunningham  : 1965   MRN: [id number]    DOSx: 10/2/2020   Referring Provider: Leigh Garcia APRN - [facility]     Medical Diagnosis:      Diagnosis Orders   1. Status post arthroscopy of right shoulder     2. Rotator cuff arthropathy of right shoulder         Outcome Measure:  QuickDASH (Disorders of the Arm, Shoulder, and Hand) 36% disability    S: see eval  O: Pt given written HEP wand ex FE, ER, IR, reviewed written copy w/ patient  Time 4297-2375     Visit 1 Repeat outcome measure at mid point and end. Pain 5/10     ROM      Modalities            Manual                  Stretch      Table slides      Wall Flexion      Wall ER stretch      Towel IR stretch      IR wand behind back 2 x 15     Exercise      Shrugs AROM      Pendulum Ex      UBE      Pulleys - flex      Pulleys-IR      Supine wand chest press      Supine wand flex 2 x 15     Supine wand ER/IR 2 x 15     Supine flexion      S-lying ER      Standing wand flex      Standing flexion            ROWS: H Functional activities  To aid in ROM and strength needed for reaching , lifting ,pushing and pulling at home/work    ROWS: M  \"    ROWS: L  \"    ER  \"    IR  \"                A:  Tolerated well. Above added to written HEP.   P: Continue with rehab plan  Daniel Padron PT    Treatment Charges: Mins Units   Initial Evaluation 20 1   Re-Evaluation     Ther Exercise         TE 25 2   Manual Therapy     MT     Ther Activities        TA     Gait Training          GT     Neuro Re-education NR     Modalities     Non-Billable Service Time     Other     Total Time/Units 45 3

## 2020-12-03 NOTE — PROGRESS NOTES
[facility] OUTPATIENT REHABILITATION  PHYSICAL THERAPY INITIAL EVALUATION         Date:  12/3/2020   Patient: Eileen Cunningham  : 1965  MRN: [id number]  Referring Provider: HARPREET Noyola - CNP   [facility]     Medical Diagnosis:   M75.41 (ICD-10-CM) - Impingement syndrome of right shoulder    S43.431A (ICD-10-CM) - Tear of right glenoid labrum, initial encounter    M19.011 (ICD-10-CM) - Arthritis of right acromioclavicular joint    M19.011 (ICD-10-CM) - Osteoarthritis of right glenohumeral joint    M75.121 (ICD-10-CM) - Complete tear of right rotator cuff, unspecified whether traumatic           SUBJECTIVE:     Onset date: 2020 CELENA, catrina, bicep tenodesis, 10/2/2020 RTC repair 1cm tear    Onset[de-identified] Insidious onset    Mechanism of Injury / History: . Patient is right handed. Previous PT: yes - did not help    Medical Management for Current Problem: surgery 2020    Chief complaint: pain    Behavior: condition is staying the same    Pain: constant   Current: 5/10     Best: 210     Worst:10/10    Symptom Type/Quality: throbbing  Location[de-identified] A-C joint, inferior to acromion, mid-deltoid region      Aggravated by: reaching overhead, reaching out, reaching behind back    Relieved by: placing arm in sling position       Imaging results: Xr Ankle Left (min 3 Views)    Result Date: 2020  EXAMINATION: THREE XRAY VIEWS OF THE LEFT ANKLE 2020 11:02 am COMPARISON: None. HISTORY: ORDERING SYSTEM PROVIDED HISTORY: Pain and swelling of left ankle FINDINGS: There is no evidence for acute fracture or dislocation. The ankle mortise appears intact on these nonstress views. The joint spaces are relatively well maintained. There are plantar and posterior calcaneal enthesophytes. There is no acute osseous abnormality identified. There is no radiopaque foreign body. No acute radiographic findings.       Past Medical History:  Past Medical History:   Diagnosis Date    Anxiety     Blood in stool     Chronic back pain     Constipation     Headache(784.0)     History of dental problems     Hyperlipidemia     Neck pain     Neck stiffness     Type II or unspecified type diabetes mellitus without mention of complication, not stated as uncontrolled      Past Surgical History:   Procedure Laterality Date    ENDOSCOPY, COLON, DIAGNOSTIC      FRACTURE SURGERY Left     femur    KNEE ARTHROSCOPY Left 07/18/2016    medial lateral menisectomy/synovectomy/chodroplasty    SHOULDER ARTHROSCOPY Right 2/11/2020    RIGHT SHOULDER ARTHROSCOPY SUBACHROMIAL DECOMPRESSION DEBRIDEMENT, BICEP TENODESIS ,LABRAL DEBRIDEMENT AND KHALIDA performed by Siddhartha Klein DO at [address] [facility] ARTHROSCOPY Right 10/02/2020    SHOULDER ARTHROSCOPY Right 10/2/2020    RIGHT SHOULDER ARTHROSCOPY SUBACROMIAL DECOMPRESSION AND DEBRIDEMENT LABRIAL DEBRIDEMENT ROTATOR CUFF REPAIR [place]HAL[place] performed by Haylie Godl DO at [facility]         Medications:   Current Outpatient Medications   Medication Sig Dispense Refill    furosemide (LASIX) 20 MG tablet Take 1 tablet by mouth daily as needed (swelling) 30 tablet 0    oxyCODONE-acetaminophen (PERCOCET) 5-325 MG per tablet Take 1 tablet by mouth every 6 hours as needed for Pain for up to 7 days.  28 tablet 0    ibuprofen (ADVIL;MOTRIN) 800 MG tablet Take 1 tablet by mouth every 8 hours as needed for Pain 90 tablet 3    senna (SENOKOT) 8.6 MG tablet Take 1 tablet by mouth 2 times daily 60 tablet 5    alogliptin (NESINA) 25 MG TABS tablet Take 1 tablet by mouth daily 30 tablet 5    aspirin (RA ASPIRIN EC ADULT LOW ST) 81 MG EC tablet take 1 tablet by mouth once daily 30 tablet 11    Dulaglutide 1.5 MG/0.5ML SOPN Inject 1.5 mg into the skin once a week 4 pen 2    metFORMIN (GLUCOPHAGE) 1000 MG tablet Take 1 tablet by mouth 2 times daily (with meals) 60 tablet 5    glipiZIDE (GLUCOTROL XL) 5 MG []Sulcus Sign []+ / [] -   [] Apprehension []+ / [] -   [] Bicep Load II []+ / [] -   [] Duane Holts []+ / [] -   [] Elbow Valgus []+ / [] -     [] Other: []+ / [] -         ASSESSMENT     Outcome Measure:   QuickDASH (Disorders of the Arm, Shoulder, and Hand) 36% disability    Problems:    Pain reported 10/10   ROM decreased   Strength decreased   Decreased functional ability with use of right upper extremity    [x] There are no barriers affecting plan of care or recovery    [] Barriers to this patient's plan of care or recovery include. Domestic Concerns:  [x] No  [] Yes:    Short Term goals (4 weeks)   Decrease reported pain to 2/10   Increase AROM to 135/80/L2   Increase Strength to 4/5       Long Term goals (8 weeks)   Decrease reported pain to 0000-1/10   Increase ROM to 155/90/T10   Increase Strength to 5-/5    QuickDASH 12% disability   Independent with Home Exercise Programs    Rehab Potential: [x] Good  [] Fair  [] Poor    PLAN       Treatment Plan: ROM w/ emphasis on IR, stretching, strengthening at week 12 12/25/2020  [x] Therapeutic Exercise  [x] Therapeutic Activity  [x] Neuromuscular Re-education   [] Gait Training  [] Balance Training  [] Aerobic conditioning  [x] Manual Therapy  [] Massage/Fascial release   [x] Work/Sport specific activities    [] Pain Neuroscience [x] Cold/hotpack  [] Vasocompression  [x] Electrical Stimulation  [] Lumbar/Cervical Traction  [] Ultrasound   [] Iontophoresis: 4 mg/mL Dexamethasone Sodium Phosphate 40-80 mAmin        [x] Instruction in HEP      []  Medication allergies reviewed for use of Dexamethasone Sodium Phosphate 4mg/ml  with iontophoresis treatments. Patient is not allergic.       The following CPT codes are likely to be used in the care of this patient: 42976 Therapeutic Exercise , 45421 Therapeutic Activities , 06782 Manual Therapy       Suggested Professional Referral: [x] No  [] Yes:     Patient Education:  [x] Plans/Goals, Risks/Benefits discussed  [x] Home exercise program  Method of Education: [x] Verbal  [x] Demo  [x] Written  Comprehension of Education:  [x] Verbalizes understanding. [x] Demonstrates understanding. [] Needs Review. [] Demonstrates/verbalizes understanding of HEP/Ed previously given. Frequency:  1-2 days per week for 8 weeks    Patient understands diagnosis/prognosis and consents to treatment, plan and goals: [x] Yes    [] No     Thank you for the opportunity to work with your patient. If you have questions or comments, please contact me at [phone]; fax: [phone]. Electronically signed by: Bing Cheng, PT    Medicare Patients Only     Please sign Physician's Certification and return to: [address] [place] [address]  Dept: [phone]  Dept Fax: [phone] Certification / Comments     Frequency/Duration 1-2 days per week for 8 weeks. Certification period from 12/3/2020  to 2/1/2021. I have reviewed the Plan of Care established for skilled therapy services and certify that the services are required and that they will be provided while the patient is under my care.     Physician's Comments/Revisions:               Physician's Printed Name:                                           [de-identified] Signature:                                                               Date:

## 2020-12-08 ENCOUNTER — TREATMENT (OUTPATIENT)
Dept: PHYSICAL THERAPY | Age: 55
End: 2020-12-08
Payer: COMMERCIAL

## 2020-12-08 PROCEDURE — G0283 ELEC STIM OTHER THAN WOUND: HCPCS

## 2020-12-08 PROCEDURE — 97110 THERAPEUTIC EXERCISES: CPT

## 2020-12-08 NOTE — PROGRESS NOTES
[facility]  Physical Therapy Daily Treatment Note    Date: 2020  Patient Name: Debra Ferraro  : 1965   MRN: [id number]    DOSx: 10/2/2020                                                                 12 weeks 20  Referring Provider:  HARPREET Tolentino - CNP    Medical Diagnosis:      Diagnosis Orders   1. Status post arthroscopy of right shoulder     2. Rotator cuff arthropathy of right shoulder         Outcome Measure:  QuickDASH (Disorders of the Arm, Shoulder, and Hand) 36% disability    S: pt reports feeling pretty good  O: Pt given written HEP wand ex FE, ER, IR, reviewed written copy w/ patient  Time 0824-4409     Visit 2 Repeat outcome measure at mid point and end. Pain 5/10     ROM      Modalities      Es/ice 20 min     Manual                  Stretch      Table slides      Wall Flexion      Wall ER stretch      Towel IR stretch 2 x 15 HEP    IR wand behind back 2 x 15 HEP    Exercise      Shrugs AROM      Pendulum Ex      UBE      Pulleys - flex 5 min     Pulleys-IR 3 min     Supine wand chest press      Supine wand flex 2 x 15     Supine wand ER/IR 2 x 15     Supine flexion      S-lying ER      Standing wand flex 2 x 20     Standing IR/ER 2 x 20     Standing flexion            ROWS: H Functional activities  To aid in ROM and strength needed for reaching , lifting ,pushing and pulling at home/work    ROWS: M  \"    ROWS: L  \"    ER  \"    IR  \"                A:  Tolerated well. Above added to written HEP. reinst pt importance of HEP 2-3x daily.   P: Continue with rehab plan, working on increasing ROM  Cheek Sharron, PTA    Treatment Charges: Mins Units   Initial Evaluation     Re-Evaluation     Ther Exercise         TE 40 3   Manual Therapy     MT     Ther Activities        TA     Gait Training          GT     Neuro Re-education NR     Modalities 20 1   Non-Billable Service Time     Other     Total Time/Units 60 4

## 2020-12-11 ENCOUNTER — TELEPHONE (OUTPATIENT)
Dept: PHYSICAL THERAPY | Age: 55
End: 2020-12-11

## 2020-12-17 ENCOUNTER — TELEPHONE (OUTPATIENT)
Dept: PHYSICAL THERAPY | Age: 55
End: 2020-12-17

## 2020-12-22 ENCOUNTER — TREATMENT (OUTPATIENT)
Dept: PHYSICAL THERAPY | Age: 55
End: 2020-12-22
Payer: COMMERCIAL

## 2020-12-22 PROCEDURE — 97110 THERAPEUTIC EXERCISES: CPT

## 2020-12-22 PROCEDURE — G0283 ELEC STIM OTHER THAN WOUND: HCPCS

## 2020-12-22 NOTE — PROGRESS NOTES
[facility]  Physical Therapy Daily Treatment Note    Date: 2020  Patient Name: Sriram Enriquez  : 1965   MRN: [id number]    DOSx: 10/2/2020                                                                 12 weeks 20  Referring Provider:  HARPREET Enriquez - CNP    Medical Diagnosis:   1. Status post arthroscopy of right shoulder      2. Rotator cuff arthropathy of right shoulder           Outcome Measure:  QuickDASH (Disorders of the Arm, Shoulder, and Hand) 36% disability    S: pt reports been sick with stomach flu  O: Pt given written HEP wand ex FE, ER, IR, reviewed written copy w/ patient  Time 2027-2416     Visit 3 Repeat outcome measure at mid point and end. Pain 5/10     ROM      Modalities      Es/ice 20 min     Manual                  Stretch      Table slides      Wall Flexion      Wall ER stretch      Towel IR stretch 2 x 15 HEP    IR wand behind back 2 x 15 HEP    Exercise      Shrugs AROM      Pendulum Ex      UBE      Pulleys - flex 5 min     Pulleys-IR 3 min     Supine wand chest press      Supine wand flex 2 x 20     Supine wand ER/IR 2 x 20     Supine flexion      S-lying ER 2 x 20     Standing wand flex 2 x 20     Standing IR/ER 2 x 20     Standing flexion            ROWS: H Functional activities  To aid in ROM and strength needed for reaching , lifting ,pushing and pulling at home/work    ROWS: M  \"    ROWS: L  \"    ER  \"    IR  \"                A:  Tolerated well. Above added to written HEP. reinst pt importance of HEP 2-3x daily.   P: Continue with rehab plan, working on increasing ROM  Adrian Ballesteros, PTA    Treatment Charges: Mins Units   Initial Evaluation     Re-Evaluation     Ther Exercise         TE 40 3   Manual Therapy     MT     Ther Activities        TA     Gait Training          GT     Neuro Re-education NR     Modalities 20 1   Non-Billable Service Time     Other     Total Time/Units 60 4

## 2020-12-28 ENCOUNTER — TREATMENT (OUTPATIENT)
Dept: PHYSICAL THERAPY | Age: 55
End: 2020-12-28
Payer: COMMERCIAL

## 2020-12-28 PROCEDURE — 97110 THERAPEUTIC EXERCISES: CPT

## 2020-12-28 PROCEDURE — G0283 ELEC STIM OTHER THAN WOUND: HCPCS

## 2020-12-28 NOTE — PROGRESS NOTES
[facility]  Physical Therapy Daily Treatment Note    Date: 2020  Patient Name: Miladys Johnson  : 1965   MRN: [id number]    DOSx: 10/2/2020                                                                 12 weeks 20  Referring Provider:  HARPREET Bustillos CNP    Medical Diagnosis:   1. Status post arthroscopy of right shoulder      2. Rotator cuff arthropathy of right shoulder           Outcome Measure:  QuickDASH (Disorders of the Arm, Shoulder, and Hand) 36% disability    S: pt reports doing well. O: Pt given written HEP wand ex FE, ER, IR, reviewed written copy w/ patient  Time 3788-6035     Visit 4 Repeat outcome measure at mid point and end. Pain 3-4/10     ROM      Modalities      Es/ice 20 min     Manual                  Stretch      Table slides      Wall Flexion      Wall ER stretch      Towel IR stretch 2 x 15 HEP    IR wand behind back 2 x 15 HEP    Exercise      Shrugs AROM      Pendulum Ex      UBE      Pulleys - flex 5 min     Pulleys-IR 3 min     Supine wand chest press      Supine wand flex     Supine wand ER/IR     Supine flexion      S-lying ER 2 x 20     Standing wand flex 2 x 20     Standing IR/ER 2 x 20     Standing flexion            ROWS: H Green 2 x 15     ROWS: M Green 2 x 15 \"    ROWS: L Green 2 x 15 \"    ER  \"    IR  \"                A:  Tolerated well.   Popping in the shoulder with IR   P: Continue with rehab plan, working on increasing ROM  Jace Schaumann, PTA    Treatment Charges: Mins Units   Initial Evaluation     Re-Evaluation     Ther Exercise         TE 40 3   Manual Therapy     MT     Ther Activities        TA     Gait Training          GT     Neuro Re-education NR     Modalities 20 1   Non-Billable Service Time     Other     Total Time/Units 60 4

## 2020-12-30 ENCOUNTER — TELEPHONE (OUTPATIENT)
Dept: PHYSICAL THERAPY | Age: 55
End: 2020-12-30

## 2021-01-06 ENCOUNTER — TREATMENT (OUTPATIENT)
Dept: PHYSICAL THERAPY | Age: 56
End: 2021-01-06
Payer: COMMERCIAL

## 2021-01-06 DIAGNOSIS — Z98.890 STATUS POST ARTHROSCOPY OF RIGHT SHOULDER: Primary | ICD-10-CM

## 2021-01-06 DIAGNOSIS — M12.811 ROTATOR CUFF ARTHROPATHY OF RIGHT SHOULDER: ICD-10-CM

## 2021-01-06 PROCEDURE — 97110 THERAPEUTIC EXERCISES: CPT

## 2021-01-06 NOTE — PROGRESS NOTES
[facility]  Physical Therapy Daily Treatment Note    Date: 2021  Patient Name: Penny Jefferson  : 1965   MRN: [id number]    DOSx: 10/2/2020                                                                 12 weeks 20  Referring Provider:  HARPREET Cabrera - CNP    Medical Diagnosis:   1. Status post arthroscopy of right shoulder      2. Rotator cuff arthropathy of right shoulder           Outcome Measure:  QuickDASH (Disorders of the Arm, Shoulder, and Hand) 36% disability    S: pt reports doing well. But continues to hear popping in shoulder with IR   O: Pt given written HEP wand ex FE, ER, IR, reviewed written copy w/ patient  Time 9230-3451     Visit 4 Repeat outcome measure at mid point and end. Pain 3-4/10     ROM      Modalities      Es/ice 20 min     Manual                  Stretch      Table slides      Wall Flexion      Wall ER stretch      Towel IR stretch 2 x 15 HEP    IR wand behind back 2 x 15 HEP    Exercise      Shrugs AROM      Pendulum Ex      UBE 2/2     Pulleys - flex 5 min     Pulleys-IR 3 min     Supine wand chest press      Supine wand flex     Supine wand ER/IR     Supine flexion      S-lying ER 2 x 20     Standing wand flex 2 x 20     Standing IR/ER 2 x 20     Standing flexion            ROWS: H Green 2 x 15     ROWS: M Green 2 x 15 \"    ROWS: L Green 2 x 15 \"    ER Red 2 x 15 \"    IR Red 2 x 15 \"                A:  Tolerated well. Popping in the shoulder with IR.   Pt c/o shoulder should be better but not consistent with coming to therapy  P: Continue with rehab plan, working on increasing ROM  Cruz Garcia PTA    Treatment Charges: Mins Units   Initial Evaluation     Re-Evaluation     Ther Exercise         TE 40 3   Manual Therapy     MT     Ther Activities        TA     Gait Training          GT     Neuro Re-education NR     Modalities     Non-Billable Service Time     Other     Total Time/Units 40 3

## 2021-01-11 ENCOUNTER — OFFICE VISIT (OUTPATIENT)
Dept: ORTHOPEDIC SURGERY | Age: 56
End: 2021-01-11
Payer: COMMERCIAL

## 2021-01-11 VITALS — BODY MASS INDEX: 33.59 KG/M2 | WEIGHT: 248 LBS | TEMPERATURE: 98 F | HEIGHT: 72 IN

## 2021-01-11 DIAGNOSIS — M75.121 COMPLETE TEAR OF RIGHT ROTATOR CUFF, UNSPECIFIED WHETHER TRAUMATIC: ICD-10-CM

## 2021-01-11 DIAGNOSIS — M75.41 IMPINGEMENT SYNDROME OF RIGHT SHOULDER: Primary | ICD-10-CM

## 2021-01-11 DIAGNOSIS — M19.011 ARTHRITIS OF RIGHT ACROMIOCLAVICULAR JOINT: ICD-10-CM

## 2021-01-11 DIAGNOSIS — S43.431A TEAR OF RIGHT GLENOID LABRUM, INITIAL ENCOUNTER: ICD-10-CM

## 2021-01-11 PROCEDURE — 99213 OFFICE O/P EST LOW 20 MIN: CPT | Performed by: ORTHOPAEDIC SURGERY

## 2021-01-11 PROCEDURE — 1036F TOBACCO NON-USER: CPT | Performed by: ORTHOPAEDIC SURGERY

## 2021-01-11 PROCEDURE — G8417 CALC BMI ABV UP PARAM F/U: HCPCS | Performed by: ORTHOPAEDIC SURGERY

## 2021-01-11 PROCEDURE — G8427 DOCREV CUR MEDS BY ELIG CLIN: HCPCS | Performed by: ORTHOPAEDIC SURGERY

## 2021-01-11 PROCEDURE — G8482 FLU IMMUNIZE ORDER/ADMIN: HCPCS | Performed by: ORTHOPAEDIC SURGERY

## 2021-01-11 PROCEDURE — 3017F COLORECTAL CA SCREEN DOC REV: CPT | Performed by: ORTHOPAEDIC SURGERY

## 2021-01-11 RX ORDER — METHYLPREDNISOLONE 4 MG/1
4 TABLET ORAL SEE ADMIN INSTRUCTIONS
Qty: 1 KIT | Refills: 0 | Status: SHIPPED | OUTPATIENT
Start: 2021-01-11 | End: 2021-01-17

## 2021-01-11 RX ORDER — CELECOXIB 200 MG/1
200 CAPSULE ORAL DAILY
Qty: 30 CAPSULE | Refills: 0 | Status: SHIPPED
Start: 2021-01-11 | End: 2021-05-26

## 2021-01-11 RX ORDER — OXYCODONE HYDROCHLORIDE AND ACETAMINOPHEN 5; 325 MG/1; MG/1
1 TABLET ORAL EVERY 6 HOURS PRN
Qty: 28 TABLET | Refills: 0 | Status: SHIPPED | OUTPATIENT
Start: 2021-01-11 | End: 2021-01-18

## 2021-01-11 NOTE — PROGRESS NOTES
Chief Complaint   Patient presents with    Shoulder Pain     Right Shoulder RTC DOS 10/02/2020, still in PT       Kenyatta Wright returns today for follow up of his right shoulder rtc repair. Sanaz Rosenberg 10/2/2020.  he reports that the pain in the shoulder is better. he has been going to physical therapy. he is also complaining of intermittent pain and weakness. The patient is right dominant. The patient's pain level is a 5/10. The patient did respond to treatment.     Past Medical History:   Diagnosis Date    Anxiety     Blood in stool     Chronic back pain     Constipation     Headache(784.0)     History of dental problems     Hyperlipidemia     Neck pain     Neck stiffness     Type II or unspecified type diabetes mellitus without mention of complication, not stated as uncontrolled      Past Surgical History:   Procedure Laterality Date    ENDOSCOPY, COLON, DIAGNOSTIC      FRACTURE SURGERY Left     femur    KNEE ARTHROSCOPY Left 07/18/2016    medial lateral menisectomy/synovectomy/chodroplasty    SHOULDER ARTHROSCOPY Right 2/11/2020    RIGHT SHOULDER ARTHROSCOPY SUBACHROMIAL DECOMPRESSION DEBRIDEMENT, BICEP TENODESIS ,LABRAL DEBRIDEMENT AND KHALIDA performed by Lyric Prince DO at [address] [facility] ARTHROSCOPY Right 10/02/2020    SHOULDER ARTHROSCOPY Right 10/2/2020    RIGHT SHOULDER ARTHROSCOPY SUBACROMIAL DECOMPRESSION AND DEBRIDEMENT LABRIAL DEBRIDEMENT ROTATOR CUFF REPAIR KHALIDA performed by Deonna Singh DO at [facility]         Current Outpatient Medications:     methylPREDNISolone (MEDROL, JETT,) 4 MG tablet, Take 1 tablet by mouth See Admin Instructions for 6 days Take by mouth., Disp: 1 kit, Rfl: 0    celecoxib (CELEBREX) 200 MG capsule, Take 1 capsule by mouth daily, Disp: 30 capsule, Rfl: 0    furosemide (LASIX) 20 MG tablet, Take 1 tablet by mouth daily as needed (swelling), Disp: 30 tablet, Rfl: 0   senna (SENOKOT) 8.6 MG tablet, Take 1 tablet by mouth 2 times daily, Disp: 60 tablet, Rfl: 5    alogliptin (NESINA) 25 MG TABS tablet, Take 1 tablet by mouth daily, Disp: 30 tablet, Rfl: 5    aspirin (RA ASPIRIN EC ADULT LOW ST) 81 MG EC tablet, take 1 tablet by mouth once daily, Disp: 30 tablet, Rfl: 11    Dulaglutide 1.5 MG/0.5ML SOPN, Inject 1.5 mg into the skin once a week, Disp: 4 pen, Rfl: 2    metFORMIN (GLUCOPHAGE) 1000 MG tablet, Take 1 tablet by mouth 2 times daily (with meals), Disp: 60 tablet, Rfl: 5    glipiZIDE (GLUCOTROL XL) 5 MG extended release tablet, Take 1 tablet by mouth daily, Disp: 30 tablet, Rfl: 5    simvastatin (ZOCOR) 20 MG tablet, Take 1 tablet by mouth every evening, Disp: 30 tablet, Rfl: 5    pantoprazole (PROTONIX) 20 MG tablet, Take 1 tablet by mouth nightly as needed (heartburn), Disp: 30 tablet, Rfl: 3    Alcohol Swabs PADS, 1 Units by Does not apply route as needed (checking glucose), Disp: 100 each, Rfl: 11    blood glucose monitor strips, FBS daily. Please dispense Freestyle brand, Disp: 100 strip, Rfl: 11    Lancets MISC, Test once daily, Disp: 100 each, Rfl: 3    ibuprofen (ADVIL;MOTRIN) 800 MG tablet, Take 1 tablet by mouth every 8 hours as needed for Pain, Disp: 90 tablet, Rfl: 3  Allergies   Allergen Reactions    Tobramycin Swelling     Social History     Socioeconomic History    Marital status:      Spouse name: Not on file    Number of children: Not on file    Years of education: Not on file    Highest education level: Not on file   Occupational History    Occupation: [employer]   Social Needs    Financial resource strain: Not on file    Food insecurity     Worry: Not on file     Inability: Not on file   [employer] needs     Medical: Not on file     Non-medical: Not on file   Tobacco Use    Smoking status: Never Smoker    Smokeless tobacco: Never Used   Substance and Sexual Activity    Alcohol use:  Yes Alcohol/week: 0.0 standard drinks     Comment: rarely .   Coffee occasionally    Drug use: No    Sexual activity: Yes     Partners: Female   Lifestyle    Physical activity     Days per week: Not on file     Minutes per session: Not on file    Stress: Not on file   Relationships    Social connections     Talks on phone: Not on file     Gets together: Not on file     Attends [employer] service: Not on file     Active member of club or organization: Not on file     Attends meetings of clubs or organizations: Not on file     Relationship status: Not on file    Intimate partner violence     Fear of current or ex partner: Not on file     Emotionally abused: Not on file     Physically abused: Not on file     Forced sexual activity: Not on file   Other Topics Concern    Not on file   Social History Narrative    Not on file     Family History   Problem Relation Age of Onset    Diabetes Father     High Blood Pressure Father     Heart Disease Father     Stroke Father     Heart Attack Father     Diabetes Mother     Heart Disease Mother     Emphysema Mother     Cancer Brother 54        colon    High Blood Pressure Brother     Cancer Maternal Cousin 40        colon    Cancer Sister     Diabetes Sister     Heart Disease Sister     Stroke Sister     Diabetes Maternal Aunt     Heart Disease Maternal Aunt     High Blood Pressure Maternal Aunt     High Cholesterol Maternal Aunt     Stroke Maternal Aunt     Diabetes Maternal Uncle     Heart Disease Maternal Uncle     High Blood Pressure Maternal Uncle     High Cholesterol Maternal Uncle     Stroke Maternal Uncle     Diabetes Paternal Aunt     Heart Disease Paternal Aunt     High Blood Pressure Paternal Aunt     High Cholesterol Paternal Aunt     Stroke Paternal Aunt     Diabetes Paternal Uncle     Heart Disease Paternal Uncle     High Blood Pressure Paternal Uncle     High Cholesterol Paternal Eva Rocker     Stroke Paternal Uncle  Diabetes Maternal Grandmother     High Blood Pressure Maternal Grandmother     High Cholesterol Maternal Grandmother     Cancer Maternal Grandfather     Diabetes Maternal Grandfather     High Blood Pressure Maternal Grandfather     High Cholesterol Maternal Grandfather     Stroke Maternal Grandfather     Diabetes Paternal Grandfather     High Blood Pressure Paternal Grandfather     High Cholesterol Paternal Grandfather        REVIEW OF SYSTEMS:     General/Constitution:  (-)weight loss, (-)fever, (-)chills, (-)weakness. Skin: (-) rash,(-) psoriasis,(-) eczema, (-)skin cancer. Musculoskeletal: (-) fractures,  (-) dislocations,(-) collagen vascular disease, (-) fibromyalgia, (-) multiple sclerosis, (-) muscular dystrophy, (-) RSD,(-) joint pain (-)swelling, (-) joint pain,swelling. Neurologic: (-) epilepsy, (-)seizures,(-) brain tumor,(-) TIA, (-)stroke, (-)headaches, (-)Parkinson disease,(-) memory loss, (-) LOC. Cardiovascular: (-) Chest pain, (-) swelling in legs/feet, (-) SOB, (-) cramping in legs/feet with walking. Respiratory: (-) SOB, (-) Coughing, (-) night sweats. GI: (-) nausea, (-) vomiting, (-) diarrhea, (-) blood in stool, (-) gastric ulcer. Psychiatric: (-) Depression, (-) Anxiety, (-) bipolar disease, (-) Alzheimer's Disease  Allergic/Immunologic: (-) allergies latex, (-) allergies metal, (-) skin sensitivity. Hematlogic: (-) anemia, (-) blood transfusion, (-) DVT/PE, (-) Clotting disorders    SUBJECTIVE:    Constitution:  The patient is alert and oriented x 3, appears to be stated age and in no distress. @VS    Skin:  Upon inspection: the skin appears warm, dry and intact. There is not a previous scar over the affected area. There is not any cellulitis, lymphedema or cutaneous lesions noted in the lower extremities. Upon palpation there is no induration noted.       Neurologic:  Gait: normal; Motor exam of the upper extremities show: The reflexes in biceps/triceps/brachioradialis are equal and symmetric. Sensory exam C5-T1 are normal bilaterally. Cardiovascular: The vascular exam is normal and is well perfused to distal extremities. There are 2+ radial pulses bilaterally, and motor and sensation is intact to median, ulnar, and radial, musclocutaneus, and axillary nerve distribution and grossly symmetric bilaterally. There is cap refill noted less than two seconds in all digits. There is not edema of the bilateral upper extremities. There is not varicosities noted in the distal extremities. Lymph:  Upon palpation,  there is no lymphadenopathy noted in bilateral upper extremities. Musculoskeletal:  Gait: normal; examination of the nails and digits reveal no cyanosis or clubbing. Cervical Exam:  On physical exam, [place] Whipple is well-developed, well-nourished, oriented to person, place and time. his gait is normal.  On evaluation of his cervical spine, he has full range of motion of the cervical spine without pain. There is no cervical tenderness to palpation. Shoulder Exam:   On evaluation of his bilaterally upper extremities, his right shoulder has no deformity. There is tenderness upon palpation of the lateral shoulder. There is not evidence of scapular dyskinesis. There is not muscle atrophy in shoulder girdle. The range of motion for the Right Shoulder is 130/35/pl and for the Left shoulder is 150/50/t8. Right shoulder Motor strength is 5/5 in the supraspinatus, 5/5 internal rotation and 5/5 in external rotation, and Left shoulder motor strength 5/5 in supraspinatus, 5/5 in internal rotation, 5/5 in external rotation.         Right shoulder:  positive Impingement , negative [place] ,negative  Speeds,negative  Apprehension ,negative Ross Load Shift, negative Cole manuver, negative Cross arm test.     Left shoulder: negative Impingement , negative [place] ,negative  Speeds,negative  Apprehension ,negative Ross Load Shift, negative Cole manuver, negative Cross arm test.     X-ray:  n/a    MRI:  n/a    Radiographic findings reviewed with patient        Impression:       Encounter Diagnoses   Name Primary?  Impingement syndrome of right shoulder Yes    Tear of right glenoid labrum, initial encounter     Arthritis of right acromioclavicular joint     Complete tear of right rotator cuff, unspecified whether traumatic        Plan:  Natural history and expected course discussed. Questions answered. Educational material distributed. Reduction in offending activity. Gentle ROM exercises  RICE therapy.   Medrol dose pack  celebrex  Continue PT  HEP  No heavy lifting pushing or pulling  Fu in 2 months with xr

## 2021-01-13 ENCOUNTER — TELEPHONE (OUTPATIENT)
Dept: PHYSICAL THERAPY | Age: 56
End: 2021-01-13

## 2021-01-18 DIAGNOSIS — M25.562 ACUTE PAIN OF LEFT KNEE: Primary | ICD-10-CM

## 2021-01-21 DIAGNOSIS — K21.9 GASTROESOPHAGEAL REFLUX DISEASE: ICD-10-CM

## 2021-01-22 RX ORDER — PANTOPRAZOLE SODIUM 20 MG/1
TABLET, DELAYED RELEASE ORAL
Qty: 30 TABLET | Refills: 3 | OUTPATIENT
Start: 2021-01-22

## 2021-01-25 DIAGNOSIS — R12 HEART BURN: ICD-10-CM

## 2021-01-25 RX ORDER — PANTOPRAZOLE SODIUM 20 MG/1
20 TABLET, DELAYED RELEASE ORAL NIGHTLY PRN
Qty: 30 TABLET | Refills: 3 | Status: SHIPPED
Start: 2021-01-25 | End: 2021-05-26 | Stop reason: SDUPTHER

## 2021-02-01 DIAGNOSIS — E11.42 TYPE 2 DIABETES MELLITUS WITH DIABETIC POLYNEUROPATHY, WITHOUT LONG-TERM CURRENT USE OF INSULIN (HCC): ICD-10-CM

## 2021-02-01 DIAGNOSIS — Z12.5 SCREENING PSA (PROSTATE SPECIFIC ANTIGEN): ICD-10-CM

## 2021-02-01 DIAGNOSIS — M25.562 ACUTE PAIN OF LEFT KNEE: Primary | ICD-10-CM

## 2021-02-01 LAB
ALBUMIN SERPL-MCNC: 4 G/DL (ref 3.5–5.2)
ALP BLD-CCNC: 84 U/L (ref 40–129)
ALT SERPL-CCNC: 47 U/L (ref 0–40)
ANION GAP SERPL CALCULATED.3IONS-SCNC: 13 MMOL/L (ref 7–16)
AST SERPL-CCNC: 37 U/L (ref 0–39)
BASOPHILS ABSOLUTE: 0.06 E9/L (ref 0–0.2)
BASOPHILS RELATIVE PERCENT: 0.8 % (ref 0–2)
BILIRUB SERPL-MCNC: 0.4 MG/DL (ref 0–1.2)
BUN BLDV-MCNC: 14 MG/DL (ref 6–20)
CALCIUM SERPL-MCNC: 8.9 MG/DL (ref 8.6–10.2)
CHLORIDE BLD-SCNC: 104 MMOL/L (ref 98–107)
CHOLESTEROL, TOTAL: 157 MG/DL (ref 0–199)
CO2: 23 MMOL/L (ref 22–29)
CREAT SERPL-MCNC: 0.7 MG/DL (ref 0.7–1.2)
EOSINOPHILS ABSOLUTE: 0.18 E9/L (ref 0.05–0.5)
EOSINOPHILS RELATIVE PERCENT: 2.4 % (ref 0–6)
FOLATE: 14.9 NG/ML (ref 4.8–24.2)
GFR AFRICAN AMERICAN: >60
GFR NON-AFRICAN AMERICAN: >60 ML/MIN/1.73
GLUCOSE BLD-MCNC: 206 MG/DL (ref 74–99)
HBA1C MFR BLD: 9.4 % (ref 4–5.6)
HCT VFR BLD CALC: 41.6 % (ref 37–54)
HDLC SERPL-MCNC: 38 MG/DL
HEMOGLOBIN: 13.4 G/DL (ref 12.5–16.5)
IMMATURE GRANULOCYTES #: 0.03 E9/L
IMMATURE GRANULOCYTES %: 0.4 % (ref 0–5)
LDL CHOLESTEROL CALCULATED: 76 MG/DL (ref 0–99)
LYMPHOCYTES ABSOLUTE: 2.25 E9/L (ref 1.5–4)
LYMPHOCYTES RELATIVE PERCENT: 29.6 % (ref 20–42)
MCH RBC QN AUTO: 27.9 PG (ref 26–35)
MCHC RBC AUTO-ENTMCNC: 32.2 % (ref 32–34.5)
MCV RBC AUTO: 86.7 FL (ref 80–99.9)
MONOCYTES ABSOLUTE: 0.75 E9/L (ref 0.1–0.95)
MONOCYTES RELATIVE PERCENT: 9.9 % (ref 2–12)
NEUTROPHILS ABSOLUTE: 4.34 E9/L (ref 1.8–7.3)
NEUTROPHILS RELATIVE PERCENT: 56.9 % (ref 43–80)
PDW BLD-RTO: 13.2 FL (ref 11.5–15)
PLATELET # BLD: 227 E9/L (ref 130–450)
PMV BLD AUTO: 11.5 FL (ref 7–12)
POTASSIUM SERPL-SCNC: 4.3 MMOL/L (ref 3.5–5)
PROSTATE SPECIFIC ANTIGEN: 0.92 NG/ML (ref 0–4)
RBC # BLD: 4.8 E12/L (ref 3.8–5.8)
SODIUM BLD-SCNC: 140 MMOL/L (ref 132–146)
TOTAL PROTEIN: 6.9 G/DL (ref 6.4–8.3)
TRIGL SERPL-MCNC: 214 MG/DL (ref 0–149)
TSH SERPL DL<=0.05 MIU/L-ACNC: 2.92 UIU/ML (ref 0.27–4.2)
VITAMIN B-12: 371 PG/ML (ref 211–946)
VLDLC SERPL CALC-MCNC: 43 MG/DL
WBC # BLD: 7.6 E9/L (ref 4.5–11.5)

## 2021-02-02 ENCOUNTER — OFFICE VISIT (OUTPATIENT)
Dept: FAMILY MEDICINE CLINIC | Age: 56
End: 2021-02-02
Payer: COMMERCIAL

## 2021-02-02 ENCOUNTER — OFFICE VISIT (OUTPATIENT)
Dept: ORTHOPEDIC SURGERY | Age: 56
End: 2021-02-02
Payer: COMMERCIAL

## 2021-02-02 VITALS
SYSTOLIC BLOOD PRESSURE: 130 MMHG | HEART RATE: 79 BPM | BODY MASS INDEX: 33.72 KG/M2 | HEIGHT: 72 IN | DIASTOLIC BLOOD PRESSURE: 80 MMHG | RESPIRATION RATE: 16 BRPM | TEMPERATURE: 97.1 F | OXYGEN SATURATION: 96 % | WEIGHT: 249 LBS

## 2021-02-02 VITALS — WEIGHT: 249 LBS | HEIGHT: 72 IN | TEMPERATURE: 98.6 F | BODY MASS INDEX: 33.72 KG/M2

## 2021-02-02 DIAGNOSIS — E11.42 TYPE 2 DIABETES MELLITUS WITH DIABETIC POLYNEUROPATHY, WITHOUT LONG-TERM CURRENT USE OF INSULIN (HCC): Primary | Chronic | ICD-10-CM

## 2021-02-02 DIAGNOSIS — I87.2 VENOUS REFLUX: ICD-10-CM

## 2021-02-02 DIAGNOSIS — E78.5 HYPERLIPIDEMIA ASSOCIATED WITH TYPE 2 DIABETES MELLITUS (HCC): Chronic | ICD-10-CM

## 2021-02-02 DIAGNOSIS — E11.69 HYPERLIPIDEMIA ASSOCIATED WITH TYPE 2 DIABETES MELLITUS (HCC): Chronic | ICD-10-CM

## 2021-02-02 DIAGNOSIS — E66.09 CLASS 1 OBESITY DUE TO EXCESS CALORIES WITH SERIOUS COMORBIDITY AND BODY MASS INDEX (BMI) OF 33.0 TO 33.9 IN ADULT: ICD-10-CM

## 2021-02-02 DIAGNOSIS — Z76.0 MEDICATION REFILL: ICD-10-CM

## 2021-02-02 DIAGNOSIS — M17.12 PRIMARY OSTEOARTHRITIS OF LEFT KNEE: Primary | ICD-10-CM

## 2021-02-02 PROCEDURE — 1036F TOBACCO NON-USER: CPT | Performed by: FAMILY MEDICINE

## 2021-02-02 PROCEDURE — G8417 CALC BMI ABV UP PARAM F/U: HCPCS | Performed by: FAMILY MEDICINE

## 2021-02-02 PROCEDURE — G8417 CALC BMI ABV UP PARAM F/U: HCPCS | Performed by: ORTHOPAEDIC SURGERY

## 2021-02-02 PROCEDURE — 99214 OFFICE O/P EST MOD 30 MIN: CPT | Performed by: FAMILY MEDICINE

## 2021-02-02 PROCEDURE — 3017F COLORECTAL CA SCREEN DOC REV: CPT | Performed by: ORTHOPAEDIC SURGERY

## 2021-02-02 PROCEDURE — G8482 FLU IMMUNIZE ORDER/ADMIN: HCPCS | Performed by: FAMILY MEDICINE

## 2021-02-02 PROCEDURE — 3017F COLORECTAL CA SCREEN DOC REV: CPT | Performed by: FAMILY MEDICINE

## 2021-02-02 PROCEDURE — 2022F DILAT RTA XM EVC RTNOPTHY: CPT | Performed by: FAMILY MEDICINE

## 2021-02-02 PROCEDURE — G8482 FLU IMMUNIZE ORDER/ADMIN: HCPCS | Performed by: ORTHOPAEDIC SURGERY

## 2021-02-02 PROCEDURE — 3046F HEMOGLOBIN A1C LEVEL >9.0%: CPT | Performed by: FAMILY MEDICINE

## 2021-02-02 PROCEDURE — 1036F TOBACCO NON-USER: CPT | Performed by: ORTHOPAEDIC SURGERY

## 2021-02-02 PROCEDURE — G8427 DOCREV CUR MEDS BY ELIG CLIN: HCPCS | Performed by: ORTHOPAEDIC SURGERY

## 2021-02-02 PROCEDURE — 99214 OFFICE O/P EST MOD 30 MIN: CPT | Performed by: ORTHOPAEDIC SURGERY

## 2021-02-02 PROCEDURE — G8427 DOCREV CUR MEDS BY ELIG CLIN: HCPCS | Performed by: FAMILY MEDICINE

## 2021-02-02 RX ORDER — OXYCODONE HYDROCHLORIDE AND ACETAMINOPHEN 5; 325 MG/1; MG/1
TABLET ORAL
COMMUNITY
Start: 2021-01-11 | End: 2021-03-01 | Stop reason: SDUPTHER

## 2021-02-02 RX ORDER — IBUPROFEN 800 MG/1
800 TABLET ORAL EVERY 8 HOURS PRN
Qty: 90 TABLET | Refills: 3 | Status: SHIPPED
Start: 2021-02-02 | End: 2021-08-20 | Stop reason: SDUPTHER

## 2021-02-02 RX ORDER — GLIPIZIDE 10 MG/1
10 TABLET, FILM COATED, EXTENDED RELEASE ORAL DAILY
Qty: 30 TABLET | Refills: 2 | Status: SHIPPED
Start: 2021-02-02 | End: 2021-05-26 | Stop reason: SDUPTHER

## 2021-02-02 RX ORDER — ALOGLIPTIN 25 MG/1
25 TABLET, FILM COATED ORAL DAILY
Qty: 30 TABLET | Refills: 5 | Status: SHIPPED
Start: 2021-02-02 | End: 2021-08-03 | Stop reason: SDUPTHER

## 2021-02-02 RX ORDER — SIMVASTATIN 20 MG
20 TABLET ORAL EVERY EVENING
Qty: 30 TABLET | Refills: 5 | Status: SHIPPED
Start: 2021-02-02 | End: 2021-08-10

## 2021-02-02 ASSESSMENT — PATIENT HEALTH QUESTIONNAIRE - PHQ9
SUM OF ALL RESPONSES TO PHQ QUESTIONS 1-9: 0
SUM OF ALL RESPONSES TO PHQ QUESTIONS 1-9: 0

## 2021-02-02 NOTE — PROGRESS NOTES
Chief Complaint   Patient presents with    Knee Pain     Left knee pain. Has been painful for years now. Has recieved cortisone injections in knee in the past. Has been longer then six months since cortisone injections. Subjective:     Patient ID: Zehra Pleitez is a 63 y.o..  male    Knee Pain  Patient complains of left knee pain. This is evaluated as a personal injury. There was not a history of injury. The pain began several years ago. The pain is located medial, lateral, anterior. He describes  His symptoms as aching, shooting and stabbing. He has experienced popping, clicking, locking, and giving way in the affected knee. The patient has had pain with kneeling, squating, and climbing stairs. Symptoms improve with rest, ice. The symptoms are worse with activity, stair climbing, kneeling, deep knee bending. The knee has given out or felt unstable. The patient cannot bend and straighten the knee fully. The patient is active in none. Treatment to date has been Tylenol, NSAID's, knee sleeve/brace, without significant relief. The patient is not working.      Past Medical History:   Diagnosis Date    Anxiety     Blood in stool     Chronic back pain     Constipation     Headache(784.0)     History of dental problems     Hyperlipidemia     Neck pain     Neck stiffness     Type II or unspecified type diabetes mellitus without mention of complication, not stated as uncontrolled      Past Surgical History:   Procedure Laterality Date    ENDOSCOPY, COLON, DIAGNOSTIC      FRACTURE SURGERY Left     femur    KNEE ARTHROSCOPY Left 07/18/2016    medial lateral menisectomy/synovectomy/chodroplasty    SHOULDER ARTHROSCOPY Right 2/11/2020    RIGHT SHOULDER ARTHROSCOPY SUBACHROMIAL DECOMPRESSION DEBRIDEMENT, BICEP TENODESIS ,LABRAL DEBRIDEMENT AND KHALIDA performed by Kyler Garcia DO at [address] [facility] ARTHROSCOPY Right 10/02/2020    SHOULDER ARTHROSCOPY Right 10/2/2020 Socioeconomic History    Marital status:      Spouse name: Not on file    Number of children: Not on file    Years of education: Not on file    Highest education level: Not on file   Occupational History    Occupation: [employer]   Social Needs    Financial resource strain: Not on file    Food insecurity     Worry: Not on file     Inability: Not on file   [employer] needs     Medical: Not on file     Non-medical: Not on file   Tobacco Use    Smoking status: Never Smoker    Smokeless tobacco: Never Used   Substance and Sexual Activity    Alcohol use: Yes     Alcohol/week: 0.0 standard drinks     Comment: rarely .   Coffee occasionally    Drug use: No    Sexual activity: Yes     Partners: Female   Lifestyle    Physical activity     Days per week: Not on file     Minutes per session: Not on file    Stress: Not on file   Relationships    Social connections     Talks on phone: Not on file     Gets together: Not on file     Attends Church service: Not on file     Active member of club or organization: Not on file     Attends meetings of clubs or organizations: Not on file     Relationship status: Not on file    Intimate partner violence     Fear of current or ex partner: Not on file     Emotionally abused: Not on file     Physically abused: Not on file     Forced sexual activity: Not on file   Other Topics Concern    Not on file   Social History Narrative    Not on file     Family History   Problem Relation Age of Onset    Diabetes Father     High Blood Pressure Father     Heart Disease Father     Stroke Father     Heart Attack Father     Diabetes Mother     Heart Disease Mother     Emphysema Mother     Cancer Brother 54        colon    High Blood Pressure Brother     Cancer Maternal Cousin 40        colon    Cancer Sister     Diabetes Sister     Heart Disease Sister     Stroke Sister     Diabetes Maternal Aunt     Heart Disease Maternal Aunt  High Blood Pressure Maternal Aunt     High Cholesterol Maternal Aunt     Stroke Maternal Aunt     Diabetes Maternal Uncle     Heart Disease Maternal Uncle     High Blood Pressure Maternal Uncle     High Cholesterol Maternal Uncle     Stroke Maternal Uncle     Diabetes Paternal Aunt     Heart Disease Paternal Aunt     High Blood Pressure Paternal Aunt     High Cholesterol Paternal Aunt     Stroke Paternal Aunt     Diabetes Paternal Uncle     Heart Disease Paternal Uncle     High Blood Pressure Paternal Uncle     High Cholesterol Paternal Uncle     Stroke Paternal Uncle     Diabetes Maternal Grandmother     High Blood Pressure Maternal Grandmother     High Cholesterol Maternal Grandmother     Cancer Maternal Grandfather     Diabetes Maternal Grandfather     High Blood Pressure Maternal Grandfather     High Cholesterol Maternal Grandfather     Stroke Maternal Grandfather     Diabetes Paternal Grandfather     High Blood Pressure Paternal Grandfather     High Cholesterol Paternal Grandfather          REVIEW OF SYSTEMS:     General/Constitution:  (-)weight loss, (-)fever, (-)chills, (-)weakness. Skin: (-) rash,(-) psoriasis,(-) eczema, (-)skin cancer. Musculoskeletal: (-) fractures,  (-) dislocations,(-) collagen vascular disease, (-) fibromyalgia, (-) multiple sclerosis, (-) muscular dystrophy, (-) RSD,(-) joint pain (-)swelling, (-) joint pain,swelling. Neurologic: (-) epilepsy, (-)seizures,(-) brain tumor,(-) TIA, (-)stroke, (-)headaches, (-)Parkinson disease,(-) memory loss, (-) LOC. Cardiovascular: (-) Chest pain, (-) swelling in legs/feet, (-) SOB, (-) cramping in legs/feet with walking. Respiratory: (-) SOB, (-) Coughing, (-) night sweats. GI: (-) nausea, (-) vomiting, (-) diarrhea, (-) blood in stool, (-) gastric ulcer.   Psychiatric: (-) Depression, (-) Anxiety, (-) bipolar disease, (-) Alzheimer's Disease Allergic/Immunologic: (-) allergies latex, (-) allergies metal, (-) skin sensitivity. Hematlogic: (-) anemia, (-) blood transfusion, (-) DVT/PE, (-) Clotting disorders    Subjective:    Constitution:  Temp 98.6 °F (37 °C)   Ht 6' (1.829 m)   Wt 249 lb (112.9 kg)   BMI 33.77 kg/m²     Psycihatric:  The patient is alert and oriented x 3, appears to be stated age and in no distress. Respiratory:  Respiratory effort is not labored. Patient is not gasping. Palpation of the chest reveals no tactile fremitus. Skin:  Upon inspection: the skin appears warm, dry and intact. There is  a previous scar over the affected area. There is any cellulitis, lymphedema or cutaneous lesions noted in the lower extremities. Upon palpation there is no induration noted. Neurologic:  Gait: antalgic; Motor exam of the lower extremities show ; quadriceps, hamstrings, foot dorsi and plantar flexors intact R.  5/5 and L. 5/5. Deep tendon reflexes are 2/4 at the knees and 2/4 at the ankles with strong extensor hallicus longus motor strength bilaterally. Sensory to both feet is intact to all sensory roots. Cardiovascular: The vascular exam is normal and is well perfused to distal extremities. Distal pulses DP/PT: R. 2+; L. 2+. There is cap refill noted less than two seconds in all digits. There is not edema of the bilateral lower extremities. There is not varicosities noted in the distal extremities. Lymph:  Upon palpation,  there is no lymphadenopathy noted in bilateral lower extremities. Musculoskeletal:  Gait: antalgic; examination of the nails and digits reveal no cyanosis or clubbing. Lumbar exam:  On visual inspection, there is not deformity of the spine. full range of motion, no tenderness, palpable spasm or pain on motion. Special tests: Straight Leg Raise negative, Rich test negative.       Hip exam: Upon inspection, there is not deformity noted. Upon palpation there is not tenderness. ROM: is  full and symmetrical.   Strength: Hip Flexors 5/5; Hip Abductors 5/5; Hip Adduction 5/5. Knee exam:  Left knee exam shows;  range of motion of R. Knee is 0 to 120, and L. Knee is -5 to 115. The patient does have  pain on motion, effusion is mild, there is tenderness over the  medial, lateral, anterior region, there are not any masses, there is not ligamentous instability, there is  deformity noted. Knee exam: left positive for moderate crepitations, some mild tenderness laxity is not noted with stress. There is not a popliteal cyst.    R. Knee:  Lachman's negative, Anterior Drawer negative, Posterior Drawer negative  Celestina's negative, Thallasy  negative,   PF grind test negative, Apprehension test negative, Patellar J sign  negative  L. Knee:  Lachman's negative, Anterior Drawer negative, Posterior Drawer negative  Celestina's negative, Thallasy  negative,   PF grind test negative, Apprehension test negative,  Patellar J sign  negative    Xray Exam:  Moderate to severe medial joint narrowing  Radiographic findings reviewed with patient    Assessment:  Encounter Diagnoses   Name Primary?  Primary osteoarthritis of left knee Yes       Plan:  Natural history and expected course discussed. Questions answered. Educational materials distributed. Rest, ice, compression, and elevation (RICE) therapy. I had a lengthy discussion with the patient regarding their diagnosis. I explained treatment options including surgical vs non surgical treatment. I reviewed in detail the risks and benefits and outlined the procedure in detail with expected outcomes and possible complications. I also discussed non surgical treatment such as injections (CSI and visco supplementation), physical therapy, topical creams and NSAID's. They have elected for conservative management at this time. The patient has failed conservative measures such as NSAIDS, HEP, and cortisone injections. The patient is also diabetic and cannot have traditional steriod injection. He is an excellent candidate for Zilretta injections  in the Left knee. We will contact the patient's insurance company and see them back in the office once we have received approval.    At least 25 minutes was spent discussing the diagnosis and treatment options with the patient with at least 50% of the time was spent with decision making and counseling the patient.

## 2021-02-02 NOTE — PATIENT INSTRUCTIONS
Patient Education        Learning About Diabetes Food Guidelines  Your Care Instructions     Meal planning is important to manage diabetes. It helps keep your blood sugar at a target level (which you set with your doctor). You don't have to eat special foods. You can eat what your family eats, including sweets once in a while. But you do have to pay attention to how often you eat and how much you eat of certain foods. You may want to work with a dietitian or a certified diabetes educator (CDE) to help you plan meals and snacks. A dietitian or CDE can also help you lose weight if that is one of your goals. What should you know about eating carbs? Managing the amount of carbohydrate (carbs) you eat is an important part of healthy meals when you have diabetes. Carbohydrate is found in many foods. · Learn which foods have carbs. And learn the amounts of carbs in different foods. ? Bread, cereal, pasta, and rice have about 15 grams of carbs in a serving. A serving is 1 slice of bread (1 ounce), ½ cup of cooked cereal, or 1/3 cup of cooked pasta or rice. ? Fruits have 15 grams of carbs in a serving. A serving is 1 small fresh fruit, such as an apple or orange; ½ of a banana; ½ cup of cooked or canned fruit; ½ cup of fruit juice; 1 cup of melon or raspberries; or 2 tablespoons of dried fruit. ? Milk and no-sugar-added yogurt have 15 grams of carbs in a serving. A serving is 1 cup of milk or 2/3 cup of no-sugar-added yogurt. ? Starchy vegetables have 15 grams of carbs in a serving. A serving is ½ cup of mashed potatoes or sweet potato; 1 cup winter squash; ½ of a small baked potato; ½ cup of cooked beans; or ½ cup cooked corn or green peas. · Learn how much carbs to eat each day and at each meal. A dietitian or CDE can teach you how to keep track of the amount of carbs you eat. This is called carbohydrate counting. · If you are not sure how to count carbohydrate grams, use the Plate Method to plan meals. It is a good, quick way to make sure that you have a balanced meal. It also helps you spread carbs throughout the day. ? Divide your plate by types of foods. Put non-starchy vegetables on half the plate, meat or other protein food on one-quarter of the plate, and a grain or starchy vegetable in the final quarter of the plate. To this you can add a small piece of fruit and 1 cup of milk or yogurt, depending on how many carbs you are supposed to eat at a meal.  · Try to eat about the same amount of carbs at each meal. Do not \"save up\" your daily allowance of carbs to eat at one meal.  · Proteins have very little or no carbs per serving. Examples of proteins are beef, chicken, turkey, fish, eggs, tofu, cheese, cottage cheese, and peanut butter. A serving size of meat is 3 ounces, which is about the size of a deck of cards. Examples of meat substitute serving sizes (equal to 1 ounce of meat) are 1/4 cup of cottage cheese, 1 egg, 1 tablespoon of peanut butter, and ½ cup of tofu. How can you eat out and still eat healthy? · Learn to estimate the serving sizes of foods that have carbohydrate. If you measure food at home, it will be easier to estimate the amount in a serving of restaurant food. · If the meal you order has too much carbohydrate (such as potatoes, corn, or baked beans), ask to have a low-carbohydrate food instead. Ask for a salad or green vegetables. · If you use insulin, check your blood sugar before and after eating out to help you plan how much to eat in the future. · If you eat more carbohydrate at a meal than you had planned, take a walk or do other exercise. This will help lower your blood sugar. What else should you know? · Limit saturated fat, such as the fat from meat and dairy products. This is a healthy choice because people who have diabetes are at higher risk of heart disease. So choose lean cuts of meat and nonfat or low-fat dairy products. Use olive or canola oil instead of butter or shortening when cooking. · Don't skip meals. Your blood sugar may drop too low if you skip meals and take insulin or certain medicines for diabetes. · Check with your doctor before you drink alcohol. Alcohol can cause your blood sugar to drop too low. Alcohol can also cause a bad reaction if you take certain diabetes medicines. Follow-up care is a key part of your treatment and safety. Be sure to make and go to all appointments, and call your doctor if you are having problems. It's also a good idea to know your test results and keep a list of the medicines you take. Where can you learn more? Go to https://chpekelvinewbautista.[employer]. org and sign in to your Who@ account. Enter X686 in the Getourguide box to learn more about \"Learning About Diabetes Food Guidelines. \"     If you do not have an account, please click on the \"Sign Up Now\" link. Current as of: December 20, 2019               Content Version: 12.6  © 5128-7560 [employer]. Care instructions adapted under license by [facility] ([facility]). If you have questions about a medical condition or this instruction, always ask your healthcare professional. [place] [address] any warranty or liability for your use of this information.

## 2021-02-02 NOTE — PROGRESS NOTES
2021     Marciano Mejia (:  1965) is a 63 y.o. male, with a:  Past Medical History:   Diagnosis Date    Anxiety     Blood in stool     Chronic back pain     Constipation     Headache(784.0)     History of dental problems     Hyperlipidemia     Neck pain     Neck stiffness     Type II or unspecified type diabetes mellitus without mention of complication, not stated as uncontrolled        Here for evaluation of the following medical concerns:  Chief Complaint   Patient presents with    Diabetes    Edema     follow up lower extremity edema. Patient did see Dr Maria L Dubois 2021.  Patient is having a procedure done on 2021    Discuss Labs     completed 2021     Interval Hx  - evaluated by vascular surgery    F/U of chronic problem(s)   Chronic problems reviewed today include:  DM, HLD, venous insufficiency and obsesity  Current status of this/these condition(s):  stable  Tolerating meds: Yes    Diabetes Mellitus Type 2   Current treatment: Metformin 1000 mg BID, alogliptin 25 mg daily, glipizide 5 mg daily and trulicity 1.5 mg weekly  Recent medication changes: glipizide added  Last HbA1c: 9.4    Home blood sugar records: checking about once weekly      Lab Results   Component Value Date    LABA1C 9.4 (H) 2021    LABA1C 8.1 (H) 10/16/2020    LABA1C 9.6 (H) 2020     Lab Results   Component Value Date    LABMICR <12.0 2018    CREATININE 0.7 2021     Lab Results   Component Value Date    ALT 47 (H) 2021    AST 37 2021     Lab Results   Component Value Date    CHOL 157 2021    TRIG 214 (H) 2021    HDL 38 2021    LDLCALC 76 2021        Hyperlipidemia  Current treatment: Simvastatin 20 mg nightly  Recent medication changes: none    Lab Results   Component Value Date    CHOL 157 2021    TRIG 214 (H) 2021    HDL 38 2021    LDLCALC 76 2021     Lab Results   Component Value Date    ALT 47 (H) 2021 AST 37 02/01/2021        GERD  Current treatment: Pantoprazole 20 mg nightly as needed  Recent medication changes: none    Obesity with BMI and comorbidities as noted above    Review of Systems   Constitutional: Negative for chills and fever. Respiratory: Negative for cough and shortness of breath. Cardiovascular: Positive for leg swelling. Negative for chest pain. Gastrointestinal: Negative for abdominal pain, constipation, diarrhea, nausea and vomiting. Genitourinary: Negative for dysuria. Musculoskeletal: Positive for arthralgias. Neurological: Negative for headaches. Prior to Visit Medications    Medication Sig Taking? Authorizing Provider   oxyCODONE-acetaminophen (PERCOCET) 5-325 MG per tablet take 1 tablet by mouth every 6 hours if needed for pain Yes Historical Provider, MD   pantoprazole (PROTONIX) 20 MG tablet Take 1 tablet by mouth nightly as needed (heartburn) Yes Tae Carbajal DO   ibuprofen (ADVIL;MOTRIN) 800 MG tablet Take 1 tablet by mouth every 8 hours as needed for Pain Yes Tae Carbajal DO   alogliptin (NESINA) 25 MG TABS tablet Take 1 tablet by mouth daily Yes Tae Carbajal DO   aspirin (RA ASPIRIN EC ADULT LOW ST) 81 MG EC tablet take 1 tablet by mouth once daily Yes Tae Carbajal DO   Dulaglutide 1.5 MG/0.5ML SOPN Inject 1.5 mg into the skin once a week Yes Tae Carbajal DO   metFORMIN (GLUCOPHAGE) 1000 MG tablet Take 1 tablet by mouth 2 times daily (with meals) Yes Tae Carbajal DO   glipiZIDE (GLUCOTROL XL) 5 MG extended release tablet Take 1 tablet by mouth daily Yes Tae Carbajal DO   simvastatin (ZOCOR) 20 MG tablet Take 1 tablet by mouth every evening Yes Tae Carbajal DO   Alcohol Swabs PADS 1 Units by Does not apply route as needed (checking glucose) Yes Tae Carbajal DO   blood glucose monitor strips FBS daily.  Please dispense Freestyle brand Yes Prudencio Flank, DO   Lancets MISC Test once daily Yes Tae Carbajal DO   celecoxib (CELEBREX) 200 MG capsule Take 1 capsule by mouth daily  Patient not taking: Reported on 2/2/2021  Scarlet Gold DO   furosemide (LASIX) 20 MG tablet Take 1 tablet by mouth daily as needed (swelling)  Tae Carbajal DO   senna (SENOKOT) 8.6 MG tablet Take 1 tablet by mouth 2 times daily  Harley Jung DO        Social History     Tobacco Use    Smoking status: Never Smoker    Smokeless tobacco: Never Used   Substance Use Topics    Alcohol use: Yes     Alcohol/week: 0.0 standard drinks     Comment: rarely . Coffee occasionally        Past Surgical History:   Procedure Laterality Date    ENDOSCOPY, COLON, DIAGNOSTIC      FRACTURE SURGERY Left     femur    KNEE ARTHROSCOPY Left 07/18/2016    medial lateral menisectomy/synovectomy/chodroplasty    SHOULDER ARTHROSCOPY Right 2/11/2020    RIGHT SHOULDER ARTHROSCOPY SUBACHROMIAL DECOMPRESSION DEBRIDEMENT, BICEP TENODESIS ,LABRAL DEBRIDEMENT AND KHALIDA performed by Aislinn Paige DO at [address] [facility] ARTHROSCOPY Right 10/02/2020    SHOULDER ARTHROSCOPY Right 10/2/2020    RIGHT SHOULDER ARTHROSCOPY SUBACROMIAL DECOMPRESSION AND DEBRIDEMENT LABRIAL DEBRIDEMENT ROTATOR CUFF REPAIR KHALIDA performed by Scarlet Gold DO at [address]:    02/02/21 1304   BP: 130/80   Pulse: 79   Resp: 16   Temp: 97.1 °F (36.2 °C)   TempSrc: Temporal   SpO2: 96%   Weight: 249 lb (112.9 kg)   Height: 6' (1.829 m)     Estimated body mass index is 33.77 kg/m² as calculated from the following:    Height as of 1/11/21: 6' (1.829 m). Weight as of this encounter: 249 lb (112.9 kg). Physical Exam  Constitutional:       Appearance: Normal appearance. HENT:      Head: Normocephalic and atraumatic. Eyes:      Extraocular Movements: Extraocular movements intact. Conjunctiva/sclera: Conjunctivae normal.   Cardiovascular:      Rate and Rhythm: Normal rate and regular rhythm. Pulses: Normal pulses.    Pulmonary:      Effort: Pulmonary effort is normal. No respiratory distress. Abdominal:      General: There is no distension. Musculoskeletal:         General: Swelling present. Right lower leg: No edema. Left lower leg: No edema. Skin:     General: Skin is warm and dry. Neurological:      General: No focal deficit present. Mental Status: He is alert. Mental status is at baseline. Psychiatric:         Mood and Affect: Mood normal.         Behavior: Behavior normal.         ASSESSMENT/PLAN:  Uri Echeverria was seen today for diabetes, edema and discuss labs. Diagnoses and all orders for this visit:    Type 2 diabetes mellitus with diabetic polyneuropathy, without long-term current use of insulin (HCC)  -     glipiZIDE (GLUCOTROL XL) 10 MG extended release tablet; Take 1 tablet by mouth daily  -     metFORMIN (GLUCOPHAGE) 1000 MG tablet; Take 1 tablet by mouth 2 times daily (with meals)  -     alogliptin (NESINA) 25 MG TABS tablet; Take 1 tablet by mouth daily  -     Dulaglutide 3 MG/0.5ML SOPN; Inject 3 mg into the skin once a week    Hyperlipidemia associated with type 2 diabetes mellitus (HCC)  -     simvastatin (ZOCOR) 20 MG tablet; Take 1 tablet by mouth every evening    Venous reflux    Medication refill  -     ibuprofen (ADVIL;MOTRIN) 800 MG tablet; Take 1 tablet by mouth every 8 hours as needed for Pain    Class 1 obesity due to excess calories with serious comorbidity and body mass index (BMI) of 33.0 to 33.9 in adult    Additional plan and future considerations: As above. Increase glipizide and dulaglutide. Encourage healthy low-carb diet, regular exercise, and weight loss.   Return to office in 3 months for diabetes follow-up or sooner if needed    Future Appointments   Date Time Provider Riri Perez   2/2/2021  2:30 PM Casilda Schwab, [address]   2/2/2021  2:30 PM Ananda Piña [facility]   2/22/2021  2:20 PM Casilda Schwab,  [place], DO on 2/2/2021 at 1:05 PM

## 2021-02-03 ASSESSMENT — ENCOUNTER SYMPTOMS
DIARRHEA: 0
COUGH: 0
ABDOMINAL PAIN: 0
SHORTNESS OF BREATH: 0
NAUSEA: 0
CONSTIPATION: 0
VOMITING: 0

## 2021-03-01 ENCOUNTER — OFFICE VISIT (OUTPATIENT)
Dept: ORTHOPEDIC SURGERY | Age: 56
End: 2021-03-01
Payer: COMMERCIAL

## 2021-03-01 VITALS — TEMPERATURE: 98 F | HEIGHT: 72 IN | WEIGHT: 249 LBS | BODY MASS INDEX: 33.72 KG/M2

## 2021-03-01 DIAGNOSIS — M17.11 PRIMARY OSTEOARTHRITIS OF RIGHT KNEE: Primary | ICD-10-CM

## 2021-03-01 PROCEDURE — G8417 CALC BMI ABV UP PARAM F/U: HCPCS | Performed by: ORTHOPAEDIC SURGERY

## 2021-03-01 PROCEDURE — 1036F TOBACCO NON-USER: CPT | Performed by: ORTHOPAEDIC SURGERY

## 2021-03-01 PROCEDURE — G8482 FLU IMMUNIZE ORDER/ADMIN: HCPCS | Performed by: ORTHOPAEDIC SURGERY

## 2021-03-01 PROCEDURE — 3017F COLORECTAL CA SCREEN DOC REV: CPT | Performed by: ORTHOPAEDIC SURGERY

## 2021-03-01 PROCEDURE — G8427 DOCREV CUR MEDS BY ELIG CLIN: HCPCS | Performed by: ORTHOPAEDIC SURGERY

## 2021-03-01 PROCEDURE — 99213 OFFICE O/P EST LOW 20 MIN: CPT | Performed by: ORTHOPAEDIC SURGERY

## 2021-03-01 RX ORDER — OXYCODONE HYDROCHLORIDE AND ACETAMINOPHEN 5; 325 MG/1; MG/1
1 TABLET ORAL EVERY 6 HOURS PRN
Qty: 28 TABLET | Refills: 0 | Status: SHIPPED | OUTPATIENT
Start: 2021-03-01 | End: 2021-03-08

## 2021-03-01 NOTE — PROGRESS NOTES
Chief Complaint   Patient presents with    Knee Pain     Right Knee F/U, [facility]       Subjective:     Patient ID: Adalgisa Still is a 63 y.o..  male    Knee Pain  Patient complains of left knee pain. This is evaluated as a personal injury. There was not a history of injury. The pain began several years ago. The pain is located medial, lateral, anterior. He describes  His symptoms as aching, shooting and stabbing. He has experienced popping, clicking, locking, and giving way in the affected knee. The patient has had pain with kneeling, squating, and climbing stairs. Symptoms improve with rest, ice. The symptoms are worse with activity, stair climbing, kneeling, deep knee bending. The knee has given out or felt unstable. The patient cannot bend and straighten the knee fully. The patient is active in none. Treatment to date has been Tylenol, NSAID's, knee sleeve/brace, cortisone several times and supartz without significant relief. The patient is not working.      Past Medical History:   Diagnosis Date    Anxiety     Blood in stool     Chronic back pain     Constipation     Headache(784.0)     History of dental problems     Hyperlipidemia     Neck pain     Neck stiffness     Type II or unspecified type diabetes mellitus without mention of complication, not stated as uncontrolled      Past Surgical History:   Procedure Laterality Date    ENDOSCOPY, COLON, DIAGNOSTIC      FRACTURE SURGERY Left     femur    KNEE ARTHROSCOPY Left 07/18/2016    medial lateral menisectomy/synovectomy/chodroplasty    SHOULDER ARTHROSCOPY Right 2/11/2020    RIGHT SHOULDER ARTHROSCOPY SUBACHROMIAL DECOMPRESSION DEBRIDEMENT, BICEP TENODESIS ,LABRAL DEBRIDEMENT AND KHALIDA performed by Leroy Salinas DO at [address] [facility] ARTHROSCOPY Right 10/02/2020    SHOULDER ARTHROSCOPY Right 10/2/2020    RIGHT SHOULDER ARTHROSCOPY SUBACROMIAL DECOMPRESSION AND DEBRIDEMENT LABRIAL DEBRIDEMENT ROTATOR CUFF REPAIR [place] performed by Raeford Lennox, DO at [facility]         Current Outpatient Medications:     oxyCODONE-acetaminophen (PERCOCET) 5-325 MG per tablet, take 1 tablet by mouth every 6 hours if needed for pain, Disp: , Rfl:     glipiZIDE (GLUCOTROL XL) 10 MG extended release tablet, Take 1 tablet by mouth daily, Disp: 30 tablet, Rfl: 2    metFORMIN (GLUCOPHAGE) 1000 MG tablet, Take 1 tablet by mouth 2 times daily (with meals), Disp: 60 tablet, Rfl: 5    ibuprofen (ADVIL;MOTRIN) 800 MG tablet, Take 1 tablet by mouth every 8 hours as needed for Pain, Disp: 90 tablet, Rfl: 3    alogliptin (NESINA) 25 MG TABS tablet, Take 1 tablet by mouth daily, Disp: 30 tablet, Rfl: 5    simvastatin (ZOCOR) 20 MG tablet, Take 1 tablet by mouth every evening, Disp: 30 tablet, Rfl: 5    Dulaglutide 3 MG/0.5ML SOPN, Inject 3 mg into the skin once a week, Disp: 4 pen, Rfl: 2    pantoprazole (PROTONIX) 20 MG tablet, Take 1 tablet by mouth nightly as needed (heartburn), Disp: 30 tablet, Rfl: 3    celecoxib (CELEBREX) 200 MG capsule, Take 1 capsule by mouth daily, Disp: 30 capsule, Rfl: 0    aspirin (RA ASPIRIN EC ADULT LOW ST) 81 MG EC tablet, take 1 tablet by mouth once daily, Disp: 30 tablet, Rfl: 11    Alcohol Swabs PADS, 1 Units by Does not apply route as needed (checking glucose), Disp: 100 each, Rfl: 11    blood glucose monitor strips, FBS daily.  Please dispense Freestyle brand, Disp: 100 strip, Rfl: 11    Lancets MISC, Test once daily, Disp: 100 each, Rfl: 3  Allergies   Allergen Reactions    Tobramycin Swelling     Social History     Socioeconomic History    Marital status:      Spouse name: Not on file    Number of children: Not on file    Years of education: Not on file    Highest education level: Not on file   Occupational History    Occupation: [employer]   Social Needs    Financial resource strain: Not on file    Food insecurity     Worry: Not on file     Inability: Not on file    Transportation needs     Medical: Not on file     Non-medical: Not on file   Tobacco Use    Smoking status: Never Smoker    Smokeless tobacco: Never Used   Substance and Sexual Activity    Alcohol use: Yes     Alcohol/week: 0.0 standard drinks     Comment: rarely .   Coffee occasionally    Drug use: No    Sexual activity: Yes     Partners: Female   Lifestyle    Physical activity     Days per week: Not on file     Minutes per session: Not on file    Stress: Not on file   Relationships    Social connections     Talks on phone: Not on file     Gets together: Not on file     Attends Spiritism service: Not on file     Active member of club or organization: Not on file     Attends meetings of clubs or organizations: Not on file     Relationship status: Not on file    Intimate partner violence     Fear of current or ex partner: Not on file     Emotionally abused: Not on file     Physically abused: Not on file     Forced sexual activity: Not on file   Other Topics Concern    Not on file   Social History Narrative    Not on file     Family History   Problem Relation Age of Onset    Diabetes Father     High Blood Pressure Father     Heart Disease Father     Stroke Father     Heart Attack Father     Diabetes Mother     Heart Disease Mother     Emphysema Mother     Cancer Brother 54        colon    High Blood Pressure Brother     Cancer Maternal Cousin 40        colon    Cancer Sister     Diabetes Sister     Heart Disease Sister     Stroke Sister     Diabetes Maternal Aunt     Heart Disease Maternal Aunt     High Blood Pressure Maternal Aunt     High Cholesterol Maternal Aunt     Stroke Maternal Aunt     Diabetes Maternal Uncle     Heart Disease Maternal Uncle     High Blood Pressure Maternal Uncle     High Cholesterol Maternal Uncle     Stroke Maternal Uncle     Diabetes Paternal Aunt     Heart Disease Paternal Aunt     High Blood Pressure Paternal Aunt     High Cholesterol Paternal Aunt     Stroke Paternal Aunt     Diabetes Paternal Uncle     Heart Disease Paternal Uncle     High Blood Pressure Paternal Uncle     High Cholesterol Paternal Uncle     Stroke Paternal Uncle     Diabetes Maternal Grandmother     High Blood Pressure Maternal Grandmother     High Cholesterol Maternal Grandmother     Cancer Maternal Grandfather     Diabetes Maternal Grandfather     High Blood Pressure Maternal Grandfather     High Cholesterol Maternal Grandfather     Stroke Maternal Grandfather     Diabetes Paternal Grandfather     High Blood Pressure Paternal Grandfather     High Cholesterol Paternal Grandfather          REVIEW OF SYSTEMS:     General/Constitution:  (-)weight loss, (-)fever, (-)chills, (-)weakness. Skin: (-) rash,(-) psoriasis,(-) eczema, (-)skin cancer. Musculoskeletal: (-) fractures,  (-) dislocations,(-) collagen vascular disease, (-) fibromyalgia, (-) multiple sclerosis, (-) muscular dystrophy, (-) RSD,(-) joint pain (-)swelling, (-) joint pain,swelling. Neurologic: (-) epilepsy, (-)seizures,(-) brain tumor,(-) TIA, (-)stroke, (-)headaches, (-)Parkinson disease,(-) memory loss, (-) LOC. Cardiovascular: (-) Chest pain, (-) swelling in legs/feet, (-) SOB, (-) cramping in legs/feet with walking. Respiratory: (-) SOB, (-) Coughing, (-) night sweats. GI: (-) nausea, (-) vomiting, (-) diarrhea, (-) blood in stool, (-) gastric ulcer. Psychiatric: (-) Depression, (-) Anxiety, (-) bipolar disease, (-) Alzheimer's Disease  Allergic/Immunologic: (-) allergies latex, (-) allergies metal, (-) skin sensitivity. Hematlogic: (-) anemia, (-) blood transfusion, (-) DVT/PE, (-) Clotting disorders    Subjective:    Constitution:  Temp 98 °F (36.7 °C)   Ht 6' (1.829 m)   Wt 249 lb (112.9 kg)   BMI 33.77 kg/m²     Psycihatric:  The patient is alert and oriented x 3, appears to be stated age and in no distress. Respiratory:  Respiratory effort is not labored. Patient is not gasping. Palpation of the chest reveals no tactile fremitus. Skin:  Upon inspection: the skin appears warm, dry and intact. There is  a previous scar over the affected area. There is any cellulitis, lymphedema or cutaneous lesions noted in the lower extremities. Upon palpation there is no induration noted. Neurologic:  Gait: antalgic; Motor exam of the lower extremities show ; quadriceps, hamstrings, foot dorsi and plantar flexors intact R.  5/5 and L. 5/5. Deep tendon reflexes are 2/4 at the knees and 2/4 at the ankles with strong extensor hallicus longus motor strength bilaterally. Sensory to both feet is intact to all sensory roots. Cardiovascular: The vascular exam is normal and is well perfused to distal extremities. Distal pulses DP/PT: R. 2+; L. 2+. There is cap refill noted less than two seconds in all digits. There is not edema of the bilateral lower extremities. There is not varicosities noted in the distal extremities. Lymph:  Upon palpation,  there is no lymphadenopathy noted in bilateral lower extremities. Musculoskeletal:  Gait: antalgic; examination of the nails and digits reveal no cyanosis or clubbing. Lumbar exam:  On visual inspection, there is not deformity of the spine. full range of motion, no tenderness, palpable spasm or pain on motion. Special tests: Straight Leg Raise negative, Rich test negative. Hip exam:   Upon inspection, there is not deformity noted. Upon palpation there is not tenderness. ROM: is  full and symmetrical.   Strength: Hip Flexors 5/5; Hip Abductors 5/5; Hip Adduction 5/5. Knee exam:  Left knee exam shows;  range of motion of R. Knee is 0 to 120, and L. Knee is -5 to 115. The patient does have  pain on motion, effusion is mild, there is tenderness over the  medial, lateral, anterior region, there are not any masses, there is not ligamentous instability, there is  deformity noted.     Knee exam: left positive for moderate crepitations, some mild tenderness laxity is not noted with stress. There is not a popliteal cyst.    R. Knee:  Lachman's negative, Anterior Drawer negative, Posterior Drawer negative  Celestina's negative, Thallasy  negative,   PF grind test negative, Apprehension test negative, Patellar J sign  negative  L. Knee:  Lachman's negative, Anterior Drawer negative, Posterior Drawer negative  Celestina's negative, Thallasy  negative,   PF grind test negative, Apprehension test negative,  Patellar J sign  negative    Xray Exam:  Moderate to severe medial joint narrowing  Radiographic findings reviewed with patient    Assessment:  Encounter Diagnoses   Name Primary?  Primary osteoarthritis of right knee Yes       Plan:  Natural history and expected course discussed. Questions answered. Educational materials distributed. Rest, ice, compression, and elevation (RICE) therapy. I had a lengthy discussion with the patient regarding their diagnosis. I explained treatment options including surgical vs non surgical treatment. I reviewed in detail the risks and benefits and outlined the procedure in detail with expected outcomes and possible complications. I also discussed non surgical treatment such as injections (CSI and visco supplementation), physical therapy, topical creams and NSAID's. They have elected for conservative management at this time. The patient has failed conservative measures such as NSAIDS, HEP, cortisone injections multiple times and supartz. The patient is also diabetic and cannot have traditional steriod injection. He is an excellent candidate for Zilretta injections  in the Left knee.  We will contact the patient's insurance company and see them back in the office once we have received approval.

## 2021-03-24 ENCOUNTER — TELEPHONE (OUTPATIENT)
Dept: ORTHOPEDIC SURGERY | Age: 56
End: 2021-03-24

## 2021-04-01 ENCOUNTER — OFFICE VISIT (OUTPATIENT)
Dept: ORTHOPEDIC SURGERY | Age: 56
End: 2021-04-01
Payer: COMMERCIAL

## 2021-04-01 DIAGNOSIS — M17.12 PRIMARY OSTEOARTHRITIS OF LEFT KNEE: Primary | ICD-10-CM

## 2021-04-01 PROCEDURE — 20610 DRAIN/INJ JOINT/BURSA W/O US: CPT | Performed by: ORTHOPAEDIC SURGERY

## 2021-04-02 NOTE — PROGRESS NOTES
Chief Complaint   Patient presents with    Injections     Left knee Johanna Arango        I will proceed with a cortisone injection in the Left knee. Verbal and written consent was obtained for the injections. The skin was prepped with alcohol. A prepared mixture of 32 mg of Zilretta and 5mL diluent was injected to Left knee. The injection was given through the lateral side of the knee. The patient tolerated the injection well. I will see the patient back prn. Benedict Bae was seen today for injections.     Diagnoses and all orders for this visit:    Primary osteoarthritis of left knee

## 2021-05-07 ENCOUNTER — TELEPHONE (OUTPATIENT)
Dept: ORTHOPEDIC SURGERY | Age: 56
End: 2021-05-07

## 2021-05-07 NOTE — TELEPHONE ENCOUNTER
PT CALLED TO LET US KNOW THAT HIS INJECTION WORE OFF AND IS TAKING THE PAIN MEDS AND IS DOING OK FOR BRIAN I TOLD HIM IF IT STARTS BOTHERING HIM TO CALL AND WE WILL BRING HIM IN TO RE ASSESS HIM

## 2021-05-26 ENCOUNTER — OFFICE VISIT (OUTPATIENT)
Dept: FAMILY MEDICINE CLINIC | Age: 56
End: 2021-05-26
Payer: COMMERCIAL

## 2021-05-26 VITALS
BODY MASS INDEX: 33.32 KG/M2 | OXYGEN SATURATION: 97 % | HEART RATE: 60 BPM | DIASTOLIC BLOOD PRESSURE: 70 MMHG | WEIGHT: 246 LBS | TEMPERATURE: 97.8 F | HEIGHT: 72 IN | SYSTOLIC BLOOD PRESSURE: 110 MMHG | RESPIRATION RATE: 16 BRPM

## 2021-05-26 DIAGNOSIS — E11.42 TYPE 2 DIABETES MELLITUS WITH DIABETIC POLYNEUROPATHY, WITHOUT LONG-TERM CURRENT USE OF INSULIN (HCC): Primary | ICD-10-CM

## 2021-05-26 DIAGNOSIS — K59.00 CONSTIPATION, UNSPECIFIED CONSTIPATION TYPE: ICD-10-CM

## 2021-05-26 DIAGNOSIS — R12 HEART BURN: ICD-10-CM

## 2021-05-26 LAB — HBA1C MFR BLD: 6.8 %

## 2021-05-26 PROCEDURE — 3017F COLORECTAL CA SCREEN DOC REV: CPT | Performed by: FAMILY MEDICINE

## 2021-05-26 PROCEDURE — G8417 CALC BMI ABV UP PARAM F/U: HCPCS | Performed by: FAMILY MEDICINE

## 2021-05-26 PROCEDURE — 1036F TOBACCO NON-USER: CPT | Performed by: FAMILY MEDICINE

## 2021-05-26 PROCEDURE — G8427 DOCREV CUR MEDS BY ELIG CLIN: HCPCS | Performed by: FAMILY MEDICINE

## 2021-05-26 PROCEDURE — 99214 OFFICE O/P EST MOD 30 MIN: CPT | Performed by: FAMILY MEDICINE

## 2021-05-26 PROCEDURE — 2022F DILAT RTA XM EVC RTNOPTHY: CPT | Performed by: FAMILY MEDICINE

## 2021-05-26 PROCEDURE — 83036 HEMOGLOBIN GLYCOSYLATED A1C: CPT | Performed by: FAMILY MEDICINE

## 2021-05-26 PROCEDURE — 3044F HG A1C LEVEL LT 7.0%: CPT | Performed by: FAMILY MEDICINE

## 2021-05-26 RX ORDER — GLIPIZIDE 10 MG/1
10 TABLET, FILM COATED, EXTENDED RELEASE ORAL DAILY
Qty: 30 TABLET | Refills: 3 | Status: SHIPPED
Start: 2021-05-26 | End: 2021-10-05

## 2021-05-26 RX ORDER — PANTOPRAZOLE SODIUM 20 MG/1
20 TABLET, DELAYED RELEASE ORAL NIGHTLY PRN
Qty: 30 TABLET | Refills: 3 | Status: SHIPPED
Start: 2021-05-26 | End: 2021-10-05

## 2021-05-26 RX ORDER — OXYCODONE HYDROCHLORIDE AND ACETAMINOPHEN 5; 325 MG/1; MG/1
1 TABLET ORAL EVERY 4 HOURS PRN
COMMUNITY
End: 2022-02-09

## 2021-05-26 RX ORDER — AMOXICILLIN 250 MG
1 CAPSULE ORAL DAILY PRN
Qty: 30 TABLET | Refills: 3 | Status: SHIPPED
Start: 2021-05-26 | End: 2022-10-17 | Stop reason: SDUPTHER

## 2021-05-26 RX ORDER — OXYCODONE HYDROCHLORIDE AND ACETAMINOPHEN 325; 5 MG/5ML; MG/5ML
SOLUTION ORAL
COMMUNITY
Start: 2021-03-01 | End: 2021-05-26

## 2021-05-26 ASSESSMENT — ENCOUNTER SYMPTOMS
ABDOMINAL PAIN: 0
DIARRHEA: 0
COUGH: 0
VOMITING: 0
NAUSEA: 0
SHORTNESS OF BREATH: 0
CONSTIPATION: 0

## 2021-05-26 NOTE — PROGRESS NOTES
2021     Sammie Angela (:  1965) is a 63 y.o. male, with a:  Past Medical History:   Diagnosis Date    Anxiety     Blood in stool     Chronic back pain     Constipation     Headache(784.0)     History of dental problems     Hyperlipidemia     Neck pain     Neck stiffness     Type II or unspecified type diabetes mellitus without mention of complication, not stated as uncontrolled        Here for evaluation of the following medical concerns:  Chief Complaint   Patient presents with    Diabetes    Constipation     wants a stool softner called in      He also follows with orthopedics, vascular surgeyr  He has previously seen rheumatology, pain management     F/U of chronic problem(s)   Chronic problems reviewed today include:  DM, HLD, venous insufficiency and obsesity  Current status of this/these condition(s):  stable  Tolerating meds: Yes    Diabetes Mellitus Type 2   Current treatment: Metformin 1000 mg BID, alogliptin 25 mg daily, glipizide 10 mg daily and trulicity 3 mg weekly  Recent medication changes: glipizide increase, Trulicity increased  Down 3 lbs since last visit  Poct HbA1c: 6.8  Home blood sugar records: checks a few times weekly, well controlled    Lab Results   Component Value Date    LABA1C 6.8 2021    LABA1C 9.4 (H) 2021    LABA1C 8.1 (H) 10/16/2020     Lab Results   Component Value Date    LABMICR <12.0 2018    CREATININE 0.7 2021     Lab Results   Component Value Date    ALT 47 (H) 2021    AST 37 2021     Lab Results   Component Value Date    CHOL 157 2021    TRIG 214 (H) 2021    HDL 38 2021    LDLCALC 76 2021        Hyperlipidemia  Current treatment: Simvastatin 20 mg nightly  Recent medication changes: none    Lab Results   Component Value Date    CHOL 157 2021    TRIG 214 (H) 2021    HDL 38 2021    LDLCALC 76 2021     Lab Results   Component Value Date    ALT 47 (H) 2021 AST 37 02/01/2021        GERD  Current treatment: Pantoprazole 20 mg nightly as needed  Recent medication changes: none    Obesity with BMI and comorbidities as noted above    Review of Systems   Constitutional: Negative for chills and fever. Respiratory: Negative for cough and shortness of breath. Cardiovascular: Negative for chest pain. Gastrointestinal: Negative for abdominal pain, constipation, diarrhea, nausea and vomiting. Genitourinary: Negative for dysuria. Musculoskeletal: Positive for arthralgias. Neurological: Negative for headaches. Prior to Visit Medications    Medication Sig Taking? Authorizing Provider   oxyCODONE-acetaminophen (PERCOCET) 5-325 MG per tablet Take 1 tablet by mouth every 4 hours as needed for Pain. Yes Historical Provider, MD   glipiZIDE (GLUCOTROL XL) 10 MG extended release tablet Take 1 tablet by mouth daily Yes Tae Carbajal DO   metFORMIN (GLUCOPHAGE) 1000 MG tablet Take 1 tablet by mouth 2 times daily (with meals) Yes Tae Carbajal DO   ibuprofen (ADVIL;MOTRIN) 800 MG tablet Take 1 tablet by mouth every 8 hours as needed for Pain Yes Tae Carbajal DO   alogliptin (NESINA) 25 MG TABS tablet Take 1 tablet by mouth daily Yes Tae Carbajal DO   simvastatin (ZOCOR) 20 MG tablet Take 1 tablet by mouth every evening Yes Tae Carbajal DO   Dulaglutide 3 MG/0.5ML SOPN Inject 3 mg into the skin once a week Yes Tae Carbajal DO   pantoprazole (PROTONIX) 20 MG tablet Take 1 tablet by mouth nightly as needed (heartburn) Yes Tae Carbajal DO   aspirin (RA ASPIRIN EC ADULT LOW ST) 81 MG EC tablet take 1 tablet by mouth once daily Yes Tae Carbajal DO   Alcohol Swabs PADS 1 Units by Does not apply route as needed (checking glucose) Yes Tae Carbajal DO   blood glucose monitor strips FBS daily.  Please dispense Freestyle brand Yes Jordyn Sanchez DO   Lancets MISC Test once daily Yes Jordyn Sanchez DO        Social History     Tobacco Use    Smoking status: Never Smoker    Smokeless tobacco: Never Used   Substance Use Topics    Alcohol use: Yes     Alcohol/week: 0.0 standard drinks     Comment: rarely . Coffee occasionally        Past Surgical History:   Procedure Laterality Date    ENDOSCOPY, COLON, DIAGNOSTIC      FRACTURE SURGERY Left     femur    KNEE ARTHROSCOPY Left 07/18/2016    medial lateral menisectomy/synovectomy/chodroplasty    SHOULDER ARTHROSCOPY Right 2/11/2020    RIGHT SHOULDER ARTHROSCOPY SUBACHROMIAL DECOMPRESSION DEBRIDEMENT, BICEP TENODESIS ,LABRAL DEBRIDEMENT AND KHALIDA performed by Esa Morrison DO at [address] [facility] ARTHROSCOPY Right 10/02/2020    SHOULDER ARTHROSCOPY Right 10/2/2020    RIGHT SHOULDER ARTHROSCOPY SUBACROMIAL DECOMPRESSION AND DEBRIDEMENT LABRIAL DEBRIDEMENT ROTATOR CUFF REPAIR [place] performed by Tre Hsieh DO at [address]:    05/26/21 1523   BP: 110/70   Pulse: 60   Resp: 16   Temp: 97.8 °F (36.6 °C)   TempSrc: Temporal   SpO2: 97%   Weight: 246 lb (111.6 kg)   Height: 6' (1.829 m)     Estimated body mass index is 33.36 kg/m² as calculated from the following:    Height as of this encounter: 6' (1.829 m). Weight as of this encounter: 246 lb (111.6 kg). Physical Exam  Constitutional:       Appearance: Normal appearance. HENT:      Head: Normocephalic and atraumatic. Eyes:      Extraocular Movements: Extraocular movements intact. Conjunctiva/sclera: Conjunctivae normal.   Cardiovascular:      Rate and Rhythm: Normal rate and regular rhythm. Pulses: Normal pulses. Pulmonary:      Effort: Pulmonary effort is normal. No respiratory distress. Abdominal:      General: There is no distension. Musculoskeletal:         General: No swelling. Right lower leg: No edema. Left lower leg: No edema. Skin:     General: Skin is warm and dry. Neurological:      General: No focal deficit present. Mental Status: He is alert.  Mental status

## 2021-05-26 NOTE — PATIENT INSTRUCTIONS
[facility] ([facility] COVID-19 vaccine scheduling information:  [facility] will be following the [place] Department of Health age-based guidelines for release of vaccine. Patients are to call [phone] to schedule their appointment. Vaccines are only available through appointments.

## 2021-07-23 DIAGNOSIS — E11.42 TYPE 2 DIABETES MELLITUS WITH DIABETIC POLYNEUROPATHY, WITHOUT LONG-TERM CURRENT USE OF INSULIN (HCC): Chronic | ICD-10-CM

## 2021-07-26 RX ORDER — ASPIRIN 81 MG/1
TABLET ORAL
Qty: 30 TABLET | Refills: 11 | Status: ON HOLD
Start: 2021-07-26 | End: 2022-03-02 | Stop reason: HOSPADM

## 2021-08-03 DIAGNOSIS — E11.42 TYPE 2 DIABETES MELLITUS WITH DIABETIC POLYNEUROPATHY, WITHOUT LONG-TERM CURRENT USE OF INSULIN (HCC): Chronic | ICD-10-CM

## 2021-08-03 RX ORDER — ALOGLIPTIN 25 MG/1
25 TABLET, FILM COATED ORAL DAILY
Qty: 30 TABLET | Refills: 5 | Status: SHIPPED
Start: 2021-08-03 | End: 2022-02-09 | Stop reason: SDUPTHER

## 2021-08-03 NOTE — TELEPHONE ENCOUNTER
Patient called for refill, stating going out of town tomorrow.     Last seen 5/26/2021  Next appt 9/23/2021  [facility]

## 2021-08-10 DIAGNOSIS — E11.42 TYPE 2 DIABETES MELLITUS WITH DIABETIC POLYNEUROPATHY, WITHOUT LONG-TERM CURRENT USE OF INSULIN (HCC): Chronic | ICD-10-CM

## 2021-08-10 DIAGNOSIS — E78.5 HYPERLIPIDEMIA ASSOCIATED WITH TYPE 2 DIABETES MELLITUS (HCC): Chronic | ICD-10-CM

## 2021-08-10 DIAGNOSIS — E11.69 HYPERLIPIDEMIA ASSOCIATED WITH TYPE 2 DIABETES MELLITUS (HCC): Chronic | ICD-10-CM

## 2021-08-10 RX ORDER — SIMVASTATIN 20 MG
20 TABLET ORAL EVERY EVENING
Qty: 30 TABLET | Refills: 1 | Status: SHIPPED
Start: 2021-08-10 | End: 2021-10-05

## 2021-08-19 DIAGNOSIS — Z76.0 MEDICATION REFILL: ICD-10-CM

## 2021-08-20 RX ORDER — IBUPROFEN 800 MG/1
800 TABLET ORAL EVERY 8 HOURS PRN
Qty: 90 TABLET | Refills: 0 | Status: ON HOLD
Start: 2021-08-20 | End: 2022-03-02 | Stop reason: HOSPADM

## 2021-09-13 ENCOUNTER — OFFICE VISIT (OUTPATIENT)
Dept: ORTHOPEDIC SURGERY | Age: 56
End: 2021-09-13
Payer: COMMERCIAL

## 2021-09-13 VITALS — WEIGHT: 244 LBS | HEIGHT: 72 IN | BODY MASS INDEX: 33.05 KG/M2

## 2021-09-13 DIAGNOSIS — M17.12 PRIMARY OSTEOARTHRITIS OF LEFT KNEE: Primary | ICD-10-CM

## 2021-09-13 PROCEDURE — G8427 DOCREV CUR MEDS BY ELIG CLIN: HCPCS | Performed by: ORTHOPAEDIC SURGERY

## 2021-09-13 PROCEDURE — G8417 CALC BMI ABV UP PARAM F/U: HCPCS | Performed by: ORTHOPAEDIC SURGERY

## 2021-09-13 PROCEDURE — 99213 OFFICE O/P EST LOW 20 MIN: CPT | Performed by: ORTHOPAEDIC SURGERY

## 2021-09-13 PROCEDURE — 3017F COLORECTAL CA SCREEN DOC REV: CPT | Performed by: ORTHOPAEDIC SURGERY

## 2021-09-13 PROCEDURE — 1036F TOBACCO NON-USER: CPT | Performed by: ORTHOPAEDIC SURGERY

## 2021-09-13 RX ORDER — CELECOXIB 200 MG/1
200 CAPSULE ORAL 2 TIMES DAILY
Qty: 60 CAPSULE | Refills: 3 | Status: SHIPPED
Start: 2021-09-13 | End: 2022-02-09

## 2021-09-13 NOTE — PROGRESS NOTES
Chief Complaint   Patient presents with    Knee Pain     Patient here for left knee pain continues to get worse. Patient would like to discuss surgery options. Subjective:     Patient ID: Anabel Andersen is a 63 y.o..  male    Knee Pain  Patient is here for follow up of his left knee pain. He has failed all attempts at conservative measures.       Past Medical History:   Diagnosis Date    Anxiety     Blood in stool     Chronic back pain     Constipation     Headache(784.0)     History of dental problems     Hyperlipidemia     Neck pain     Neck stiffness     Type II or unspecified type diabetes mellitus without mention of complication, not stated as uncontrolled      Past Surgical History:   Procedure Laterality Date    ENDOSCOPY, COLON, DIAGNOSTIC      FRACTURE SURGERY Left     femur    KNEE ARTHROSCOPY Left 07/18/2016    medial lateral menisectomy/synovectomy/chodroplasty    SHOULDER ARTHROSCOPY Right 2/11/2020    RIGHT SHOULDER ARTHROSCOPY SUBACHROMIAL DECOMPRESSION DEBRIDEMENT, BICEP TENODESIS ,LABRAL DEBRIDEMENT AND KHALIDA performed by Billy Gatica DO at [address] [facility] ARTHROSCOPY Right 10/02/2020    SHOULDER ARTHROSCOPY Right 10/2/2020    RIGHT SHOULDER ARTHROSCOPY SUBACROMIAL DECOMPRESSION AND DEBRIDEMENT LABRIAL DEBRIDEMENT ROTATOR CUFF REPAIR KHALIDA performed by Nette Brantley DO at [facility]         Current Outpatient Medications:     ibuprofen (ADVIL;MOTRIN) 800 MG tablet, Take 1 tablet by mouth every 8 hours as needed for Pain, Disp: 90 tablet, Rfl: 0    metFORMIN (GLUCOPHAGE) 1000 MG tablet, take 1 tablet by mouth twice a day with meals, Disp: 60 tablet, Rfl: 1    simvastatin (ZOCOR) 20 MG tablet, take 1 tablet by mouth every evening, Disp: 30 tablet, Rfl: 1    alogliptin (NESINA) 25 MG TABS tablet, Take 1 tablet by mouth daily, Disp: 30 tablet, Rfl: 5    aspirin (ASPIRIN LOW DOSE) 81 MG EC tablet, take 1 tablet by mouth once daily, Disp: 30 tablet, Rfl: 11    oxyCODONE-acetaminophen (PERCOCET) 5-325 MG per tablet, Take 1 tablet by mouth every 4 hours as needed for Pain., Disp: , Rfl:     Dulaglutide 3 MG/0.5ML SOPN, Inject 3 mg into the skin once a week, Disp: 4 pen, Rfl: 3    glipiZIDE (GLUCOTROL XL) 10 MG extended release tablet, Take 1 tablet by mouth daily, Disp: 30 tablet, Rfl: 3    pantoprazole (PROTONIX) 20 MG tablet, Take 1 tablet by mouth nightly as needed (heartburn), Disp: 30 tablet, Rfl: 3    senna-docusate (PERICOLACE) 8.6-50 MG per tablet, Take 1 tablet by mouth daily as needed for Constipation, Disp: 30 tablet, Rfl: 3    Alcohol Swabs PADS, 1 Units by Does not apply route as needed (checking glucose), Disp: 100 each, Rfl: 11    blood glucose monitor strips, FBS daily. Please dispense Freestyle brand, Disp: 100 strip, Rfl: 11    Lancets MISC, Test once daily, Disp: 100 each, Rfl: 3  Allergies   Allergen Reactions    Tobramycin Swelling     Social History     Socioeconomic History    Marital status:      Spouse name: Not on file    Number of children: Not on file    Years of education: Not on file    Highest education level: Not on file   Occupational History    Occupation: [employer]   Tobacco Use    Smoking status: Never Smoker    Smokeless tobacco: Never Used   Vaping Use    Vaping Use: Never assessed   Substance and Sexual Activity    Alcohol use: Yes     Alcohol/week: 0.0 standard drinks     Comment: rarely . Coffee occasionally    Drug use: No    Sexual activity: Yes     Partners: Female   Other Topics Concern    Not on file   Social History Narrative    Not on file     Social Determinants of Health     Financial Resource Strain:     Difficulty of Paying Living Expenses:    Food Insecurity:     Worried About Running Out of Food in the Last Year:     [address] in the Last Year:    Transportation Needs:     Lack of Transportation (Medical):      Lack of Transportation (Non-Medical):    Physical Activity:     Days of Exercise per Week:     Minutes of Exercise per Session:    Stress:     Feeling of Stress :    Social Connections:     Frequency of Communication with Friends and Family:     Frequency of Social Gatherings with Friends and Family:     Attends [employer] Services:     Active Member of Clubs or Organizations:     Attends Club or Organization Meetings:     Marital Status:    Intimate Partner Violence:     Fear of Current or Ex-Partner:     Emotionally Abused:     Physically Abused:     Sexually Abused:      Family History   Problem Relation Age of Onset    Diabetes Father     High Blood Pressure Father     Heart Disease Father     Stroke Father     Heart Attack Father     Diabetes Mother     Heart Disease Mother     Emphysema Mother     Cancer Brother 54        colon    High Blood Pressure Brother     Cancer Maternal Cousin 40        colon    Cancer Sister     Diabetes Sister     Heart Disease Sister     Stroke Sister     Diabetes Maternal Aunt     Heart Disease Maternal Aunt     High Blood Pressure Maternal Aunt     High Cholesterol Maternal Aunt     Stroke Maternal Aunt     Diabetes Maternal Uncle     Heart Disease Maternal Uncle     High Blood Pressure Maternal Uncle     High Cholesterol Maternal Uncle     Stroke Maternal Uncle     Diabetes Paternal Aunt     Heart Disease Paternal Aunt     High Blood Pressure Paternal Aunt     High Cholesterol Paternal [place]     Stroke Paternal Aunt     Diabetes Paternal Uncle     Heart Disease Paternal Uncle     High Blood Pressure Paternal Uncle     High Cholesterol Paternal Uncle     Stroke Paternal Uncle     Diabetes Maternal Grandmother     High Blood Pressure Maternal Grandmother     High Cholesterol Maternal Grandmother     Cancer Maternal Grandfather     Diabetes Maternal Grandfather     High Blood Pressure Maternal Grandfather     High Cholesterol Maternal Grandfather     Stroke Maternal Grandfather     Diabetes Paternal Grandfather     High Blood Pressure Paternal Grandfather     High Cholesterol Paternal Grandfather          REVIEW OF SYSTEMS:     General/Constitution:  (-)weight loss, (-)fever, (-)chills, (-)weakness. Skin: (-) rash,(-) psoriasis,(-) eczema, (-)skin cancer. Musculoskeletal: (-) fractures,  (-) dislocations,(-) collagen vascular disease, (-) fibromyalgia, (-) multiple sclerosis, (-) muscular dystrophy, (-) RSD,(-) joint pain (-)swelling, (-) joint pain,swelling. Neurologic: (-) epilepsy, (-)seizures,(-) brain tumor,(-) TIA, (-)stroke, (-)headaches, (-)Parkinson disease,(-) memory loss, (-) LOC. Cardiovascular: (-) Chest pain, (-) swelling in legs/feet, (-) SOB, (-) cramping in legs/feet with walking. Respiratory: (-) SOB, (-) Coughing, (-) night sweats. GI: (-) nausea, (-) vomiting, (-) diarrhea, (-) blood in stool, (-) gastric ulcer. Psychiatric: (-) Depression, (-) Anxiety, (-) bipolar disease, (-) Alzheimer's Disease  Allergic/Immunologic: (-) allergies latex, (-) allergies metal, (-) skin sensitivity. Hematlogic: (-) anemia, (-) blood transfusion, (-) DVT/PE, (-) Clotting disorders    Subjective:    Constitution:  Ht 6' (1.829 m)   Wt 244 lb (110.7 kg)   BMI 33.09 kg/m²     Psycihatric:  The patient is alert and oriented x 3, appears to be stated age and in no distress. Respiratory:  Respiratory effort is not labored. Patient is not gasping. Palpation of the chest reveals no tactile fremitus. Skin:  Upon inspection: the skin appears warm, dry and intact. There is  a previous scar over the affected area. There is any cellulitis, lymphedema or cutaneous lesions noted in the lower extremities. Upon palpation there is no induration noted. Neurologic:  Gait: antalgic; Motor exam of the lower extremities show ; quadriceps, hamstrings, foot dorsi and plantar flexors intact R.  5/5 and L. 5/5.  Deep tendon reflexes are 2/4 at the knees and 2/4 at the ankles with strong extensor hallicus longus motor strength bilaterally. Sensory to both feet is intact to all sensory roots. Cardiovascular: The vascular exam is normal and is well perfused to distal extremities. Distal pulses DP/PT: R. 2+; L. 2+. There is cap refill noted less than two seconds in all digits. There is not edema of the bilateral lower extremities. There is not varicosities noted in the distal extremities. Lymph:  Upon palpation,  there is no lymphadenopathy noted in bilateral lower extremities. Musculoskeletal:  Gait: antalgic; examination of the nails and digits reveal no cyanosis or clubbing. Lumbar exam:  On visual inspection, there is not deformity of the spine. full range of motion, no tenderness, palpable spasm or pain on motion. Special tests: Straight Leg Raise negative, Rich test negative. Hip exam:   Upon inspection, there is not deformity noted. Upon palpation there is not tenderness. ROM: is  full and symmetrical.   Strength: Hip Flexors 5/5; Hip Abductors 5/5; Hip Adduction 5/5. Knee exam:  Left knee exam shows;  range of motion of R. Knee is 0 to 120, and L. Knee is -5 to 115. The patient does have  pain on motion, effusion is mild, there is tenderness over the  medial, lateral, anterior region, there are not any masses, there is not ligamentous instability, there is  deformity noted. Knee exam: left positive for moderate crepitations, some mild tenderness laxity is not noted with stress.   There is not a popliteal cyst.    R. Knee:  Lachman's negative, Anterior Drawer negative, Posterior Drawer negative  Celestina's negative, Thallasy  negative,   PF grind test negative, Apprehension test negative, Patellar J sign  negative  L. Knee:  Lachman's negative, Anterior Drawer negative, Posterior Drawer negative  Celestina's negative, Thallasy  negative,   PF grind test negative, Apprehension test negative,  Patellar J sign  negative    Xray Exam:  Moderate to severe medial joint narrowing  Radiographic findings reviewed with patient    Assessment:  Encounter Diagnoses   Name Primary?  Primary osteoarthritis of left knee Yes       Plan:  Natural history and expected course discussed. Questions answered. Educational materials distributed. Rest, ice, compression, and elevation (RICE) therapy. I had a lengthy discussion with the patient regarding their diagnosis. I explained treatment options including surgical vs non surgical treatment. I reviewed in detail the risks and benefits and outlined the procedure in detail with expected outcomes and possible complications. I also discussed non surgical treatment such as injections (CSI and visco supplementation), physical therapy, topical creams and NSAID's. They have elected for surgical management at this time. He would like to discuss surgery with his family. He will call and let us know.

## 2021-09-23 ENCOUNTER — TELEPHONE (OUTPATIENT)
Dept: FAMILY MEDICINE CLINIC | Age: 56
End: 2021-09-23

## 2021-09-23 NOTE — TELEPHONE ENCOUNTER
----- Message from GLIIF sent at 9/23/2021 10:14 AM EDT -----  Subject: Appointment Request    Reason for Call: Routine Existing Condition Follow Up (Diabetes)    QUESTIONS  Type of Appointment? Established Patient  Reason for appointment request? Available appointments did not meet   patient need  Additional Information for Provider? Patient needs to reschedule his   appointment for a diabetes follow up but there were no appointments to   reschedule. please contact the patient to schedule.   ---------------------------------------------------------------------------  --------------  CALL BACK INFO  What is the best way for the office to contact you? OK to leave message on   voicemail  Preferred Call Back Phone Number? [phone]  ---------------------------------------------------------------------------  --------------  SCRIPT ANSWERS  Relationship to Patient? Self  Have your symptoms changed? No  (Is the patient requesting to be seen urgently for their symptoms?)? No  Is this follow up request related to routine Diabetes Management? Yes  Have you been diagnosed with, awaiting test results for, or told that you   are suspected of having COVID-19 (Coronavirus)? (If patient has tested   negative or was tested as a requirement for work, school, or travel and   not based on symptoms, answer no)? No  Within the past two weeks have you developed any of the following symptoms   (answer no if symptoms have been present longer than 2 weeks or began   more than 2 weeks ago)? Fever or Chills, Cough, Shortness of breath or   difficulty breathing, Loss of taste or smell, Sore throat, Nasal   congestion, Sneezing or runny nose, Fatigue or generalized body aches   (answer no if pain is specific to a body part e.g. back pain), Diarrhea,   Headache? No  Have you had close contact with someone with COVID-19 in the last 14 days? No  (Service Expert - click yes below to proceed with "[employer] As Usual   Scheduling)? Yes

## 2021-10-05 DIAGNOSIS — E78.5 HYPERLIPIDEMIA ASSOCIATED WITH TYPE 2 DIABETES MELLITUS (HCC): Chronic | ICD-10-CM

## 2021-10-05 DIAGNOSIS — R12 HEART BURN: ICD-10-CM

## 2021-10-05 DIAGNOSIS — E11.69 HYPERLIPIDEMIA ASSOCIATED WITH TYPE 2 DIABETES MELLITUS (HCC): Chronic | ICD-10-CM

## 2021-10-05 DIAGNOSIS — E11.42 TYPE 2 DIABETES MELLITUS WITH DIABETIC POLYNEUROPATHY, WITHOUT LONG-TERM CURRENT USE OF INSULIN (HCC): Chronic | ICD-10-CM

## 2021-10-05 RX ORDER — PANTOPRAZOLE SODIUM 20 MG/1
TABLET, DELAYED RELEASE ORAL
Qty: 30 TABLET | Refills: 0 | Status: SHIPPED
Start: 2021-10-05 | End: 2021-12-07

## 2021-10-05 RX ORDER — SIMVASTATIN 20 MG
20 TABLET ORAL EVERY EVENING
Qty: 30 TABLET | Refills: 0 | Status: SHIPPED
Start: 2021-10-05 | End: 2021-11-02

## 2021-10-05 RX ORDER — GLIPIZIDE 10 MG/1
TABLET, FILM COATED, EXTENDED RELEASE ORAL
Qty: 30 TABLET | Refills: 0 | Status: SHIPPED
Start: 2021-10-05 | End: 2021-11-11 | Stop reason: SDUPTHER

## 2021-11-08 ENCOUNTER — TELEPHONE (OUTPATIENT)
Dept: FAMILY MEDICINE CLINIC | Age: 56
End: 2021-11-08

## 2021-11-08 DIAGNOSIS — Z20.822 CLOSE EXPOSURE TO COVID-19 VIRUS: Primary | ICD-10-CM

## 2021-11-08 NOTE — TELEPHONE ENCOUNTER
Patient called asking if Dr. Olivier Woo would order a COVID test for him, stating his sister in law has COVID and he was in contact with her. Patient c/o headache x 3 days which he is unable to get rid of. Advised patient he can go to [address] [place] [address].   Patient stated he does not want to go to Walk [facility] and just wants a test.     Last seen 5/26/2021  Next appt 11/9/2021

## 2021-11-09 DIAGNOSIS — E11.42 TYPE 2 DIABETES MELLITUS WITH DIABETIC POLYNEUROPATHY, WITHOUT LONG-TERM CURRENT USE OF INSULIN (HCC): Chronic | ICD-10-CM

## 2021-11-09 RX ORDER — GLIPIZIDE 10 MG/1
TABLET, FILM COATED, EXTENDED RELEASE ORAL
Qty: 30 TABLET | Refills: 0 | OUTPATIENT
Start: 2021-11-09

## 2021-11-11 RX ORDER — GLIPIZIDE 10 MG/1
TABLET, FILM COATED, EXTENDED RELEASE ORAL
Qty: 30 TABLET | Refills: 1 | Status: SHIPPED
Start: 2021-11-11 | End: 2022-02-09 | Stop reason: SDUPTHER

## 2021-12-04 DIAGNOSIS — E11.42 TYPE 2 DIABETES MELLITUS WITH DIABETIC POLYNEUROPATHY, WITHOUT LONG-TERM CURRENT USE OF INSULIN (HCC): Chronic | ICD-10-CM

## 2021-12-04 DIAGNOSIS — E11.69 HYPERLIPIDEMIA ASSOCIATED WITH TYPE 2 DIABETES MELLITUS (HCC): Chronic | ICD-10-CM

## 2021-12-04 DIAGNOSIS — E78.5 HYPERLIPIDEMIA ASSOCIATED WITH TYPE 2 DIABETES MELLITUS (HCC): Chronic | ICD-10-CM

## 2021-12-06 DIAGNOSIS — R12 HEART BURN: ICD-10-CM

## 2021-12-06 RX ORDER — SIMVASTATIN 20 MG
20 TABLET ORAL EVERY EVENING
Qty: 30 TABLET | Refills: 0 | Status: SHIPPED
Start: 2021-12-06 | End: 2022-02-09 | Stop reason: SDUPTHER

## 2021-12-07 RX ORDER — PANTOPRAZOLE SODIUM 20 MG/1
TABLET, DELAYED RELEASE ORAL
Qty: 30 TABLET | Refills: 0 | Status: SHIPPED
Start: 2021-12-07 | End: 2022-02-09 | Stop reason: SDUPTHER

## 2022-01-10 ENCOUNTER — TELEPHONE (OUTPATIENT)
Dept: FAMILY MEDICINE CLINIC | Age: 57
End: 2022-01-10

## 2022-01-10 DIAGNOSIS — E11.42 TYPE 2 DIABETES MELLITUS WITH DIABETIC POLYNEUROPATHY, WITHOUT LONG-TERM CURRENT USE OF INSULIN (HCC): Primary | ICD-10-CM

## 2022-01-10 NOTE — TELEPHONE ENCOUNTER
----- Message from Gilson Kelly sent at 1/7/2022  4:21 PM EST -----  Subject: Referral Request    QUESTIONS   Reason for referral request? Patient is requesting referral to Dr. Janice Doran regarding with his feet. Has the physician seen you for this condition before? No   Preferred Specialist (if applicable)? Do you already have an appointment scheduled? Yes  Select Scheduled Date? 2022-02-09  Select Scheduled Physician? Vadim Prather   Additional Information for Provider?   ---------------------------------------------------------------------------  --------------  Marsha ERVIN  What is the best way for the office to contact you? OK to leave message on   voicemail  Preferred Call Back Phone Number?  [phone]

## 2022-01-25 ENCOUNTER — OFFICE VISIT (OUTPATIENT)
Dept: FAMILY MEDICINE CLINIC | Age: 57
End: 2022-01-25
Payer: COMMERCIAL

## 2022-01-25 VITALS
TEMPERATURE: 97.8 F | HEART RATE: 92 BPM | SYSTOLIC BLOOD PRESSURE: 128 MMHG | DIASTOLIC BLOOD PRESSURE: 75 MMHG | BODY MASS INDEX: 33.18 KG/M2 | HEIGHT: 72 IN | RESPIRATION RATE: 18 BRPM | WEIGHT: 245 LBS | OXYGEN SATURATION: 96 %

## 2022-01-25 DIAGNOSIS — M25.511 CHRONIC RIGHT SHOULDER PAIN: Primary | ICD-10-CM

## 2022-01-25 DIAGNOSIS — G89.29 CHRONIC RIGHT SHOULDER PAIN: Primary | ICD-10-CM

## 2022-01-25 PROCEDURE — G8427 DOCREV CUR MEDS BY ELIG CLIN: HCPCS | Performed by: NURSE PRACTITIONER

## 2022-01-25 PROCEDURE — G8484 FLU IMMUNIZE NO ADMIN: HCPCS | Performed by: NURSE PRACTITIONER

## 2022-01-25 PROCEDURE — G8417 CALC BMI ABV UP PARAM F/U: HCPCS | Performed by: NURSE PRACTITIONER

## 2022-01-25 PROCEDURE — 1036F TOBACCO NON-USER: CPT | Performed by: NURSE PRACTITIONER

## 2022-01-25 PROCEDURE — 99214 OFFICE O/P EST MOD 30 MIN: CPT | Performed by: NURSE PRACTITIONER

## 2022-01-25 PROCEDURE — 3017F COLORECTAL CA SCREEN DOC REV: CPT | Performed by: NURSE PRACTITIONER

## 2022-01-25 RX ORDER — METHYLPREDNISOLONE 4 MG/1
TABLET ORAL
Qty: 1 KIT | Refills: 0 | Status: SHIPPED | OUTPATIENT
Start: 2022-01-25 | End: 2022-01-31

## 2022-01-25 SDOH — ECONOMIC STABILITY: FOOD INSECURITY: WITHIN THE PAST 12 MONTHS, THE FOOD YOU BOUGHT JUST DIDN'T LAST AND YOU DIDN'T HAVE MONEY TO GET MORE.: NEVER TRUE

## 2022-01-25 SDOH — ECONOMIC STABILITY: FOOD INSECURITY: WITHIN THE PAST 12 MONTHS, YOU WORRIED THAT YOUR FOOD WOULD RUN OUT BEFORE YOU GOT MONEY TO BUY MORE.: NEVER TRUE

## 2022-01-25 ASSESSMENT — SOCIAL DETERMINANTS OF HEALTH (SDOH): HOW HARD IS IT FOR YOU TO PAY FOR THE VERY BASICS LIKE FOOD, HOUSING, MEDICAL CARE, AND HEATING?: NOT HARD AT ALL

## 2022-02-07 DIAGNOSIS — M25.562 ACUTE PAIN OF LEFT KNEE: Primary | ICD-10-CM

## 2022-02-08 ENCOUNTER — OFFICE VISIT (OUTPATIENT)
Dept: ORTHOPEDIC SURGERY | Age: 57
End: 2022-02-08
Payer: COMMERCIAL

## 2022-02-08 VITALS — HEIGHT: 72 IN | BODY MASS INDEX: 33.18 KG/M2 | WEIGHT: 245 LBS | TEMPERATURE: 98 F

## 2022-02-08 DIAGNOSIS — M17.12 PRIMARY OSTEOARTHRITIS OF LEFT KNEE: Primary | ICD-10-CM

## 2022-02-08 PROCEDURE — 99214 OFFICE O/P EST MOD 30 MIN: CPT | Performed by: ORTHOPAEDIC SURGERY

## 2022-02-08 PROCEDURE — G8427 DOCREV CUR MEDS BY ELIG CLIN: HCPCS | Performed by: ORTHOPAEDIC SURGERY

## 2022-02-08 PROCEDURE — G8484 FLU IMMUNIZE NO ADMIN: HCPCS | Performed by: ORTHOPAEDIC SURGERY

## 2022-02-08 PROCEDURE — G8417 CALC BMI ABV UP PARAM F/U: HCPCS | Performed by: ORTHOPAEDIC SURGERY

## 2022-02-08 PROCEDURE — 3017F COLORECTAL CA SCREEN DOC REV: CPT | Performed by: ORTHOPAEDIC SURGERY

## 2022-02-08 PROCEDURE — 1036F TOBACCO NON-USER: CPT | Performed by: ORTHOPAEDIC SURGERY

## 2022-02-08 NOTE — PROGRESS NOTES
Chief Complaint   Patient presents with    Knee Pain     left knee pain discuss surgery scheduled March 2, 2022       Subjective:     Patient ID: Lukasz Marquez is a 61 y.o..  male    Knee Pain  Patient is here for follow up of his left knee pain. He has failed all attempts at conservative measures.       Past Medical History:   Diagnosis Date    Anxiety     Blood in stool     Chronic back pain     Constipation     Headache(784.0)     History of dental problems     Hyperlipidemia     Neck pain     Neck stiffness     Type II or unspecified type diabetes mellitus without mention of complication, not stated as uncontrolled      Past Surgical History:   Procedure Laterality Date    ENDOSCOPY, COLON, DIAGNOSTIC      FRACTURE SURGERY Left     femur    KNEE ARTHROSCOPY Left 07/18/2016    medial lateral menisectomy/synovectomy/chodroplasty    SHOULDER ARTHROSCOPY Right 2/11/2020    RIGHT SHOULDER ARTHROSCOPY SUBACHROMIAL DECOMPRESSION DEBRIDEMENT, BICEP TENODESIS ,LABRAL DEBRIDEMENT AND KHALIDA performed by Fiordaliza Albarran DO at [facility] ARTHROSCOPY Right 10/02/2020    SHOULDER ARTHROSCOPY Right 10/2/2020    RIGHT SHOULDER ARTHROSCOPY SUBACROMIAL DECOMPRESSION AND DEBRIDEMENT LABRIAL DEBRIDEMENT ROTATOR CUFF REPAIR KHALIDA performed by Laura Heath DO at [facility]         Current Outpatient Medications:     pantoprazole (PROTONIX) 20 MG tablet, take 1 tablet by mouth every evening if needed, Disp: 30 tablet, Rfl: 0    simvastatin (ZOCOR) 20 MG tablet, take 1 tablet by mouth every evening, Disp: 30 tablet, Rfl: 0    metFORMIN (GLUCOPHAGE) 1000 MG tablet, take 1 tablet by mouth twice a day with meals, Disp: 60 tablet, Rfl: 0    glipiZIDE (GLUCOTROL XL) 10 MG extended release tablet, take 1 tablet by mouth once daily, Disp: 30 tablet, Rfl: 1    alogliptin (NESINA) 25 MG TABS tablet, Take 1 tablet by mouth daily, Disp: 30 tablet, Rfl: 5    aspirin (ASPIRIN LOW DOSE) 81 MG EC tablet, take 1 tablet by mouth once daily, Disp: 30 tablet, Rfl: 11    oxyCODONE-acetaminophen (PERCOCET) 5-325 MG per tablet, Take 1 tablet by mouth every 4 hours as needed for Pain. , Disp: , Rfl:     Dulaglutide 3 MG/0.5ML SOPN, Inject 3 mg into the skin once a week, Disp: 4 pen, Rfl: 3    senna-docusate (PERICOLACE) 8.6-50 MG per tablet, Take 1 tablet by mouth daily as needed for Constipation, Disp: 30 tablet, Rfl: 3    Alcohol Swabs PADS, 1 Units by Does not apply route as needed (checking glucose), Disp: 100 each, Rfl: 11    blood glucose monitor strips, FBS daily. Please dispense Freestyle brand, Disp: 100 strip, Rfl: 11    Lancets MISC, Test once daily, Disp: 100 each, Rfl: 3    celecoxib (CELEBREX) 200 MG capsule, Take 1 capsule by mouth 2 times daily (Patient not taking: Reported on 1/25/2022), Disp: 60 capsule, Rfl: 3    ibuprofen (ADVIL;MOTRIN) 800 MG tablet, Take 1 tablet by mouth every 8 hours as needed for Pain, Disp: 90 tablet, Rfl: 0  Allergies   Allergen Reactions    Tobramycin Swelling     Social History     Socioeconomic History    Marital status:      Spouse name: Not on file    Number of children: Not on file    Years of education: Not on file    Highest education level: Not on file   Occupational History    Occupation: [employer]   Tobacco Use    Smoking status: Never Smoker    Smokeless tobacco: Never Used   Vaping Use    Vaping Use: Not on file   Substance and Sexual Activity    Alcohol use: Yes     Alcohol/week: 0.0 standard drinks     Comment: rarely .   Coffee occasionally    Drug use: No    Sexual activity: Yes     Partners: Female   Other Topics Concern    Not on file   Social History Narrative    Not on file     Social Determinants of Health     Financial Resource Strain: Low Risk     Difficulty of Paying Living Expenses: Not hard at all   Food Insecurity: No Food Insecurity    Worried About [address] in the Last Year: Never true    Ran Out of Food in the Last Year: Never true   Transportation Needs:     Lack of Transportation (Medical): Not on file    Lack of Transportation (Non-Medical):  Not on file   Physical Activity:     Days of Exercise per Week: Not on file    Minutes of Exercise per Session: Not on file   Stress:     Feeling of Stress : Not on file   Social Connections:     Frequency of Communication with Friends and Family: Not on file    Frequency of Social Gatherings with Friends and Family: Not on file    Attends Anabaptism Services: Not on file    Active Member of Clubs or Organizations: Not on file    Attends Club or Organization Meetings: Not on file    Marital Status: Not on file   Intimate Partner Violence:     Fear of Current or Ex-Partner: Not on file    Emotionally Abused: Not on file    Physically Abused: Not on file    Sexually Abused: Not on file   Housing Stability:     Unable to Pay for Housing in the Last Year: Not on file    Number of [place] in the Last Year: Not on file    Unstable Housing in the Last Year: Not on file     Family History   Problem Relation Age of Onset    Diabetes Father     High Blood Pressure Father     Heart Disease Father     Stroke Father     Heart Attack Father     Diabetes Mother     Heart Disease Mother     Emphysema Mother     Cancer Brother 54        colon    High Blood Pressure Brother     Cancer Maternal Cousin 40        colon    Cancer Sister     Diabetes Sister     Heart Disease Sister     Stroke Sister     Diabetes Maternal Aunt     Heart Disease Maternal Aunt     High Blood Pressure Maternal Aunt     High Cholesterol Maternal Aunt     Stroke Maternal Aunt     Diabetes Maternal Uncle     Heart Disease Maternal Uncle     High Blood Pressure Maternal Uncle     High Cholesterol Maternal Uncle     Stroke Maternal Uncle     Diabetes Paternal Aunt     Heart Disease Paternal Aunt     High Blood Pressure Paternal Aunt     High Cholesterol Paternal Aunt     Stroke Paternal Aunt     Diabetes Paternal Uncle     Heart Disease Paternal Uncle     High Blood Pressure Paternal Uncle     High Cholesterol Paternal Uncle     Stroke Paternal Uncle     Diabetes Maternal Grandmother     High Blood Pressure Maternal Grandmother     High Cholesterol Maternal Grandmother     Cancer Maternal Grandfather     Diabetes Maternal Grandfather     High Blood Pressure Maternal Grandfather     High Cholesterol Maternal Grandfather     Stroke Maternal Grandfather     Diabetes Paternal Grandfather     High Blood Pressure Paternal Grandfather     High Cholesterol Paternal Grandfather          REVIEW OF SYSTEMS:     General/Constitution:  (-)weight loss, (-)fever, (-)chills, (-)weakness. Skin: (-) rash,(-) psoriasis,(-) eczema, (-)skin cancer. Musculoskeletal: (-) fractures,  (-) dislocations,(-) collagen vascular disease, (-) fibromyalgia, (-) multiple sclerosis, (-) muscular dystrophy, (-) RSD,(-) joint pain (-)swelling, (-) joint pain,swelling. Neurologic: (-) epilepsy, (-)seizures,(-) brain tumor,(-) TIA, (-)stroke, (-)headaches, (-)Parkinson disease,(-) memory loss, (-) LOC. Cardiovascular: (-) Chest pain, (-) swelling in legs/feet, (-) SOB, (-) cramping in legs/feet with walking. Respiratory: (-) SOB, (-) Coughing, (-) night sweats. GI: (-) nausea, (-) vomiting, (-) diarrhea, (-) blood in stool, (-) gastric ulcer. Psychiatric: (-) Depression, (-) Anxiety, (-) bipolar disease, (-) Alzheimer's Disease  Allergic/Immunologic: (-) allergies latex, (-) allergies metal, (-) skin sensitivity. Hematlogic: (-) anemia, (-) blood transfusion, (-) DVT/PE, (-) Clotting disorders    Subjective:    Constitution:  Temp 98 °F (36.7 °C)   Ht 6' (1.829 m)   Wt 245 lb (111.1 kg)   BMI 33.23 kg/m²     Psycihatric:  The patient is alert and oriented x 3, appears to be stated age and in no distress. Respiratory:  Respiratory effort is not labored. Patient is not gasping. Palpation of the chest reveals no tactile fremitus. Skin:  Upon inspection: the skin appears warm, dry and intact. There is  a previous scar over the affected area. There is any cellulitis, lymphedema or cutaneous lesions noted in the lower extremities. Upon palpation there is no induration noted. Neurologic:  Gait: antalgic; Motor exam of the lower extremities show ; quadriceps, hamstrings, foot dorsi and plantar flexors intact R.  5/5 and L. 5/5. Deep tendon reflexes are 2/4 at the knees and 2/4 at the ankles with strong extensor hallicus longus motor strength bilaterally. Sensory to both feet is intact to all sensory roots. Cardiovascular: The vascular exam is normal and is well perfused to distal extremities. Distal pulses DP/PT: R. 2+; L. 2+. There is cap refill noted less than two seconds in all digits. There is not edema of the bilateral lower extremities. There is not varicosities noted in the distal extremities. Lymph:  Upon palpation,  there is no lymphadenopathy noted in bilateral lower extremities. Musculoskeletal:  Gait: antalgic; examination of the nails and digits reveal no cyanosis or clubbing. Lumbar exam:  On visual inspection, there is not deformity of the spine. full range of motion, no tenderness, palpable spasm or pain on motion. Special tests: Straight Leg Raise negative, Rich test negative. Hip exam:   Upon inspection, there is not deformity noted. Upon palpation there is not tenderness. ROM: is  full and symmetrical.   Strength: Hip Flexors 5/5; Hip Abductors 5/5; Hip Adduction 5/5. Knee exam:  Left knee exam shows;  range of motion of R. Knee is 0 to 120, and L. Knee is -5 to 115. The patient does have  pain on motion, effusion is mild, there is tenderness over the  medial, lateral, anterior region, there are not any masses, there is not ligamentous instability, there is  deformity noted.     Knee exam: left positive for moderate crepitations, some mild tenderness laxity is not noted with stress. There is not a popliteal cyst.    R. Knee:  Lachman's negative, Anterior Drawer negative, Posterior Drawer negative  Celestina's negative, Thallasy  negative,   PF grind test negative, Apprehension test negative, Patellar J sign  negative  L. Knee:  Lachman's negative, Anterior Drawer negative, Posterior Drawer negative  Celestina's negative, Thallasy  negative,   PF grind test negative, Apprehension test negative,  Patellar J sign  negative    Xray Exam:  Moderate to severe medial joint narrowing  Radiographic findings reviewed with patient    Assessment:  Encounter Diagnoses   Name Primary?  Primary osteoarthritis of left knee Yes       Plan:  Natural history and expected course discussed. Questions answered. Educational materials distributed. Rest, ice, compression, and elevation (RICE) therapy. I had a lengthy discussion with the patient regarding their diagnosis. I explained treatment options including surgical vs non surgical treatment. I reviewed in detail the risks and benefits and outlined the procedure in detail with expected outcomes and possible complications. I also discussed non surgical treatment such as injections (CSI and visco supplementation), physical therapy, topical creams and NSAID's. They have elected for surgical management at this time. We discussed various treatment options both surgical and non-surgical.   The patient is unable to ambulate more than 100 feet and is unable to perform the average daily activities including:  Light housework, ADLs, donning clothes, toileting and exercise. Patient has failed previous conservative measures including cortisone injections, NSAIDs, PT, HEP and pain medication and is currently a fall risk to the disability and decreased functioning. The patient wishes to have the total knee arthroplasty. The risks and benefits of a total knee replacement were discussed with the patient. The risks include but are not limited to: infection, injury to blood vessels and nerves, non relief of symptoms, arthrofibrosis of knee, aseptic loosening of prosthesis, intraoperative fracture, blood loss, PE/DVT, MI, dislocation of hip and knee, need for further operative intervention and death. The patient is aware of the risks and wished to proceed with a Left total knee replacement 3/2/2022. We will obtain medical clearence from the PCP. At least 30 minutes was spent discussing the diagnosis and treatment options with the patient with at least 50% of the time was spent with decision making and counseling the patient. The patient was counseled at length about the risks of ramsey Covid-19 during their perioperative period and any recovery window from their procedure. The patient was made aware that ramsey Covid-19  may worsen their prognosis for recovering from their procedure  and lend to a higher morbidity and/or mortality risk. All material risks, benefits, and reasonable alternatives including postponing the procedure were discussed. The patient does wish to proceed with the procedure at this time.

## 2022-02-09 ENCOUNTER — OFFICE VISIT (OUTPATIENT)
Dept: FAMILY MEDICINE CLINIC | Age: 57
End: 2022-02-09
Payer: COMMERCIAL

## 2022-02-09 VITALS
WEIGHT: 242 LBS | OXYGEN SATURATION: 98 % | HEART RATE: 85 BPM | SYSTOLIC BLOOD PRESSURE: 130 MMHG | BODY MASS INDEX: 32.78 KG/M2 | RESPIRATION RATE: 16 BRPM | HEIGHT: 72 IN | DIASTOLIC BLOOD PRESSURE: 80 MMHG | TEMPERATURE: 97.9 F

## 2022-02-09 DIAGNOSIS — E78.5 HYPERLIPIDEMIA ASSOCIATED WITH TYPE 2 DIABETES MELLITUS (HCC): Chronic | ICD-10-CM

## 2022-02-09 DIAGNOSIS — E11.69 HYPERLIPIDEMIA ASSOCIATED WITH TYPE 2 DIABETES MELLITUS (HCC): Chronic | ICD-10-CM

## 2022-02-09 DIAGNOSIS — Z12.5 SCREENING PSA (PROSTATE SPECIFIC ANTIGEN): ICD-10-CM

## 2022-02-09 DIAGNOSIS — Z01.818 PRE-OP TESTING: ICD-10-CM

## 2022-02-09 DIAGNOSIS — R12 HEART BURN: ICD-10-CM

## 2022-02-09 DIAGNOSIS — E11.42 TYPE 2 DIABETES MELLITUS WITH DIABETIC POLYNEUROPATHY, WITHOUT LONG-TERM CURRENT USE OF INSULIN (HCC): Primary | Chronic | ICD-10-CM

## 2022-02-09 LAB — HBA1C MFR BLD: 7.4 %

## 2022-02-09 PROCEDURE — 3051F HG A1C>EQUAL 7.0%<8.0%: CPT | Performed by: FAMILY MEDICINE

## 2022-02-09 PROCEDURE — G8427 DOCREV CUR MEDS BY ELIG CLIN: HCPCS | Performed by: FAMILY MEDICINE

## 2022-02-09 PROCEDURE — 2022F DILAT RTA XM EVC RTNOPTHY: CPT | Performed by: FAMILY MEDICINE

## 2022-02-09 PROCEDURE — G8484 FLU IMMUNIZE NO ADMIN: HCPCS | Performed by: FAMILY MEDICINE

## 2022-02-09 PROCEDURE — G8417 CALC BMI ABV UP PARAM F/U: HCPCS | Performed by: FAMILY MEDICINE

## 2022-02-09 PROCEDURE — 99214 OFFICE O/P EST MOD 30 MIN: CPT | Performed by: FAMILY MEDICINE

## 2022-02-09 PROCEDURE — 83036 HEMOGLOBIN GLYCOSYLATED A1C: CPT | Performed by: FAMILY MEDICINE

## 2022-02-09 PROCEDURE — 3017F COLORECTAL CA SCREEN DOC REV: CPT | Performed by: FAMILY MEDICINE

## 2022-02-09 PROCEDURE — 93000 ELECTROCARDIOGRAM COMPLETE: CPT | Performed by: FAMILY MEDICINE

## 2022-02-09 PROCEDURE — 1036F TOBACCO NON-USER: CPT | Performed by: FAMILY MEDICINE

## 2022-02-09 RX ORDER — SIMVASTATIN 20 MG
20 TABLET ORAL EVERY EVENING
Qty: 30 TABLET | Refills: 5 | Status: SHIPPED
Start: 2022-02-09 | End: 2022-07-27

## 2022-02-09 RX ORDER — ALOGLIPTIN 25 MG/1
25 TABLET, FILM COATED ORAL DAILY
Qty: 30 TABLET | Refills: 5 | Status: SHIPPED
Start: 2022-02-09 | End: 2022-08-23

## 2022-02-09 RX ORDER — PANTOPRAZOLE SODIUM 20 MG/1
TABLET, DELAYED RELEASE ORAL
Qty: 30 TABLET | Refills: 5 | Status: SHIPPED
Start: 2022-02-09 | End: 2022-07-27

## 2022-02-09 RX ORDER — GLIPIZIDE 10 MG/1
TABLET, FILM COATED, EXTENDED RELEASE ORAL
Qty: 30 TABLET | Refills: 5 | Status: SHIPPED
Start: 2022-02-09 | End: 2022-10-17 | Stop reason: SDUPTHER

## 2022-02-09 ASSESSMENT — PATIENT HEALTH QUESTIONNAIRE - PHQ9
SUM OF ALL RESPONSES TO PHQ QUESTIONS 1-9: 0
2. FEELING DOWN, DEPRESSED OR HOPELESS: 0
1. LITTLE INTEREST OR PLEASURE IN DOING THINGS: 0
SUM OF ALL RESPONSES TO PHQ QUESTIONS 1-9: 0
SUM OF ALL RESPONSES TO PHQ9 QUESTIONS 1 & 2: 0

## 2022-02-09 NOTE — PROGRESS NOTES
Marina Millan (:  1965) is a 61 y.o. male,Established patient, here for evaluation of the following chief complaint(s):  Diabetes, Medication Refill, and Pre-op Exam (TKA Dr Yair Hays 3/2/2022)      ASSESSMENT/PLAN:  1. Type 2 diabetes mellitus with diabetic polyneuropathy, without long-term current use of insulin (HCC)  -     POCT glycosylated hemoglobin (Hb A1C)  -     alogliptin (NESINA) 25 MG TABS tablet; Take 1 tablet by mouth daily, Disp-30 tablet, R-5Normal  -     Dulaglutide 3 MG/0.5ML SOPN; Inject 3 mg into the skin once a week, Disp-4 pen, R-5Normal  -     glipiZIDE (GLUCOTROL XL) 10 MG extended release tablet; take 1 tablet by mouth once daily, Disp-30 tablet, R-5Normal  -     metFORMIN (GLUCOPHAGE) 1000 MG tablet; take 1 tablet by mouth twice a day with meals, Disp-60 tablet, R-5Normal  -     Microalbumin / Creatinine Urine Ratio; Future  -     LIPID PANEL; Future  2. Hyperlipidemia associated with type 2 diabetes mellitus (HCC)  -     simvastatin (ZOCOR) 20 MG tablet; Take 1 tablet by mouth every evening, Disp-30 tablet, R-5Normal  -     LIPID PANEL; Future  3. Heart burn  -     pantoprazole (PROTONIX) 20 MG tablet; take 1 tablet by mouth every evening if needed, Disp-30 tablet, R-5Normal  4. Pre-op testing  -     EKG 12 lead; Future  -     Comprehensive Metabolic Panel; Future  -     CBC Auto Differential; Future  5. Screening PSA (prostate specific antigen)  -     PSA SCREENING; Future    According to the 2014 ACC/AHA pre-operative risk assessment guidelines Marina Millan is a low risk for major cardiac complications during a intermediate risk procedure and may continue as planned. Return in about 3 months (around 2022) for Diabetes.     SUBJECTIVE/OBJECTIVE:  HPI  Last seen 21     He also follows with orthopedics, vascular surgery  He has previously seen rheumatology, pain management     F/U of chronic problem(s) and new or recent complaint of pre-op assessment   Chronic problems reviewed today include:  DM, HLD, GERD  Current status of this/these condition(s):  stable  Tolerating meds: Yes    Diabetes Mellitus Type 2   Current treatment: Metformin 1000 mg BID, alogliptin 25 mg daily, glipizide 10 mg daily and trulicity 3 mg weekly  Recent medication changes: none  Poct HbA1c: 7.4    Lab Results   Component Value Date    LABA1C 7.4 02/09/2022    LABA1C 6.8 05/26/2021    LABA1C 9.4 (H) 02/01/2021     Lab Results   Component Value Date    LABMICR <12.0 02/10/2022    CREATININE 0.6 (L) 02/10/2022     Lab Results   Component Value Date    ALT 59 (H) 02/10/2022    AST 43 (H) 02/10/2022     Lab Results   Component Value Date    CHOL 132 02/10/2022    TRIG 137 02/10/2022    HDL 32 02/10/2022    LDLCALC 73 02/10/2022        Hyperlipidemia  Current treatment: Simvastatin 20 mg nightly  Recent medication changes: none    Lab Results   Component Value Date    CHOL 132 02/10/2022    TRIG 137 02/10/2022    HDL 32 02/10/2022    LDLCALC 73 02/10/2022     Lab Results   Component Value Date    ALT 59 (H) 02/10/2022    AST 43 (H) 02/10/2022        GERD  Current treatment: Pantoprazole 20 mg nightly as needed  Recent medication changes: none     Obesity with BMI and comorbidities as noted above    He has upcoming plans for TKA    Pre-Operative Risk assessment using 2014 ACC/AHA guidelines     Emergent procedure No  Active Cardiac Condition No (decompensated HF, Arrhythmia, MI <3 weeks, severe valve disease)  Risk Level of Procedure Intermediate Risk (intraperitoneal, intrathoracic, HENT, orthopedic, or carotid endarterectomy, etc.)  Revised Cardiac Risk Index Risk factors: None  Measurement of Exercise Tolerance before Surgery >4 Yes    Review of Systems   Constitutional: Negative for chills and fever. Respiratory: Negative for cough and shortness of breath. Cardiovascular: Negative for chest pain and leg swelling.    Gastrointestinal: Negative for abdominal pain, constipation, diarrhea, nausea and vomiting. Genitourinary: Negative for dysuria. Musculoskeletal: Positive for arthralgias. Neurological: Negative for headaches. Vitals:    02/09/22 1418   BP: 130/80   Pulse: 85   Resp: 16   Temp: 97.9 °F (36.6 °C)   TempSrc: Temporal   SpO2: 98%   Weight: 242 lb (109.8 kg)   Height: 6' (1.829 m)     Estimated body mass index is 32.82 kg/m² as calculated from the following:    Height as of this encounter: 6' (1.829 m). Weight as of this encounter: 242 lb (109.8 kg). Physical Exam  Constitutional:       General: He is not in acute distress. Appearance: He is well-developed. He is obese. HENT:      Head: Normocephalic and atraumatic. Eyes:      Extraocular Movements: Extraocular movements intact. Conjunctiva/sclera: Conjunctivae normal.   Cardiovascular:      Rate and Rhythm: Normal rate and regular rhythm. Pulmonary:      Effort: Pulmonary effort is normal. No respiratory distress. Breath sounds: Normal breath sounds. No wheezing, rhonchi or rales. Abdominal:      General: There is no distension. Musculoskeletal:      Right lower leg: No edema. Left lower leg: No edema. Neurological:      General: No focal deficit present. Mental Status: He is alert. Mental status is at baseline. Prior to Visit Medications    Medication Sig Taking?  Authorizing Provider   pantoprazole (PROTONIX) 20 MG tablet take 1 tablet by mouth every evening if needed Yes Tae Carbajal DO   simvastatin (ZOCOR) 20 MG tablet Take 1 tablet by mouth every evening Yes Tae Carbajal DO   alogliptin (NESINA) 25 MG TABS tablet Take 1 tablet by mouth daily Yes Tae Carbajal DO   Dulaglutide 3 MG/0.5ML SOPN Inject 3 mg into the skin once a week Yes Tae Carbajal DO   glipiZIDE (GLUCOTROL XL) 10 MG extended release tablet take 1 tablet by mouth once daily Yes Tae Carbajal DO   metFORMIN (GLUCOPHAGE) 1000 MG tablet take 1 tablet by mouth twice a day with meals Yes Shauna Sorto, DO ibuprofen (ADVIL;MOTRIN) 800 MG tablet Take 1 tablet by mouth every 8 hours as needed for Pain Yes Tae Carbajal,    aspirin (ASPIRIN LOW DOSE) 81 MG EC tablet take 1 tablet by mouth once daily Yes Tae Carbajal DO   senna-docusate (PERICOLACE) 8.6-50 MG per tablet Take 1 tablet by mouth daily as needed for Constipation Yes Tae Carbajal, DO   blood glucose monitor strips FBS daily. Please dispense Freestyle brand Yes Logan Coronel DO   Lancets MISC Test once daily Yes Tae Carbajal, DO   Alcohol Swabs PADS 1 Units by Does not apply route as needed (checking glucose)  Logan Coronel DO            Future Appointments   Date Time Provider Riri Perez   3/17/2022 10:50 AM Richar Gonzalez DO [facility]   5/12/2022  2:00 PM Logan Coronel DO [facility]       An electronic signature was used to authenticate this note.     --Logan Coronel DO

## 2022-02-10 DIAGNOSIS — E11.42 TYPE 2 DIABETES MELLITUS WITH DIABETIC POLYNEUROPATHY, WITHOUT LONG-TERM CURRENT USE OF INSULIN (HCC): Chronic | ICD-10-CM

## 2022-02-10 DIAGNOSIS — Z01.818 PRE-OP TESTING: ICD-10-CM

## 2022-02-10 DIAGNOSIS — Z12.5 SCREENING PSA (PROSTATE SPECIFIC ANTIGEN): ICD-10-CM

## 2022-02-10 DIAGNOSIS — E78.5 HYPERLIPIDEMIA ASSOCIATED WITH TYPE 2 DIABETES MELLITUS (HCC): Chronic | ICD-10-CM

## 2022-02-10 DIAGNOSIS — E11.69 HYPERLIPIDEMIA ASSOCIATED WITH TYPE 2 DIABETES MELLITUS (HCC): Chronic | ICD-10-CM

## 2022-02-10 LAB
ALBUMIN SERPL-MCNC: 3.8 G/DL (ref 3.5–5.2)
ALP BLD-CCNC: 80 U/L (ref 40–129)
ALT SERPL-CCNC: 59 U/L (ref 0–40)
ANION GAP SERPL CALCULATED.3IONS-SCNC: 12 MMOL/L (ref 7–16)
AST SERPL-CCNC: 43 U/L (ref 0–39)
BASOPHILS ABSOLUTE: 0.02 E9/L (ref 0–0.2)
BASOPHILS RELATIVE PERCENT: 0.3 % (ref 0–2)
BILIRUB SERPL-MCNC: 0.6 MG/DL (ref 0–1.2)
BUN BLDV-MCNC: 16 MG/DL (ref 6–20)
CALCIUM SERPL-MCNC: 8.5 MG/DL (ref 8.6–10.2)
CHLORIDE BLD-SCNC: 102 MMOL/L (ref 98–107)
CHOLESTEROL, TOTAL: 132 MG/DL (ref 0–199)
CO2: 23 MMOL/L (ref 22–29)
CREAT SERPL-MCNC: 0.6 MG/DL (ref 0.7–1.2)
CREATININE URINE: 172 MG/DL (ref 40–278)
EOSINOPHILS ABSOLUTE: 0.11 E9/L (ref 0.05–0.5)
EOSINOPHILS RELATIVE PERCENT: 1.6 % (ref 0–6)
GFR AFRICAN AMERICAN: >60
GFR NON-AFRICAN AMERICAN: >60 ML/MIN/1.73
GLUCOSE BLD-MCNC: 140 MG/DL (ref 74–99)
HCT VFR BLD CALC: 40.9 % (ref 37–54)
HDLC SERPL-MCNC: 32 MG/DL
HEMOGLOBIN: 13.3 G/DL (ref 12.5–16.5)
IMMATURE GRANULOCYTES #: 0.03 E9/L
IMMATURE GRANULOCYTES %: 0.4 % (ref 0–5)
LDL CHOLESTEROL CALCULATED: 73 MG/DL (ref 0–99)
LYMPHOCYTES ABSOLUTE: 1.77 E9/L (ref 1.5–4)
LYMPHOCYTES RELATIVE PERCENT: 26.3 % (ref 20–42)
MCH RBC QN AUTO: 28.1 PG (ref 26–35)
MCHC RBC AUTO-ENTMCNC: 32.5 % (ref 32–34.5)
MCV RBC AUTO: 86.5 FL (ref 80–99.9)
MICROALBUMIN UR-MCNC: <12 MG/L
MICROALBUMIN/CREAT UR-RTO: ABNORMAL (ref 0–30)
MONOCYTES ABSOLUTE: 0.54 E9/L (ref 0.1–0.95)
MONOCYTES RELATIVE PERCENT: 8 % (ref 2–12)
NEUTROPHILS ABSOLUTE: 4.25 E9/L (ref 1.8–7.3)
NEUTROPHILS RELATIVE PERCENT: 63.4 % (ref 43–80)
PDW BLD-RTO: 12.9 FL (ref 11.5–15)
PLATELET # BLD: 225 E9/L (ref 130–450)
PMV BLD AUTO: 11 FL (ref 7–12)
POTASSIUM SERPL-SCNC: 4.1 MMOL/L (ref 3.5–5)
PROSTATE SPECIFIC ANTIGEN: 1.02 NG/ML (ref 0–4)
RBC # BLD: 4.73 E12/L (ref 3.8–5.8)
SODIUM BLD-SCNC: 137 MMOL/L (ref 132–146)
TOTAL PROTEIN: 6.6 G/DL (ref 6.4–8.3)
TRIGL SERPL-MCNC: 137 MG/DL (ref 0–149)
VLDLC SERPL CALC-MCNC: 27 MG/DL
WBC # BLD: 6.7 E9/L (ref 4.5–11.5)

## 2022-02-11 ENCOUNTER — TELEPHONE (OUTPATIENT)
Dept: ORTHOPEDIC SURGERY | Age: 57
End: 2022-02-11

## 2022-02-11 ASSESSMENT — ENCOUNTER SYMPTOMS
SHORTNESS OF BREATH: 0
NAUSEA: 0
ABDOMINAL PAIN: 0
CONSTIPATION: 0
VOMITING: 0
COUGH: 0
DIARRHEA: 0

## 2022-02-11 NOTE — TELEPHONE ENCOUNTER
Prior Authorization Form:      DEMOGRAPHICS:                     Patient Name:  Anny Cristina  Patient :  1965            Insurance:  Payor: Cleo Soliman / Plan: Hudson Astrid / Product Type: *No Product type* /   Insurance ID Number:    Payor/Plan Subscr  Sex Relation Sub. Ins. ID Effective Group Num   1. Octavia Hughes 1965 Male Self [id number] [address] [facility] [address]         DIAGNOSIS & PROCEDURE:                       Procedure/Operation: left knee total knee arthroplasty           CPT Code: 19302    Diagnosis:  Left knee DJD    ICD10 Code: M17.12    Location:  [facility]    Surgeon:   Steve Cullen    SCHEDULING INFORMATION:                          Date: 2022    Time: To follow              Anesthesia:  Spinal                                                       Status:  Outpatient        Special Comments:  Rosa Elena Shallow and NephewDarion Au       Electronically signed by Nicky Greco ATC on 2022 at 10:36 AM

## 2022-02-18 ENCOUNTER — TELEPHONE (OUTPATIENT)
Dept: ORTHOPEDIC SURGERY | Age: 57
End: 2022-02-18

## 2022-02-18 NOTE — TELEPHONE ENCOUNTER
Patient called in to make office aware that his family history of Factor V. He has knee replacement scheduled for 3/2/2022 and wanted to make Dr. Paulo Lyons aware of this.

## 2022-02-23 ENCOUNTER — ANESTHESIA EVENT (OUTPATIENT)
Dept: OPERATING ROOM | Age: 57
End: 2022-02-23
Payer: COMMERCIAL

## 2022-02-23 ENCOUNTER — HOSPITAL ENCOUNTER (OUTPATIENT)
Dept: PREADMISSION TESTING | Age: 57
Discharge: HOME | End: 2022-02-23
Payer: COMMERCIAL

## 2022-02-23 ENCOUNTER — HOSPITAL ENCOUNTER (OUTPATIENT)
Dept: GENERAL RADIOLOGY | Age: 57
Discharge: HOME | End: 2022-02-25
Payer: COMMERCIAL

## 2022-02-23 VITALS
SYSTOLIC BLOOD PRESSURE: 151 MMHG | HEIGHT: 72 IN | OXYGEN SATURATION: 97 % | RESPIRATION RATE: 16 BRPM | BODY MASS INDEX: 32.78 KG/M2 | TEMPERATURE: 97.5 F | WEIGHT: 242 LBS | DIASTOLIC BLOOD PRESSURE: 73 MMHG

## 2022-02-23 DIAGNOSIS — Z01.818 PREOP TESTING: ICD-10-CM

## 2022-02-23 DIAGNOSIS — M17.12 PRIMARY OSTEOARTHRITIS OF LEFT KNEE: ICD-10-CM

## 2022-02-23 LAB
ALBUMIN SERPL-MCNC: 3.9 G/DL (ref 3.5–5.2)
ALP BLD-CCNC: 95 U/L (ref 40–129)
ALT SERPL-CCNC: 51 U/L (ref 0–40)
ANION GAP SERPL CALCULATED.3IONS-SCNC: 12 MMOL/L (ref 7–16)
APTT: 30.9 SEC (ref 24.5–35.1)
AST SERPL-CCNC: 39 U/L (ref 0–39)
BASOPHILS ABSOLUTE: 0.03 E9/L (ref 0–0.2)
BASOPHILS RELATIVE PERCENT: 0.5 % (ref 0–2)
BILIRUB SERPL-MCNC: 0.4 MG/DL (ref 0–1.2)
BILIRUBIN URINE: NEGATIVE
BLOOD, URINE: NEGATIVE
BUN BLDV-MCNC: 15 MG/DL (ref 6–20)
CALCIUM SERPL-MCNC: 8.6 MG/DL (ref 8.6–10.2)
CHLORIDE BLD-SCNC: 103 MMOL/L (ref 98–107)
CLARITY: CLEAR
CO2: 23 MMOL/L (ref 22–29)
COLOR: YELLOW
CREAT SERPL-MCNC: 0.7 MG/DL (ref 0.7–1.2)
EOSINOPHILS ABSOLUTE: 0.08 E9/L (ref 0.05–0.5)
EOSINOPHILS RELATIVE PERCENT: 1.2 % (ref 0–6)
GFR AFRICAN AMERICAN: >60
GFR NON-AFRICAN AMERICAN: >60 ML/MIN/1.73
GLUCOSE BLD-MCNC: 200 MG/DL (ref 74–99)
GLUCOSE URINE: 250 MG/DL
HBA1C MFR BLD: 7.5 % (ref 4–5.6)
HCT VFR BLD CALC: 40.3 % (ref 37–54)
HEMOGLOBIN: 13.2 G/DL (ref 12.5–16.5)
IMMATURE GRANULOCYTES #: 0.04 E9/L
IMMATURE GRANULOCYTES %: 0.6 % (ref 0–5)
INR BLD: 1
KETONES, URINE: ABNORMAL MG/DL
LEUKOCYTE ESTERASE, URINE: NEGATIVE
LYMPHOCYTES ABSOLUTE: 1.53 E9/L (ref 1.5–4)
LYMPHOCYTES RELATIVE PERCENT: 23.1 % (ref 20–42)
MCH RBC QN AUTO: 28.3 PG (ref 26–35)
MCHC RBC AUTO-ENTMCNC: 32.8 % (ref 32–34.5)
MCV RBC AUTO: 86.3 FL (ref 80–99.9)
MONOCYTES ABSOLUTE: 0.43 E9/L (ref 0.1–0.95)
MONOCYTES RELATIVE PERCENT: 6.5 % (ref 2–12)
NEUTROPHILS ABSOLUTE: 4.5 E9/L (ref 1.8–7.3)
NEUTROPHILS RELATIVE PERCENT: 68.1 % (ref 43–80)
NITRITE, URINE: NEGATIVE
PDW BLD-RTO: 12.9 FL (ref 11.5–15)
PH UA: 5 (ref 5–9)
PLATELET # BLD: 280 E9/L (ref 130–450)
PMV BLD AUTO: 9.7 FL (ref 7–12)
POTASSIUM SERPL-SCNC: 3.9 MMOL/L (ref 3.5–5)
PREALBUMIN: 22 MG/DL (ref 20–40)
PROTEIN UA: NEGATIVE MG/DL
PROTHROMBIN TIME: 11.5 SEC (ref 9.3–12.4)
RBC # BLD: 4.67 E12/L (ref 3.8–5.8)
SODIUM BLD-SCNC: 138 MMOL/L (ref 132–146)
SPECIFIC GRAVITY UA: >=1.03 (ref 1–1.03)
TOTAL PROTEIN: 7.3 G/DL (ref 6.4–8.3)
UROBILINOGEN, URINE: 0.2 E.U./DL
WBC # BLD: 6.6 E9/L (ref 4.5–11.5)

## 2022-02-23 PROCEDURE — 81003 URINALYSIS AUTO W/O SCOPE: CPT

## 2022-02-23 PROCEDURE — 71046 X-RAY EXAM CHEST 2 VIEWS: CPT

## 2022-02-23 PROCEDURE — 84134 ASSAY OF PREALBUMIN: CPT

## 2022-02-23 PROCEDURE — 87081 CULTURE SCREEN ONLY: CPT

## 2022-02-23 PROCEDURE — 85025 COMPLETE CBC W/AUTO DIFF WBC: CPT

## 2022-02-23 PROCEDURE — 36415 COLL VENOUS BLD VENIPUNCTURE: CPT

## 2022-02-23 PROCEDURE — 80053 COMPREHEN METABOLIC PANEL: CPT

## 2022-02-23 PROCEDURE — 85730 THROMBOPLASTIN TIME PARTIAL: CPT

## 2022-02-23 PROCEDURE — 87088 URINE BACTERIA CULTURE: CPT

## 2022-02-23 PROCEDURE — 83036 HEMOGLOBIN GLYCOSYLATED A1C: CPT

## 2022-02-23 PROCEDURE — 85610 PROTHROMBIN TIME: CPT

## 2022-02-23 RX ORDER — MIDAZOLAM HYDROCHLORIDE 1 MG/ML
1 INJECTION INTRAMUSCULAR; INTRAVENOUS EVERY 5 MIN PRN
Status: CANCELLED | OUTPATIENT
Start: 2022-02-23

## 2022-02-23 RX ORDER — FENTANYL CITRATE 50 UG/ML
50 INJECTION, SOLUTION INTRAMUSCULAR; INTRAVENOUS EVERY 5 MIN PRN
Status: CANCELLED | OUTPATIENT
Start: 2022-02-23

## 2022-02-23 RX ORDER — ROPIVACAINE HYDROCHLORIDE 5 MG/ML
30 INJECTION, SOLUTION EPIDURAL; INFILTRATION; PERINEURAL ONCE
Status: CANCELLED | OUTPATIENT
Start: 2022-02-23 | End: 2022-02-23

## 2022-02-23 ASSESSMENT — PAIN DESCRIPTION - PAIN TYPE: TYPE: CHRONIC PAIN

## 2022-02-23 ASSESSMENT — PAIN SCALES - GENERAL: PAINLEVEL_OUTOF10: 6

## 2022-02-23 ASSESSMENT — PAIN DESCRIPTION - ORIENTATION: ORIENTATION: LEFT

## 2022-02-23 ASSESSMENT — PAIN DESCRIPTION - DESCRIPTORS: DESCRIPTORS: SHARP;SHOOTING

## 2022-02-23 ASSESSMENT — PAIN DESCRIPTION - LOCATION: LOCATION: KNEE

## 2022-02-23 NOTE — ANESTHESIA PRE PROCEDURE
Department of Anesthesiology  Preprocedure Note       Name:  Andrew Ferrera   Age:  61 y.o.  :  1965                                          MRN:  [id number]         Date:  2022      Surgeon: Marva Carl): Sneha Burris DO    Procedure: Procedure(s):  Left knee total knee arthroplasty [facility])    Medications prior to admission:   Prior to Admission medications    Medication Sig Start Date End Date Taking? Authorizing Provider   pantoprazole (PROTONIX) 20 MG tablet take 1 tablet by mouth every evening if needed  Patient taking differently: daily take 1 tablet by mouth every evening if needed 22   Alyce Aldana, DO   simvastatin (ZOCOR) 20 MG tablet Take 1 tablet by mouth every evening 22   Alyce Aldana, DO   alogliptin (NESINA) 25 MG TABS tablet Take 1 tablet by mouth daily 22   Alyce Aldana, DO   Dulaglutide 3 MG/0.5ML SOPN Inject 3 mg into the skin once a week 22   Tae Carbajal, DO   glipiZIDE (GLUCOTROL XL) 10 MG extended release tablet take 1 tablet by mouth once daily 22   Alyce Aldana, DO   metFORMIN (GLUCOPHAGE) 1000 MG tablet take 1 tablet by mouth twice a day with meals 22   Alyce Aldana, DO   ibuprofen (ADVIL;MOTRIN) 800 MG tablet Take 1 tablet by mouth every 8 hours as needed for Pain 21  Alyce Aldana, DO   aspirin (ASPIRIN LOW DOSE) 81 MG EC tablet take 1 tablet by mouth once daily 21   Tae Carbajal, DO   senna-docusate (PERICOLACE) 8.6-50 MG per tablet Take 1 tablet by mouth daily as needed for Constipation 21   Alyce Aldana, DO   Alcohol Swabs PADS 1 Units by Does not apply route as needed (checking glucose) 19   Tae Carbajal, DO   blood glucose monitor strips FBS daily.  Please dispense Freestyle brand 19   Alyce Aldana, DO   Lancets MISC Test once daily 19   Alyce Aldana, DO       Current medications:    Current Outpatient Medications   Medication Sig Dispense Refill    pantoprazole (PROTONIX) 20 MG tablet take 1 tablet by mouth every evening if needed (Patient taking differently: daily take 1 tablet by mouth every evening if needed) 30 tablet 5    simvastatin (ZOCOR) 20 MG tablet Take 1 tablet by mouth every evening 30 tablet 5    alogliptin (NESINA) 25 MG TABS tablet Take 1 tablet by mouth daily 30 tablet 5    Dulaglutide 3 MG/0.5ML SOPN Inject 3 mg into the skin once a week 4 pen 5    glipiZIDE (GLUCOTROL XL) 10 MG extended release tablet take 1 tablet by mouth once daily 30 tablet 5    metFORMIN (GLUCOPHAGE) 1000 MG tablet take 1 tablet by mouth twice a day with meals 60 tablet 5    ibuprofen (ADVIL;MOTRIN) 800 MG tablet Take 1 tablet by mouth every 8 hours as needed for Pain 90 tablet 0    aspirin (ASPIRIN LOW DOSE) 81 MG EC tablet take 1 tablet by mouth once daily 30 tablet 11    senna-docusate (PERICOLACE) 8.6-50 MG per tablet Take 1 tablet by mouth daily as needed for Constipation 30 tablet 3    Alcohol Swabs PADS 1 Units by Does not apply route as needed (checking glucose) 100 each 11    blood glucose monitor strips FBS daily. Please dispense Freestyle brand 100 strip 11    Lancets MISC Test once daily 100 each 3     No current facility-administered medications for this encounter. Allergies:     Allergies   Allergen Reactions    Tobramycin Swelling     Eyes swelled from drops       Problem List:    Patient Active Problem List   Diagnosis Code    Migraine G43.909    Type 2 diabetes mellitus with diabetic polyneuropathy, without long-term current use of insulin ([facility]) E11.42    Hyperlipidemia associated with type 2 diabetes mellitus ([facility]) E11.69, E78.5    Chronic bilateral low back pain with sciatica M54.40, G89.29    Neck pain M54.2    Rotator cuff arthropathy of right shoulder M12.811    Class 1 obesity due to excess calories with serious comorbidity and body mass index (BMI) of 33.0 to 33.9 in adult E66.09, Z68.33    Status post arthroscopy of right shoulder Z98.890    Lumbar spondylosis M47.816    Chronic pain syndrome G89.4    Primary osteoarthritis of left knee M17.12    Primary osteoarthritis of right shoulder M19.011    Precordial pain R07.2    Impingement syndrome of right shoulder M75.41    Tear of right glenoid labrum S43.431A    Arthritis of right acromioclavicular joint M19.011    Complete tear of right rotator cuff M75.121    Morbidly obese (HCC) E66.01    Venous reflux I87.2       Past Medical History:        Diagnosis Date    Anxiety     Blood in stool     Chronic back pain     Constipation     Headache(784.0)     History of dental problems     Hyperlipidemia     Neck pain     Neck stiffness     Type II or unspecified type diabetes mellitus without mention of complication, not stated as uncontrolled        Past Surgical History:        Procedure Laterality Date    COLONOSCOPY      FRACTURE SURGERY Left     femur    KNEE ARTHROSCOPY Left 07/18/2016    medial lateral menisectomy/synovectomy/chodroplasty    SHOULDER ARTHROSCOPY Right 2/11/2020    RIGHT SHOULDER ARTHROSCOPY SUBACHROMIAL DECOMPRESSION DEBRIDEMENT, BICEP TENODESIS ,LABRAL DEBRIDEMENT AND KHALIDA performed by Nicole Napier DO at [address] ARTHROSCOPY Right 10/02/2020    SHOULDER ARTHROSCOPY Right 10/2/2020    RIGHT SHOULDER ARTHROSCOPY SUBACROMIAL DECOMPRESSION AND DEBRIDEMENT LABRIAL DEBRIDEMENT ROTATOR CUFF REPAIR KHALIDA performed by Ramírez Oneal DO at [facility]         Social History:    Social History     Tobacco Use    Smoking status: Never Smoker    Smokeless tobacco: Never Used   Substance Use Topics    Alcohol use: Yes     Alcohol/week: 0.0 standard drinks     Comment: rarely .   Coffee occasionally                                Counseling given: Not Answered      Vital Signs (Current):   Vitals:    02/23/22 1243   BP: (!) 151/73   Resp: 16   Temp: 97.5 °F (36.4 °C)   TempSrc: Infrared   SpO2: 97%   Weight: 242 lb (109.8 kg) Height: 6' (1.829 m)                                              BP Readings from Last 3 Encounters:   02/23/22 (!) 151/73   02/09/22 130/80   01/25/22 128/75       NPO Status:                                                                                 BMI:   Wt Readings from Last 3 Encounters:   02/23/22 242 lb (109.8 kg)   02/09/22 242 lb (109.8 kg)   02/08/22 245 lb (111.1 kg)     Body mass index is 32.82 kg/m². CBC:   Lab Results   Component Value Date    WBC 6.6 02/23/2022    RBC 4.67 02/23/2022    HGB 13.2 02/23/2022    HCT 40.3 02/23/2022    MCV 86.3 02/23/2022    RDW 12.9 02/23/2022     02/23/2022       CMP:   Lab Results   Component Value Date     02/23/2022    K 3.9 02/23/2022     02/23/2022    CO2 23 02/23/2022    BUN 15 02/23/2022    CREATININE 0.7 02/23/2022    GFRAA >60 02/23/2022    LABGLOM >60 02/23/2022    GLUCOSE 200 02/23/2022    PROT 7.3 02/23/2022    CALCIUM 8.6 02/23/2022    BILITOT 0.4 02/23/2022    ALKPHOS 95 02/23/2022    AST 39 02/23/2022    ALT 51 02/23/2022       POC Tests: No results for input(s): POCGLU, POCNA, POCK, POCCL, POCBUN, POCHEMO, POCHCT in the last 72 hours.     Coags:   Lab Results   Component Value Date    PROTIME 11.5 02/23/2022    INR 1.0 02/23/2022    APTT 30.9 02/23/2022       HCG (If Applicable): No results found for: PREGTESTUR, PREGSERUM, HCG, HCGQUANT     ABGs: No results found for: PHART, PO2ART, VWX4EWZ, KRJ1PNI, BEART, N3NBKDLK     Type & Screen (If Applicable):  No results found for: LABABO, LABRH    Drug/Infectious Status (If Applicable):  No results found for: HIV, HEPCAB    COVID-19 Screening (If Applicable):   Lab Results   Component Value Date    COVID19 Not Detected 11/04/2020         Anesthesia Evaluation  Patient summary reviewed no history of anesthetic complications:   Airway: Mallampati: II  TM distance: >3 FB   Neck ROM: full  Mouth opening: > = 3 FB Dental: normal exam         Pulmonary:Negative Pulmonary ROS breath

## 2022-02-23 NOTE — PROGRESS NOTES
Per Dinorah Files at Dr. Clarisa Arevalo office, pt not to stop aspirin. Update to pt,he voiced understanding.

## 2022-02-23 NOTE — PROGRESS NOTES
Patient attended preoperative Total Joint Camp on 2/23/2022. Patient is scheduled to have an elective knee replacement. Patient was educated regarding Disease Process, Medications, Smoking Cessation, Oxygenation, Incentive Spirometry and Deep Breath and Cough, signs and symptoms of postoperative joint infection that include: Fever, Chills, Pain Control, Drainage and Redness, post-op follow up with orthopaedic surgeon, dressing removal, staple removal, ambulatory devices which include a wheeled walker and cane, bed mobility, correct anatomical alignment, active range of motion, proper transferring technique, incision care, infection prevention measures, non-pharmacologic comfort measures, notification of inadequate pain control measures, pain scale for assessing level of pain, pharmacologic pain management, relaxation techniques.

## 2022-02-23 NOTE — PROGRESS NOTES
[address] [facility]                                                                                                                    PRE OP INSTRUCTIONS FOR  Arin Hernandez        Date: 2/23/2022    Date of surgery: 3/2/22   Arrival Time: Hospital will call you between 5pm and 7pm the evening before with your final arrival time for surgery    1. Do not eat or drink anything after midnight prior to surgery. This includes no water, chewing gum, mints or ice chips. 2. Take the following medications with a small sip of water on the morning of Surgery: pantoprazole     3. Diabetics may take evening dose of insulin but none after midnight. If you feel symptomatic or low blood sugar morning of surgery drink 1-2 ounces of apple juice only. 4. Aspirin, Ibuprofen, Advil, Naproxen, Vitamin E and other Anti-inflammatory products should be stopped  before surgery  as directed by your physician. Take Tylenol only unless instructed otherwise by your surgeon. 5. Check with your Doctor regarding stopping Plavix, Coumadin, Lovenox, Eliquis, Effient, or other blood thinners. 6. Do not smoke,use illicit drugs and do not drink any alcoholic beverages 24 hours prior to surgery. 7. You may brush your teeth the morning of surgery. DO NOT SWALLOW WATER    8. You MUST make arrangements for a responsible adult to take you home after your surgery. You will not be allowed to leave alone or drive yourself home. It is strongly suggested someone stay with you the first 24 hrs. Your surgery will be cancelled if you do not have a ride home. 9. PEDIATRIC PATIENTS ONLY:  A parent/legal guardian must accompany a child scheduled for surgery and plan to stay at the hospital until the child is discharged. Please do not bring other children with you.     10. Please wear simple, loose fitting clothing to the hospital.  Do not bring valuables (money, credit cards, checkbooks, etc.) Do not wear any makeup (including no eye makeup) or nail polish on your fingers or toes. 11. DO NOT wear any jewelry or piercings on day of surgery. All body piercing jewelry must be removed. 12. Shower the night before surgery with _x__Antibacterial soap /STEPHANIE WIPES___x_____    13. TOTAL JOINT REPLACEMENT/HYSTERECTOMY PATIENTS ONLY---Remember to bring Blood Bank bracelet to the hospital on the day of surgery. 14. If you have a Living Will and Durable Power of  for Healthcare, please bring in a copy. 15. If appropriate bring crutches, inspirex, WALKER, CANE etc... 12. Notify your Surgeon if you develop any illness between now and surgery time, cough, cold, fever, sore throat, nausea, vomiting, etc.  Please notify your surgeon if you experience dizziness, shortness of breath or blurred vision between now & the time of your surgery. 17. If you have ___dentures, they will be removed before going to the OR; we will provide you a container. If you wear ___contact lenses or ___glasses, they will be removed; please bring a case for them. 18. To provide excellent care visitors will be limited to 1 in the room at any given time. 19. Please bring picture ID and insurance card. 20. Sleep apnea patients need to bring CPAP AND SETTINGS to hospital on day of surgery. 21. During flu season no children under the age of 15 are permitted in the hospital for the safety of all patients. 22. Other please check in at the information desk/main lobby. Please wear a mask. Please call AMBULATORY CARE if you have any further questions.    [facility]     [address]

## 2022-02-25 DIAGNOSIS — Z01.818 PREOP TESTING: ICD-10-CM

## 2022-02-25 LAB
MRSA CULTURE ONLY: NORMAL
URINE CULTURE, ROUTINE: NORMAL

## 2022-02-27 LAB
SARS-COV-2: NOT DETECTED
SOURCE: NORMAL

## 2022-02-27 ASSESSMENT — KOOS JR
GOING UP OR DOWN STAIRS: 3
STANDING UPRIGHT: 3
RISING FROM SITTING: 4
STRAIGHTENING KNEE FULLY: 3
HOW SEVERE IS YOUR KNEE STIFFNESS AFTER FIRST WAKING IN MORNING: 3
BENDING TO THE FLOOR TO PICK UP OBJECT: 4
TWISING OR PIVOTING ON KNEE: 3

## 2022-02-27 ASSESSMENT — PROMIS GLOBAL HEALTH SCALE
HOW IS THE PROMIS V1.1 BEING ADMINISTERED?: 2
IN GENERAL, PLEASE RATE HOW WELL YOU CARRY OUT YOUR USUAL SOCIAL ACTIVITIES (INCLUDES ACTIVITIES AT HOME, AT WORK, AND IN YOUR COMMUNITY, AND RESPONSIBILITIES AS A PARENT, CHILD, SPOUSE, EMPLOYEE, FRIEND, ETC) [ON A SCALE OF 1 (POOR) TO 5 (EXCELLENT)]?: 3
IN GENERAL, HOW WOULD YOU RATE YOUR MENTAL HEALTH, INCLUDING YOUR MOOD AND YOUR ABILITY TO THINK [ON A SCALE OF 1 (POOR) TO 5 (EXCELLENT)]?: 5
SUM OF RESPONSES TO QUESTIONS 2, 4, 5, & 10: 15
IN THE PAST 7 DAYS, HOW WOULD YOU RATE YOUR PAIN ON AVERAGE [ON A SCALE FROM 0 (NO PAIN) TO 10 (WORST IMAGINABLE PAIN)]?: 7
IN GENERAL, WOULD YOU SAY YOUR QUALITY OF LIFE IS...[ON A SCALE OF 1 (POOR) TO 5 (EXCELLENT)]: 4
IN THE PAST 7 DAYS, HOW WOULD YOU RATE YOUR FATIGUE ON AVERAGE [ON A SCALE FROM 1 (NONE) TO 5 (VERY SEVERE)]?: 4
TO WHAT EXTENT ARE YOU ABLE TO CARRY OUT YOUR EVERYDAY PHYSICAL ACTIVITIES SUCH AS WALKING, CLIMBING STAIRS, CARRYING GROCERIES, OR MOVING A CHAIR [ON A SCALE OF 1 (NOT AT ALL) TO 5 (COMPLETELY)]?: 3
IN GENERAL, WOULD YOU SAY YOUR HEALTH IS...[ON A SCALE OF 1 (POOR) TO 5 (EXCELLENT)]: 4
WHO IS THE PERSON COMPLETING THE PROMIS V1.1 SURVEY?: 0
IN GENERAL, HOW WOULD YOU RATE YOUR SATISFACTION WITH YOUR SOCIAL ACTIVITIES AND RELATIONSHIPS [ON A SCALE OF 1 (POOR) TO 5 (EXCELLENT)]?: 5
IN THE PAST 7 DAYS, HOW OFTEN HAVE YOU BEEN BOTHERED BY EMOTIONAL PROBLEMS, SUCH AS FEELING ANXIOUS, DEPRESSED, OR IRRITABLE [ON A SCALE FROM 1 (NEVER) TO 5 (ALWAYS)]?: 1
SUM OF RESPONSES TO QUESTIONS 3, 6, 7, & 8: 17
IN GENERAL, HOW WOULD YOU RATE YOUR PHYSICAL HEALTH [ON A SCALE OF 1 (POOR) TO 5 (EXCELLENT)]?: 3

## 2022-03-01 NOTE — FLOWSHEET NOTE
03/01/22 1358   Social/Functional History   Lives With Spouse   Type of [address] Two level; Able to Live on Main level with bedroom/bathroom;1/2 bath on main level   Home Access Stairs to enter without rails   Entrance Stairs - Number of Steps 2 steps to enter- pt plans to sleep in a recliner on 1st floor- 12 steps with 1 rail to bed/full bathroom on 2nd floor   Bathroom Shower/Tub Walk-in shower; Tub/Shower unit   Bathroom Toilet Handicap height   Receives Help From Family   3/1/2022: SS Note/Discharge planning:  Met with pt in [facility], pt having surgery for a total knee arthroplasty on 3/2 , explained sw role for transition of care and [facility], pt plans to return home day of surgery from [facility], pt's wife will help him as needed and provide his transport home. Pt prefers [email or web address], [facility] [address] [place] [address] for home therapy for start of care on Thursday 3/3 and to [facility] [place] for a w/w, bsc, and shower seat w/back for hospital delivery to [facility], will follow post-op for home care and dme orders. Electronically signed by RISSA Perrin on 3/1/2022 at 1:52 PM

## 2022-03-02 ENCOUNTER — ANESTHESIA (OUTPATIENT)
Dept: OPERATING ROOM | Age: 57
End: 2022-03-02
Payer: COMMERCIAL

## 2022-03-02 ENCOUNTER — APPOINTMENT (OUTPATIENT)
Dept: GENERAL RADIOLOGY | Age: 57
End: 2022-03-02
Attending: ORTHOPAEDIC SURGERY
Payer: COMMERCIAL

## 2022-03-02 ENCOUNTER — HOSPITAL ENCOUNTER (OUTPATIENT)
Age: 57
Discharge: HOME | End: 2022-03-02
Attending: ORTHOPAEDIC SURGERY | Admitting: ORTHOPAEDIC SURGERY
Payer: COMMERCIAL

## 2022-03-02 VITALS
OXYGEN SATURATION: 93 % | TEMPERATURE: 98.4 F | RESPIRATION RATE: 15 BRPM | DIASTOLIC BLOOD PRESSURE: 54 MMHG | SYSTOLIC BLOOD PRESSURE: 115 MMHG

## 2022-03-02 VITALS
TEMPERATURE: 97.2 F | RESPIRATION RATE: 18 BRPM | DIASTOLIC BLOOD PRESSURE: 78 MMHG | OXYGEN SATURATION: 98 % | HEART RATE: 81 BPM | SYSTOLIC BLOOD PRESSURE: 142 MMHG

## 2022-03-02 DIAGNOSIS — M17.12 PRIMARY OSTEOARTHRITIS OF LEFT KNEE: Primary | ICD-10-CM

## 2022-03-02 DIAGNOSIS — R52 PAIN: ICD-10-CM

## 2022-03-02 DIAGNOSIS — Z01.818 PREOP TESTING: ICD-10-CM

## 2022-03-02 DIAGNOSIS — G89.18 POST-OP PAIN: ICD-10-CM

## 2022-03-02 LAB
METER GLUCOSE: 123 MG/DL (ref 74–99)
METER GLUCOSE: 206 MG/DL (ref 74–99)

## 2022-03-02 PROCEDURE — 6360000002 HC RX W HCPCS: Performed by: ANESTHESIOLOGY

## 2022-03-02 PROCEDURE — 2500000003 HC RX 250 WO HCPCS: Performed by: ORTHOPAEDIC SURGERY

## 2022-03-02 PROCEDURE — C1776 JOINT DEVICE (IMPLANTABLE): HCPCS | Performed by: ORTHOPAEDIC SURGERY

## 2022-03-02 PROCEDURE — 2580000003 HC RX 258: Performed by: NURSE PRACTITIONER

## 2022-03-02 PROCEDURE — 2500000003 HC RX 250 WO HCPCS: Performed by: NURSE ANESTHETIST, CERTIFIED REGISTERED

## 2022-03-02 PROCEDURE — 64447 NJX AA&/STRD FEMORAL NRV IMG: CPT | Performed by: ANESTHESIOLOGY

## 2022-03-02 PROCEDURE — 6360000002 HC RX W HCPCS: Performed by: NURSE ANESTHETIST, CERTIFIED REGISTERED

## 2022-03-02 PROCEDURE — 97116 GAIT TRAINING THERAPY: CPT | Performed by: PHYSICAL THERAPIST

## 2022-03-02 PROCEDURE — 6360000002 HC RX W HCPCS: Performed by: NURSE PRACTITIONER

## 2022-03-02 PROCEDURE — 97535 SELF CARE MNGMENT TRAINING: CPT

## 2022-03-02 PROCEDURE — 6370000000 HC RX 637 (ALT 250 FOR IP): Performed by: NURSE PRACTITIONER

## 2022-03-02 PROCEDURE — 27447 TOTAL KNEE ARTHROPLASTY: CPT | Performed by: ORTHOPAEDIC SURGERY

## 2022-03-02 PROCEDURE — 82962 GLUCOSE BLOOD TEST: CPT

## 2022-03-02 PROCEDURE — 2500000003 HC RX 250 WO HCPCS: Performed by: NURSE PRACTITIONER

## 2022-03-02 PROCEDURE — 6360000002 HC RX W HCPCS: Performed by: ORTHOPAEDIC SURGERY

## 2022-03-02 PROCEDURE — 3600000015 HC SURGERY LEVEL 5 ADDTL 15MIN: Performed by: ORTHOPAEDIC SURGERY

## 2022-03-02 PROCEDURE — 3700000000 HC ANESTHESIA ATTENDED CARE: Performed by: ORTHOPAEDIC SURGERY

## 2022-03-02 PROCEDURE — 88311 DECALCIFY TISSUE: CPT

## 2022-03-02 PROCEDURE — 97530 THERAPEUTIC ACTIVITIES: CPT

## 2022-03-02 PROCEDURE — 7100000000 HC PACU RECOVERY - FIRST 15 MIN: Performed by: ORTHOPAEDIC SURGERY

## 2022-03-02 PROCEDURE — 2580000003 HC RX 258: Performed by: ANESTHESIOLOGY

## 2022-03-02 PROCEDURE — 97110 THERAPEUTIC EXERCISES: CPT | Performed by: PHYSICAL THERAPIST

## 2022-03-02 PROCEDURE — 7100000010 HC PHASE II RECOVERY - FIRST 15 MIN: Performed by: ORTHOPAEDIC SURGERY

## 2022-03-02 PROCEDURE — 2709999900 HC NON-CHARGEABLE SUPPLY: Performed by: ORTHOPAEDIC SURGERY

## 2022-03-02 PROCEDURE — 6370000000 HC RX 637 (ALT 250 FOR IP): Performed by: ANESTHESIOLOGY

## 2022-03-02 PROCEDURE — 3600000005 HC SURGERY LEVEL 5 BASE: Performed by: ORTHOPAEDIC SURGERY

## 2022-03-02 PROCEDURE — 3700000001 HC ADD 15 MINUTES (ANESTHESIA): Performed by: ORTHOPAEDIC SURGERY

## 2022-03-02 PROCEDURE — 6370000000 HC RX 637 (ALT 250 FOR IP): Performed by: ORTHOPAEDIC SURGERY

## 2022-03-02 PROCEDURE — 97161 PT EVAL LOW COMPLEX 20 MIN: CPT | Performed by: PHYSICAL THERAPIST

## 2022-03-02 PROCEDURE — 6360000002 HC RX W HCPCS

## 2022-03-02 PROCEDURE — 97165 OT EVAL LOW COMPLEX 30 MIN: CPT

## 2022-03-02 PROCEDURE — 7100000001 HC PACU RECOVERY - ADDTL 15 MIN: Performed by: ORTHOPAEDIC SURGERY

## 2022-03-02 PROCEDURE — 2580000003 HC RX 258: Performed by: ORTHOPAEDIC SURGERY

## 2022-03-02 PROCEDURE — 88305 TISSUE EXAM BY PATHOLOGIST: CPT

## 2022-03-02 PROCEDURE — C1713 ANCHOR/SCREW BN/BN,TIS/BN: HCPCS | Performed by: ORTHOPAEDIC SURGERY

## 2022-03-02 PROCEDURE — 7100000011 HC PHASE II RECOVERY - ADDTL 15 MIN: Performed by: ORTHOPAEDIC SURGERY

## 2022-03-02 PROCEDURE — 73560 X-RAY EXAM OF KNEE 1 OR 2: CPT

## 2022-03-02 PROCEDURE — A4217 STERILE WATER/SALINE, 500 ML: HCPCS | Performed by: ORTHOPAEDIC SURGERY

## 2022-03-02 DEVICE — GENESIS II NON-POROUS TIBIAL                                    BASEPLATE SIZE 8 LEFT
Type: IMPLANTABLE DEVICE | Site: KNEE | Status: FUNCTIONAL
Brand: GENESIS II

## 2022-03-02 DEVICE — GEN II 7.5MM RESUR PAT 35MM
Type: IMPLANTABLE DEVICE | Site: KNEE | Status: FUNCTIONAL
Brand: GENESIS II

## 2022-03-02 DEVICE — KNEE K1 TOT HEMI STD CEM IMPL CAPPED K1 SN: Type: IMPLANTABLE DEVICE | Status: FUNCTIONAL

## 2022-03-02 DEVICE — CEMENT BNE 20ML 40GM FULL DOSE PMMA W/O ANTIBIO M VISC: Type: IMPLANTABLE DEVICE | Site: KNEE | Status: FUNCTIONAL

## 2022-03-02 DEVICE — LEGION PS OXINIUM FEMORAL SZ 7 LEFT
Type: IMPLANTABLE DEVICE | Site: KNEE | Status: FUNCTIONAL
Brand: LEGION

## 2022-03-02 DEVICE — LEGION POSTERIOR STABILIZED HIGH                                    FLEX HIGHLY CROSS LINKED                                    POLYETHYLENE SIZE 7-8 11MM
Type: IMPLANTABLE DEVICE | Site: KNEE | Status: FUNCTIONAL
Brand: LEGION

## 2022-03-02 RX ORDER — FENTANYL CITRATE 50 UG/ML
INJECTION, SOLUTION INTRAMUSCULAR; INTRAVENOUS
Status: COMPLETED
Start: 2022-03-02 | End: 2022-03-02

## 2022-03-02 RX ORDER — OXYCODONE HYDROCHLORIDE 5 MG/1
10 TABLET ORAL EVERY 4 HOURS PRN
Status: DISCONTINUED | OUTPATIENT
Start: 2022-03-02 | End: 2022-03-02 | Stop reason: HOSPADM

## 2022-03-02 RX ORDER — ROPIVACAINE HYDROCHLORIDE 5 MG/ML
30 INJECTION, SOLUTION EPIDURAL; INFILTRATION; PERINEURAL ONCE
Status: COMPLETED | OUTPATIENT
Start: 2022-03-02 | End: 2022-03-02

## 2022-03-02 RX ORDER — GABAPENTIN 100 MG/1
100 CAPSULE ORAL 3 TIMES DAILY
Qty: 90 CAPSULE | Refills: 0 | Status: SHIPPED | OUTPATIENT
Start: 2022-03-02 | End: 2022-04-22 | Stop reason: SDUPTHER

## 2022-03-02 RX ORDER — SODIUM CHLORIDE 9 MG/ML
25 INJECTION, SOLUTION INTRAVENOUS PRN
Status: CANCELLED | OUTPATIENT
Start: 2022-03-02

## 2022-03-02 RX ORDER — SODIUM CHLORIDE 0.9 % (FLUSH) 0.9 %
5-40 SYRINGE (ML) INJECTION EVERY 12 HOURS SCHEDULED
Status: CANCELLED | OUTPATIENT
Start: 2022-03-02

## 2022-03-02 RX ORDER — ATORVASTATIN CALCIUM 10 MG/1
10 TABLET, FILM COATED ORAL DAILY
Refills: 5 | Status: CANCELLED | OUTPATIENT
Start: 2022-03-02

## 2022-03-02 RX ORDER — MIDAZOLAM HYDROCHLORIDE 1 MG/ML
INJECTION INTRAMUSCULAR; INTRAVENOUS
Status: COMPLETED
Start: 2022-03-02 | End: 2022-03-02

## 2022-03-02 RX ORDER — FENTANYL CITRATE 50 UG/ML
50 INJECTION, SOLUTION INTRAMUSCULAR; INTRAVENOUS EVERY 5 MIN PRN
Status: COMPLETED | OUTPATIENT
Start: 2022-03-02 | End: 2022-03-02

## 2022-03-02 RX ORDER — MEPERIDINE HYDROCHLORIDE 25 MG/ML
INJECTION INTRAMUSCULAR; INTRAVENOUS; SUBCUTANEOUS
Status: COMPLETED
Start: 2022-03-02 | End: 2022-03-02

## 2022-03-02 RX ORDER — CEFAZOLIN SODIUM 2 G/50ML
2000 SOLUTION INTRAVENOUS
Status: COMPLETED | OUTPATIENT
Start: 2022-03-02 | End: 2022-03-02

## 2022-03-02 RX ORDER — SODIUM CHLORIDE 0.9 % (FLUSH) 0.9 %
5-40 SYRINGE (ML) INJECTION EVERY 12 HOURS SCHEDULED
Status: DISCONTINUED | OUTPATIENT
Start: 2022-03-02 | End: 2022-03-02 | Stop reason: HOSPADM

## 2022-03-02 RX ORDER — ALOGLIPTIN 25 MG/1
25 TABLET, FILM COATED ORAL DAILY
Status: DISCONTINUED | OUTPATIENT
Start: 2022-03-02 | End: 2022-03-02 | Stop reason: HOSPADM

## 2022-03-02 RX ORDER — SODIUM CHLORIDE 0.9 % (FLUSH) 0.9 %
5-40 SYRINGE (ML) INJECTION PRN
Status: CANCELLED | OUTPATIENT
Start: 2022-03-02

## 2022-03-02 RX ORDER — NICOTINE POLACRILEX 4 MG
15 LOZENGE BUCCAL PRN
Status: DISCONTINUED | OUTPATIENT
Start: 2022-03-02 | End: 2022-03-02 | Stop reason: HOSPADM

## 2022-03-02 RX ORDER — ACETAMINOPHEN 500 MG
1000 TABLET ORAL ONCE
Status: COMPLETED | OUTPATIENT
Start: 2022-03-02 | End: 2022-03-02

## 2022-03-02 RX ORDER — SODIUM CHLORIDE 0.9 % (FLUSH) 0.9 %
5-40 SYRINGE (ML) INJECTION PRN
Status: DISCONTINUED | OUTPATIENT
Start: 2022-03-02 | End: 2022-03-02 | Stop reason: HOSPADM

## 2022-03-02 RX ORDER — MELOXICAM 7.5 MG/1
7.5 TABLET ORAL DAILY
Status: DISCONTINUED | OUTPATIENT
Start: 2022-03-02 | End: 2022-03-02 | Stop reason: HOSPADM

## 2022-03-02 RX ORDER — OXYCODONE AND ACETAMINOPHEN 10; 325 MG/1; MG/1
1 TABLET ORAL EVERY 6 HOURS PRN
Qty: 28 TABLET | Refills: 0 | Status: SHIPPED | OUTPATIENT
Start: 2022-03-02 | End: 2022-03-09 | Stop reason: SDUPTHER

## 2022-03-02 RX ORDER — FENTANYL CITRATE 50 UG/ML
INJECTION, SOLUTION INTRAMUSCULAR; INTRAVENOUS PRN
Status: DISCONTINUED | OUTPATIENT
Start: 2022-03-02 | End: 2022-03-02 | Stop reason: SDUPTHER

## 2022-03-02 RX ORDER — GLIPIZIDE 5 MG/1
10 TABLET ORAL
Status: DISCONTINUED | OUTPATIENT
Start: 2022-03-03 | End: 2022-03-02 | Stop reason: HOSPADM

## 2022-03-02 RX ORDER — ONDANSETRON 4 MG/1
4 TABLET, ORALLY DISINTEGRATING ORAL EVERY 8 HOURS PRN
Status: DISCONTINUED | OUTPATIENT
Start: 2022-03-02 | End: 2022-03-02 | Stop reason: HOSPADM

## 2022-03-02 RX ORDER — CEFAZOLIN SODIUM 2 G/50ML
2000 SOLUTION INTRAVENOUS EVERY 8 HOURS
Status: DISCONTINUED | OUTPATIENT
Start: 2022-03-02 | End: 2022-03-02 | Stop reason: HOSPADM

## 2022-03-02 RX ORDER — DEXTROSE MONOHYDRATE 25 G/50ML
12.5 INJECTION, SOLUTION INTRAVENOUS PRN
Status: DISCONTINUED | OUTPATIENT
Start: 2022-03-02 | End: 2022-03-02 | Stop reason: CLARIF

## 2022-03-02 RX ORDER — MORPHINE SULFATE 2 MG/ML
2 INJECTION, SOLUTION INTRAMUSCULAR; INTRAVENOUS
Status: DISCONTINUED | OUTPATIENT
Start: 2022-03-02 | End: 2022-03-02 | Stop reason: HOSPADM

## 2022-03-02 RX ORDER — CELECOXIB 100 MG/1
200 CAPSULE ORAL ONCE
Status: COMPLETED | OUTPATIENT
Start: 2022-03-02 | End: 2022-03-02

## 2022-03-02 RX ORDER — ONDANSETRON 2 MG/ML
INJECTION INTRAMUSCULAR; INTRAVENOUS PRN
Status: DISCONTINUED | OUTPATIENT
Start: 2022-03-02 | End: 2022-03-02 | Stop reason: SDUPTHER

## 2022-03-02 RX ORDER — SODIUM CHLORIDE 9 MG/ML
25 INJECTION, SOLUTION INTRAVENOUS PRN
Status: DISCONTINUED | OUTPATIENT
Start: 2022-03-02 | End: 2022-03-02 | Stop reason: HOSPADM

## 2022-03-02 RX ORDER — MIDAZOLAM HYDROCHLORIDE 1 MG/ML
1 INJECTION INTRAMUSCULAR; INTRAVENOUS EVERY 5 MIN PRN
Status: DISCONTINUED | OUTPATIENT
Start: 2022-03-02 | End: 2022-03-02 | Stop reason: HOSPADM

## 2022-03-02 RX ORDER — OXYCODONE HYDROCHLORIDE AND ACETAMINOPHEN 5; 325 MG/1; MG/1
1 TABLET ORAL EVERY 6 HOURS PRN
Qty: 28 TABLET | Refills: 0 | Status: SHIPPED | OUTPATIENT
Start: 2022-03-02 | End: 2022-03-02 | Stop reason: HOSPADM

## 2022-03-02 RX ORDER — PANTOPRAZOLE SODIUM 40 MG/1
40 TABLET, DELAYED RELEASE ORAL DAILY
Status: CANCELLED | OUTPATIENT
Start: 2022-03-02

## 2022-03-02 RX ORDER — PROPOFOL 10 MG/ML
INJECTION, EMULSION INTRAVENOUS PRN
Status: DISCONTINUED | OUTPATIENT
Start: 2022-03-02 | End: 2022-03-02 | Stop reason: SDUPTHER

## 2022-03-02 RX ORDER — SODIUM CHLORIDE 9 MG/ML
INJECTION, SOLUTION INTRAVENOUS CONTINUOUS
Status: DISCONTINUED | OUTPATIENT
Start: 2022-03-02 | End: 2022-03-02 | Stop reason: HOSPADM

## 2022-03-02 RX ORDER — ROPIVACAINE HYDROCHLORIDE 5 MG/ML
INJECTION, SOLUTION EPIDURAL; INFILTRATION; PERINEURAL
Status: COMPLETED
Start: 2022-03-02 | End: 2022-03-02

## 2022-03-02 RX ORDER — MEPERIDINE HYDROCHLORIDE 25 MG/ML
12.5 INJECTION INTRAMUSCULAR; INTRAVENOUS; SUBCUTANEOUS EVERY 5 MIN PRN
Status: DISCONTINUED | OUTPATIENT
Start: 2022-03-02 | End: 2022-03-02 | Stop reason: HOSPADM

## 2022-03-02 RX ORDER — DEXTROSE AND SODIUM CHLORIDE 5; .45 G/100ML; G/100ML
INJECTION, SOLUTION INTRAVENOUS CONTINUOUS
Status: CANCELLED | OUTPATIENT
Start: 2022-03-02

## 2022-03-02 RX ORDER — ROPIVACAINE HYDROCHLORIDE 5 MG/ML
INJECTION, SOLUTION EPIDURAL; INFILTRATION; PERINEURAL
Status: DISCONTINUED | OUTPATIENT
Start: 2022-03-02 | End: 2022-03-02 | Stop reason: SDUPTHER

## 2022-03-02 RX ORDER — BLOOD PRESSURE TEST KIT
1 KIT MISCELLANEOUS PRN
Status: CANCELLED | OUTPATIENT
Start: 2022-03-02

## 2022-03-02 RX ORDER — ONDANSETRON 2 MG/ML
4 INJECTION INTRAMUSCULAR; INTRAVENOUS EVERY 6 HOURS PRN
Status: DISCONTINUED | OUTPATIENT
Start: 2022-03-02 | End: 2022-03-02 | Stop reason: HOSPADM

## 2022-03-02 RX ORDER — DEXTROSE MONOHYDRATE 50 MG/ML
100 INJECTION, SOLUTION INTRAVENOUS PRN
Status: DISCONTINUED | OUTPATIENT
Start: 2022-03-02 | End: 2022-03-02 | Stop reason: HOSPADM

## 2022-03-02 RX ORDER — DEXAMETHASONE SODIUM PHOSPHATE 10 MG/ML
8 INJECTION INTRAMUSCULAR; INTRAVENOUS ONCE
Status: COMPLETED | OUTPATIENT
Start: 2022-03-02 | End: 2022-03-02

## 2022-03-02 RX ORDER — CELECOXIB 100 MG/1
100 CAPSULE ORAL 2 TIMES DAILY
Qty: 60 CAPSULE | Refills: 0 | Status: SHIPPED | OUTPATIENT
Start: 2022-03-02 | End: 2022-04-13 | Stop reason: ALTCHOICE

## 2022-03-02 RX ORDER — BUPIVACAINE HYDROCHLORIDE 7.5 MG/ML
INJECTION, SOLUTION INTRASPINAL PRN
Status: DISCONTINUED | OUTPATIENT
Start: 2022-03-02 | End: 2022-03-02 | Stop reason: SDUPTHER

## 2022-03-02 RX ORDER — OXYCODONE AND ACETAMINOPHEN 10; 325 MG/1; MG/1
1 TABLET ORAL ONCE
Status: COMPLETED | OUTPATIENT
Start: 2022-03-02 | End: 2022-03-02

## 2022-03-02 RX ORDER — ACETAMINOPHEN 325 MG/1
650 TABLET ORAL EVERY 6 HOURS
Status: DISCONTINUED | OUTPATIENT
Start: 2022-03-03 | End: 2022-03-02 | Stop reason: HOSPADM

## 2022-03-02 RX ORDER — VANCOMYCIN HYDROCHLORIDE 1 G/20ML
INJECTION, POWDER, LYOPHILIZED, FOR SOLUTION INTRAVENOUS PRN
Status: DISCONTINUED | OUTPATIENT
Start: 2022-03-02 | End: 2022-03-02 | Stop reason: ALTCHOICE

## 2022-03-02 RX ORDER — LIDOCAINE HYDROCHLORIDE 20 MG/ML
INJECTION, SOLUTION INTRAVENOUS PRN
Status: DISCONTINUED | OUTPATIENT
Start: 2022-03-02 | End: 2022-03-02 | Stop reason: SDUPTHER

## 2022-03-02 RX ORDER — PREGABALIN 75 MG/1
75 CAPSULE ORAL ONCE
Status: COMPLETED | OUTPATIENT
Start: 2022-03-02 | End: 2022-03-02

## 2022-03-02 RX ORDER — FENTANYL CITRATE 50 UG/ML
100 INJECTION, SOLUTION INTRAMUSCULAR; INTRAVENOUS ONCE
Status: COMPLETED | OUTPATIENT
Start: 2022-03-02 | End: 2022-03-02

## 2022-03-02 RX ORDER — MIDAZOLAM HYDROCHLORIDE 1 MG/ML
2 INJECTION INTRAMUSCULAR; INTRAVENOUS ONCE
Status: COMPLETED | OUTPATIENT
Start: 2022-03-02 | End: 2022-03-02

## 2022-03-02 RX ORDER — CEFAZOLIN SODIUM 2 G/50ML
SOLUTION INTRAVENOUS
Status: COMPLETED
Start: 2022-03-02 | End: 2022-03-02

## 2022-03-02 RX ADMIN — SODIUM CHLORIDE: 9 INJECTION, SOLUTION INTRAVENOUS at 07:18

## 2022-03-02 RX ADMIN — MIDAZOLAM HYDROCHLORIDE 2 MG: 1 INJECTION INTRAMUSCULAR; INTRAVENOUS at 07:44

## 2022-03-02 RX ADMIN — FENTANYL CITRATE 50 MCG: 50 INJECTION, SOLUTION INTRAMUSCULAR; INTRAVENOUS at 07:45

## 2022-03-02 RX ADMIN — FENTANYL CITRATE 50 MCG: 50 INJECTION, SOLUTION INTRAMUSCULAR; INTRAVENOUS at 07:44

## 2022-03-02 RX ADMIN — ROPIVACAINE HYDROCHLORIDE 30 ML: 5 INJECTION, SOLUTION EPIDURAL; INFILTRATION; PERINEURAL at 07:54

## 2022-03-02 RX ADMIN — OXYCODONE AND ACETAMINOPHEN 1 TABLET: 325; 10 TABLET ORAL at 14:41

## 2022-03-02 RX ADMIN — SODIUM CHLORIDE: 9 INJECTION, SOLUTION INTRAVENOUS at 09:25

## 2022-03-02 RX ADMIN — MEPERIDINE HYDROCHLORIDE 12.5 MG: 25 INJECTION, SOLUTION INTRAMUSCULAR; INTRAVENOUS; SUBCUTANEOUS at 12:10

## 2022-03-02 RX ADMIN — DEXAMETHASONE SODIUM PHOSPHATE 8 MG: 10 INJECTION INTRAMUSCULAR; INTRAVENOUS at 07:18

## 2022-03-02 RX ADMIN — PHENYLEPHRINE HYDROCHLORIDE 100 MCG: 10 INJECTION INTRAVENOUS at 09:34

## 2022-03-02 RX ADMIN — PROPOFOL INJECTABLE EMULSION 100 MCG/KG/MIN: 10 INJECTION, EMULSION INTRAVENOUS at 08:38

## 2022-03-02 RX ADMIN — FENTANYL CITRATE 100 MCG: 50 INJECTION, SOLUTION INTRAMUSCULAR; INTRAVENOUS at 07:48

## 2022-03-02 RX ADMIN — Medication 0.5 MG: at 12:42

## 2022-03-02 RX ADMIN — FENTANYL CITRATE 25 MCG: 50 INJECTION, SOLUTION INTRAMUSCULAR; INTRAVENOUS at 08:43

## 2022-03-02 RX ADMIN — MIDAZOLAM 2 MG: 1 INJECTION INTRAMUSCULAR; INTRAVENOUS at 07:44

## 2022-03-02 RX ADMIN — ONDANSETRON 4 MG: 2 INJECTION INTRAMUSCULAR; INTRAVENOUS at 08:37

## 2022-03-02 RX ADMIN — HYDROMORPHONE HYDROCHLORIDE 0.5 MG: 1 INJECTION, SOLUTION INTRAMUSCULAR; INTRAVENOUS; SUBCUTANEOUS at 12:42

## 2022-03-02 RX ADMIN — BUPIVACAINE HYDROCHLORIDE IN DEXTROSE 1.8 ML: 7.5 INJECTION, SOLUTION SUBARACHNOID at 08:33

## 2022-03-02 RX ADMIN — ACETAMINOPHEN 1000 MG: 500 TABLET ORAL at 07:19

## 2022-03-02 RX ADMIN — PHENYLEPHRINE HYDROCHLORIDE 50 MCG: 10 INJECTION INTRAVENOUS at 09:04

## 2022-03-02 RX ADMIN — PREGABALIN 75 MG: 75 CAPSULE ORAL at 07:19

## 2022-03-02 RX ADMIN — MELOXICAM 7.5 MG: 7.5 TABLET ORAL at 14:32

## 2022-03-02 RX ADMIN — FENTANYL CITRATE 25 MCG: 50 INJECTION, SOLUTION INTRAMUSCULAR; INTRAVENOUS at 08:37

## 2022-03-02 RX ADMIN — CEFAZOLIN SODIUM 2000 MG: 2 SOLUTION INTRAVENOUS at 11:39

## 2022-03-02 RX ADMIN — LIDOCAINE HYDROCHLORIDE 50 MG: 20 INJECTION, SOLUTION INTRAVENOUS at 08:37

## 2022-03-02 RX ADMIN — PHENYLEPHRINE HYDROCHLORIDE 100 MCG: 10 INJECTION INTRAVENOUS at 09:58

## 2022-03-02 RX ADMIN — CEFAZOLIN SODIUM 2000 MG: 2 SOLUTION INTRAVENOUS at 08:23

## 2022-03-02 RX ADMIN — FENTANYL CITRATE 25 MCG: 50 INJECTION, SOLUTION INTRAMUSCULAR; INTRAVENOUS at 08:40

## 2022-03-02 RX ADMIN — FENTANYL CITRATE 25 MCG: 50 INJECTION, SOLUTION INTRAMUSCULAR; INTRAVENOUS at 08:33

## 2022-03-02 RX ADMIN — CELECOXIB 200 MG: 100 CAPSULE ORAL at 07:19

## 2022-03-02 RX ADMIN — MEPERIDINE HYDROCHLORIDE 12.5 MG: 25 INJECTION, SOLUTION INTRAMUSCULAR; INTRAVENOUS; SUBCUTANEOUS at 12:20

## 2022-03-02 RX ADMIN — PROPOFOL INJECTABLE EMULSION 50 MG: 10 INJECTION, EMULSION INTRAVENOUS at 08:37

## 2022-03-02 RX ADMIN — ROPIVACAINE HYDROCHLORIDE 35 ML: 5 INJECTION, SOLUTION EPIDURAL; INFILTRATION; PERINEURAL at 07:53

## 2022-03-02 ASSESSMENT — PAIN DESCRIPTION - FREQUENCY
FREQUENCY: CONTINUOUS

## 2022-03-02 ASSESSMENT — PULMONARY FUNCTION TESTS
PIF_VALUE: 1
PIF_VALUE: 0
PIF_VALUE: 1
PIF_VALUE: 0
PIF_VALUE: 0
PIF_VALUE: 1
PIF_VALUE: 0
PIF_VALUE: 1
PIF_VALUE: 0
PIF_VALUE: 1
PIF_VALUE: 0
PIF_VALUE: 1
PIF_VALUE: 0
PIF_VALUE: 1
PIF_VALUE: 0
PIF_VALUE: 1
PIF_VALUE: 0
PIF_VALUE: 0
PIF_VALUE: 1
PIF_VALUE: 1
PIF_VALUE: 0
PIF_VALUE: 1

## 2022-03-02 ASSESSMENT — PAIN DESCRIPTION - DESCRIPTORS
DESCRIPTORS: THROBBING
DESCRIPTORS: ACHING;DISCOMFORT;SORE
DESCRIPTORS: THROBBING
DESCRIPTORS: ACHING;DISCOMFORT

## 2022-03-02 ASSESSMENT — PAIN DESCRIPTION - ORIENTATION
ORIENTATION: LEFT

## 2022-03-02 ASSESSMENT — PAIN SCALES - GENERAL
PAINLEVEL_OUTOF10: 3
PAINLEVEL_OUTOF10: 0
PAINLEVEL_OUTOF10: 2
PAINLEVEL_OUTOF10: 10
PAINLEVEL_OUTOF10: 7
PAINLEVEL_OUTOF10: 10
PAINLEVEL_OUTOF10: 5
PAINLEVEL_OUTOF10: 10
PAINLEVEL_OUTOF10: 4
PAINLEVEL_OUTOF10: 4
PAINLEVEL_OUTOF10: 0
PAINLEVEL_OUTOF10: 8
PAINLEVEL_OUTOF10: 3

## 2022-03-02 ASSESSMENT — PAIN DESCRIPTION - PAIN TYPE
TYPE: ACUTE PAIN;SURGICAL PAIN

## 2022-03-02 ASSESSMENT — PAIN - FUNCTIONAL ASSESSMENT: PAIN_FUNCTIONAL_ASSESSMENT: 0-10

## 2022-03-02 ASSESSMENT — PAIN DESCRIPTION - LOCATION
LOCATION: KNEE

## 2022-03-02 ASSESSMENT — PAIN DESCRIPTION - PROGRESSION
CLINICAL_PROGRESSION: GRADUALLY WORSENING
CLINICAL_PROGRESSION: NOT CHANGED
CLINICAL_PROGRESSION: RAPIDLY IMPROVING
CLINICAL_PROGRESSION: GRADUALLY WORSENING

## 2022-03-02 NOTE — PROGRESS NOTES
[address] [facility]  [facility] [place]        Date:3/2/2022                                                  Patient Name: Karyle April    MRN: [id number]    : 1965    Room: OR POOL/NONE      Evaluating OT: Coty Castaneda OTR/L; [id number]     Referring Provider and Specific Provider Orders/Date:      22  OT eval and treat  Start:  22,   End:  22,   ONE TIME,   Standing Count:  1 Occurrences,   R        Last continued at transfer on Wed Mar 2, 2022  2:05 PM    Ramin Fontaine DO     Placement Recommendation: HHOT       Diagnosis:   1. Primary osteoarthritis of left knee    2. Preop testing    3. Post-op pain    4.  Pain         Surgery: L TKA      Pertinent Medical History:       Past Medical History:   Diagnosis Date    Anxiety     Blood in stool     Chronic back pain     Constipation     Headache(784.0)     History of dental problems     Hyperlipidemia     Neck pain     Neck stiffness     Type II or unspecified type diabetes mellitus without mention of complication, not stated as uncontrolled          Past Surgical History:   Procedure Laterality Date    COLONOSCOPY      FRACTURE SURGERY Left     femur    KNEE ARTHROSCOPY Left 2016    medial lateral menisectomy/synovectomy/chodroplasty    SHOULDER ARTHROSCOPY Right 2020    RIGHT SHOULDER ARTHROSCOPY SUBACHROMIAL DECOMPRESSION DEBRIDEMENT, BICEP TENODESIS ,LABRAL DEBRIDEMENT AND KHALIDA performed by Benedict Chun DO at [address] [facility] ARTHROSCOPY Right 10/02/2020    SHOULDER ARTHROSCOPY Right 10/2/2020    RIGHT SHOULDER ARTHROSCOPY SUBACROMIAL DECOMPRESSION AND DEBRIDEMENT LABRIAL DEBRIDEMENT ROTATOR CUFF REPAIR KHALIDA performed by Ramin Fontaine DO at [facility]        Precautions:  Fall Risk, full weight bearing: L LE d/t TKA      Assessment of current deficits: [x] Functional mobility  [x]ADLs  [x] Strength               []Cognition    [x] Functional transfers   [x] IADLs         [] Safety Awareness   [x]Endurance    [] Fine Coordination              [x] Balance      [] Vision/perception   []Sensation     []Gross Motor Coordination  [x] ROM  [] Delirium                   [] Motor Control     OT PLAN OF CARE   OT POC based on physician orders, patient diagnosis and results of clinical assessment    Frequency/Duration 1-3 days/wk for 2 weeks PRN     Specific OT Treatment Interventions to include:   * Instruction/training on adapted ADL techniques and AE recommendations to increase functional independence within precautions       * Training on energy conservation strategies, correct breathing pattern and techniques to improve independence/tolerance for self-care routine  * Functional transfer/mobility training/DME recommendations for increased independence, safety, and fall prevention  * Patient/Family education to increase follow through with safety techniques and functional independence  * Recommendation of environmental modifications for increased safety with functional transfers/mobility and ADLs  * Splinting/positioning for increased function, prevention of contractures, and improve skin integrity  * Therapeutic exercise to improve motor endurance, ROM, and functional strength for ADLs/functional transfers  * Therapeutic activities to facilitate/challenge dynamic balance, stand tolerance for increased safety and independence with ADLs  * Positioning to improve skin integrity, interaction with environment and functional independence    Recommended Adaptive Equipment: wheeled walker, bedside commode, shower chair, reacher and leg  provided     Home Living: with family: spouse; single family home, 2 story, half bath on 1st floor, 2 steps to enter with no rail, 12 steps up to bedroom and bathroom, pt can reside on 1st floor and sleep in recliner.        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 Cognition: A&O: 4/4; Follows 3 step directions   Memory: good    Sequencing: good    Problem solving: good    Judgement/safety: good     [facility]   AM-PAC Daily Activity Inpatient   How much help for putting on and taking off regular lower body clothing?: A Lot  How much help for Bathing?: A Lot  How much help for Toileting?: A Little  How much help for putting on and taking off regular upper body clothing?: A Little  How much help for taking care of personal grooming?: A Little  How much help for eating meals?: None  AM-PAC Inpatient Daily Activity Raw Score: 17  AM-PAC Inpatient ADL T-Scale Score : 37.26  ADL Inpatient CMS 0-100% Score: 50.11  ADL Inpatient CMS G-Code Modifier : CK     Functional Assessment:    Initial Eval Status  Date: 3/2/22 Treatment Status  Date: STGs = LTGs  Time frame: 10-14 days   Feeding Independent   Independent    Grooming Supervision   Independent    UB Dressing Supervision   Independent    LB Dressing Moderate Assist to thread feet into incontinent garment and pants while seated EOB then stood with assistance to bring up around hips and waist.   Independent    Bathing Moderate Assist  Modified [place]   Toileting Supervision to use urinal   Independent    Bed Mobility  Supine to sit: Supervision to loop right foot around left ankle and guide LE off bed. Sit to supine: N/T   Supine to sit: Independent   Sit to supine: Independent    Functional Transfers Minimal Assist from EOB to wheeled walker x 2 reps. Supervision for transfer to and from wheelchair x 2 reps. CLINIC:  Supervision for transfer to and from bedside commode over toilet. Supervision for transfer to and from tub transfer bench over tub using leg .    Supervision for transfer to and from car using leg . Transfer training with verbal cues for hand placement throughout session to improve safety. Independent    Functional Mobility Minimal Assist progressing to supervision with wheeled walker to improve balance in room, to wheelchair in hallway and in clinic, verbal cues for walker sequence and safety. Modified [place]    Balance Sitting:     Static: good     Dynamic: fair   Standing: fair  with wheeled walker     Activity Tolerance fair   good    Visual/  Perceptual Glasses: yes                 Hand Dominance: right      AROM (PROM) Strength Additional Info:    RUE  WFL 5/5 good  and wfl FMC/dexterity noted during ADL tasks     LUE WFL 5/5 good  and wfl FMC/dexterity noted during ADL tasks       Hearing: WFL   Sensation:  No c/o numbness or tingling  Tone: WFL   Edema: yes, surgical extremity     Comments: Upon arrival the patient was supine. At end of session, patient was left in care of PT. Overall patient demonstrated decreased independence and safety during completion of ADL/functional transfer/mobility tasks. Pt would benefit from continued skilled OT to increase safety and independence with completion of ADL/IADL tasks for functional independence and quality of life. Treatment: OT treatment provided this date includes:    Instruction/training on safety and adapted techniques for completion of ADLs    Instruction/training on safe functional mobility/transfer techniques    Instruction/training on energy conservation/work simplification for completion of ADLs    Proper Positioning/Alignment   Instruction/training in weight bearing status and walker sequence   Instruction/training in use of adaptive equipment for lower body dressing, demo provided    Instruction/training in lower body dressing techniques     Rehab Potential: Good for established goals.       Patient / Family Goal: return home       Patient and/or family were instructed on functional diagnosis,

## 2022-03-02 NOTE — ANESTHESIA POSTPROCEDURE EVALUATION
Department of Anesthesiology  Postprocedure Note    Patient: Debra Montana  MRN: [id number]  YOB: 1965  Date of evaluation: 3/2/2022  Time:  2:00 PM     Procedure Summary     Date: 03/02/22 Room / Location: [address] [place] [address] / [facility]    Anesthesia Start: 4991 Anesthesia Stop: 5760    Procedure: Left knee total knee arthroplasty [facility]) (Left ) Diagnosis: (LEFT KNEE DJD)    Surgeons: Dipesh Moreno DO Responsible Provider: Lawrence Esqueda MD    Anesthesia Type: spinal ASA Status: 3          Anesthesia Type: spinal    Yesi Phase I: Yesi Score: 10    Yesi Phase II: Yesi Score: 10    Last vitals: Reviewed and per EMR flowsheets.        Anesthesia Post Evaluation    Patient location during evaluation: PACU  Patient participation: complete - patient participated  Level of consciousness: awake  Pain score: 0  Airway patency: patent  Nausea & Vomiting: no nausea  Complications: no  Cardiovascular status: blood pressure returned to baseline  Respiratory status: acceptable  Hydration status: euvolemic

## 2022-03-02 NOTE — PROGRESS NOTES
735 to pacu for left adductor canal block by dr Octavia Newman. Connected to all monitors and O2 applied at 3 liters nasal canula  743 time out performed and premedicated per orders  750 block started by dr Octavia Newman using ultrasound guidance. 754 30cc 0.5% ropivacaine injected to left adductor canal daniel procedure well.

## 2022-03-02 NOTE — ANESTHESIA PRE PROCEDURE
Department of Anesthesiology  Preprocedure Note       Name:  Ana Dill   Age:  61 y.o.  :  1965                                          MRN:  [id number]         Date:  3/2/2022      Surgeon: Corinthia Goodpasture): Eduardo Arce DO    Procedure: Procedure(s):  Left knee total knee arthroplasty [facility])    Medications prior to admission:   Prior to Admission medications    Medication Sig Start Date End Date Taking? Authorizing Provider   gabapentin (NEURONTIN) 100 MG capsule Take 1 capsule by mouth 3 times daily for 30 days. Intended supply: 30 days 3/2/22 4/1/22  Josh Ny DO   celecoxib (CELEBREX) 100 MG capsule Take 1 capsule by mouth 2 times daily 3/2/22   Josh Ny DO   aspirin 325 MG EC tablet Take 1 tablet by mouth 2 times daily for 28 days 3/2/22 3/30/22  Josh Ny, DO   oxyCODONE-acetaminophen (PERCOCET) 5-325 MG per tablet Take 1 tablet by mouth every 6 hours as needed for Pain for up to 7 days. Intended supply: 7 days.  Take lowest dose possible to manage pain 3/2/22 3/9/22  Josh Ny DO   pantoprazole (PROTONIX) 20 MG tablet take 1 tablet by mouth every evening if needed  Patient taking differently: daily take 1 tablet by mouth every evening if needed 22   Gerald Lang, DO   simvastatin (ZOCOR) 20 MG tablet Take 1 tablet by mouth every evening 22   Gerald Lang, DO   alogliptin (NESINA) 25 MG TABS tablet Take 1 tablet by mouth daily 22   Gerald Lang, DO   Dulaglutide 3 MG/0.5ML SOPN Inject 3 mg into the skin once a week 22   Tae Carbajal,    glipiZIDE (GLUCOTROL XL) 10 MG extended release tablet take 1 tablet by mouth once daily 22   Gerald Lang, DO   metFORMIN (GLUCOPHAGE) 1000 MG tablet take 1 tablet by mouth twice a day with meals 22   Gerald Lang, DO   ibuprofen (ADVIL;MOTRIN) 800 MG tablet Take 1 tablet by mouth every 8 hours as needed for Pain 21  Vincent Caicco, DO   senna-docusate (PERICOLACE) 8.6-50 MG per tablet Take 1 tablet by mouth daily as needed for Constipation 5/26/21   Shauna Sorto, DO   Alcohol Swabs PADS 1 Units by Does not apply route as needed (checking glucose) 8/20/19   Tae Carbajal, DO   blood glucose monitor strips FBS daily. Please dispense Freestyle brand 8/20/19   Shauna Sorto, DO   Lancets MISC Test once daily 8/20/19   Shauna Sorto DO       Current medications:    No current facility-administered medications for this visit. Current Outpatient Medications   Medication Sig Dispense Refill    gabapentin (NEURONTIN) 100 MG capsule Take 1 capsule by mouth 3 times daily for 30 days. Intended supply: 30 days 90 capsule 0    celecoxib (CELEBREX) 100 MG capsule Take 1 capsule by mouth 2 times daily 60 capsule 0    aspirin 325 MG EC tablet Take 1 tablet by mouth 2 times daily for 28 days 56 tablet 0    oxyCODONE-acetaminophen (PERCOCET) 5-325 MG per tablet Take 1 tablet by mouth every 6 hours as needed for Pain for up to 7 days. Intended supply: 7 days.  Take lowest dose possible to manage pain 28 tablet 0     Facility-Administered Medications Ordered in Other Visits   Medication Dose Route Frequency Provider Last Rate Last Admin    ortho mix injection   Injection On Call Teo Jungserafin, APRN - CNP        acetaminophen (TYLENOL) tablet 1,000 mg  1,000 mg Oral Once Enzo Jungling, APRN - CNP        pregabalin (LYRICA) capsule 75 mg  75 mg Oral Once Teo Jungling, APRN - CNP        celecoxib (CELEBREX) capsule 200 mg  200 mg Oral Once Teo Jungling, APRN - CNP        dexamethasone (DECADRON) injection 8 mg  8 mg IntraVENous Once Teo Jungling, APRN - CNP        sodium chloride flush 0.9 % injection 5-40 mL  5-40 mL IntraVENous 2 times per day Teo Jungling, APRN - CNP        sodium chloride flush 0.9 % injection 5-40 mL  5-40 mL IntraVENous PRN Enzo Jungling, APRN - CNP        0.9 % sodium chloride infusion  25 mL IntraVENous PRN Teo Jungling, APRN - CNP        ceFAZolin (ANCEF) 2000 mg in dextrose 3 % 50 mL IVPB (duplex)  2,000 mg IntraVENous On Call to [facility], APRN - CNP        0.9 % sodium chloride infusion   IntraVENous Continuous Rebeca Rivera MD        fentaNYL (SUBLIMAZE) 100 MCG/2ML injection             midazolam (VERSED) 2 MG/2ML injection             ropivacaine (NAROPIN) 0.5% injection             fentaNYL (SUBLIMAZE) injection 50 mcg  50 mcg IntraVENous Q5 Min PRN Rebeca Rivera MD        midazolam (VERSED) injection 1 mg  1 mg IntraVENous Q5 Min PRN Rebeca Rivera MD        ropivacaine (NAROPIN) 0.5% injection 30 mL  30 mL Infiltration Once Rebeca Rivera MD           Allergies:     Allergies   Allergen Reactions    Tobramycin Swelling     Eyes swelled from drops       Problem List:    Patient Active Problem List   Diagnosis Code    Migraine G43.909    Type 2 diabetes mellitus with diabetic polyneuropathy, without long-term current use of insulin ([facility]) E11.42    Hyperlipidemia associated with type 2 diabetes mellitus ([facility]) E11.69, E78.5    Chronic bilateral low back pain with sciatica M54.40, G89.29    Neck pain M54.2    Rotator cuff arthropathy of right shoulder M12.811    Class 1 obesity due to excess calories with serious comorbidity and body mass index (BMI) of 33.0 to 33.9 in adult E66.09, Z68.33    Status post arthroscopy of right shoulder Z98.890    Lumbar spondylosis M47.816    Chronic pain syndrome G89.4    Primary osteoarthritis of left knee M17.12    Primary osteoarthritis of right shoulder M19.011    Precordial pain R07.2    Impingement syndrome of right shoulder M75.41    Tear of right glenoid labrum S43.431A    Arthritis of right acromioclavicular joint M19.011    Complete tear of right rotator cuff M75.121    Morbidly obese ([facility]) E66.01    Venous reflux I87.2    Primary osteoarthritis of one knee, left M17.12       Past Medical History:        Diagnosis Date    Anxiety     Blood in stool     Chronic back pain     Constipation     Headache(784.0)     History of dental problems     Hyperlipidemia     Neck pain     Neck stiffness     Type II or unspecified type diabetes mellitus without mention of complication, not stated as uncontrolled        Past Surgical History:        Procedure Laterality Date    COLONOSCOPY      FRACTURE SURGERY Left     femur    KNEE ARTHROSCOPY Left 07/18/2016    medial lateral menisectomy/synovectomy/chodroplasty    SHOULDER ARTHROSCOPY Right 2/11/2020    RIGHT SHOULDER ARTHROSCOPY SUBACHROMIAL DECOMPRESSION DEBRIDEMENT, BICEP TENODESIS ,LABRAL DEBRIDEMENT AND KHALIDA performed by Debra Amezcua DO at [address] [facility] ARTHROSCOPY Right 10/02/2020    SHOULDER ARTHROSCOPY Right 10/2/2020    RIGHT SHOULDER ARTHROSCOPY SUBACROMIAL DECOMPRESSION AND DEBRIDEMENT LABRIAL DEBRIDEMENT ROTATOR CUFF REPAIR KHALIDA performed by Sneha Burris DO at [facility]         Social History:    Social History     Tobacco Use    Smoking status: Never Smoker    Smokeless tobacco: Never Used   Substance Use Topics    Alcohol use: Yes     Alcohol/week: 0.0 standard drinks     Comment: rarely . Coffee occasionally                                Counseling given: Not Answered      Vital Signs (Current): There were no vitals filed for this visit.                                            BP Readings from Last 3 Encounters:   03/02/22 (!) 155/115   02/23/22 (!) 151/73   02/09/22 130/80       NPO Status:                                                                                 BMI:   Wt Readings from Last 3 Encounters:   02/23/22 242 lb (109.8 kg)   02/09/22 242 lb (109.8 kg)   02/08/22 245 lb (111.1 kg)     There is no height or weight on file to calculate BMI.    CBC:   Lab Results   Component Value Date    WBC 6.6 02/23/2022    RBC 4.67 02/23/2022    HGB 13.2 02/23/2022    HCT 40.3 02/23/2022    MCV 86.3 02/23/2022    RDW 12.9 02/23/2022    PLT 280 02/23/2022       CMP:   Lab Results   Component Value Date     02/23/2022    K 3.9 02/23/2022     02/23/2022    CO2 23 02/23/2022    BUN 15 02/23/2022    CREATININE 0.7 02/23/2022    GFRAA >60 02/23/2022    LABGLOM >60 02/23/2022    GLUCOSE 200 02/23/2022    PROT 7.3 02/23/2022    CALCIUM 8.6 02/23/2022    BILITOT 0.4 02/23/2022    ALKPHOS 95 02/23/2022    AST 39 02/23/2022    ALT 51 02/23/2022       POC Tests: No results for input(s): POCGLU, POCNA, POCK, POCCL, POCBUN, POCHEMO, POCHCT in the last 72 hours. Coags:   Lab Results   Component Value Date    PROTIME 11.5 02/23/2022    INR 1.0 02/23/2022    APTT 30.9 02/23/2022       HCG (If Applicable): No results found for: PREGTESTUR, PREGSERUM, HCG, HCGQUANT     ABGs: No results found for: PHART, PO2ART, XGI7LGC, WKL2GGF, BEART, A8FXMZHG     Type & Screen (If Applicable):  No results found for: LABABO, LABRH    Drug/Infectious Status (If Applicable):  No results found for: HIV, HEPCAB    COVID-19 Screening (If Applicable):   Lab Results   Component Value Date    COVID19 Not Detected 02/25/2022         Anesthesia Evaluation  Patient summary reviewed no history of anesthetic complications:   Airway: Mallampati: II  TM distance: >3 FB   Neck ROM: full  Mouth opening: > = 3 FB Dental: normal exam         Pulmonary:Negative Pulmonary ROS breath sounds clear to auscultation                             Cardiovascular:Negative CV ROS    (+) hyperlipidemia      ECG reviewed  Rhythm: regular  Rate: normal                 ROS comment: EKG: Normal Sinus Rhythm 92. Neuro/Psych:   (+) neuromuscular disease:, headaches:, depression/anxiety              ROS comment: Neck stiffness. GI/Hepatic/Renal: Neg GI/Hepatic/Renal ROS  (+) GERD:, morbid obesity (BMI: 32.82.)         ROS comment: Constipation and blood in stool. .   Endo/Other:    (+) DiabetesType II DM, , .                  ROS comment: Left [place].  Abdominal:   (+) obese,           Vascular: negative vascular ROS. Other Findings:               Anesthesia Plan      spinal     ASA 3     (Left adductor canal block)  Induction: intravenous. MIPS: Postoperative opioids intended. Anesthetic plan and risks discussed with patient. Plan discussed with CRNA. Attending anesthesiologist reviewed and agrees with Preprocedure content          PAT Patient Interview:  Patient seen, chart reviewed per routine on March 2, 2022 at 7:11 AM by Carolina Desir MD.  Above represents information available via shared medical record including previous anesthesia history, drug and allergy history.   Confirmation of above and final plan per Day of Surgery (DOS) anesthesiologist.    Michael Parsons MD   3/2/2022

## 2022-03-02 NOTE — ANESTHESIA PROCEDURE NOTES
Spinal Block    Patient location during procedure: OR  Start time: 3/2/2022 8:29 AM  End time: 3/2/2022 8:33 AM  Reason for block: primary anesthetic and at surgeon's request  Staffing  Performed: resident/CRNA   Resident/CRNA: HARPREET Obregon CRNA  Preanesthetic Checklist  Completed: patient identified, IV checked, site marked, risks and benefits discussed, surgical consent, monitors and equipment checked, pre-op evaluation, timeout performed, anesthesia consent given, oxygen available and patient being monitored  Spinal Block  Patient position: sitting  Prep: Betadine  Patient monitoring: cardiac monitor, continuous pulse ox, continuous capnometry and frequent blood pressure checks  Approach: midline  Location: L3/L4  Provider prep: sterile gloves and mask  Local infiltration: lidocaine  Dose: 0.5  Agent: bupivacaine  Adjuvant: fentanyl  Dose: 1.8  Dose: 1.8  Needle  Needle type: Pencan   Needle gauge: 25 G  Needle length: 4 in  Expiration date: 5/1/2023  Assessment  Sensory level: T8  Swirl obtained: Yes  CSF: clear  Attempts: 1  Hemodynamics: stable  Additional Notes  Patient tolerated procedure well, no issues.

## 2022-03-02 NOTE — ANESTHESIA PROCEDURE NOTES
Peripheral Block    Patient location during procedure: PACU  Start time: 3/2/2022 7:50 AM  End time: 3/2/2022 7:55 AM  Staffing  Anesthesiologist: Dahlia Hooker MD  Preanesthetic Checklist  Completed: patient identified, IV checked, site marked, risks and benefits discussed, surgical consent, monitors and equipment checked, pre-op evaluation, timeout performed, anesthesia consent given, oxygen available and patient being monitored  Peripheral Block  Patient position: supine  Prep: ChloraPrep  Patient monitoring: cardiac monitor, continuous pulse ox, frequent blood pressure checks and IV access  Block type: Femoral  Laterality: left  Injection technique: single-shot  Guidance: ultrasound guided  Adductor canal  Provider prep: mask and sterile gloves  Needle  Needle type: combined needle/nerve stimulator   Needle gauge: 20 G  Needle length: 10 cm  Needle localization: ultrasound guidance  Needle insertion depth: 8 cm  Assessment  Injection assessment: negative aspiration for heme, no paresthesia on injection and local visualized surrounding nerve on ultrasound  Slow fractionated injection: yes  Hemodynamics: stable  Additional Notes  Versed 2 mgm IV and Fentanyl 50 microgram IV, Right groin prepared, right femoral artery identified, using doppler. Right groin and medial thigh are sterilized and draped. Ultra sound picture at the junction of the upper third and the lower two third of medial thigh, till we got a picture of the femoral artery and vein, then I passed the stimuplex needle on the proximal side of the femoral artery and injected 35 ccof Ropivicaine  0.5%. No complications.         Medications Administered  Ropivacaine (NAROPIN) injection 0.5%, 35 mL  Reason for block: post-op pain management and at surgeon's request

## 2022-03-02 NOTE — OP NOTE
Operative Note      Patient: Kamryn Monroe  YOB: 1965  MRN: [id number]    Date of Procedure: 3/2/2022    Pre-Op Diagnosis: LEFT KNEE DJD    Post-Op Diagnosis: Same       Procedure(s):  Left knee total knee arthroplasty [facility])    Surgeon(s): Telly Vega DO    Assistant:   Resident: Jere Geiger DO; Nury Velasquez DO; Kelley James DO    Anesthesia: Spinal    Estimated Blood Loss (mL): Minimal    Complications: None    Specimens:   ID Type Source Tests Collected by Time Destination   A : bone left knee Tissue Tissue SURGICAL PATHOLOGY Telly Vega DO 3/2/2022 1024        Implants:  Implant Name Type Inv. Item Serial No.  Lot No. LRB No. Used Action   CEMENT BNE 20ML 40GM FULL DOSE PMMA W/O ANTIBIO M VISC - [id number]  CEMENT BNE 20ML 40GM FULL DOSE PMMA W/O ANTIBIO M VISC  [employer] [facility]-WD [id number] Left 1 Implanted   INSERT TIB SZ 7-8 [id number] KNEE XLPE POST STBL HI FLX LEGION - [id number]  INSERT TIB SZ 7-8 [id number] KNEE XLPE POST STBL HI FLX LEGION  [facility] [employer]- [id number] Left 1 Implanted   COMPONENT FEM SZ 7 L KNEE OXINIUM POST STBL MARIO LEGION - [id number]  COMPONENT FEM SZ 7 L KNEE OXINIUM POST STBL MARIO LEGION  [place] [employer]- [id number] Left 1 Implanted   BASEPLATE TIB SZ 8 [id number] ML85MM THK2. 3MM L KNEE MARIO M TAPR - [id number]  BASEPLATE TIB SZ 8 [id number] ML85MM THK2. 3MM L KNEE MARIO [place] AND [facility] Jasbir Boles [id number] Left 1 Implanted   COMPONENT PAT [id number] THK7.5MM STD MARIO RESURFACE RND NP W/O - [id number]  COMPONENT PAT [id number] THK7.5MM STD MARIO RESURFACE RND NP W/O  [employer] AND [employer] Jasbir Ramana [id number] Left 1 Implanted         Drains: * No LDAs found *    Findings: as above    Detailed Description of Procedure:   below    Department of Orthopedic Surgery  Operative Report        Pre-operative Diagnosis:  Left Knee Osteoarthritis    Post-operative Diagnosis:  Left Knee Osteoarthritis    Procedure: appropriate position. An oscillating saw was used to make the distal femoral cut, discarding the pieces of cut femoral bone and sent for study. The posterior reference guide was then placed on the distal femur after the intramedullary guide and the distal femoral cutting guide were then removed. The posterior referencing guide was then pinned in appropriate position. Jude wing was used to confirm appropriate femoral trial size according to pre-operative templating. Posterior reference guide was then removed. An appropriate sized 4-in-1 cutting guide was applied to the distal femur. Oscillating saw was used to make the anterior, posterior, and chamfer cuts. The 4-in-1 cutting guide was then removed. Attention was turned to the tibia. Intramedullary drilling was then performed of the proximal tibia. The intramedullary guide with the proximal tibial cutting guide was then placed on the tibia. Proximal tibial cutting guide was then pinned in appropriate position. After pinning the proximal tibial cutting guide in appropriate position, oscillating saw was then used to make the proximal tibial cut. The guide was then removed. The proximal tibia that was cut was sharply excised and removed. At this time, all osteophytes on the proximal tibia were then removed with a rongeur. Tensiometer was then placed in extension and flexion, confirming appropriate ligamentous balancing. The box-cutting guide for the posterior-stabilized knee was then placed on the distal femur. The box was then cut in the distal femur without complication. Box-cutting guide was then removed. An appropriately sized trial tibial baseplate and a polyethylene component trial were then placed into the knee. The appropriately sized femur trial component was then placed. The knee was reduced and taken through a range of motion and found to be appropriately stable.  Half pins were then placed in the tibial base plate confirming position in preparation for delilah punch. The patella was then prepared. The patella surface was free hand cut for the appropriate size implant. The patella holes were drilled and the patella was then trialed. There was good alignment and tracking of the patella. Distal femoral component was removed, trial poly was then removed. Keel punch was then performed of the proximal tibial. Tibial base plate was then removed. Copious irrigation was performed of the wound and final inspection for all interposed soft tissue and loose bodies was then performed as cement was being mixed. Copious irrigation was performed once again of the knee. Cement was placed on the proximal tibial component and the tibial component was then impacted into appropriate position with all excess extruded cement being removed with Culver elevator. A polyethylene component was then impacted into appropriate position and checked for stability, which it was stable. Cement was then placed on the distal femoral component. Distal femoral component was then impacted in appropriate position with all excess extruded cement being removed with a Culver elevator. The knee was extended, taken through a range of motion, and found to be appropriately stable. Final inspection for all excess extruded cement was then performed. Culver elevator removed all excess extruded cement. Copious irrigation was performed of the knee. The knee was then soaked with a bedadine solution soak for 3 minutes. The knee was then throughly irrigated with saline solution. Then 1 gram of vanomycin powder was placed within the wound. The capsule was  then closed with strata-fix closure. I then injected our TXA mixture into the knee joint. Copious irrigation was performed of this layer followed by, 2-0 Vicryl for the subcutaneous tissues, and skin staples was used to secure incision. A sterile layered dressing was placed over the wound. Tourniquet was deflated.  The patient was awakened from anesthesia, transferred to the hospital bed, and taken to the PACU in stable condition. Disposition: The patient was taken to PACU in stable condition. Once stable, the patient will be transferred to the floor. Orders have been provided to begin physical therapy, weight bear as tolerated Left lower extremity. Patient received a dose of antibiotics preoperatively. We will continue this for 24 hours postoperatively for infection prophylaxis. The patient will also be started on asa for DVT prophylaxis. We have consulted  and case management for discharge planning and consulted the PCP for medical management.        Electronically signed by Eduardo Arce DO on 3/2/2022 at 10:45 AM

## 2022-03-02 NOTE — ADDENDUM NOTE
Addendum  created 03/02/22 1528 by Mahendra Hickman MD    Child order released for a procedure order, Intraprocedure Blocks edited, Order Canceled from Note

## 2022-03-02 NOTE — PROGRESS NOTES
Orthopedic Progress Note    Controlled Substance Monitoring:    Acute and Chronic Pain Monitoring:   RX Monitoring 1/25/2022   Periodic Controlled Substance Monitoring No signs of potential drug abuse or diversion identified.        Patient provided prescription for Percocet  mg secondary to acute pain not adequately controlled on lower dose medication

## 2022-03-02 NOTE — PROGRESS NOTES
Physical Therapy    Physical Therapy Initial Evaluation/Plan of Care    Room #:  OR POOL/NONE  Patient Name: Alfredo Callejas  YOB: 1965  MRN: [id number]    Date of Service: 3/2/2022     Tentative placement recommendation: Home Health PT 5 days a week   Equipment recommendation: Dashwyn Ditch and Bedside commode      Evaluating Physical Therapist: Nadine Saeed  #[id number]     Specific Provider Orders/Date/Referring Provider :  03/02/22 0715   PT eval and treat Start: 03/02/22 0715, End: 03/02/22 0715, ONE TIME, Standing Count: 1 Occurrences, R    Last continued at transfer on Wed Mar 2, 2022 2:05 PM   Michael Porter DO      Admitting Diagnosis:   Primary osteoarthritis of one knee, left [M17.12]   Visit diagnosis:   Visit Diagnoses       Codes    Preop testing     Z01.818    Post-op pain     G89.18    Pain     R52        Surgery:     left Total knee arthroplasty (TKA)    Patient Active Problem List   Diagnosis    Migraine    Type 2 diabetes mellitus with diabetic polyneuropathy, without long-term current use of insulin ([address]    Hyperlipidemia associated with type 2 diabetes mellitus ([address]    Chronic bilateral low back pain with sciatica    Neck pain    Rotator cuff arthropathy of right shoulder    Class 1 obesity due to excess calories with serious comorbidity and body mass index (BMI) of 33.0 to 33.9 in adult    Status post arthroscopy of right shoulder    Lumbar spondylosis    Chronic pain syndrome    Primary osteoarthritis of left knee    Primary osteoarthritis of right shoulder    Precordial pain    Impingement syndrome of right shoulder    Tear of right glenoid labrum    Arthritis of right acromioclavicular joint    Complete tear of right rotator cuff    Morbidly obese (HCC)    Venous reflux    Primary osteoarthritis of one knee, left       ASSESSMENT of Current Deficits  Safe for discharge home with supervision.       PHYSICAL THERAPY  PLAN OF CARE     Discharge skilled Physical Therapy. Patient and or family understand(s) diagnosis, prognosis, and plan of care.     Prior Level of Function: Patient ambulated independently    Rehab Potential: good    for baseline    Past medical history:   Past Medical History:   Diagnosis Date    Anxiety     Blood in stool     Chronic back pain     Constipation     Headache(784.0)     History of dental problems     Hyperlipidemia     Neck pain     Neck stiffness     Type II or unspecified type diabetes mellitus without mention of complication, not stated as uncontrolled      Past Surgical History:   Procedure Laterality Date    COLONOSCOPY      FRACTURE SURGERY Left     femur    KNEE ARTHROSCOPY Left 07/18/2016    medial lateral menisectomy/synovectomy/chodroplasty    SHOULDER ARTHROSCOPY Right 2/11/2020    RIGHT SHOULDER ARTHROSCOPY SUBACHROMIAL DECOMPRESSION DEBRIDEMENT, BICEP TENODESIS ,LABRAL DEBRIDEMENT AND KHALIDA performed by Gerson Humphrey DO at [address] [facility] ARTHROSCOPY Right 10/02/2020    SHOULDER ARTHROSCOPY Right 10/2/2020    RIGHT SHOULDER ARTHROSCOPY SUBACROMIAL DECOMPRESSION AND DEBRIDEMENT LABRIAL DEBRIDEMENT ROTATOR CUFF REPAIR KHALIDA performed by Telly Vega DO at [address]:    Precautions:  Ambulate patient , falls ,Left FWB (full weight bearing)     Social history: Patient lives with spouse in a ranch home  with 1 + 1 steps  to enter without [address] owned: None,       [address] [facility]   How much difficulty turning over in bed?: A Little  How much difficulty sitting down on / standing up from a chair with arms?: A Little  How much difficulty moving from lying on back to sitting on side of bed?: A Little  How much help from another person moving to and from a bed to a chair?: A Little  How much help from another person needed to walk in hospital room?: A Little  How much help from another person for climbing 3-5 steps with a railing?: A Little  AM-[facility] Inpatient Mobility Raw Score : 18  AM-PAC Inpatient T-Scale Score : 43.63  Mobility Inpatient CMS 0-100% Score: 46.58  Mobility Inpatient CMS G-Code Modifier : CK    Nursing cleared patient for PT evaluation. The admitting diagnosis and active problem list as listed above have been reviewed prior to the initiation of this evaluation. OBJECTIVE:  Was pt agreeable to Eval/treatment? Yes   Pain Level  8/10   Left knee   Post trt 4/10   Bed Mobility  Rolling: Independent    Supine to sit: Minimal assist of 1    Sit to supine: Minimal assist of 1    Scooting: Minimal assist of 1     Transfers Sit to stand: Supervision   Cues for hand placement   Ambulation    4, 3x20  feet using  wheeled walker with Supervision    for walker approximation, safety and left lower extremity extension in stance phase of gait, Patient with flexed posture, antalgic gait left lower extremity and increased step length and cues for upright posture, decreased step length, increased evelyn, left knee extension in stance phase of gait and safety   Stair negotiation: ascended and descended   1 step x 3 reps min a with cueing for safety    ROM Within functional limits except Left knee 5--50°     Strength Within functional limits  except  left lower extremity lower extremity 2+/5   Balance Sitting EOB:  good     Dynamic Standing:  fair   wheeled walker      Patient is Alert & Oriented x person, place, time and situation and follows directions    Sensation:  Patient denies numbness/tingling  Edema:  yes left lower extremity        Patient education  Patient educated on role of Physical Therapy, risks of immobility, safety and plan of care,  importance of mobility while in hospital , ankle pumps, quad set and glut set for edema control, blood clot prevention, importance and purpose of adaptive device and adjusted to proper height for the patient. , safety , weight bearing status , left knee extension in bed and during stance phase of gait and proper use/technique of incentive spirometer  Edema control  Patient response to education:   Pt verbalized understanding Pt demonstrated skill Pt requires further education in this area   Yes Partial Yes       Treatment:  Patient practiced and was instructed in the following treatment:     Therapist educated and facilitated patient on techniques to increase safety and independence with bed mobility, balance, functional transfers, and functional mobility. Patient ambulated in PT gym ,performed stairs. Returned to room, Sat edge of bed 15 minutes with Supervision  to increase dynamic sitting balance and activity tolerance. in bed to perform exercises. Patient performed ankle pumps, quad sets, glut sets x 15-20 each. Active assist heelslide, hip abduction/adduction, straight leg raise x 10 each    Instruction knee extension in bed with kneecap and toes pointing to ceiling, positioning for edema control  Instruction and performance of incentive inspirometer. At end of session, patient sitting edge of bed with  call light and phone within reach,   all lines and tubes intact, nursing notified. Patient would benefit from skilled Home Physical Therapy to improve functional independence and quality of life. Patient's/ family goals   home today    Patient and or family understand(s) diagnosis, prognosis, and plan of care. Time in  255   425  Time out  310  455    Total Treatment Time  25 minutes    Evaluation time includes thorough review of current medical information, gathering information on past medical history/social history and prior level of function, completion of standardized testing/informal observation of tasks, assessment of data, and development of Plan of care and goals.      CPT codes:  Low Complexity PT evaluation (06232)  Therapeutic exercises (45030)   10 minutes  1 unit(s)  Gait Training (20092) 15 minutes 1 unit(s)    Jake Thao, PT

## 2022-03-02 NOTE — CARE COORDINATION
3/2/2022: SS Note/Discharge planning:  SS Consult for post-op d/c planning and HHC/DME orders noted, met with pt and pt's wife in [facility], confirmed d/c plan for pt to return home with [address] [place] [address], agency accepted referral for home PT/OT for start of care tomorrow, [address] [place] [address], Ne to deliver pt's dme ( w/w, bsc, and shower seat w/back) to [facility]  [facility] today between 2-6pm, [address] [place] [address] [address] notified. Electronically signed by IRSSA Salazar on 3/2/2022 at 2:45 PM

## 2022-03-02 NOTE — PROGRESS NOTES
CLINICAL PHARMACY NOTE: MEDS TO BEDS    Total # of Prescriptions Filled: 3   The following medications were delivered to the patient:  · Celecoxib 100 mg  · Aspirin 325 mg  · Gabapentin 100 mg    Additional Documentation:

## 2022-03-02 NOTE — H&P
Updated H&P    Chief Complaint   Patient presents with    Knee Pain       left knee pain discuss surgery scheduled March 2, 2022         Subjective:     Patient ID: Orlando Pruitt is a 61 y.o..  male     Knee Pain  Patient is here for follow up of his left knee pain.   He has failed all attempts at conservative measures.       Past Medical History        Past Medical History:   Diagnosis Date    Anxiety      Blood in stool      Chronic back pain      Constipation      Headache(784.0)      History of dental problems      Hyperlipidemia      Neck pain      Neck stiffness      Type II or unspecified type diabetes mellitus without mention of complication, not stated as uncontrolled           Past Surgical History         Past Surgical History:   Procedure Laterality Date    ENDOSCOPY, COLON, DIAGNOSTIC        FRACTURE SURGERY Left       femur    KNEE ARTHROSCOPY Left 07/18/2016     medial lateral menisectomy/synovectomy/chodroplasty    SHOULDER ARTHROSCOPY Right 2/11/2020     RIGHT SHOULDER ARTHROSCOPY SUBACHROMIAL DECOMPRESSION DEBRIDEMENT, BICEP TENODESIS ,LABRAL DEBRIDEMENT AND KHALIDA performed by Eileen Rivas DO at [address] [facility] ARTHROSCOPY Right 10/02/2020    SHOULDER ARTHROSCOPY Right 10/2/2020     RIGHT SHOULDER ARTHROSCOPY SUBACROMIAL DECOMPRESSION AND DEBRIDEMENT LABRIAL DEBRIDEMENT ROTATOR CUFF REPAIR KHALIDA performed by Dyllan Vigil DO at [facility]               Current Medication      Current Outpatient Medications:     pantoprazole (PROTONIX) 20 MG tablet, take 1 tablet by mouth every evening if needed, Disp: 30 tablet, Rfl: 0    simvastatin (ZOCOR) 20 MG tablet, take 1 tablet by mouth every evening, Disp: 30 tablet, Rfl: 0    metFORMIN (GLUCOPHAGE) 1000 MG tablet, take 1 tablet by mouth twice a day with meals, Disp: 60 tablet, Rfl: 0    glipiZIDE (GLUCOTROL XL) 10 MG extended release tablet, take 1 tablet by mouth once daily, Disp: 30 tablet, Rfl: 1    alogliptin (NESINA) 25 MG TABS tablet, Take 1 tablet by mouth daily, Disp: 30 tablet, Rfl: 5    aspirin (ASPIRIN LOW DOSE) 81 MG EC tablet, take 1 tablet by mouth once daily, Disp: 30 tablet, Rfl: 11    oxyCODONE-acetaminophen (PERCOCET) 5-325 MG per tablet, Take 1 tablet by mouth every 4 hours as needed for Pain. , Disp: , Rfl:     Dulaglutide 3 MG/0.5ML SOPN, Inject 3 mg into the skin once a week, Disp: 4 pen, Rfl: 3    senna-docusate (PERICOLACE) 8.6-50 MG per tablet, Take 1 tablet by mouth daily as needed for Constipation, Disp: 30 tablet, Rfl: 3    Alcohol Swabs PADS, 1 Units by Does not apply route as needed (checking glucose), Disp: 100 each, Rfl: 11    blood glucose monitor strips, FBS daily. Please dispense Freestyle brand, Disp: 100 strip, Rfl: 11    Lancets MISC, Test once daily, Disp: 100 each, Rfl: 3    celecoxib (CELEBREX) 200 MG capsule, Take 1 capsule by mouth 2 times daily (Patient not taking: Reported on 1/25/2022), Disp: 60 capsule, Rfl: 3    ibuprofen (ADVIL;MOTRIN) 800 MG tablet, Take 1 tablet by mouth every 8 hours as needed for Pain, Disp: 90 tablet, Rfl: 0          Allergies   Allergen Reactions    Tobramycin Swelling      Social History               Socioeconomic History    Marital status:        Spouse name: Not on file    Number of children: Not on file    Years of education: Not on file    Highest education level: Not on file   Occupational History    Occupation: [employer]   Tobacco Use    Smoking status: Never Smoker    Smokeless tobacco: Never Used   Vaping Use    Vaping Use: Not on file   Substance and Sexual Activity    Alcohol use: Yes       Alcohol/week: 0.0 standard drinks       Comment: rarely .   Coffee occasionally    Drug use: No    Sexual activity: Yes       Partners: Female   Other Topics Concern    Not on file   Social History Narrative    Not on file      Social Determinants of Health          Financial Resource Strain: Low Risk     Difficulty of Paying Living Expenses: Not hard at all   Food Insecurity: No Food Insecurity    Worried About Running Out of Food in the Last Year: Never true    Shady of Food in the Last Year: Never true   Transportation Needs:     Lack of Transportation (Medical): Not on file    Lack of Transportation (Non-Medical):  Not on file   Physical Activity:     Days of Exercise per Week: Not on file    Minutes of Exercise per Session: Not on file   Stress:     Feeling of Stress : Not on file   Social Connections:     Frequency of Communication with Friends and Family: Not on file    Frequency of Social Gatherings with Friends and Family: Not on file    Attends Shinto Services: Not on file    Active Member of Clubs or Organizations: Not on file    Attends Club or Organization Meetings: Not on file    Marital Status: Not on file   Intimate Partner Violence:     Fear of Current or Ex-Partner: Not on file    Emotionally Abused: Not on file    Physically Abused: Not on file    Sexually Abused: Not on file   Housing Stability:     Unable to Pay for Housing in the Last Year: Not on file    Number of [place] in the Last Year: Not on file    Unstable Housing in the Last Year: Not on file         Family History         Family History   Problem Relation Age of Onset    Diabetes Father      High Blood Pressure Father      Heart Disease Father      Stroke Father      Heart Attack Father      Diabetes Mother      Heart Disease Mother      Emphysema Mother      Cancer Brother 54         colon    High Blood Pressure Brother      Cancer Maternal Cousin 40         colon    Cancer Sister      Diabetes Sister      Heart Disease Sister      Stroke Sister      Diabetes Maternal Aunt      Heart Disease Maternal Aunt      High Blood Pressure Maternal Aunt      High Cholesterol Maternal Aunt      Stroke Maternal Aunt      Diabetes Maternal Uncle      Heart Disease Maternal Uncle      High Blood Pressure Maternal Uncle      High Cholesterol Maternal Uncle      Stroke Maternal Uncle      Diabetes Paternal Aunt      Heart Disease Paternal Aunt      High Blood Pressure Paternal Aunt      High Cholesterol Paternal Aunt      Stroke Paternal Aunt      Diabetes Paternal Uncle      Heart Disease Paternal Uncle      High Blood Pressure Paternal Uncle      High Cholesterol Paternal Uncle      Stroke Paternal Uncle      Diabetes Maternal Grandmother      High Blood Pressure Maternal Grandmother      High Cholesterol Maternal Grandmother      Cancer Maternal Grandfather      Diabetes Maternal Grandfather      High Blood Pressure Maternal Grandfather      High Cholesterol Maternal Grandfather      Stroke Maternal Grandfather      Diabetes Paternal Grandfather      High Blood Pressure Paternal Grandfather      High Cholesterol Paternal Grandfather                 REVIEW OF SYSTEMS:      General/Constitution:  (-)weight loss, (-)fever, (-)chills, (-)weakness. Skin: (-) rash,(-) psoriasis,(-) eczema, (-)skin cancer. Musculoskeletal: (-) fractures,  (-) dislocations,(-) collagen vascular disease, (-) fibromyalgia, (-) multiple sclerosis, (-) muscular dystrophy, (-) RSD,(-) joint pain (-)swelling, (-) joint pain,swelling. Neurologic: (-) epilepsy, (-)seizures,(-) brain tumor,(-) TIA, (-)stroke, (-)headaches, (-)Parkinson disease,(-) memory loss, (-) LOC. Cardiovascular: (-) Chest pain, (-) swelling in legs/feet, (-) SOB, (-) cramping in legs/feet with walking. Respiratory: (-) SOB, (-) Coughing, (-) night sweats. GI: (-) nausea, (-) vomiting, (-) diarrhea, (-) blood in stool, (-) gastric ulcer. Psychiatric: (-) Depression, (-) Anxiety, (-) bipolar disease, (-) Alzheimer's Disease  Allergic/Immunologic: (-) allergies latex, (-) allergies metal, (-) skin sensitivity.   Hematlogic: (-) anemia, (-) blood transfusion, (-) DVT/PE, (-) Clotting disorders     Subjective:       Vital signs are stable. In general, patient is awake, alert and oriented X3, in no apparent distress. Examination of HENT reveals normocephalic, atraumatic. PERRLA/EOMI sclera are white. Conjunctivae are clear. TM's are intact. Pharynx is pink and moist.  Uvula and tongue are midline. Heart: Positive S1 and positive S2 with regular rate and rhythm. Lungs: Clear to auscultation bilaterally without rales, rhonchi or wheezes. Abdomen: soft, nontender. Positive bowel sounds. No organomegaly. No guarding or rigidity.       Constitution:  BP (!) 155/115   Pulse 98   Temp 98.3 °F (36.8 °C) (Infrared)   Resp 16   SpO2 96%        Psycihatric:  The patient is alert and oriented x 3, appears to be stated age and in no distress.       Respiratory:  Respiratory effort is not labored. Patient is not gasping. Palpation of the chest reveals no tactile fremitus.     Skin:  Upon inspection: the skin appears warm, dry and intact. There is  a previous scar over the affected area. There is any cellulitis, lymphedema or cutaneous lesions noted in the lower extremities. Upon palpation there is no induration noted.       Neurologic:  Gait: antalgic; Motor exam of the lower extremities show ; quadriceps, hamstrings, foot dorsi and plantar flexors intact R.  5/5 and L. 5/5. Deep tendon reflexes are 2/4 at the knees and 2/4 at the ankles with strong extensor hallicus longus motor strength bilaterally. Sensory to both feet is intact to all sensory roots.     Cardiovascular: The vascular exam is normal and is well perfused to distal extremities. Distal pulses DP/PT: R. 2+; L. 2+. There is cap refill noted less than two seconds in all digits. There is not edema of the bilateral lower extremities.   There is not varicosities noted in the distal extremities.       Lymph:  Upon palpation,  there is no lymphadenopathy noted in bilateral lower extremities.       Musculoskeletal:  Gait: antalgic; examination of the nails and digits reveal no cyanosis or clubbing.     Lumbar exam:  On visual inspection, there is not deformity of the spine. full range of motion, no tenderness, palpable spasm or pain on motion. Special tests: Straight Leg Raise negative, Rich test negative.        Hip exam:   Upon inspection, there is not deformity noted. Upon palpation there is not tenderness. ROM: is  full and symmetrical.   Strength: Hip Flexors 5/5; Hip Abductors 5/5; Hip Adduction 5/5.      Knee exam:  Left knee exam shows;  range of motion of R. Knee is 0 to 120, and L. Knee is -5 to 115. The patient does have  pain on motion, effusion is mild, there is tenderness over the  medial, lateral, anterior region, there are not any masses, there is not ligamentous instability, there is  deformity noted. Knee exam: left positive for moderate crepitations, some mild tenderness laxity is not noted with stress. There is not a popliteal cyst.     R. Knee:  Lachman's negative, Anterior Drawer negative, Posterior Drawer negative  Celestina's negative, Thallasy  negative,   PF grind test negative, Apprehension test negative, Patellar J sign  negative  L. Knee:  Lachman's negative, Anterior Drawer negative, Posterior Drawer negative  Celestina's negative, Thallasy  negative,   PF grind test negative, Apprehension test negative,  Patellar J sign  negative     Xray Exam:  Moderate to severe medial joint narrowing  Radiographic findings reviewed with patient     Assessment:       Encounter Diagnoses   Name Primary?  Primary osteoarthritis of left knee Yes         Plan:  Natural history and expected course discussed. Questions answered. Educational materials distributed. Rest, ice, compression, and elevation (RICE) therapy. I had a lengthy discussion with the patient regarding their diagnosis. I explained treatment options including surgical vs non surgical treatment.  I reviewed in detail the risks and benefits and outlined the procedure in detail with expected outcomes and possible complications. I also discussed non surgical treatment such as injections (CSI and visco supplementation), physical therapy, topical creams and NSAID's. They have elected for surgical management at this time.      We discussed various treatment options both surgical and non-surgical.   The patient is unable to ambulate more than 100 feet and is unable to perform the average daily activities including:  Light housework, ADLs, donning clothes, toileting and exercise. Patient has failed previous conservative measures including cortisone injections, NSAIDs, PT, HEP and pain medication and is currently a fall risk to the disability and decreased functioning. The patient wishes to have the total knee arthroplasty. The risks and benefits of a total knee replacement were discussed with the patient. The risks include but are not limited to: infection, injury to blood vessels and nerves, non relief of symptoms, arthrofibrosis of knee, aseptic loosening of prosthesis, intraoperative fracture, blood loss, PE/DVT, MI, dislocation of hip and knee, need for further operative intervention and death. The patient is aware of the risks and wished to proceed with a Left total knee replacement 3/2/2022. The patient was counseled at length about the risks of ramsey Covid-19 during their perioperative period and any recovery window from their procedure.  The patient was made aware that ramsey Covid-19  may worsen their prognosis for recovering from their procedure  and lend to a higher morbidity and/or mortality risk.  All material risks, benefits, and reasonable alternatives including postponing the procedure were discussed.  The patient does wish to proceed with the procedure at this time.

## 2022-03-03 ENCOUNTER — TELEPHONE (OUTPATIENT)
Dept: ADMINISTRATIVE | Age: 57
End: 2022-03-03

## 2022-03-03 NOTE — TELEPHONE ENCOUNTER
Pt would like a r/c from Drs office.   He had surgery yesterday, and he has questions regarding his script for pain medication and also said he is having swelling and wants to know if that is normal?

## 2022-03-04 ENCOUNTER — TELEPHONE (OUTPATIENT)
Dept: ORTHOPEDIC SURGERY | Age: 57
End: 2022-03-04

## 2022-03-04 RX ORDER — ONDANSETRON 4 MG/1
4 TABLET, ORALLY DISINTEGRATING ORAL EVERY 8 HOURS PRN
Qty: 21 TABLET | Refills: 0 | Status: SHIPPED | OUTPATIENT
Start: 2022-03-04

## 2022-03-08 ENCOUNTER — TELEPHONE (OUTPATIENT)
Dept: ORTHOPEDIC SURGERY | Age: 57
End: 2022-03-08

## 2022-03-08 DIAGNOSIS — M17.12 PRIMARY OSTEOARTHRITIS OF LEFT KNEE: ICD-10-CM

## 2022-03-08 NOTE — TELEPHONE ENCOUNTER
Refill is for 03/09/2022      Last appointment 2/8/2022  Next appointment   Future Appointments   Date Time Provider Riri Perez   3/17/2022 10:50 AM [address] [place] [address]      Last refill:  03/02/2022  DOS: 03/02/2022      Patient called in requesting refill of:    oxyCODONE-acetaminophen (PERCOCET)  MG per tablet       [facility] [place], [place] - [facility] [address] [facility] Notice [phone] Tatum Corporal [phone]   [address] [facility], [address]  [phone]

## 2022-03-09 RX ORDER — OXYCODONE AND ACETAMINOPHEN 10; 325 MG/1; MG/1
1 TABLET ORAL EVERY 6 HOURS PRN
Qty: 28 TABLET | Refills: 0 | Status: SHIPPED
Start: 2022-03-09 | End: 2022-03-15 | Stop reason: SDUPTHER

## 2022-03-15 ENCOUNTER — TELEPHONE (OUTPATIENT)
Dept: ORTHOPEDIC SURGERY | Age: 57
End: 2022-03-15

## 2022-03-15 DIAGNOSIS — M17.12 PRIMARY OSTEOARTHRITIS OF LEFT KNEE: ICD-10-CM

## 2022-03-15 RX ORDER — OXYCODONE AND ACETAMINOPHEN 10; 325 MG/1; MG/1
1 TABLET ORAL EVERY 6 HOURS PRN
Qty: 28 TABLET | Refills: 0 | Status: SHIPPED
Start: 2022-03-15 | End: 2022-03-22 | Stop reason: SDUPTHER

## 2022-03-15 NOTE — TELEPHONE ENCOUNTER
Last appointment Visit 3/2/22  Next appointment   Future Appointments   Date Time Provider Riri Perez   3/17/2022 10:50 AM DO Aniket [facility]      Last refill 3/8/22  DOS: 3/2/22       Patient called in requesting refill of   oxyCODONE-acetaminophen (PERCOCET)  MG per tablet         [facility] [address] Soniya Cevallos, [address] - [facility] [facility] [address] Sasha Merida [phone] Patience Liao [phone]   [address] [facility], [address]  [phone]

## 2022-03-16 DIAGNOSIS — Z96.652 STATUS POST LEFT KNEE REPLACEMENT: Primary | ICD-10-CM

## 2022-03-17 ENCOUNTER — OFFICE VISIT (OUTPATIENT)
Dept: ORTHOPEDIC SURGERY | Age: 57
End: 2022-03-17

## 2022-03-17 VITALS — TEMPERATURE: 98 F | BODY MASS INDEX: 32.78 KG/M2 | WEIGHT: 242 LBS | HEIGHT: 72 IN

## 2022-03-17 DIAGNOSIS — I82.4Y2 ACUTE DEEP VEIN THROMBOSIS (DVT) OF PROXIMAL VEIN OF LEFT LOWER EXTREMITY (HCC): Primary | ICD-10-CM

## 2022-03-17 DIAGNOSIS — Z96.652 STATUS POST LEFT KNEE REPLACEMENT: ICD-10-CM

## 2022-03-17 PROCEDURE — 99024 POSTOP FOLLOW-UP VISIT: CPT | Performed by: ORTHOPAEDIC SURGERY

## 2022-03-17 NOTE — PROGRESS NOTES
Subjective:     Post-Operative week: 2 Status Post left Total Knee Arthroplasty, surgery date 3/2/22. Systemic or Specific Complaints: increased swelling and decreased ROM. Patient has not been wearing KAILEY hose as directed. Objective:     General: alert, appears stated age and cooperative   Wound: Wound clean and dry no evidence of infection. , No Erythema, No Edema and No Drainage   Motion: Flexion: -5 to 50 Degrees   DVT Exam: No evidence of DVT seen on physical exam.  Negative Esther's sign. No cords or calf tenderness. No significant calf/ankle edema. Imaging:  Xrays:  No signs of fracture, there is good alignment, and no signs of aseptic loosening. Radiographic findings reviewed with patient    Assessment:     Encounter Diagnoses   Name Primary?  Acute deep vein thrombosis (DVT) of proximal vein of left lower extremity (HCC) Yes    Status post left knee replacement       Doing well postoperatively. Plan:     Continues current post-op course, staples were removed at today's appointment  Patient is to continue taking enteric coated aspirin 81 mg, 1 tablet twice daily. Patient is to continue wearing KAILEY hose, on during the day and off at night. Outpatient physical therapy is to begin immediately. No bathing/swimming/hot tub for two weeks or until incision is completely closed. We will see the patient back in 3 weeks for repeat xray and evaluation. Please call office with any questions or concerns at [phone]    I explained to the patient he is behind schedule with his rehab and he needs to push through the pain. I warned him that if he does not, we will have to return to the OR to perform an MAKI. The patient voiced understanding.

## 2022-03-22 ENCOUNTER — TELEPHONE (OUTPATIENT)
Dept: ORTHOPEDIC SURGERY | Age: 57
End: 2022-03-22

## 2022-03-22 DIAGNOSIS — M17.12 PRIMARY OSTEOARTHRITIS OF LEFT KNEE: ICD-10-CM

## 2022-03-22 RX ORDER — OXYCODONE AND ACETAMINOPHEN 10; 325 MG/1; MG/1
1 TABLET ORAL EVERY 6 HOURS PRN
Qty: 28 TABLET | Refills: 0 | Status: SHIPPED
Start: 2022-03-22 | End: 2022-03-30 | Stop reason: SDUPTHER

## 2022-03-22 NOTE — TELEPHONE ENCOUNTER
Last appointment Visit 3/17/22  Next appointment   Future Appointments   Date Time Provider Riri Perez   3/25/2022  1:00 PM Jayden Latham, PT [facility] PT [facility]   4/6/2022  1:45 PM Alfredo Esparza APRN - KYLIE Moreno [facility]      Last refill 3/15/22  DOS: 3/2/22       Patient called in requesting refill of   Percocet 10mg  [facility] [address] Ramon Tim [phone] Sherine Paredes [phone] [address] [address]   Phone:  [phone]  Fax:  [phone]

## 2022-03-25 ENCOUNTER — EVALUATION (OUTPATIENT)
Dept: PHYSICAL THERAPY | Age: 57
End: 2022-03-25
Payer: COMMERCIAL

## 2022-03-25 DIAGNOSIS — Z96.652 STATUS POST LEFT KNEE REPLACEMENT: Primary | ICD-10-CM

## 2022-03-25 PROCEDURE — 97163 PT EVAL HIGH COMPLEX 45 MIN: CPT | Performed by: PHYSICAL THERAPIST

## 2022-03-25 NOTE — PROGRESS NOTES
[facility] OUTPATIENT REHABILITATION  PHYSICAL THERAPY INITIAL EVALUATION         Date:  3/25/2022   Patient: Alex Regalado  : 1965  MRN: [id number]  Referring Provider: Uriel Lara DO  [address]  [facility] Diagnosis:      Diagnosis Orders   1. Status post left knee replacement         Physician Order: Eval and Treat     SUBJECTIVE:     Surgical procedure: L TKA    Date of surgery: 2022    Services provided following surgery: home care x 2 weeks    History: L TKA with history of chronic problems. Reports high pain and dysfunction with edema. US performed 3/17/22 ruled out DVT. Chief complaint: pain, edema, decreased motion, decreased mobility, weakness, inability / limited ability to use leg, difficulty walking, difficulty with stairs, limited ability to complete ADLs (dressing , bathing, grooming, toileting , transferring , walking), limited ability to complete IADLs (cooking, cleaning, transportation , laundry), limited ability to complete home/outdoor chores/tasks, limited tolerance to weightbearing tasks and weightbearing duration, poor sleep quality     Pain:   Current: 7/10       Symptom Type / Quality: aching, sharp  Location[de-identified] Knee: global     Imaging results: XR CHEST (2 VW)    Result Date: 2022  EXAMINATION: TWO XRAY VIEWS OF THE CHEST 2022 1:23 pm COMPARISON: 2020 HISTORY: ORDERING SYSTEM PROVIDED HISTORY: Primary osteoarthritis of left knee TECHNOLOGIST PROVIDED HISTORY: Pre Op Reason for exam:->Pre OP FINDINGS: The lungs are without acute focal process. There is no effusion or pneumothorax. The cardiomediastinal silhouette is without acute process. The osseous structures are without acute process. No acute process.      XR KNEE LEFT (1-2 VIEWS)    Result Date: 3/18/2022  EXAMINATION: TWO XRAY VIEWS OF THE LEFT KNEE 3/17/2022 11:10 am COMPARISON: 2022 HISTORY: ORDERING SYSTEM PROVIDED HISTORY: Status post left knee replacement FINDINGS: Two views of the left knee reveal patient to be status post total left knee arthroplasty. Surgical skin staples seen overlying the anterior aspect of the knee. There is extensive soft tissue swelling. Joint effusion present. Spurring of the patella. No hardware complication or malalignment of the prosthesis. Extensive soft tissue swelling again seen with large joint effusion. The alignment of the prosthesis is satisfactory. No hardware complication. XR KNEE LEFT (1-2 VIEWS)    Result Date: 3/2/2022  EXAMINATION: TWO XRAY VIEWS OF THE LEFT KNEE 3/2/2022 11:08 am COMPARISON: 8 February 2022 HISTORY: ORDERING SYSTEM PROVIDED HISTORY: Pain TECHNOLOGIST PROVIDED HISTORY: Reason for exam:->post op tka FINDINGS: Anatomically aligned recent left TKA with no abnormal bone or soft tissue findings. Anatomically aligned left TKA with no unexpected findings. US DUP LOWER EXTREMITY LEFT POLO    Result Date: 3/17/2022  EXAMINATION: DUPLEX VENOUS ULTRASOUND OF THE LEFT LOWER EXTREMITY 3/17/2022 12:47 pm TECHNIQUE: Duplex ultrasound using B-mode/gray scaled imaging and Doppler spectral analysis and color flow was obtained of the deep venous structures of the left lower extremity. COMPARISON: None. HISTORY: ORDERING SYSTEM PROVIDED HISTORY: Acute deep vein thrombosis (DVT) of proximal vein of left lower extremity [facility]) TECHNOLOGIST PROVIDED HISTORY: What reading provider will be dictating this exam?->CRC FINDINGS: The visualized veins of the left lower extremity are patent and free of echogenic thrombus. The veins demonstrate good compressibility with normal color flow study and spectral analysis. No evidence of DVT in the left lower extremity.  RECOMMENDATIONS: Unavailable       Past Medical History:  Past Medical History:   Diagnosis Date    Anxiety     Blood in stool     Chronic back pain     Constipation     Headache(784.0)     History of dental problems     Hyperlipidemia     Neck pain     Neck stiffness     Type II or unspecified type diabetes mellitus without mention of complication, not stated as uncontrolled      Past Surgical History:   Procedure Laterality Date    COLONOSCOPY      FRACTURE SURGERY Left     femur    KNEE ARTHROSCOPY Left 07/18/2016    medial lateral menisectomy/synovectomy/chodroplasty    SHOULDER ARTHROSCOPY Right 2/11/2020    RIGHT SHOULDER ARTHROSCOPY SUBACHROMIAL DECOMPRESSION DEBRIDEMENT, BICEP TENODESIS ,LABRAL DEBRIDEMENT AND KHALIDA performed by Kanu Acevedo DO at [address] ARTHROSCOPY Right 10/02/2020    SHOULDER ARTHROSCOPY Right 10/2/2020    RIGHT SHOULDER ARTHROSCOPY SUBACROMIAL DECOMPRESSION AND DEBRIDEMENT LABRIAL DEBRIDEMENT ROTATOR CUFF REPAIR KHALIDA performed by Jimmie Higginbotham DO at [address] Left 3/2/2022    Left knee total knee arthroplasty [facility]) performed by Jimmie Higginbotham DO at [address]       Medications:   Current Outpatient Medications   Medication Sig Dispense Refill    oxyCODONE-acetaminophen (PERCOCET)  MG per tablet Take 1 tablet by mouth every 6 hours as needed for Pain for up to 7 days. Intended supply: 7 days 28 tablet 0    ondansetron (ZOFRAN ODT) 4 MG disintegrating tablet Take 1 tablet by mouth every 8 hours as needed for Nausea or Vomiting 21 tablet 0    gabapentin (NEURONTIN) 100 MG capsule Take 1 capsule by mouth 3 times daily for 30 days.  Intended supply: 30 days 90 capsule 0    celecoxib (CELEBREX) 100 MG capsule Take 1 capsule by mouth 2 times daily 60 capsule 0    aspirin 325 MG EC tablet Take 1 tablet by mouth 2 times daily for 28 days 56 tablet 0    pantoprazole (PROTONIX) 20 MG tablet take 1 tablet by mouth every evening if needed (Patient taking differently: daily take 1 tablet by mouth every evening if needed) 30 tablet 5    simvastatin (ZOCOR) 20 MG tablet Take 1 tablet by mouth every evening 30 tablet 5    alogliptin (NESINA) 25 MG TABS tablet Take 1 tablet by mouth daily 30 tablet 5    Dulaglutide 3 MG/0.5ML SOPN Inject 3 mg into the skin once a week 4 pen 5    glipiZIDE (GLUCOTROL XL) 10 MG extended release tablet take 1 tablet by mouth once daily 30 tablet 5    metFORMIN (GLUCOPHAGE) 1000 MG tablet take 1 tablet by mouth twice a day with meals 60 tablet 5    senna-docusate (PERICOLACE) 8.6-50 MG per tablet Take 1 tablet by mouth daily as needed for Constipation 30 tablet 3    Alcohol Swabs PADS 1 Units by Does not apply route as needed (checking glucose) 100 each 11    blood glucose monitor strips FBS daily. Please dispense Freestyle brand 100 strip 11    Lancets MISC Test once daily 100 each 3     No current facility-administered medications for this visit. Occupation: Temporary disability. Patient Goals: pain relief, return to prior activity, full use of leg    Precautions/Contraindications: recent surgery    OBJECTIVE:     Estimated body mass index is 32.82 kg/m² as calculated from the following:    Height as of 3/17/22: 6' (1.829 m). Weight as of 3/17/22: 242 lb (109.8 kg). Observations: Well nourished male with normal affect. Rises slowly, carefully, in guarded manner from chair. Ambulates with wheeled walker. Inspection: Incision edges approximated, no purulent drainage, no redness, no swelling, no tenderness and not hot to touch. Edema: moderate global    Gait: normal, limp L LE, ambulates with wheeled walker    Joint/Motion:    Knee:  Right:   AROM: 115° Flexion,  0° Extension  Left:   AROM: 50° Flexion,  -15° Extension    Strength:    Knee:   Right: Flexion 5/5,  Extension 5/5  Left: Flexion 2/5,  Extension 2/5    Palpation: Tender to palpation throughout knee. ASSESSMENT     Patient is 3 weeks post total knee arthroplasty .   Patient has significant limitations in both flexion and extension as well as moderate edema along with impaired strength, function, gait, weightbearing tolerance. Treatment will start with methods to control swelling as well as AROM and stretching . Progression to strengthening, functional training, gait training, gait device advancement, and balance / proprioception exercises once motion and edema are under control.     Outcome Measure:   Lower Extremity Functional Scale (LEFS) 95% impairment    Problems:   Pain 7/10 constant  Edema moderate  ROM decreased  Strength decreased   Limitations with walking, stairs, standing, sitting, ADLs , IADLs , use of left lower extremity, limited tolerance to weightbearing tasks and weightbearing duration    [x] There are no barriers affecting plan of care or recovery    [] Barriers to this patient's plan of care or recovery include:    Domestic Concerns:  [x] No  [] Yes:    Short Term goals (3 weeks)   Pain 4/10   Edema mild   ROM -5 ext, 100 flex   Strength 4/5    Able to perform / complete the following functions / tasks: ADLs, progress assistive device   Lower Extremity Functional Scale (LEFS) 70% impairment    Long Term goals (6 weeks)   Pain 0/10   Edema none   ROM 0 ext, 120 flex   Strength 5/5   Able to perform / complete the following functions / tasks: IADLs, ambulate without assistive device, normal gait, normal stair negotiation , reach / lift / carry light weighted items in performance of home or work demands, tolerate weightbearing activities for 4 - 6 hours   LEFS 30% impairment   Independent with home exercise program (HEP)    Rehab Potential: [x] Good  [] Fair  [] Poor    PLAN       Treatment Plan:  instruction in home exercise program   therapeutic exercise   therapeutic activity   neuromuscular re-education   gait training   balance training   proprioceptive training   manual therapy   vasocompression     The following CPT codes are likely to be used in the care of this patient:   [id number] PT Evaluation: High Complexity   99251 PT Re-Evaluation   41444 Therapeutic Exercise   [id number] Neuromuscular Re-Education   47308 Therapeutic Activities   56923 Manual Therapy   31692 Vasopneumatic Device     Suggested Professional Referral: [x] No  [] Yes:     Patient Education:  [x] Plans / Goals, Risks / Benefits discussed  [x] Home exercise program  Method of Education: [x] Verbal  [x] Demo  [x] Written  Comprehension of Education:  [x] Verbalizes understanding. [x] Demonstrates understanding. [] Needs Review. [] Demonstrates / verbalizes understanding of HEP / Jocelyn Journey previously given. Frequency:  2 days per week for 6 weeks    Patient understands diagnosis/prognosis and consents to treatment, plan and goals: [x] Yes    [] No     Thank you for the opportunity to work with your patient. If you have questions or comments, please contact me at [phone]; fax: [phone]. Electronically signed by: Chica Garrido PT         Please sign Physician's Certification and return to: [address] [place] [address] PHYSICAL THERAPY  [address] [address]  Dept: [phone]  Dept Fax: [phone]  Loc: [phone] Certification / Comments     Frequency/Duration 2 days per week for 6 weeks. Certification period from 3/25/2022  to 6/25/2022. I have reviewed the Plan of Care established for skilled therapy services and certify that the services are required and that they will be provided while the patient is under my care.     Physician's Comments/Revisions:               Physician's Printed Name:                                           [de-identified] Signature:                                                               Date:

## 2022-03-25 NOTE — PROGRESS NOTES
Physical Therapy Daily Treatment Note    Date: 3/25/2022  Patient Name: Mary Lopez  : 1965   MRN: [id number]  DOInjury: --  DOSx: 2022  Referring Provider: Mika Wilcox DO  [address]  [facility]:      Diagnosis Orders   1. Status post left knee replacement         Patient is 3 weeks post total knee arthroplasty . Patient has significant limitations in both flexion and extension as well as moderate edema along with impaired strength, function, gait, weightbearing tolerance. Treatment will start with methods to control swelling as well as AROM and stretching . Progression to strengthening, functional training, gait training, gait device advancement, and balance / proprioception exercises once motion and edema are under control.     Outcome Measure:   Lower Extremity Functional Scale (LEFS) 95% impairment    Access Code: [email or web address]  URL: [email or web address]  Date: 2022  Prepared by: Silverio Oar    Program Notes  Proper Elevation    -- Elevate limb on a stable stack of blankets / pillows so leg is higher than heart. -- Do this for 45 minutes, 3 times a day  -- You may apply ice for the first 15 minutes, then remove it while you continue your elevation  -- Be sure to lie flat in bed or on a couch with a comfortable pillow under your head. Lying back in a recliner is not helpful.  -- You may prop your head up if you cannot tolerate lying completely flat, but only as high as necessary for comfort. Exercises  Supine Quad Set - 3 x daily - 3 sets - 10-15 reps  Supine Heel Slide - 3 x daily - 3 sets - 10 reps  Seated Knee Extension AROM - 3 x daily - 3 sets - 10 reps    X = TO BE PERFORMED NEXT VISIT  > = PROGRESS TO THIS    S: See eval  O:    Time  [id number]       Visit  ? Repeat outcome measure at mid point and end.     Pain    Pain 7/10     ROM  -15 ext, 50 flex     Modalities          MO   Manual      Stretching knee flexion MT   Stretching       Patella mobs X  TE   Prone hangs   TE   Heel props X  TE   Knee flex stretch-seated X  TE   Prone self flexion stretch   TE   Exercise       Nustep  X  TE   Quad sets 5 sec hold 3 x 10 reps  TE   Heel slides 3 x 10   TE   SLR Unable  X  TE   LAQ 3 x 10  TE   Marching   TA   Squat    TA   Step-ups - FWD    TA   Step-ups - LAT   TA   Step-ups - BWD    TA   Step up and over reciprocally    TA   [] TG  [] Leg Press 2-leg   TE   [] TG  [] Leg Press 1-leg   TE   CR    TE   Knee Extension Machine   TE   Marching gait   NR   Side stepping   NR               A: Tolerated well. Unable to add stretching today due to pain and edema. Discussed anatomy, physiology, body mechanics, principles of loading, and progressive loading/activity. Reviewed home exercise program extensively along with instructions for ice and elevation; written copy provided.     P: Continue with rehab plan  Sherren Perch, PT    Treatment Charges: Mins Units   Initial Evaluation 40 1   Re-Evaluation     Ther Exercise         TE     Manual Therapy     MT     Ther Activities        TA     Gait Training          GT     Neuro Re-education NR     Modalities     Non-Billable Service Time     Other     Total Time/Units 40 1

## 2022-03-29 ENCOUNTER — TELEPHONE (OUTPATIENT)
Dept: ORTHOPEDIC SURGERY | Age: 57
End: 2022-03-29

## 2022-03-29 DIAGNOSIS — M17.12 PRIMARY OSTEOARTHRITIS OF LEFT KNEE: ICD-10-CM

## 2022-03-29 NOTE — TELEPHONE ENCOUNTER
Last appointment Visit 3/17/22  Next appointment   Future Appointments   Date Time Provider Riri Perez   3/30/2022  1:40 PM Olivia Rebolledo PTA [facility] PT [facility]   4/1/2022 11:20 AM Olivia Rebolledo PTA [facility] PT [facility]   4/6/2022  1:45 PM [facility]      Last refill 3/22/22  DOS:  3/2/22      Patient called in requesting refill of   Percocet 10mg  [facility] [address] [facility] [address] Kris Arzate [phone] Vidya Trejo [phone] [address] [address]   Phone:  [phone]  Fax:  [phone]

## 2022-03-30 ENCOUNTER — TREATMENT (OUTPATIENT)
Dept: PHYSICAL THERAPY | Age: 57
End: 2022-03-30
Payer: COMMERCIAL

## 2022-03-30 DIAGNOSIS — Z96.652 STATUS POST LEFT KNEE REPLACEMENT: Primary | ICD-10-CM

## 2022-03-30 PROCEDURE — 97110 THERAPEUTIC EXERCISES: CPT

## 2022-03-30 PROCEDURE — 97016 VASOPNEUMATIC DEVICE THERAPY: CPT

## 2022-03-30 RX ORDER — OXYCODONE AND ACETAMINOPHEN 10; 325 MG/1; MG/1
1 TABLET ORAL EVERY 6 HOURS PRN
Qty: 28 TABLET | Refills: 0 | Status: SHIPPED
Start: 2022-03-30 | End: 2022-04-06 | Stop reason: SDUPTHER

## 2022-03-30 NOTE — PROGRESS NOTES
Physical Therapy Daily Treatment Note    Date: 3/30/2022  Patient Name: Alex Regalado  : 1965   MRN: [id number]  DOInjury: --  DOSx: 2022  Referring Provider: Uriel Lara DO  [address] [facility]:      Diagnosis Orders   1. Status post left knee replacement         Patient is 3 weeks post total knee arthroplasty . Patient has significant limitations in both flexion and extension as well as moderate edema along with impaired strength, function, gait, weightbearing tolerance. Treatment will start with methods to control swelling as well as AROM and stretching . Progression to strengthening, functional training, gait training, gait device advancement, and balance / proprioception exercises once motion and edema are under control.     Outcome Measure:   Lower Extremity Functional Scale (LEFS) 95% impairment    Access Code: [email or web address]  URL: [email or web address]  Date: 2022  Prepared by: Karis Arevalo    Program Notes  Proper Elevation    -- Elevate limb on a stable stack of blankets / pillows so leg is higher than heart. -- Do this for 45 minutes, 3 times a day  -- You may apply ice for the first 15 minutes, then remove it while you continue your elevation  -- Be sure to lie flat in bed or on a couch with a comfortable pillow under your head. Lying back in a recliner is not helpful.  -- You may prop your head up if you cannot tolerate lying completely flat, but only as high as necessary for comfort. Exercises  Supine Quad Set - 3 x daily - 3 sets - 10-15 reps  Supine Heel Slide - 3 x daily - 3 sets - 10 reps  Seated Knee Extension AROM - 3 x daily - 3 sets - 10 reps    X = TO BE PERFORMED NEXT VISIT  > = PROGRESS TO THIS    S:  Pt reports feeling better but continues to have problems with swelling , but states he is icing as instructed at home .   O:    Time  [facility]      Visit  2 visit   40327 Therapeutic Exercise    41267 Neuromuscular Re-Education 0/36  58664 Therapeutic Activities 0/36   65501 Manual Therapy 0/12  50236 Vasopneumatic   Device 1/12     This is not a charge     Repeat outcome measure at mid point and end. Pain    Pain 7/10     ROM   -13  ext,71  flex 3/30/2022    Modalities             Vasopneumatic   Device X 10 min  Post ex's MO   Manual      Stretching knee flexion   MT   Stretching       Patella mobs X 30 new x TE   Prone hangs   TE   Heel props X 3 min new  x TE   Knee flex stretch-seated X x TE   Prone self flexion stretch   TE   Exercise       Nustep  L X 5 min x TE         Quad sets 5 sec hold 3 x 10 reps  TE   Heel slides 3 x 10   TE   SLR Unable  X x TE   LAQ 3 x 10  TE   Marching   TA   Squat    TA   Step-ups - FWD    TA   Step-ups - LAT   TA   Step-ups - BWD    TA   Step up and over reciprocally    TA   [] TG  [] Leg Press 2-leg   TE   [] TG  [] Leg Press 1-leg   TE   CR    TE   Knee Extension Machine   TE   Marching gait   NR   Side stepping   NR               A: Tolerated well. Pt with noted increase in AROM and slight decrease in swelling . Meryle Sandman Discussed anatomy, physiology, body mechanics, principles of loading, and progressive loading/activity. Reviewed home exercise program extensively along with instructions for ice and elevation; written copy provided.     P: Continue with rehab plan  Marybeth Coyne PTA    Treatment Charges: Mins Units   Initial Evaluation     Re-Evaluation     Ther Exercise         TE 30 2   Manual Therapy     MT     Ther Activities        TA     Gait Training          GT     Neuro Re-education NR     Modalities X 10 1   Non-Billable Service Time     Other     Total Time/Units 40 3

## 2022-04-01 ENCOUNTER — TELEPHONE (OUTPATIENT)
Dept: PHYSICAL THERAPY | Age: 57
End: 2022-04-01

## 2022-04-04 ENCOUNTER — TREATMENT (OUTPATIENT)
Dept: PHYSICAL THERAPY | Age: 57
End: 2022-04-04
Payer: COMMERCIAL

## 2022-04-04 DIAGNOSIS — Z96.652 STATUS POST LEFT KNEE REPLACEMENT: Primary | ICD-10-CM

## 2022-04-04 PROCEDURE — 97110 THERAPEUTIC EXERCISES: CPT

## 2022-04-04 PROCEDURE — 97016 VASOPNEUMATIC DEVICE THERAPY: CPT

## 2022-04-04 NOTE — PROGRESS NOTES
Physical Therapy Daily Treatment Note    Date: 2022  Patient Name: Shira Rodrigues  : 1965   MRN: [id number]  DOInjury: --  DOSx: 2022  Referring Provider: Lum Fothergill, DO  [address] [facility]:      Diagnosis Orders   1. Status post left knee replacement         Patient is 3 weeks post total knee arthroplasty . Patient has significant limitations in both flexion and extension as well as moderate edema along with impaired strength, function, gait, weightbearing tolerance. Treatment will start with methods to control swelling as well as AROM and stretching . Progression to strengthening, functional training, gait training, gait device advancement, and balance / proprioception exercises once motion and edema are under control.     Outcome Measure:   Lower Extremity Functional Scale (LEFS) 95% impairment    Access Code: [email or web address]  URL: [email or web address]  Date: 2022  Prepared by: Ena North    Program Notes  Proper Elevation    -- Elevate limb on a stable stack of blankets / pillows so leg is higher than heart. -- Do this for 45 minutes, 3 times a day  -- You may apply ice for the first 15 minutes, then remove it while you continue your elevation  -- Be sure to lie flat in bed or on a couch with a comfortable pillow under your head. Lying back in a recliner is not helpful.  -- You may prop your head up if you cannot tolerate lying completely flat, but only as high as necessary for comfort.       Exercises  Supine Quad Set - 3 x daily - 3 sets - 10-15 reps  Supine Heel Slide - 3 x daily - 3 sets - 10 reps  Seated Knee Extension AROM - 3 x daily - 3 sets - 10 reps    X = TO BE PERFORMED NEXT VISIT  > = PROGRESS TO THIS    S:  Pt reports feeling pretty good as far as pain goes, but voiced concern over stiffness and his lack of knee flexion ROM O:    Time  5459-2282      Visit  3 visit   95717 Therapeutic Exercise    66979 Neuromuscular Re-Education 0/36  47942 Therapeutic Activities 0/36   98537 Manual Therapy 0/12  42927 Vasopneumatic   Device 2/12     This is not a charge     Repeat outcome measure at mid point and end. Pain    Pain 7/10     ROM   -12  Ext, 85  flex  4/04 /2022    Modalities             Vasopneumatic   Device X 10 min  Post ex's MO   Manual      Stretching knee flexion   MT   Stretching       Patella mobs  x TE   Prone hangs   TE   Heel props X 5 min  x TE   Knee flex stretch-seated 3 X 30 sec new  x TE   Prone self flexion stretch   TE   Exercise       Nustep  L X 5 min x TE         Quad sets 5 sec hold 3 x 10 reps  TE   Heel slides 3 x 10   TE   SLR 2 x 5 Minimal assistance TE   LAQ 3 x 10  TE   Marching   TA   Squat    TA   Step-ups - FWD    TA   Step-ups - LAT   TA   Step-ups - BWD    TA   Step up and over reciprocally    TA   [] TG  [] Leg Press 2-leg   TE   [] TG  [] Leg Press 1-leg   TE   CR    TE   Knee Extension Machine   TE   Marching gait   NR   Side stepping   NR               A: Tolerated well. Pt reacted well to increased time for heel prop and addition of seated flexion stretch. Pt required assistance to complete SLR. Pt displaying notable increase in knee flexion. Discussed anatomy, physiology, body mechanics, principles of loading, and progressive loading/activity. Reviewed home exercise program extensively along with instructions for ice and elevation; written copy provided.     P: Continue with rehab plan  Mike Fowler PTA    Treatment Charges: Mins Units   Initial Evaluation     Re-Evaluation     Ther Exercise         TE 40 3   Manual Therapy     MT     Ther Activities        TA     Gait Training          GT     Neuro Re-education NR     Modalities X 10 1   Non-Billable Service Time     Other     Total Time/Units 50 4

## 2022-04-06 ENCOUNTER — TREATMENT (OUTPATIENT)
Dept: PHYSICAL THERAPY | Age: 57
End: 2022-04-06
Payer: COMMERCIAL

## 2022-04-06 ENCOUNTER — OFFICE VISIT (OUTPATIENT)
Dept: ORTHOPEDIC SURGERY | Age: 57
End: 2022-04-06

## 2022-04-06 VITALS — BODY MASS INDEX: 32.78 KG/M2 | TEMPERATURE: 98 F | WEIGHT: 242 LBS | HEIGHT: 72 IN

## 2022-04-06 DIAGNOSIS — Z96.652 STATUS POST LEFT KNEE REPLACEMENT: Primary | ICD-10-CM

## 2022-04-06 DIAGNOSIS — M17.12 PRIMARY OSTEOARTHRITIS OF LEFT KNEE: ICD-10-CM

## 2022-04-06 PROCEDURE — 97110 THERAPEUTIC EXERCISES: CPT

## 2022-04-06 PROCEDURE — 99024 POSTOP FOLLOW-UP VISIT: CPT | Performed by: NURSE PRACTITIONER

## 2022-04-06 PROCEDURE — 97140 MANUAL THERAPY 1/> REGIONS: CPT

## 2022-04-06 PROCEDURE — 97016 VASOPNEUMATIC DEVICE THERAPY: CPT

## 2022-04-06 RX ORDER — OXYCODONE AND ACETAMINOPHEN 10; 325 MG/1; MG/1
1 TABLET ORAL EVERY 6 HOURS PRN
Qty: 28 TABLET | Refills: 0 | Status: SHIPPED
Start: 2022-04-06 | End: 2022-04-14 | Stop reason: SDUPTHER

## 2022-04-06 NOTE — PROGRESS NOTES
Physical Therapy Daily Treatment Note    Date: 2022  Patient Name: Sriram Enriquez  : 1965   MRN: [id number]  DOInjury: --  DOSx: 2022  Referring Provider: Con Hickman DO  [address] [facility]:      Diagnosis Orders   1. Status post left knee replacement         Patient is 3 weeks post total knee arthroplasty . Patient has significant limitations in both flexion and extension as well as moderate edema along with impaired strength, function, gait, weightbearing tolerance. Treatment will start with methods to control swelling as well as AROM and stretching . Progression to strengthening, functional training, gait training, gait device advancement, and balance / proprioception exercises once motion and edema are under control.     Outcome Measure:   Lower Extremity Functional Scale (LEFS) 95% impairment    Access Code: [email or web address]  URL: [email or web address]  Date: 2022  Prepared by: Keyona Hooks    Program Notes  Proper Elevation    -- Elevate limb on a stable stack of blankets / pillows so leg is higher than heart. -- Do this for 45 minutes, 3 times a day  -- You may apply ice for the first 15 minutes, then remove it while you continue your elevation  -- Be sure to lie flat in bed or on a couch with a comfortable pillow under your head. Lying back in a recliner is not helpful.  -- You may prop your head up if you cannot tolerate lying completely flat, but only as high as necessary for comfort. Exercises  Supine Quad Set - 3 x daily - 3 sets - 10-15 reps  Supine Heel Slide - 3 x daily - 3 sets - 10 reps  Seated Knee Extension AROM - 3 x daily - 3 sets - 10 reps    X = TO BE PERFORMED NEXT VISIT  > = PROGRESS TO THIS    S:  Pt reports that he is sore after orthopedic appointment.   O:    Time  4896-5989      Visit  4 visit   24222 Therapeutic Exercise    14023 Neuromuscular Re-Education   02129 Therapeutic Activities  01142 Manual Therapy 1/12  50892 Vasopneumatic   Device 3/12     This is not a charge     Repeat outcome measure at mid point and end. Pain    Pain 4/10     ROM   -12  Ext, 90  flex  4/06 /2022    Modalities             Vasopneumatic   Device X 10 min  Post ex's MO   Manual      Stretching knee flexion X 10 min new   MT   Stretching       Patella mobs X 30  x TE   Prone hangs   TE   Heel props X 5 min  x TE   Knee flex stretch-seated 3 X 30 sec  x TE   Prone self flexion stretch   TE   Exercise       Nustep   x TE   Recumbent bike  X 5 min stretching only            Quad sets 5 sec hold 2 x 10 reps  TE   Heel slides 2 x 10   TE   SLR 2 x 5 Minimal assistance TE   LAQ   TE   Marching   TA   Squat    TA   Step-ups - FWD    TA   Step-ups - LAT   TA   Step-ups - BWD    TA   Step up and over reciprocally    TA   [] TG  [] Leg Press 2-leg   TE   [] TG  [] Leg Press 1-leg   TE   CR    TE   Knee Extension Machine   TE   Marching gait   NR   Side stepping   NR               A: Tolerated well. Pt with noted increase swelling today. Pt instructed to make an emphasis on self  Stretching now until return visit due to need of increase AROM. Hannah Acosta pt also re instructed on self mobs along with performing in clinic each rx session  . Discussed anatomy, physiology, body mechanics, principles of loading, and progressive loading/activity. Reviewed home exercise program extensively along with instructions for ice and elevation; written copy provided.     P: Continue with rehab plan  Lawrence Lanes, PTA    Treatment Charges: Mins Units   Initial Evaluation     Re-Evaluation     Ther Exercise         TE 30 2   Manual Therapy     MT 10 1   Ther Activities        TA     Gait Training          GT     Neuro Re-education NR     Modalities X 10 1   Non-Billable Service Time     Other     Total Time/Units 50 4

## 2022-04-06 NOTE — PROGRESS NOTES
Post-Operative week: 5 Status Post left Total Knee Arthroplasty, DOS: 3/2/22  Systemic or Specific Complaints: stiffness and pain. Objective:     General: alert, appears stated age and cooperative   Wound: Wound clean and dry no evidence of infection. , No Erythema, No Edema and No Drainage   Motion: Flexion: 10 to 90 Degrees   DVT Exam: No evidence of DVT seen on physical exam.  Negative Esther's sign. No cords or calf tenderness. No significant calf/ankle edema. Xrays:  No signs of fracture, there is good alignment, and no signs of aseptic loosening. Assessment:     Encounter Diagnosis   Name Primary?  Status post left knee replacement Yes      Doing well postoperatively. Plan:     Continues current post-op course  Patient is to discontinue taking enteric coated aspirin 325mg. Patient is to discontinue wearing KAILEY hose. Continue with outpatient physical therapy.    Fu on Tuesday for ROM check

## 2022-04-11 ENCOUNTER — TREATMENT (OUTPATIENT)
Dept: PHYSICAL THERAPY | Age: 57
End: 2022-04-11
Payer: COMMERCIAL

## 2022-04-11 DIAGNOSIS — Z96.652 STATUS POST LEFT KNEE REPLACEMENT: Primary | ICD-10-CM

## 2022-04-11 PROCEDURE — 97110 THERAPEUTIC EXERCISES: CPT

## 2022-04-11 PROCEDURE — 97016 VASOPNEUMATIC DEVICE THERAPY: CPT

## 2022-04-11 PROCEDURE — 97140 MANUAL THERAPY 1/> REGIONS: CPT

## 2022-04-11 NOTE — PROGRESS NOTES
Physical Therapy Daily Treatment Note    Date: 2022  Patient Name: Rosenda Garnett  : 1965   MRN: [id number]  DOInjury: --  DOSx: 2022  Referring Provider: Leatha Rodriguez DO  [address] [facility]:      Diagnosis Orders   1. Status post left knee replacement         Patient is 3 weeks post total knee arthroplasty . Patient has significant limitations in both flexion and extension as well as moderate edema along with impaired strength, function, gait, weightbearing tolerance. Treatment will start with methods to control swelling as well as AROM and stretching . Progression to strengthening, functional training, gait training, gait device advancement, and balance / proprioception exercises once motion and edema are under control.     Outcome Measure:   Lower Extremity Functional Scale (LEFS) 95% impairment    Access Code: [email or web address]  URL: [email or web address]  Date: 2022  Prepared by: Arely Asher    Program Notes  Proper Elevation    -- Elevate limb on a stable stack of blankets / pillows so leg is higher than heart. -- Do this for 45 minutes, 3 times a day  -- You may apply ice for the first 15 minutes, then remove it while you continue your elevation  -- Be sure to lie flat in bed or on a couch with a comfortable pillow under your head. Lying back in a recliner is not helpful.  -- You may prop your head up if you cannot tolerate lying completely flat, but only as high as necessary for comfort. Exercises  Supine Quad Set - 3 x daily - 3 sets - 10-15 reps  Supine Heel Slide - 3 x daily - 3 sets - 10 reps  Seated Knee Extension AROM - 3 x daily - 3 sets - 10 reps    X = TO BE PERFORMED NEXT VISIT  > = PROGRESS TO THIS    S:  Pt reports that his knee is doing okay as far as pain goes, but that he had an episode of knee buckling going down the stairs and had to catch himself with the railing.  Pt reported that his knee is feeling good and flexible post session. O:    Time  8961-2582      Visit  4 visit   91837 Therapeutic Exercise 8 /36   97563 Neuromuscular Re-Education 0/36  29398 Therapeutic Activities 0/36   20313 Manual Therapy 2/12  55414 Vasopneumatic   Device 4/12     This is not a charge     Repeat outcome measure at mid point and end. Pain    Pain 4/10     ROM   -12  Ext, 90  Flex with manual end range stretch   4/11 /2022    Modalities             Vasopneumatic   Device X 10 min  Post ex's MO   Manual      Stretching knee flexion X 10 min   MT   Stretching       Patella mobs X 30  x TE   Prone hangs   TE   Heel props X 5 min  x TE   Knee flex stretch-seated 3 X 30 sec  x TE   Prone self flexion stretch   TE   Exercise       Nustep   x TE   Recumbent bike       Regular stat bike  X 5 min stretching only  Seat at no. 8          Quad sets  TE   Heel slides  TE   SLR Minimal assistance TE   LAQ   TE   Marching X 25 new  TA   CR X 25 new      Hip abduction  X 25 new      Squat    TA   Step-ups - FWD    TA   Step-ups - LAT   TA   Step-ups - BWD    TA   Step up and over reciprocally    TA   [] TG  [] Leg Press 2-leg   TE   [] TG  [] Leg Press 1-leg   TE   CR    TE   Knee Extension Machine   TE   Marching gait   NR   Side stepping   NR               A: Tolerated well. Pt tolerated addition of standing exercises well. Emphasis on stretching . Discussed anatomy, physiology, body mechanics, principles of loading, and progressive loading/activity. Reviewed home exercise program extensively along with instructions for ice and elevation; written copy provided.     P: Continue with rehab plan  Ginna Pereyra PTA    Treatment Charges: Mins Units   Initial Evaluation     Re-Evaluation     Ther Exercise         TE 30 2   Manual Therapy     MT 10 1   Ther Activities        TA     Gait Training          GT     Neuro Re-education NR     Modalities X 10 1   Non-Billable Service Time     Other     Total Time/Units 50 4

## 2022-04-12 ENCOUNTER — OFFICE VISIT (OUTPATIENT)
Dept: ORTHOPEDIC SURGERY | Age: 57
End: 2022-04-12

## 2022-04-12 VITALS — HEIGHT: 72 IN | BODY MASS INDEX: 32.78 KG/M2 | WEIGHT: 242 LBS | TEMPERATURE: 98 F

## 2022-04-12 DIAGNOSIS — Z96.652 STATUS POST LEFT KNEE REPLACEMENT: Primary | ICD-10-CM

## 2022-04-12 PROCEDURE — 99024 POSTOP FOLLOW-UP VISIT: CPT | Performed by: ORTHOPAEDIC SURGERY

## 2022-04-12 NOTE — PROGRESS NOTES
Subjective:      Status Post left Total Knee Arthroplasty, surgery date 3/2/22. Systemic or Specific Complaints: increased swelling and decreased ROM. Objective:     General: alert, appears stated age and cooperative   Wound: Wound clean and dry no evidence of infection. , No Erythema, No Edema and No Drainage   Motion: Flexion: -5 to 50 Degrees   DVT Exam: No evidence of DVT seen on physical exam.  Negative Esther's sign. No cords or calf tenderness. No significant calf/ankle edema. Imaging:  Xrays:  No signs of fracture, there is good alignment, and no signs of aseptic loosening. Radiographic findings reviewed with patient    Assessment:     Encounter Diagnosis   Name Primary?  Status post left knee replacement Yes      Doing well postoperatively. Plan:     Continues current post-op course, staples were removed at today's appointment  Patient is to continue taking enteric coated aspirin 81 mg, 1 tablet twice daily. Patient is to continue wearing KAILEY hose, on during the day and off at night. Outpatient physical therapy is to begin immediately. No bathing/swimming/hot tub for two weeks or until incision is completely closed. We will see the patient back in 3 weeks for repeat xray and evaluation. Please call office with any questions or concerns at [phone]    I explained to the patient he is behind schedule with his rehab and he needs to push through the pain. I warned him that if he does not, we will have to return to the OR to perform an MAKI. The patient voiced understanding.

## 2022-04-13 ENCOUNTER — HOSPITAL ENCOUNTER (OUTPATIENT)
Age: 57
Discharge: HOME | End: 2022-04-13
Payer: COMMERCIAL

## 2022-04-13 ENCOUNTER — ANESTHESIA EVENT (OUTPATIENT)
Dept: OPERATING ROOM | Age: 57
End: 2022-04-13
Payer: COMMERCIAL

## 2022-04-13 DIAGNOSIS — Z96.652 STATUS POST LEFT KNEE REPLACEMENT: ICD-10-CM

## 2022-04-13 LAB
ANION GAP SERPL CALCULATED.3IONS-SCNC: 14 MMOL/L (ref 7–16)
BUN BLDV-MCNC: 14 MG/DL (ref 6–20)
CALCIUM SERPL-MCNC: 9.1 MG/DL (ref 8.6–10.2)
CHLORIDE BLD-SCNC: 101 MMOL/L (ref 98–107)
CO2: 23 MMOL/L (ref 22–29)
CREAT SERPL-MCNC: 0.7 MG/DL (ref 0.7–1.2)
GFR AFRICAN AMERICAN: >60
GFR NON-AFRICAN AMERICAN: >60 ML/MIN/1.73
GLUCOSE BLD-MCNC: 107 MG/DL (ref 74–99)
HCT VFR BLD CALC: 39.8 % (ref 37–54)
HEMOGLOBIN: 12.6 G/DL (ref 12.5–16.5)
MCH RBC QN AUTO: 27.8 PG (ref 26–35)
MCHC RBC AUTO-ENTMCNC: 31.7 % (ref 32–34.5)
MCV RBC AUTO: 87.7 FL (ref 80–99.9)
PDW BLD-RTO: 13.2 FL (ref 11.5–15)
PLATELET # BLD: 297 E9/L (ref 130–450)
PMV BLD AUTO: 9.5 FL (ref 7–12)
POTASSIUM SERPL-SCNC: 4.5 MMOL/L (ref 3.5–5)
RBC # BLD: 4.54 E12/L (ref 3.8–5.8)
SODIUM BLD-SCNC: 138 MMOL/L (ref 132–146)
WBC # BLD: 7.7 E9/L (ref 4.5–11.5)

## 2022-04-13 PROCEDURE — 80048 BASIC METABOLIC PNL TOTAL CA: CPT

## 2022-04-13 PROCEDURE — 85027 COMPLETE CBC AUTOMATED: CPT

## 2022-04-13 PROCEDURE — 36415 COLL VENOUS BLD VENIPUNCTURE: CPT

## 2022-04-13 RX ORDER — ASPIRIN 81 MG/1
81 TABLET ORAL DAILY
COMMUNITY
End: 2022-04-22

## 2022-04-13 NOTE — ANESTHESIA PRE PROCEDURE
Department of Anesthesiology  Preprocedure Note       Name:  Rosibel Victor   Age:  61 y.o.  :  1965                                          MRN:  [id number]         Date:  2022      Surgeon: Rhoda Esparza): Uli Reynoso DO    Procedure: Procedure(s):  LEFT KNEE MANIPULATION UNDER ANESTHESIA    Medications prior to admission:   Prior to Admission medications    Medication Sig Start Date End Date Taking? Authorizing Provider   aspirin 81 MG EC tablet Take 81 mg by mouth daily    Historical Provider, MD   oxyCODONE-acetaminophen (PERCOCET)  MG per tablet Take 1 tablet by mouth every 6 hours as needed for Pain for up to 7 days. Intended supply: 7 days 22  HARPREET Cochran CNP   ondansetron (ZOFRAN ODT) 4 MG disintegrating tablet Take 1 tablet by mouth every 8 hours as needed for Nausea or Vomiting 3/4/22   HARPREET Cochran CNP   gabapentin (NEURONTIN) 100 MG capsule Take 1 capsule by mouth 3 times daily for 30 days.  Intended supply: 30 days 3/2/22 4/1/22  Yoselin Olivarez DO   pantoprazole (PROTONIX) 20 MG tablet take 1 tablet by mouth every evening if needed  Patient taking differently: Take 20 mg by mouth daily AM 22   Kyle James DO   simvastatin (ZOCOR) 20 MG tablet Take 1 tablet by mouth every evening 22   Kyle James,    alogliptin (NESINA) 25 MG TABS tablet Take 1 tablet by mouth daily 22   Kyle James DO   Dulaglutide 3 MG/0.5ML SOPN Inject 3 mg into the skin once a week 22   Tae Carbajal DO   glipiZIDE (GLUCOTROL XL) 10 MG extended release tablet take 1 tablet by mouth once daily 22   Kyle James,    metFORMIN (GLUCOPHAGE) 1000 MG tablet take 1 tablet by mouth twice a day with meals 22   Tae Carbajal DO   senna-docusate (PERICOLACE) 8.6-50 MG per tablet Take 1 tablet by mouth daily as needed for Constipation 21   Kyle James DO   Alcohol Swabs PADS 1 Units by Does not apply route as needed (checking glucose) 8/20/19   Tae Carbajal, DO   blood glucose monitor strips FBS daily. Please dispense Freestyle brand 8/20/19   Davon Merida, DO   Lancets MISC Test once daily 8/20/19   Davon Merida DO       Current medications:    Current Outpatient Medications   Medication Sig Dispense Refill    aspirin 81 MG EC tablet Take 81 mg by mouth daily      oxyCODONE-acetaminophen (PERCOCET)  MG per tablet Take 1 tablet by mouth every 6 hours as needed for Pain for up to 7 days. Intended supply: 7 days 28 tablet 0    ondansetron (ZOFRAN ODT) 4 MG disintegrating tablet Take 1 tablet by mouth every 8 hours as needed for Nausea or Vomiting 21 tablet 0    gabapentin (NEURONTIN) 100 MG capsule Take 1 capsule by mouth 3 times daily for 30 days. Intended supply: 30 days 90 capsule 0    pantoprazole (PROTONIX) 20 MG tablet take 1 tablet by mouth every evening if needed (Patient taking differently: Take 20 mg by mouth daily AM) 30 tablet 5    simvastatin (ZOCOR) 20 MG tablet Take 1 tablet by mouth every evening 30 tablet 5    alogliptin (NESINA) 25 MG TABS tablet Take 1 tablet by mouth daily 30 tablet 5    Dulaglutide 3 MG/0.5ML SOPN Inject 3 mg into the skin once a week 4 pen 5    glipiZIDE (GLUCOTROL XL) 10 MG extended release tablet take 1 tablet by mouth once daily 30 tablet 5    metFORMIN (GLUCOPHAGE) 1000 MG tablet take 1 tablet by mouth twice a day with meals 60 tablet 5    senna-docusate (PERICOLACE) 8.6-50 MG per tablet Take 1 tablet by mouth daily as needed for Constipation 30 tablet 3    Alcohol Swabs PADS 1 Units by Does not apply route as needed (checking glucose) 100 each 11    blood glucose monitor strips FBS daily. Please dispense Freestyle brand 100 strip 11    Lancets MISC Test once daily 100 each 3     No current facility-administered medications for this visit. Allergies:     Allergies   Allergen Reactions    Tobramycin Swelling     Eyes swelled from drops       Problem List:    Patient Active Problem List   Diagnosis Code    Migraine G43.909    Type 2 diabetes mellitus with diabetic polyneuropathy, without long-term current use of insulin ([facility]) E11.42    Hyperlipidemia associated with type 2 diabetes mellitus [facility] E11.69, E78.5    Chronic bilateral low back pain with sciatica M54.40, G89.29    Neck pain M54.2    Rotator cuff arthropathy of right shoulder M12.811    Class 1 obesity due to excess calories with serious comorbidity and body mass index (BMI) of 33.0 to 33.9 in adult E66.09, Z68.33    Status post arthroscopy of right shoulder Z98.890    Lumbar spondylosis M47.816    Chronic pain syndrome G89.4    Primary osteoarthritis of left knee M17.12    Primary osteoarthritis of right shoulder M19.011    Precordial pain R07.2    Impingement syndrome of right shoulder M75.41    Tear of right glenoid labrum S43.431A    Arthritis of right acromioclavicular joint M19.011    Complete tear of right rotator cuff M75.121    Morbidly obese ([facility]) E66.01    Venous reflux I87.2    Primary osteoarthritis of one knee, left M17.12    Status post left knee replacement Z96.652       Past Medical History:        Diagnosis Date    Anxiety     Blood in stool     Chronic back pain     Constipation     GERD (gastroesophageal reflux disease)     Headache(784.0)     History of dental problems     Hyperlipidemia     Neck pain     Neck stiffness     Type II or unspecified type diabetes mellitus without mention of complication, not stated as uncontrolled        Past Surgical History:        Procedure Laterality Date    COLONOSCOPY      FRACTURE SURGERY Left     femur    KNEE ARTHROSCOPY Left 07/18/2016    medial lateral menisectomy/synovectomy/chodroplasty    SHOULDER ARTHROSCOPY Right 2/11/2020    RIGHT SHOULDER ARTHROSCOPY SUBACHROMIAL DECOMPRESSION DEBRIDEMENT, BICEP TENODESIS ,LABRAL DEBRIDEMENT AND KHALIDA performed by Manuelito Rodriguez DO at [address] [place] [address] Right 10/02/2020    SHOULDER ARTHROSCOPY Right 10/2/2020    RIGHT SHOULDER ARTHROSCOPY SUBACROMIAL DECOMPRESSION AND DEBRIDEMENT LABRIAL DEBRIDEMENT ROTATOR CUFF REPAIR KHALIDA performed by Babak Winslow DO at [facility] [address] Left 3/2/2022    Left knee total knee arthroplasty [facility]) performed by Babak Winslow DO at [address] History:    Social History     Tobacco Use    Smoking status: Never Smoker    Smokeless tobacco: Never Used   Substance Use Topics    Alcohol use: Not Currently     Alcohol/week: 0.0 standard drinks                                Counseling given: Not Answered      Vital Signs (Current): There were no vitals filed for this visit. BP Readings from Last 3 Encounters:   03/02/22 (!) 115/54   03/02/22 (!) 142/78   02/23/22 (!) 151/73       NPO Status:                                                                                 BMI:   Wt Readings from Last 3 Encounters:   04/13/22 238 lb (108 kg)   04/12/22 242 lb (109.8 kg)   04/06/22 242 lb (109.8 kg)     There is no height or weight on file to calculate BMI.    CBC:   Lab Results   Component Value Date    WBC 6.6 02/23/2022    RBC 4.67 02/23/2022    HGB 13.2 02/23/2022    HCT 40.3 02/23/2022    MCV 86.3 02/23/2022    RDW 12.9 02/23/2022     02/23/2022       CMP:   Lab Results   Component Value Date     02/23/2022    K 3.9 02/23/2022     02/23/2022    CO2 23 02/23/2022    BUN 15 02/23/2022    CREATININE 0.7 02/23/2022    GFRAA >60 02/23/2022    LABGLOM >60 02/23/2022    GLUCOSE 200 02/23/2022    PROT 7.3 02/23/2022    CALCIUM 8.6 02/23/2022    BILITOT 0.4 02/23/2022    ALKPHOS 95 02/23/2022    AST 39 02/23/2022    ALT 51 02/23/2022       POC Tests: No results for input(s): POCGLU, POCNA, POCK, POCCL, POCBUN, POCHEMO, POCHCT in the last 72 hours.     Coags:   Lab Results   Component Value Date    PROTIME content          PAT Patient Interview:  Patient seen, chart reviewed per routine on April 13, 2022 at 11:20 AM by Elizabeth Fisher MD.  Above represents information available via shared medical record including previous anesthesia history, drug and allergy history.   Confirmation of above and final plan per Day of Surgery (DOS) anesthesiologist.    Terell Stevenson MD   4/13/2022

## 2022-04-14 ENCOUNTER — TREATMENT (OUTPATIENT)
Dept: PHYSICAL THERAPY | Age: 57
End: 2022-04-14
Payer: COMMERCIAL

## 2022-04-14 ENCOUNTER — HOSPITAL ENCOUNTER (OUTPATIENT)
Age: 57
Setting detail: OUTPATIENT SURGERY
Discharge: HOME | End: 2022-04-14
Attending: ORTHOPAEDIC SURGERY | Admitting: ORTHOPAEDIC SURGERY
Payer: COMMERCIAL

## 2022-04-14 ENCOUNTER — ANESTHESIA (OUTPATIENT)
Dept: OPERATING ROOM | Age: 57
End: 2022-04-14
Payer: COMMERCIAL

## 2022-04-14 VITALS
TEMPERATURE: 98.6 F | SYSTOLIC BLOOD PRESSURE: 151 MMHG | HEART RATE: 85 BPM | BODY MASS INDEX: 31.98 KG/M2 | DIASTOLIC BLOOD PRESSURE: 82 MMHG | WEIGHT: 236.13 LBS | OXYGEN SATURATION: 98 % | RESPIRATION RATE: 14 BRPM | HEIGHT: 72 IN

## 2022-04-14 VITALS
RESPIRATION RATE: 22 BRPM | SYSTOLIC BLOOD PRESSURE: 147 MMHG | OXYGEN SATURATION: 98 % | DIASTOLIC BLOOD PRESSURE: 88 MMHG

## 2022-04-14 DIAGNOSIS — M17.12 PRIMARY OSTEOARTHRITIS OF LEFT KNEE: ICD-10-CM

## 2022-04-14 DIAGNOSIS — Z96.652 STATUS POST LEFT KNEE REPLACEMENT: Primary | ICD-10-CM

## 2022-04-14 LAB — METER GLUCOSE: 89 MG/DL (ref 74–99)

## 2022-04-14 PROCEDURE — 6370000000 HC RX 637 (ALT 250 FOR IP)

## 2022-04-14 PROCEDURE — 6360000002 HC RX W HCPCS: Performed by: NURSE ANESTHETIST, CERTIFIED REGISTERED

## 2022-04-14 PROCEDURE — 3600000002 HC SURGERY LEVEL 2 BASE: Performed by: ORTHOPAEDIC SURGERY

## 2022-04-14 PROCEDURE — 3700000001 HC ADD 15 MINUTES (ANESTHESIA): Performed by: ORTHOPAEDIC SURGERY

## 2022-04-14 PROCEDURE — 3600000012 HC SURGERY LEVEL 2 ADDTL 15MIN: Performed by: ORTHOPAEDIC SURGERY

## 2022-04-14 PROCEDURE — 97110 THERAPEUTIC EXERCISES: CPT

## 2022-04-14 PROCEDURE — 2580000003 HC RX 258: Performed by: NURSE ANESTHETIST, CERTIFIED REGISTERED

## 2022-04-14 PROCEDURE — 7100000011 HC PHASE II RECOVERY - ADDTL 15 MIN: Performed by: ORTHOPAEDIC SURGERY

## 2022-04-14 PROCEDURE — 2709999900 HC NON-CHARGEABLE SUPPLY: Performed by: ORTHOPAEDIC SURGERY

## 2022-04-14 PROCEDURE — 3700000000 HC ANESTHESIA ATTENDED CARE: Performed by: ORTHOPAEDIC SURGERY

## 2022-04-14 PROCEDURE — 2580000003 HC RX 258: Performed by: ANESTHESIOLOGY

## 2022-04-14 PROCEDURE — 82962 GLUCOSE BLOOD TEST: CPT

## 2022-04-14 PROCEDURE — 7100000010 HC PHASE II RECOVERY - FIRST 15 MIN: Performed by: ORTHOPAEDIC SURGERY

## 2022-04-14 PROCEDURE — 2500000003 HC RX 250 WO HCPCS: Performed by: ORTHOPAEDIC SURGERY

## 2022-04-14 PROCEDURE — 27570 FIXATION OF KNEE JOINT: CPT | Performed by: ORTHOPAEDIC SURGERY

## 2022-04-14 PROCEDURE — 6360000002 HC RX W HCPCS: Performed by: ANESTHESIOLOGY

## 2022-04-14 RX ORDER — HALOPERIDOL 5 MG/ML
1 INJECTION INTRAMUSCULAR
Status: DISCONTINUED | OUTPATIENT
Start: 2022-04-14 | End: 2022-04-14 | Stop reason: HOSPADM

## 2022-04-14 RX ORDER — SODIUM CHLORIDE, SODIUM LACTATE, POTASSIUM CHLORIDE, CALCIUM CHLORIDE 600; 310; 30; 20 MG/100ML; MG/100ML; MG/100ML; MG/100ML
INJECTION, SOLUTION INTRAVENOUS CONTINUOUS
Status: DISCONTINUED | OUTPATIENT
Start: 2022-04-14 | End: 2022-04-14 | Stop reason: HOSPADM

## 2022-04-14 RX ORDER — OXYCODONE HYDROCHLORIDE AND ACETAMINOPHEN 5; 325 MG/1; MG/1
TABLET ORAL
Status: COMPLETED
Start: 2022-04-14 | End: 2022-04-14

## 2022-04-14 RX ORDER — HYDRALAZINE HYDROCHLORIDE 20 MG/ML
10 INJECTION INTRAMUSCULAR; INTRAVENOUS
Status: DISCONTINUED | OUTPATIENT
Start: 2022-04-14 | End: 2022-04-14 | Stop reason: HOSPADM

## 2022-04-14 RX ORDER — DIPHENHYDRAMINE HYDROCHLORIDE 50 MG/ML
12.5 INJECTION INTRAMUSCULAR; INTRAVENOUS
Status: DISCONTINUED | OUTPATIENT
Start: 2022-04-14 | End: 2022-04-14 | Stop reason: HOSPADM

## 2022-04-14 RX ORDER — PROPOFOL 10 MG/ML
INJECTION, EMULSION INTRAVENOUS PRN
Status: DISCONTINUED | OUTPATIENT
Start: 2022-04-14 | End: 2022-04-14 | Stop reason: SDUPTHER

## 2022-04-14 RX ORDER — SODIUM CHLORIDE 0.9 % (FLUSH) 0.9 %
5-40 SYRINGE (ML) INJECTION EVERY 12 HOURS SCHEDULED
Status: DISCONTINUED | OUTPATIENT
Start: 2022-04-14 | End: 2022-04-14 | Stop reason: HOSPADM

## 2022-04-14 RX ORDER — FENTANYL CITRATE 50 UG/ML
INJECTION, SOLUTION INTRAMUSCULAR; INTRAVENOUS PRN
Status: DISCONTINUED | OUTPATIENT
Start: 2022-04-14 | End: 2022-04-14 | Stop reason: SDUPTHER

## 2022-04-14 RX ORDER — MEPERIDINE HYDROCHLORIDE 25 MG/ML
12.5 INJECTION INTRAMUSCULAR; INTRAVENOUS; SUBCUTANEOUS EVERY 5 MIN PRN
Status: DISCONTINUED | OUTPATIENT
Start: 2022-04-14 | End: 2022-04-14 | Stop reason: HOSPADM

## 2022-04-14 RX ORDER — BUPIVACAINE HYDROCHLORIDE 2.5 MG/ML
INJECTION, SOLUTION EPIDURAL; INFILTRATION; INTRACAUDAL PRN
Status: DISCONTINUED | OUTPATIENT
Start: 2022-04-14 | End: 2022-04-14 | Stop reason: ALTCHOICE

## 2022-04-14 RX ORDER — OXYCODONE HYDROCHLORIDE AND ACETAMINOPHEN 5; 325 MG/1; MG/1
1 TABLET ORAL ONCE
Status: DISCONTINUED | OUTPATIENT
Start: 2022-04-14 | End: 2022-04-14 | Stop reason: HOSPADM

## 2022-04-14 RX ORDER — OXYCODONE AND ACETAMINOPHEN 10; 325 MG/1; MG/1
1 TABLET ORAL EVERY 6 HOURS PRN
Qty: 28 TABLET | Refills: 0 | Status: SHIPPED
Start: 2022-04-14 | End: 2022-04-22 | Stop reason: SDUPTHER

## 2022-04-14 RX ORDER — SODIUM CHLORIDE 9 MG/ML
INJECTION, SOLUTION INTRAVENOUS PRN
Status: DISCONTINUED | OUTPATIENT
Start: 2022-04-14 | End: 2022-04-14 | Stop reason: HOSPADM

## 2022-04-14 RX ORDER — LABETALOL HYDROCHLORIDE 5 MG/ML
10 INJECTION, SOLUTION INTRAVENOUS
Status: DISCONTINUED | OUTPATIENT
Start: 2022-04-14 | End: 2022-04-14 | Stop reason: HOSPADM

## 2022-04-14 RX ORDER — SODIUM CHLORIDE, SODIUM LACTATE, POTASSIUM CHLORIDE, CALCIUM CHLORIDE 600; 310; 30; 20 MG/100ML; MG/100ML; MG/100ML; MG/100ML
INJECTION, SOLUTION INTRAVENOUS CONTINUOUS PRN
Status: DISCONTINUED | OUTPATIENT
Start: 2022-04-14 | End: 2022-04-14 | Stop reason: SDUPTHER

## 2022-04-14 RX ORDER — LORAZEPAM 2 MG/ML
0.5 INJECTION INTRAMUSCULAR
Status: DISCONTINUED | OUTPATIENT
Start: 2022-04-14 | End: 2022-04-14 | Stop reason: HOSPADM

## 2022-04-14 RX ORDER — OXYCODONE HYDROCHLORIDE 5 MG/1
5 TABLET ORAL
Status: DISCONTINUED | OUTPATIENT
Start: 2022-04-14 | End: 2022-04-14 | Stop reason: HOSPADM

## 2022-04-14 RX ORDER — SODIUM CHLORIDE 0.9 % (FLUSH) 0.9 %
5-40 SYRINGE (ML) INJECTION PRN
Status: DISCONTINUED | OUTPATIENT
Start: 2022-04-14 | End: 2022-04-14 | Stop reason: HOSPADM

## 2022-04-14 RX ORDER — MIDAZOLAM HYDROCHLORIDE 1 MG/ML
INJECTION INTRAMUSCULAR; INTRAVENOUS PRN
Status: DISCONTINUED | OUTPATIENT
Start: 2022-04-14 | End: 2022-04-14 | Stop reason: SDUPTHER

## 2022-04-14 RX ORDER — IPRATROPIUM BROMIDE AND ALBUTEROL SULFATE 2.5; .5 MG/3ML; MG/3ML
1 SOLUTION RESPIRATORY (INHALATION)
Status: DISCONTINUED | OUTPATIENT
Start: 2022-04-14 | End: 2022-04-14 | Stop reason: HOSPADM

## 2022-04-14 RX ADMIN — FENTANYL CITRATE 100 MCG: 50 INJECTION, SOLUTION INTRAMUSCULAR; INTRAVENOUS at 10:18

## 2022-04-14 RX ADMIN — SODIUM CHLORIDE, POTASSIUM CHLORIDE, SODIUM LACTATE AND CALCIUM CHLORIDE: 600; 310; 30; 20 INJECTION, SOLUTION INTRAVENOUS at 09:34

## 2022-04-14 RX ADMIN — HYDROMORPHONE HYDROCHLORIDE 0.5 MG: 1 INJECTION, SOLUTION INTRAMUSCULAR; INTRAVENOUS; SUBCUTANEOUS at 10:51

## 2022-04-14 RX ADMIN — SODIUM CHLORIDE, POTASSIUM CHLORIDE, SODIUM LACTATE AND CALCIUM CHLORIDE: 600; 310; 30; 20 INJECTION, SOLUTION INTRAVENOUS at 10:11

## 2022-04-14 RX ADMIN — PROPOFOL 200 MG: 10 INJECTION, EMULSION INTRAVENOUS at 10:18

## 2022-04-14 RX ADMIN — MIDAZOLAM 2 MG: 1 INJECTION INTRAMUSCULAR; INTRAVENOUS at 10:11

## 2022-04-14 RX ADMIN — OXYCODONE AND ACETAMINOPHEN 1 TABLET: 5; 325 TABLET ORAL at 11:14

## 2022-04-14 ASSESSMENT — PULMONARY FUNCTION TESTS
PIF_VALUE: 0
PIF_VALUE: 1
PIF_VALUE: 0
PIF_VALUE: 1
PIF_VALUE: 0
PIF_VALUE: 1
PIF_VALUE: 0
PIF_VALUE: 1

## 2022-04-14 ASSESSMENT — PAIN SCALES - GENERAL
PAINLEVEL_OUTOF10: 5
PAINLEVEL_OUTOF10: 0
PAINLEVEL_OUTOF10: 7
PAINLEVEL_OUTOF10: 5
PAINLEVEL_OUTOF10: 5

## 2022-04-14 ASSESSMENT — PAIN DESCRIPTION - DESCRIPTORS: DESCRIPTORS: ACHING

## 2022-04-14 ASSESSMENT — PAIN DESCRIPTION - ORIENTATION: ORIENTATION: LEFT

## 2022-04-14 ASSESSMENT — PAIN - FUNCTIONAL ASSESSMENT: PAIN_FUNCTIONAL_ASSESSMENT: 0-10

## 2022-04-14 ASSESSMENT — PAIN DESCRIPTION - PAIN TYPE: TYPE: SURGICAL PAIN

## 2022-04-14 ASSESSMENT — PAIN DESCRIPTION - LOCATION: LOCATION: KNEE

## 2022-04-14 NOTE — PROGRESS NOTES
Physical Therapy Daily Treatment Note    Date: 2022  Patient Name: Rosibel Victor  : 1965   MRN: [id number]  DOInjury: --  DOSx: 2022  Referring Provider: Uli Reynoso DO  [address] [facility]:      Diagnosis Orders   1. Status post left knee replacement         Patient is 3 weeks post total knee arthroplasty . Patient has significant limitations in both flexion and extension as well as moderate edema along with impaired strength, function, gait, weightbearing tolerance. Treatment will start with methods to control swelling as well as AROM and stretching . Progression to strengthening, functional training, gait training, gait device advancement, and balance / proprioception exercises once motion and edema are under control.     Outcome Measure:   Lower Extremity Functional Scale (LEFS) 95% impairment    Access Code: [email or web address]  URL: [email or web address]  Date: 2022  Prepared by: Myron Malin    Program Notes  Proper Elevation    -- Elevate limb on a stable stack of blankets / pillows so leg is higher than heart. -- Do this for 45 minutes, 3 times a day  -- You may apply ice for the first 15 minutes, then remove it while you continue your elevation  -- Be sure to lie flat in bed or on a couch with a comfortable pillow under your head. Lying back in a recliner is not helpful.  -- You may prop your head up if you cannot tolerate lying completely flat, but only as high as necessary for comfort.       Exercises  Supine Quad Set - 3 x daily - 3 sets - 10-15 reps  Supine Heel Slide - 3 x daily - 3 sets - 10 reps  Seated Knee Extension AROM - 3 x daily - 3 sets - 10 reps    X = TO BE PERFORMED NEXT VISIT  > = PROGRESS TO THIS    S:  Pt reports extreme pain now after just having manipulation   O:    Time  3447-2432 am Pt arrived after having manipulation 2022     Visit  6 visit   05391 Therapeutic Exercise    26174 Neuromuscular Re-Education 0/36  49210 Therapeutic Activities 0/36   55001 Manual Therapy 2/12  33792 Vasopneumatic   Device 4/12     This is not a charge     Repeat outcome measure at mid point and end. Pain    Pain 8/10     ROM   -12  Ext, 75 Flex    4/14 /2022    Modalities             Vasopneumatic   Device  Pt declined due to wanting to go home and rest 4/14/2022 MO   Manual      Stretching knee flexion   MT   Stretching       Patella mobs X 30  Pt needing frequent rest periods 4/14/2022 TE   Prone hangs   TE   Heel props X 5 min  x TE   Knee flex stretch-seated 3 X 30 sec  x TE   Prone self flexion stretch   TE   Exercise       Nustep  L 5 X 10 min x TE         Recumbent bike       Regular stat bike   Seat at no. 8          Quad sets 5 sec hold 3 x 10 reps  TE   Heel slides 3 x 15 / 3 x 15   TE   SLR Minimal assistance TE   LAQ   TE   Marching  TA   CR     Hip abduction      Squat    TA   Step-ups - FWD    TA   Step-ups - LAT   TA   Step-ups - BWD    TA   Step up and over reciprocally    TA   [] TG  [] Leg Press 2-leg   TE   [] TG  [] Leg Press 1-leg   TE   CR    TE   Knee Extension Machine   TE   Marching gait   NR   Side stepping   NR               A: Tolerated well. However due to pt's c/o pain at this time after manipulation only able to tolerate limited ex's /stretching to maintain range of motion as tolerated . Discussed , physiology, body mechanics, principles of loading, and progressive loading/activity. Reviewed home exercise program extensively along with instructions for ice and elevation; written copy provided.     P: Continue with rehab plan  Jerson Nevarez PTA    Treatment Charges: Mins Units   Initial Evaluation     Re-Evaluation     Ther Exercise         TE 40 3   Manual Therapy     MT     Ther Activities        TA     Gait Training          GT     Neuro Re-education NR     Modalities     Non-Billable Service Time     Other     Total Time/Units 40 3

## 2022-04-14 NOTE — ANESTHESIA POSTPROCEDURE EVALUATION
Department of Anesthesiology  Postprocedure Note    Patient: Julian Mckinney  MRN: [id number]  YOB: 1965  Date of evaluation: 4/14/2022  Time:  11:12 AM     Procedure Summary     Date: 04/14/22 Room / Location: [address] [place] [address] 02 / [facility]    Anesthesia Start: 1011 Anesthesia Stop: 0765    Procedure: LEFT KNEE MANIPULATION UNDER ANESTHESIA (Left Knee) Diagnosis: (S\P LEFT TKA)    Surgeons: Roxann Rodriguez DO Responsible Provider: José Miguel Underwood MD    Anesthesia Type: general ASA Status: 3          Anesthesia Type: general    Yesi Phase I: Yesi Score: 10    Yesi Phase II: Yesi Score: 10    Last vitals: Reviewed and per EMR flowsheets.        Anesthesia Post Evaluation    Patient location during evaluation: bedside  Patient participation: complete - patient participated  Level of consciousness: awake and alert  Pain score: 3  Airway patency: patent  Nausea & Vomiting: no nausea and no vomiting  Complications: no  Cardiovascular status: hemodynamically stable  Respiratory status: acceptable  Hydration status: euvolemic

## 2022-04-14 NOTE — H&P
Updated H&P    Subjective:       Status Post left Total Knee Arthroplasty, surgery date 3/2/22. Systemic or Specific Complaints: increased swelling and decreased ROM. _BP (!) 151/82   Pulse 85   Temp 98.6 °F (37 °C) (Skin)   Resp 14   Ht 6' (1.829 m)   Wt 236 lb 2 oz (107.1 kg)   SpO2 98%   BMI 32.02 kg/m²  Vital signs are stable. In general, patient is awake, alert and oriented X3, in no apparent distress. Examination of HENT reveals normocephalic, atraumatic. PERRLA/EOMI sclera are white. Conjunctivae are clear. TM's are intact. Pharynx is pink and moist.  Uvula and tongue are midline. Heart: Positive S1 and positive S2 with regular rate and rhythm. Lungs: Clear to auscultation bilaterally without rales, rhonchi or wheezes. Abdomen: soft, nontender. Positive bowel sounds. No organomegaly. No guarding or rigidity. Objective:      General: alert, appears stated age and cooperative   Wound: Wound clean and dry no evidence of infection. , No Erythema, No Edema and No Drainage   Motion: Flexion: -5 to 50 Degrees   DVT Exam: No evidence of DVT seen on physical exam.  Negative Esther's sign. No cords or calf tenderness. No significant calf/ankle edema. Imaging:  Xrays:  No signs of fracture, there is good alignment, and no signs of aseptic loosening. Radiographic findings reviewed with patient     Assessment:           Encounter Diagnosis   Name Primary? Status post left knee replacement Yes       Doing well postoperatively. Plan:      Continues current post-op course, staples were removed at today's appointment  Patient is to continue taking enteric coated aspirin 81 mg, 1 tablet twice daily. Patient is to continue wearing KAILEY hose, on during the day and off at night. Outpatient physical therapy is to begin immediately. No bathing/swimming/hot tub for two weeks or until incision is completely closed.     We will see the patient back in 3 weeks for repeat xray and evaluation. Please call office with any questions or concerns at [phone]     I explained to the patient he is behind schedule with his rehab and he needs to push through the pain. I warned him that if he does not, we will have to return to the OR to perform an MAKI. The patient voiced understanding.

## 2022-04-14 NOTE — OP NOTE
Operative Note      Patient: Sumi Bentley  YOB: 1965  MRN: [id number]    Date of Procedure: 4/14/2022    Pre-Op Diagnosis: S\P LEFT TKA, arthrofibrosis knee  Post-Op Diagnosis: Same       Procedure(s):  LEFT KNEE MANIPULATION UNDER ANESTHESIA    Surgeon(s): Karine Patten DO    Assistant:   * No surgical staff found *    Anesthesia: General    Estimated Blood Loss (mL): Minimal    Complications: None    Specimens:   * No specimens in log *    Implants:  * No implants in log *      Drains: * No LDAs found *    Findings: as above    Detailed Description of Procedure:   Below    PREOPERATIVE DIAGNOSIS:  left knee arthrofibrosis.     POSTOPERATIVE DIAGNOSIS:  left knee arthrofibrosis.     PROCEDURE:  left knee manipulation under anesthesia with injection.     ANESTHESIA:  General.     SURGEON: Gerson Paz DO     ASSISTANT: as above     BLOOD LOSS:  None.     COMPLICATIONS:  None.     OPERATIVE PROCEDURE:  The patient was brought to operative suite and  was given general anesthesia.  The right leg was identified with  preoperative time-out.  The patient was then examined preoperatively,  had about 5 degree contracture flexed to about 85 degrees.  I was  able to manipulate the knee to get to about 120 degrees.  I was  able to get his in full extension.  The patient did have moderate  crepitus with manipulation.  There were no signs of fracture but some  adhesions were probed in for sure.  I thoroughly irrigated the area,  then performed an injection of 0.25% Marcaine with the knee joint.    The patient recovered in the recovery room without difficulty.  The  patient tolerated the procedure well.       Electronically signed by Karine Patten DO on 4/14/2022 at 12:35 PM

## 2022-04-14 NOTE — ANESTHESIA POSTPROCEDURE EVALUATION
Department of Anesthesiology  Postprocedure Note    Patient: Sylvester Kay  MRN: [id number]  YOB: 1965  Date of evaluation: 4/14/2022  Time:  3:14 PM     Procedure Summary     Date: 04/14/22 Room / Location: [address] [place] [address] ([facility])    Anesthesia Start: 1011 Anesthesia Stop: 5639    Procedure: LEFT KNEE MANIPULATION UNDER ANESTHESIA (Left Knee) Diagnosis: (S\P LEFT TKA)    Surgeons: Lore Garner DO Responsible Provider: Araceli Smith MD    Anesthesia Type: general ASA Status: 3          Anesthesia Type: general    Yesi Phase I: Yesi Score: 10    Yesi Phase II: Yesi Score: 10    Last vitals: Reviewed and per EMR flowsheets.        Anesthesia Post Evaluation    Patient location during evaluation: bedside  Patient participation: complete - patient participated  Level of consciousness: awake  Pain score: 3  Airway patency: patent  Nausea & Vomiting: no nausea and no vomiting  Complications: no  Cardiovascular status: hemodynamically stable  Respiratory status: acceptable  Hydration status: euvolemic

## 2022-04-18 ENCOUNTER — TREATMENT (OUTPATIENT)
Dept: PHYSICAL THERAPY | Age: 57
End: 2022-04-18
Payer: COMMERCIAL

## 2022-04-18 DIAGNOSIS — Z96.652 STATUS POST LEFT KNEE REPLACEMENT: Primary | ICD-10-CM

## 2022-04-18 PROCEDURE — 97110 THERAPEUTIC EXERCISES: CPT

## 2022-04-18 PROCEDURE — 97140 MANUAL THERAPY 1/> REGIONS: CPT

## 2022-04-18 NOTE — PROGRESS NOTES
Physical Therapy Daily Treatment Note    Date: 2022  Patient Name: Kyree Christianson  : 1965   MRN: [id number]  DOInjury: --  DOSx: 2022  Referring Provider: Rhianna Arora DO  [address] [facility] Diagnosis:      Diagnosis Orders   1. Status post left knee replacement         Patient is 3 weeks post total knee arthroplasty . Patient has significant limitations in both flexion and extension as well as moderate edema along with impaired strength, function, gait, weightbearing tolerance. Treatment will start with methods to control swelling as well as AROM and stretching . Progression to strengthening, functional training, gait training, gait device advancement, and balance / proprioception exercises once motion and edema are under control.     Outcome Measure:   Lower Extremity Functional Scale (LEFS) 95% impairment    Access Code: [email or web address]  URL: [email or web address]  Date: 2022  Prepared by: Rigoberto Townsend    Program Notes  Proper Elevation    -- Elevate limb on a stable stack of blankets / pillows so leg is higher than heart. -- Do this for 45 minutes, 3 times a day  -- You may apply ice for the first 15 minutes, then remove it while you continue your elevation  -- Be sure to lie flat in bed or on a couch with a comfortable pillow under your head. Lying back in a recliner is not helpful.  -- You may prop your head up if you cannot tolerate lying completely flat, but only as high as necessary for comfort. Exercises  Supine Quad Set - 3 x daily - 3 sets - 10-15 reps  Supine Heel Slide - 3 x daily - 3 sets - 10 reps  Seated Knee Extension AROM - 3 x daily - 3 sets - 10 reps    X = TO BE PERFORMED NEXT VISIT  > = PROGRESS TO THIS    S:  Pt reports that he is feeling a lot better since his last session and is only having 3-4/10 pain in his quad. O:    Time  1302-7910      Visit  7 visit   85092 Therapeutic Exercise    39857 Neuromuscular Re-Education 0/36  31314 Therapeutic Activities 0/36   96844 Manual Therapy 3/12  16213 Vasopneumatic   Device 4/12     This is not a charge     Repeat outcome measure at mid point and end. Pain    Pain 8/10     ROM   -12  Ext,   ~ 85-87 Flex    4/18 /2022    Modalities             Vasopneumatic   Device  Pt declined due to wanting to go home and rest 4/14/2022 MO   Manual      Stretching knee flexion X 10 min   MT   Stretching       Patella mobs X 30  Pt needing frequent rest periods 4/14/2022 TE   Prone hangs   TE   Heel props X 5 min  x TE   Knee flex stretch-seated 3 X 30 sec  x TE   Prone self flexion stretch   TE   Exercise       Nustep  L 5 X 10 min x TE         Recumbent bike       Regular stat bike   Seat at no. 8          Quad sets 5 sec hold 3 x 10 reps  TE   Heel slides 3 x 15 / 3 x 15   TE   SLR Minimal assistance TE   LAQ   TE   Marching  TA   CR     Hip abduction      Squat    TA   Step-ups - FWD    TA   Step-ups - LAT   TA   Step-ups - BWD    TA   Step up and over reciprocally    TA   [] TG  [] Leg Press 2-leg   TE   [] TG  [] Leg Press 1-leg   TE   CR    TE   Knee Extension Machine   TE   Marching gait   NR   Side stepping   NR               A: Tolerated well. Focused on stretching. Pt knee flexion ROM visually better that last session post manipulation, aprox 85-87 degrees. Discussed, physiology, body mechanics, principles of loading, and progressive loading/activity. Reviewed home exercise program extensively along with instructions for ice and elevation; written copy provided.     P: Continue with rehab plan  Rod Morgan PTA    Treatment Charges: Mins Units   Initial Evaluation     Re-Evaluation     Ther Exercise         TE 30 2   Manual Therapy     MT 10 1   Ther Activities        TA     Gait Training          GT     Neuro Re-education NR     Modalities     Non-Billable Service Time     Other     Total Time/Units 40 3

## 2022-04-20 ENCOUNTER — TREATMENT (OUTPATIENT)
Dept: PHYSICAL THERAPY | Age: 57
End: 2022-04-20
Payer: COMMERCIAL

## 2022-04-20 DIAGNOSIS — Z96.652 STATUS POST LEFT KNEE REPLACEMENT: Primary | ICD-10-CM

## 2022-04-20 PROCEDURE — 97110 THERAPEUTIC EXERCISES: CPT

## 2022-04-20 NOTE — PROGRESS NOTES
Physical Therapy Daily Treatment Note    Date: 2022  Patient Name: Claude Covington  : 1965   MRN: [id number]  DOInjury: --  DOSx: 2022  Referring Provider: Mitchell Yu DO  [address] [facility] [facility]:      Diagnosis Orders   1. Status post left knee replacement         Patient is 3 weeks post total knee arthroplasty . Patient has significant limitations in both flexion and extension as well as moderate edema along with impaired strength, function, gait, weightbearing tolerance. Treatment will start with methods to control swelling as well as AROM and stretching . Progression to strengthening, functional training, gait training, gait device advancement, and balance / proprioception exercises once motion and edema are under control.     Outcome Measure:   Lower Extremity Functional Scale (LEFS) 95% impairment    Access Code: [email or web address]  URL: [email or web address]  Date: 2022  Prepared by: Lon Dom    Program Notes  Proper Elevation    -- Elevate limb on a stable stack of blankets / pillows so leg is higher than heart. -- Do this for 45 minutes, 3 times a day  -- You may apply ice for the first 15 minutes, then remove it while you continue your elevation  -- Be sure to lie flat in bed or on a couch with a comfortable pillow under your head. Lying back in a recliner is not helpful.  -- You may prop your head up if you cannot tolerate lying completely flat, but only as high as necessary for comfort. Exercises  Supine Quad Set - 3 x daily - 3 sets - 10-15 reps  Supine Heel Slide - 3 x daily - 3 sets - 10 reps  Seated Knee Extension AROM - 3 x daily - 3 sets - 10 reps    X = TO BE PERFORMED NEXT VISIT  > = PROGRESS TO THIS    S: Pt reports that he is still having a lot of pain, 7-8/10 on medial knee and quad.   O:    Time  3195-6625      Visit  8 visit   02517 Therapeutic Exercise    89723 Neuromuscular Re-Education   60606 Therapeutic Activities 0/36   92545 Manual Therapy 3/12  59000 Vasopneumatic   Device 4/12     This is not a charge     Repeat outcome measure at mid point and end. Pain    Pain 8/10     ROM   -12  Ext,   ~ 85-87 Flex    4/20 /2022    Modalities             Vasopneumatic   Device  Pt declined due to wanting to go home and rest 4/14/2022 MO   Manual      Stretching knee flexion   MT   Stretching       Patella mobs X 30  Pt needing frequent rest periods 4/14/2022 TE   Prone hangs   TE   Heel props X 5 min  x TE   Knee flex stretch-seated 3 X 30 sec  x TE   Prone self flexion stretch   TE   Exercise       Nustep   x TE         Recumbent bike  X 5 min stretching only      Regular stat bike   Seat at no. 8          Quad sets 5 sec hold 3 x 10 reps  TE   Heel slides 3 x 15 / 3 x 15   TE   SLR Minimal assistance TE   LAQ   TE   Marching  TA   CR     Hip abduction      Squat    TA   Step-ups - FWD    TA   Step-ups - LAT   TA   Step-ups - BWD    TA   Step up and over reciprocally    TA   [x] TG  [] Leg Press 2-leg 3 x 15   TE   [] TG  [] Leg Press 1-leg   TE   CR (TG) 2 x 15   TE   Knee Extension Machine   TE   Marching gait   NR   Side stepping   NR               A: Tolerated well. Emphasis on patella mobs, self flexion stretches, knee ext and newly added TG squats with hold times to maintain  knee flexion as tolerated   (aprox 85-87 degrees  ) . Discussed, physiology, body mechanics, principles of loading, and progressive loading/activity. Reviewed home exercise program extensively along with instructions for ice and elevation; written copy provided.     P: Continue with rehab plan  Zaria Summers PTA    Treatment Charges: Mins Units   Initial Evaluation     Re-Evaluation     Ther Exercise         TE 40 3   Manual Therapy     MT     Ther Activities        TA     Gait Training          GT     Neuro Re-education NR     Modalities     Non-Billable Service Time     Other     Total Time/Units 40 3

## 2022-04-21 DIAGNOSIS — Z96.652 STATUS POST LEFT KNEE REPLACEMENT: Primary | ICD-10-CM

## 2022-04-22 ENCOUNTER — OFFICE VISIT (OUTPATIENT)
Dept: ORTHOPEDIC SURGERY | Age: 57
End: 2022-04-22

## 2022-04-22 VITALS — TEMPERATURE: 98 F | BODY MASS INDEX: 31.97 KG/M2 | WEIGHT: 236 LBS | HEIGHT: 72 IN

## 2022-04-22 DIAGNOSIS — M17.12 PRIMARY OSTEOARTHRITIS OF LEFT KNEE: ICD-10-CM

## 2022-04-22 PROCEDURE — 99024 POSTOP FOLLOW-UP VISIT: CPT | Performed by: NURSE PRACTITIONER

## 2022-04-22 RX ORDER — GABAPENTIN 100 MG/1
100 CAPSULE ORAL 3 TIMES DAILY
Qty: 90 CAPSULE | Refills: 0 | Status: SHIPPED
Start: 2022-04-22 | End: 2022-05-05 | Stop reason: SDUPTHER

## 2022-04-22 RX ORDER — OXYCODONE AND ACETAMINOPHEN 10; 325 MG/1; MG/1
1 TABLET ORAL EVERY 6 HOURS PRN
Qty: 28 TABLET | Refills: 0 | Status: SHIPPED
Start: 2022-04-22 | End: 2022-05-02 | Stop reason: SDUPTHER

## 2022-04-22 RX ORDER — CELECOXIB 200 MG/1
200 CAPSULE ORAL 2 TIMES DAILY
Qty: 60 CAPSULE | Refills: 3 | Status: SHIPPED
Start: 2022-04-22 | End: 2022-07-20

## 2022-04-22 NOTE — PROGRESS NOTES
Subjective:     Post-Operative left tka 3/2/2022 and temo 4/14/2022 Total Knee Arthroplasty, he continues to have pain and limited motion. Objective:     General: alert, appears stated age and cooperative   Wound: Wound clean and dry no evidence of infection. , No Erythema, No Edema and No Drainage   Motion: Flexion: 5 to 85 Degrees   DVT Exam: No evidence of DVT seen on physical exam.  Negative Esther's sign. No cords or calf tenderness. No significant calf/ankle edema. Imaging:  Xrays:  No signs of fracture, there is good alignment, and no signs of aseptic loosening. Radiographic findings reviewed with patient    Assessment:     Encounter Diagnosis   Name Primary?  Primary osteoarthritis of left knee       Doing well postoperatively.      Plan:     Continue pt-aggressive rom  Hep-aggressive rom  Neurontin TID  Celebrex bid  Refilled percocet  Fu in 2 weeks

## 2022-04-25 ENCOUNTER — TREATMENT (OUTPATIENT)
Dept: PHYSICAL THERAPY | Age: 57
End: 2022-04-25
Payer: COMMERCIAL

## 2022-04-25 DIAGNOSIS — Z96.652 STATUS POST LEFT KNEE REPLACEMENT: Primary | ICD-10-CM

## 2022-04-25 PROCEDURE — 97110 THERAPEUTIC EXERCISES: CPT

## 2022-04-25 NOTE — PROGRESS NOTES
Physical Therapy Daily Treatment Note    Date: 2022  Patient Name: Tommy Zamorano  : 1965   MRN: [id number]  DOInjury: --  DOSx: 2022  Referring Provider: Dioni Gallego DO  [address] [facility] [facility]:      Diagnosis Orders   1. Status post left knee replacement         Patient is 3 weeks post total knee arthroplasty . Patient has significant limitations in both flexion and extension as well as moderate edema along with impaired strength, function, gait, weightbearing tolerance. Treatment will start with methods to control swelling as well as AROM and stretching . Progression to strengthening, functional training, gait training, gait device advancement, and balance / proprioception exercises once motion and edema are under control.     Outcome Measure:   Lower Extremity Functional Scale (LEFS) 95% impairment    Access Code: [email or web address]  URL: [email or web address]  Date: 2022  Prepared by: Mani Antonio    Program Notes  Proper Elevation    -- Elevate limb on a stable stack of blankets / pillows so leg is higher than heart. -- Do this for 45 minutes, 3 times a day  -- You may apply ice for the first 15 minutes, then remove it while you continue your elevation  -- Be sure to lie flat in bed or on a couch with a comfortable pillow under your head. Lying back in a recliner is not helpful.  -- You may prop your head up if you cannot tolerate lying completely flat, but only as high as necessary for comfort. Exercises  Supine Quad Set - 3 x daily - 3 sets - 10-15 reps  Supine Heel Slide - 3 x daily - 3 sets - 10 reps  Seated Knee Extension AROM - 3 x daily - 3 sets - 10 reps    X = TO BE PERFORMED NEXT VISIT  > = PROGRESS TO THIS    S: Pt reports compliance with HEP . Feels better since given new med's by ortho . O:    Time  2856-8994      Visit  9 visit   46377 Therapeutic Exercise    62063 Neuromuscular Re-Education   21637 Therapeutic Activities 0/36   93040 Manual Therapy 3/12  10322 Vasopneumatic   Device 4/12     This is not a charge     Repeat outcome measure at mid point and end. Pain    Pain 8/10     ROM   -10  Ext,    90 Flex    4/25 /2022    Modalities             Vasopneumatic   Device   MO   Manual      Stretching knee flexion   MT   Stretching       Patella mobs X 30  Pt needing frequent rest periods 4/14/2022 TE   Prone hangs   TE   Heel props  x TE   Knee flex stretch-seated 3 X 30 sec  x TE   Prone self flexion stretch   TE   Exercise       Nustep   x TE         Recumbent bike  X 5 min ( seat no. 8) x 2 min than able to complete full rotations . Regular stat bike   Seat at no. 8          Quad sets  TE   Heel slides  TE   SLR Minimal assistance TE   LAQ   TE   Marching  TA   CR     Hip abduction      Squat    TA   Step-ups - FWD    TA   Step-ups - LAT   TA   Step-ups - BWD    TA   Step up and over reciprocally    TA   [x] TG  [] Leg Press 2-leg L 20 3 x 20  / H 10 sec  ea no 20   TE   [] TG  [] Leg Press 1-leg   TE   CR (TG) L 20   2 x 20  TE   Knee Extension Machine for static flexion stretch  20# X 5 min  TE   Hamstring machine for ext stretches  20# 3 x 20      Marching gait   NR   Side stepping   NR               A: Tolerated well. . Progression 87 to 90 deg . Pt able to perform full rotation today on recumbent bike ( Emphasis on patella mobs, self flexion stretches, knee ext and newly added TG squats with hold times to maintain  knee flexion as tolerated  ) Discussed, physiology, body mechanics, principles of loading, and progressive loading/activity. Reviewed home exercise program extensively along with instructions for ice and elevation; written copy provided. P: Continue with rehab plan  .   RTD 5/5/2022   Everardo Hancock PTA    Treatment Charges: Mins Units   Initial Evaluation     Re-Evaluation     Ther Exercise         TE 40 3   Manual Therapy     MT     Ther Activities        TA     Gait Training GT     Neuro Re-education NR     Modalities     Non-Billable Service Time     Other     Total Time/Units 40 3

## 2022-04-28 ENCOUNTER — TREATMENT (OUTPATIENT)
Dept: PHYSICAL THERAPY | Age: 57
End: 2022-04-28
Payer: COMMERCIAL

## 2022-04-28 DIAGNOSIS — Z96.652 STATUS POST LEFT KNEE REPLACEMENT: Primary | ICD-10-CM

## 2022-04-28 PROCEDURE — 97140 MANUAL THERAPY 1/> REGIONS: CPT

## 2022-04-28 PROCEDURE — 97110 THERAPEUTIC EXERCISES: CPT

## 2022-04-28 NOTE — PROGRESS NOTES
0/36   74913 Manual Therapy 4/12  79939 Vasopneumatic   Device 4/12     This is not a charge     Repeat outcome measure at mid point and end. Pain    Pain 3/10     ROM   -8  Ext,    100  Flex    4/28/2022    Modalities             Vasopneumatic   Device   MO   Manual      Stretching knee flexion X 10 min   MT   Stretching       Patella mobs X 30  Pt needing frequent rest periods 4/14/2022 TE   Prone hangs   TE   Heel props  x TE   Knee flex stretch-seated 3 X 30 sec  x TE   Prone self flexion stretch   TE   Exercise       Nustep   x TE         Recumbent bike  X 10 min  full rotations . Seat no. 8      Regular stat bike   Seat at no. 8          Quad sets  TE   Heel slides  TE   SLR Minimal assistance TE   LAQ   TE   Marching  TA   CR     Hip abduction      Squat    TA   Step-ups - FWD    TA   Step-ups - LAT   TA   Step-ups - BWD    TA   Step up and over reciprocally    TA   [x] TG  [] Leg Press 2-leg L 20 3 x 20  / H 10 sec  ea no 20   TE   [] TG  [] Leg Press 1-leg   TE   CR (TG) L 20 2 x 20 2 x 20  TE   Knee Extension Machine for static flexion stretch  40# X 5 min  TE   Hamstring machine for ext stretches  20# 3 x 20      Marching gait   NR   Side stepping   NR               A: Tolerated well. . Progression  To 100deg . Pt able to perform full rotation today on recumbent bike without warmup . Pt also re- instructed on the need to perform HEP everyday  . ( Emphasis on patella mobs, self flexion stretches, knee ext and newly added TG squats with hold times to maintain  knee flexion as tolerated  ) Discussed, physiology, body mechanics, principles of loading, and progressive loading/activity. Reviewed home exercise program extensively along with instructions for ice and elevation; written copy provided. P: Continue with rehab plan  .   RTD 5/5/2022   Olivia Francisca, PTA    Treatment Charges: Mins Units   Initial Evaluation     Re-Evaluation     Ther Exercise         TE 30 2   Manual Therapy     MT 10 1   Ther Activities        TA     Gait Training          GT     Neuro Re-education NR     Modalities     Non-Billable Service Time     Other     Total Time/Units 40 3

## 2022-05-02 ENCOUNTER — TREATMENT (OUTPATIENT)
Dept: PHYSICAL THERAPY | Age: 57
End: 2022-05-02
Payer: COMMERCIAL

## 2022-05-02 ENCOUNTER — TELEPHONE (OUTPATIENT)
Dept: PHYSICAL THERAPY | Age: 57
End: 2022-05-02

## 2022-05-02 ENCOUNTER — TELEPHONE (OUTPATIENT)
Dept: ORTHOPEDIC SURGERY | Age: 57
End: 2022-05-02

## 2022-05-02 DIAGNOSIS — Z96.652 STATUS POST LEFT KNEE REPLACEMENT: Primary | ICD-10-CM

## 2022-05-02 DIAGNOSIS — M17.12 PRIMARY OSTEOARTHRITIS OF LEFT KNEE: ICD-10-CM

## 2022-05-02 PROCEDURE — 97140 MANUAL THERAPY 1/> REGIONS: CPT

## 2022-05-02 PROCEDURE — 97110 THERAPEUTIC EXERCISES: CPT

## 2022-05-02 RX ORDER — OXYCODONE AND ACETAMINOPHEN 10; 325 MG/1; MG/1
1 TABLET ORAL EVERY 6 HOURS PRN
Qty: 28 TABLET | Refills: 0 | Status: SHIPPED
Start: 2022-05-02 | End: 2022-05-17 | Stop reason: SDUPTHER

## 2022-05-02 NOTE — PROGRESS NOTES
Physical Therapy Daily Treatment Note    Date: 2022  Patient Name: Jake Ramsey  : 1965   MRN: [id number]  DOInjury: --  DOSx: 2022  Referring Provider: Ambrose Cameron DO  [address] [facility]     Medical Diagnosis:      Diagnosis Orders   1. Status post left knee replacement         Patient is 3 weeks post total knee arthroplasty . Patient has significant limitations in both flexion and extension as well as moderate edema along with impaired strength, function, gait, weightbearing tolerance. Treatment will start with methods to control swelling as well as AROM and stretching . Progression to strengthening, functional training, gait training, gait device advancement, and balance / proprioception exercises once motion and edema are under control.     Outcome Measure:   Lower Extremity Functional Scale (LEFS) 95% impairment    Access Code: [email or web address]  URL: [email or web address]  Date: 2022  Prepared by: Evens Nugent    Program Notes  Proper Elevation    -- Elevate limb on a stable stack of blankets / pillows so leg is higher than heart. -- Do this for 45 minutes, 3 times a day  -- You may apply ice for the first 15 minutes, then remove it while you continue your elevation  -- Be sure to lie flat in bed or on a couch with a comfortable pillow under your head. Lying back in a recliner is not helpful.  -- You may prop your head up if you cannot tolerate lying completely flat, but only as high as necessary for comfort. Exercises  Supine Quad Set - 3 x daily - 3 sets - 10-15 reps  Supine Heel Slide - 3 x daily - 3 sets - 10 reps  Seated Knee Extension AROM - 3 x daily - 3 sets - 10 reps    X = TO BE PERFORMED NEXT VISIT  > = PROGRESS TO THIS    S: Pt reports.  .O:    Time  1340- 1430       Visit  11 visit   93970 Therapeutic Exercise    46025 Neuromuscular Re-Education   37216 Therapeutic Activities    98013 Manual Therapy   81794 Vasopneumatic   Device 4/12     This is not a charge     Repeat outcome measure at mid point and end. Pain    Pain 3/10     ROM   -10  Ext,    100  Flex     5/02/2022    Modalities             Vasopneumatic   Device   MO   Manual      Stretching knee flexion X 10 min   MT   Stretching       Patella mobs X 30  Pt needing frequent rest periods 4/14/2022 TE   Prone hangs 3# x 5 min new  x TE   Heel props   x TE   Knee flex stretch-seated 3 X 30 sec  x TE   Prone self flexion stretch    TE   Exercise       Nustep   x TE         Recumbent bike  X 10 min  full rotations . Seat no. 8      Regular stat bike   Seat at no. 8          Quad sets  TE   Heel slides  TE   SLR Minimal assistance TE   LAQ   TE   Marching  TA   CR     Hip abduction      Squat    TA   Step-ups - FWD    TA   Step-ups - LAT   TA   Step-ups - BWD    TA   Step up and over reciprocally    TA   [x] TG  [] Leg Press 2-leg L 20 3 x 20  / H 10 sec  ea no 20   TE   [] TG  [] Leg Press 1-leg   TE   CR (TG) L 20 2 x 20 2 x 20  TE   Knee Extension Machine for static flexion stretch  60# X 5 min  TE   Hamstring machine for ext stretches  20# 3 x 20      Marching gait   NR   Side stepping   NR               A: Tolerated well. . Progression  To 100deg . ( Emphasis on patella mobs, self flexion stretches, knee ext and newly added TG squats with hold times to maintain  knee flexion as tolerated  ) Discussed, physiology, body mechanics, principles of loading, and progressive loading/activity. Reviewed home exercise program extensively along with instructions for ice and elevation; written copy provided. P: Continue with rehab plan  .   RTD 5/5/2022   Anahi Ho PTA    Treatment Charges: Mins Units   Initial Evaluation     Re-Evaluation     Ther Exercise         TE 35 2   Manual Therapy     MT 10 1   Ther Activities        TA     Gait Training          GT     Neuro Re-education NR     Modalities     Non-Billable Service Time     Other     Total Time/Units 45 3

## 2022-05-02 NOTE — TELEPHONE ENCOUNTER
Last appointment 4/22/22  Next appointment   Future Appointments   Date Time Provider Riri Perez   5/2/2022  1:40 PM Noni Moritz, PTA [facility]   5/4/2022  1:40 PM Noni Moritz, PTA [facility]   5/5/2022  3:30 PM HARPREET Navarrete CNP [facility]   7/26/2022  1:30 PM DO KRISTI Meehan      Last refill 4/22/22  DOS:  3/2/22      Patient called in requesting refill of   Percocet 10mg  [facility] [place] [place] [facility] [phone] [facility] Number [phone]   [address] [address]   Phone:  [phone]  Fax:  [phone]

## 2022-05-04 ENCOUNTER — TREATMENT (OUTPATIENT)
Dept: PHYSICAL THERAPY | Age: 57
End: 2022-05-04
Payer: COMMERCIAL

## 2022-05-04 DIAGNOSIS — Z96.652 STATUS POST LEFT KNEE REPLACEMENT: Primary | ICD-10-CM

## 2022-05-04 PROCEDURE — 97110 THERAPEUTIC EXERCISES: CPT

## 2022-05-04 PROCEDURE — 97140 MANUAL THERAPY 1/> REGIONS: CPT

## 2022-05-04 NOTE — PROGRESS NOTES
Physical Therapy Daily Treatment Note    Date: 2022  Patient Name: Stefano Cruz  : 1965   MRN: [id number]  DOInjury: --  DOSx: 2022  Referring Provider: DO Emili De La Fuente [address] [place] [address] Diagnosis:      Diagnosis Orders   1. Status post left knee replacement         Patient is 3 weeks post total knee arthroplasty . Patient has significant limitations in both flexion and extension as well as moderate edema along with impaired strength, function, gait, weightbearing tolerance. Treatment will start with methods to control swelling as well as AROM and stretching . Progression to strengthening, functional training, gait training, gait device advancement, and balance / proprioception exercises once motion and edema are under control.     Outcome Measure:   Lower Extremity Functional Scale (LEFS) 95% impairment    Access Code: [email or web address]  URL: [email or web address]  Date: 2022  Prepared by: Hermelindo Hooker    Program Notes  Proper Elevation    -- Elevate limb on a stable stack of blankets / pillows so leg is higher than heart. -- Do this for 45 minutes, 3 times a day  -- You may apply ice for the first 15 minutes, then remove it while you continue your elevation  -- Be sure to lie flat in bed or on a couch with a comfortable pillow under your head. Lying back in a recliner is not helpful.  -- You may prop your head up if you cannot tolerate lying completely flat, but only as high as necessary for comfort. Exercises  Supine Quad Set - 3 x daily - 3 sets - 10-15 reps  Supine Heel Slide - 3 x daily - 3 sets - 10 reps  Seated Knee Extension AROM - 3 x daily - 3 sets - 10 reps    X = TO BE PERFORMED NEXT VISIT  > = PROGRESS TO THIS    S: Pt reports doing  Given HEP each day now  . O:    Time  1340- 1430       Visit  12 visit   31729 Therapeutic Exercise    03774 Neuromuscular Re-Education   56095 Therapeutic Activities    64318 Manual Therapy 6/12  01429 Vasopneumatic   Device 4/12     This is not a charge     Repeat outcome measure at mid point and end. Pain    Pain 3/10     ROM   -10  Ext,    100  Flex     5/04/2022    Modalities             Vasopneumatic   Device   MO   Manual      Stretching knee flexion X 10 min prone position   MT   Stretching       Patella mobs X 30  Pt needing frequent rest periods 4/14/2022 TE   Prone hangs 3# x 5 min  x TE   Heel props   x TE   Knee flex stretch-seated 3 X 30 sec  x TE   Prone self flexion stretch    TE   Exercise       Nustep   x TE         Recumbent bike  X 10 min  full rotations . Seat no. 8      Regular stat bike   Seat at no. 8          Quad sets  TE   Heel slides  TE   SLR Minimal assistance TE   LAQ   TE   Marching  TA   CR     Hip abduction      Sit to stands  2 x 15  At leg press bar     Squat    TA   Step-ups - FWD    TA   Step-ups - LAT   TA   Step-ups - BWD    TA   Step up and over reciprocally    TA   [x] TG  [] Leg Press 2-leg L 20 3 x 20  / H 10 sec  ea no 20   TE   [] TG  [] Leg Press 1-leg   TE   CR (TG) L 20 2 x 20   TE   Knee Extension Machine for static flexion stretch  60# X 5 min  TE   Hamstring machine for ext stretches  20# 3 x 20      Marching gait   NR   Side stepping   NR               A: Tolerated well. .     ( Emphasis on patella mobs, self flexion stretches, knee ext and manual  flexion  ) Discussed, physiology, body mechanics, principles of loading, and progressive loading/activity. Reviewed home exercise program extensively along with instructions for ice and elevation; written copy provided. P: Continue with rehab plan  .   RTD 5/5/2022   Lizzie Lara PTA    Treatment Charges: Mins Units   Initial Evaluation     Re-Evaluation     Ther Exercise         TE 35 2   Manual Therapy     MT 10 1   Ther Activities        TA     Gait Training          GT     Neuro Re-education NR     Modalities     Non-Billable Service Time     Other     Total Time/Units 45 3

## 2022-05-05 ENCOUNTER — OFFICE VISIT (OUTPATIENT)
Dept: ORTHOPEDIC SURGERY | Age: 57
End: 2022-05-05

## 2022-05-05 VITALS — HEIGHT: 72 IN | BODY MASS INDEX: 32.37 KG/M2 | WEIGHT: 239 LBS

## 2022-05-05 DIAGNOSIS — Z96.652 STATUS POST LEFT KNEE REPLACEMENT: Primary | ICD-10-CM

## 2022-05-05 PROCEDURE — 99024 POSTOP FOLLOW-UP VISIT: CPT | Performed by: NURSE PRACTITIONER

## 2022-05-05 RX ORDER — GABAPENTIN 100 MG/1
100 CAPSULE ORAL 3 TIMES DAILY
Qty: 90 CAPSULE | Refills: 0 | Status: SHIPPED | OUTPATIENT
Start: 2022-05-05 | End: 2022-08-31

## 2022-05-05 NOTE — PROGRESS NOTES
Subjective:     Post-Operative left tka 3/2/2022 and temo 4/14/2022 Total Knee Arthroplasty, he has been on neurontin and celebrex and has noticed a difference with pain and motion    Objective:     General: alert, appears stated age and cooperative   Wound: Wound clean and dry no evidence of infection. , No Erythema, No Edema and No Drainage   Motion: Flexion: 5 to 105 Degrees   DVT Exam: No evidence of DVT seen on physical exam.  Negative Esther's sign. No cords or calf tenderness. No significant calf/ankle edema. Imaging:  Xrays:  N/A  Radiographic findings reviewed with patient    Assessment:     Encounter Diagnosis   Name Primary?  Status post left knee replacement Yes      Doing well postoperatively.      Plan:     Continue pt-aggressive rom  Hep-aggressive rom  Neurontin TID-continue for 1 more month  Celebrex bid- continue for 1 more month    Fu in 4 weeks

## 2022-05-09 ENCOUNTER — TREATMENT (OUTPATIENT)
Dept: PHYSICAL THERAPY | Age: 57
End: 2022-05-09
Payer: COMMERCIAL

## 2022-05-09 DIAGNOSIS — Z96.652 STATUS POST LEFT KNEE REPLACEMENT: Primary | ICD-10-CM

## 2022-05-09 PROCEDURE — 97110 THERAPEUTIC EXERCISES: CPT

## 2022-05-09 PROCEDURE — 97140 MANUAL THERAPY 1/> REGIONS: CPT

## 2022-05-09 NOTE — PROGRESS NOTES
Physical Therapy Daily Treatment Note    Date: 2022  Patient Name: Charanjit Skelton  : 1965   MRN: [id number]  DOInjury: --  DOSx: 2022  Referring Provider: Imelda Murguia DO  [address] [facility] Diagnosis:      Diagnosis Orders   1. Status post left knee replacement         Patient is 3 weeks post total knee arthroplasty . Patient has significant limitations in both flexion and extension as well as moderate edema along with impaired strength, function, gait, weightbearing tolerance. Treatment will start with methods to control swelling as well as AROM and stretching . Progression to strengthening, functional training, gait training, gait device advancement, and balance / proprioception exercises once motion and edema are under control.     Outcome Measure:   Lower Extremity Functional Scale (LEFS) 95% impairment    Access Code: [email or web address]  URL: [email or web address]  Date: 2022  Prepared by: Reanna Mckeon    Program Notes  Proper Elevation    -- Elevate limb on a stable stack of blankets / pillows so leg is higher than heart. -- Do this for 45 minutes, 3 times a day  -- You may apply ice for the first 15 minutes, then remove it while you continue your elevation  -- Be sure to lie flat in bed or on a couch with a comfortable pillow under your head. Lying back in a recliner is not helpful.  -- You may prop your head up if you cannot tolerate lying completely flat, but only as high as necessary for comfort. Exercises  Supine Quad Set - 3 x daily - 3 sets - 10-15 reps  Supine Heel Slide - 3 x daily - 3 sets - 10 reps  Seated Knee Extension AROM - 3 x daily - 3 sets - 10 reps    X = TO BE PERFORMED NEXT VISIT  > = PROGRESS TO THIS    S: Pt reports doing  Given HEP each day now  . O:    Time  1340- 1430       Visit  12 visit   91153 Therapeutic Exercise    42342 Neuromuscular Re-Education   75038 Therapeutic Activities    64175 Manual Therapy 7/12  11408 Vasopneumatic   Device 4/12     This is not a charge     Repeat outcome measure at mid point and end. Pain    Pain 3/10     ROM   -10  Ext,    100  Flex     5/04/2022    Modalities             Vasopneumatic   Device   MO   Manual      Stretching knee flexion X 10 min prone position   MT   Stretching       Patella mobs X 30  Pt needing frequent rest periods 4/14/2022 TE   Prone hangs 3# x 5 min  x TE   Heel props   x TE   Knee flex stretch-seated 3 X 30 sec  x TE   Prone self flexion stretch    TE   Exercise       Nustep   x TE         Recumbent bike  X 10 min  full rotations . Seat  No. 7      Regular stat bike   Seat at no. 8          Quad sets  TE   Heel slides  TE   SLR Minimal assistance TE   LAQ   TE   Marching  TA   CR     Hip abduction      Sit to stands  2 x 15  At leg press bar     Squat    TA   Step-ups - FWD    TA   Step-ups - LAT   TA   Step-ups - BWD    TA   Step up and over reciprocally    TA   [x] TG  [] Leg Press 2-leg L 20 3 x 20  / H 10 sec  ea no 20   TE   [] TG  [] Leg Press 1-leg   TE   CR (TG) L 20 2 x 20   TE   Knee Extension Machine for static flexion stretch  60# X 5 min  TE   Hamstring machine for ext stretches   30# 3 x 20      Marching gait   NR   Side stepping   NR               A: Tolerated well. .  Progression from no 8 to no. 7 seat position  to increase knee flexion on recumbent bike . ( Emphasis on patella mobs, self flexion stretches, knee ext and manual  flexion  ) Discussed, physiology, body mechanics, principles of loading, and progressive loading/activity. Reviewed home exercise program extensively along with instructions for ice and elevation; written copy provided. P: Continue with rehab plan  .   RTD 6/7/2022   Kayode Lagunas PTA    Treatment Charges: Mins Units   Initial Evaluation     Re-Evaluation     Ther Exercise         TE 30 2   Manual Therapy     MT 10 1   Ther Activities        TA     Gait Training          GT     Neuro Re-education NR     Modalities     Non-Billable Service Time     Other     Total Time/Units 40 3

## 2022-05-13 ENCOUNTER — TELEPHONE (OUTPATIENT)
Dept: PHYSICAL THERAPY | Age: 57
End: 2022-05-13

## 2022-05-16 ENCOUNTER — TELEPHONE (OUTPATIENT)
Dept: PHYSICAL THERAPY | Age: 57
End: 2022-05-16

## 2022-05-17 ENCOUNTER — TELEPHONE (OUTPATIENT)
Dept: ORTHOPEDIC SURGERY | Age: 57
End: 2022-05-17

## 2022-05-17 DIAGNOSIS — M17.12 PRIMARY OSTEOARTHRITIS OF LEFT KNEE: ICD-10-CM

## 2022-05-17 RX ORDER — OXYCODONE AND ACETAMINOPHEN 10; 325 MG/1; MG/1
1 TABLET ORAL EVERY 8 HOURS PRN
Qty: 21 TABLET | Refills: 0 | Status: SHIPPED
Start: 2022-05-17 | End: 2022-06-02 | Stop reason: SDUPTHER

## 2022-05-17 NOTE — TELEPHONE ENCOUNTER
Last appointment 4/12/2022  Next appointment   Future Appointments   Date Time Provider Riri Perez   5/18/2022  1:00 PM Army Desai, A[facility]   5/24/2022  1:00 PM Henny Lindsey, DPM [facility]   6/7/2022  1:30 PM Rebeka Puls, DO Rebeka Puls [facility]   7/26/2022  1:30 PM DO KRISTI Sherwood      Last refill 5/2/22- 5/9/22  DOS: 4/18/22       Patient called in requesting refill of   oxyCODONE-acetaminophen (PERCOCET)  MG per tablet     [facility] [address] [facility] [address] [place] [phone] [facility] [phone] [address] [place] [phone]   Phone:  [phone]  Fax:  [phone]

## 2022-05-18 ENCOUNTER — TREATMENT (OUTPATIENT)
Dept: PHYSICAL THERAPY | Age: 57
End: 2022-05-18
Payer: COMMERCIAL

## 2022-05-18 DIAGNOSIS — Z96.652 STATUS POST LEFT KNEE REPLACEMENT: Primary | ICD-10-CM

## 2022-05-18 PROCEDURE — 97110 THERAPEUTIC EXERCISES: CPT

## 2022-05-18 PROCEDURE — 97140 MANUAL THERAPY 1/> REGIONS: CPT

## 2022-05-18 NOTE — PROGRESS NOTES
Physical Therapy Daily Treatment Note    Date: 2022  Patient Name: Mis Hansen  : 1965   MRN: [id number]  DOInjury: --  DOSx: 2022  Referring Provider: Hakan Canseco DO  [address] [facility] Diagnosis:      Diagnosis Orders   1. Status post left knee replacement         Patient is 3 weeks post total knee arthroplasty . Patient has significant limitations in both flexion and extension as well as moderate edema along with impaired strength, function, gait, weightbearing tolerance. Treatment will start with methods to control swelling as well as AROM and stretching . Progression to strengthening, functional training, gait training, gait device advancement, and balance / proprioception exercises once motion and edema are under control.     Outcome Measure:   Lower Extremity Functional Scale (LEFS) 95% impairment    Access Code: [email or web address]  URL: [email or web address]  Date: 2022  Prepared by: Cam Maya    Program Notes  Proper Elevation    -- Elevate limb on a stable stack of blankets / pillows so leg is higher than heart. -- Do this for 45 minutes, 3 times a day  -- You may apply ice for the first 15 minutes, then remove it while you continue your elevation  -- Be sure to lie flat in bed or on a couch with a comfortable pillow under your head. Lying back in a recliner is not helpful.  -- You may prop your head up if you cannot tolerate lying completely flat, but only as high as necessary for comfort. Exercises  Supine Quad Set - 3 x daily - 3 sets - 10-15 reps  Supine Heel Slide - 3 x daily - 3 sets - 10 reps  Seated Knee Extension AROM - 3 x daily - 3 sets - 10 reps    X = TO BE PERFORMED NEXT VISIT  > = PROGRESS TO THIS    S: Pt reports doing  Given HEP each day now  . O:    Time  1300- 1340      Visit  14 visit   52071 Therapeutic Exercise    18347 Neuromuscular Re-Education   61044 Therapeutic Activities    62675 Manual Therapy 8/12  15534 Vasopneumatic   Device 4/12     This is not a charge     Repeat outcome measure at mid point and end. Pain    Pain 3/10     ROM   -10  Ext,    105  Flex     5/18/2022    Modalities             Vasopneumatic   Device   MO   Manual      Stretching knee flexion X 10 min prone position   MT   Stretching       Patella mobs X 30  Pt needing frequent rest periods 4/14/2022 TE   Prone hangs 4#  x 5 min  x TE   Heel props   x TE   Knee flex stretch-seated 3 X 30 sec  x TE   Prone self flexion stretch    TE   Exercise       Nustep   x TE         Recumbent bike  X 10 min  full rotations . Seat  No. 7      Regular stat bike   Seat at no. 8          Quad sets  TE   Heel slides  TE   SLR Minimal assistance TE   LAQ   TE   Marching  TA   CR     Hip abduction      Sit to stands  2 x 15  At leg press bar     Squat    TA   Step-ups - FWD    TA   Step-ups - LAT   TA   Step-ups - BWD    TA   Step up and over reciprocally    TA   [x] TG  [] Leg Press 2-leg L 20 3 x 20  / H 10 sec  ea no 20   TE   [] TG  [] Leg Press 1-leg   TE   CR (TG) L 20 2 x 20   TE   Knee Extension Machine for static flexion stretch    TE   Hamstring machine for ext stretches   40# 3 x 20      Marching gait   NR   Side stepping   NR   Seated  t band knee flexion stretch green/black band  X 5 min            A: Tolerated well. .     ( Emphasis on patella mobs, self flexion stretches, knee ext and manual  flexion  ) Discussed, physiology, body mechanics, principles of loading, and progressive loading/activity. Reviewed home exercise program extensively along with instructions for ice and elevation; written copy provided. P: Continue with rehab plan  .   RTD 6/7/2022   Kathleen Watson, PTA    Treatment Charges: Mins Units   Initial Evaluation     Re-Evaluation     Ther Exercise         TE 30 2   Manual Therapy     MT 10 1   Ther Activities        TA     Gait Training          GT     Neuro Re-education NR     Modalities     Non-Billable Service Time     Other     Total Time/Units 40 3

## 2022-05-23 ENCOUNTER — TREATMENT (OUTPATIENT)
Dept: PHYSICAL THERAPY | Age: 57
End: 2022-05-23
Payer: COMMERCIAL

## 2022-05-23 DIAGNOSIS — Z96.652 STATUS POST LEFT KNEE REPLACEMENT: Primary | ICD-10-CM

## 2022-05-23 PROCEDURE — 97530 THERAPEUTIC ACTIVITIES: CPT

## 2022-05-23 PROCEDURE — 97110 THERAPEUTIC EXERCISES: CPT

## 2022-05-23 NOTE — PROGRESS NOTES
Physical Therapy Daily Treatment Note    Date: 2022  Patient Name: Tommy Zamorano  : 1965   MRN: [id number]  DOInjury: --  DOSx: 2022  Referring Provider: Dioni Gallego DO  [address] [facility] Diagnosis:      Diagnosis Orders   1. Status post left knee replacement         Patient is 3 weeks post total knee arthroplasty . Patient has significant limitations in both flexion and extension as well as moderate edema along with impaired strength, function, gait, weightbearing tolerance. Treatment will start with methods to control swelling as well as AROM and stretching . Progression to strengthening, functional training, gait training, gait device advancement, and balance / proprioception exercises once motion and edema are under control.     Outcome Measure:   Lower Extremity Functional Scale (LEFS) 95% impairment    Access Code: [email or web address]  URL: [email or web address]  Date: 2022  Prepared by: Mani Antonio    Program Notes  Proper Elevation    -- Elevate limb on a stable stack of blankets / pillows so leg is higher than heart. -- Do this for 45 minutes, 3 times a day  -- You may apply ice for the first 15 minutes, then remove it while you continue your elevation  -- Be sure to lie flat in bed or on a couch with a comfortable pillow under your head. Lying back in a recliner is not helpful.  -- You may prop your head up if you cannot tolerate lying completely flat, but only as high as necessary for comfort. Exercises  Supine Quad Set - 3 x daily - 3 sets - 10-15 reps  Supine Heel Slide - 3 x daily - 3 sets - 10 reps  Seated Knee Extension AROM - 3 x daily - 3 sets - 10 reps    X = TO BE PERFORMED NEXT VISIT  > = PROGRESS TO THIS    S: Pt reports doing  Given HEP each day now  . O:    Time  1300- 1340      Visit  15 visit   87412 Therapeutic Exercise    05682 Neuromuscular Re-Education   32303 Therapeutic Activities    60704 Manual Therapy 8/12  61524 Vasopneumatic   Device 4/12     This is not a charge     Repeat outcome measure at mid point and end. Pain    Pain 3/10     ROM   -10  Ext,    105  Flex     5/18/2022    Modalities             Vasopneumatic   Device   MO   Manual      Stretching knee flexion   MT   Stretching       Patella mobs X 30  Pt needing frequent rest periods 4/14/2022 TE   Prone hangs 5#  x 5 min  x TE   Heel props   x TE   Knee flex stretch-seated 3 X 30 sec  x TE   Prone self flexion stretch    TE   Exercise       Nustep   x TE         Recumbent bike  X 10 min  full rotations . Seat  No. 7      Regular stat bike   Seat at no. 8          Quad sets  TE   Heel slides  TE   SLR Minimal assistance TE   LAQ   TE   Marching  TA   CR     Hip abduction      Sit to stands  2 x 15  At leg press bar  TA   Squat    TA   Step-ups - FWD    TA   Step-ups - LAT   TA   Step-ups - BWD    TA   Step up and over reciprocally    TA   [x] TG  [] Leg Press 2-leg L 20 3 x 20  / H 10 sec  ea no 20   TE   [] TG  [] Leg Press 1-leg   TE   CR (TG) L 20 2 x 20   TE   Knee Extension Machine for static flexion stretch    TE   Hamstring machine for ext stretches   40# 3 x 20      Marching gait   NR   Side stepping   NR   Seated  t band knee flexion stretch green/black band              A: Tolerated well. .     ( Emphasis on patella mobs, self flexion stretches, knee ext and manual  flexion  ) Discussed, physiology, body mechanics, principles of loading, and progressive loading/activity. Reviewed home exercise program extensively along with instructions for ice and elevation; written copy provided. P: Continue with rehab plan  .   RTD 6/7/2022   Danilo Mac PTA    Treatment Charges: Mins Units   Initial Evaluation     Re-Evaluation     Ther Exercise         TE 30 2   Manual Therapy     MT     Ther Activities        TA 10 1   Gait Training          GT     Neuro Re-education NR     Modalities     Non-Billable Service Time     Other     Total Time/Units 40 3

## 2022-05-25 ENCOUNTER — TREATMENT (OUTPATIENT)
Dept: PHYSICAL THERAPY | Age: 57
End: 2022-05-25
Payer: COMMERCIAL

## 2022-05-25 DIAGNOSIS — Z96.652 STATUS POST LEFT KNEE REPLACEMENT: Primary | ICD-10-CM

## 2022-05-25 PROCEDURE — 97530 THERAPEUTIC ACTIVITIES: CPT

## 2022-05-25 PROCEDURE — 97110 THERAPEUTIC EXERCISES: CPT

## 2022-05-25 NOTE — PROGRESS NOTES
Physical Therapy Daily Treatment Note    Date: 2022  Patient Name: Stefano Cruz  : 1965   MRN: [id number]  DOInjury: --  DOSx: 2022  Referring Provider: DO Emili De La Fuente [address] [place] [address] Diagnosis:      Diagnosis Orders   1. Status post left knee replacement         Patient is 3 weeks post total knee arthroplasty . Patient has significant limitations in both flexion and extension as well as moderate edema along with impaired strength, function, gait, weightbearing tolerance. Treatment will start with methods to control swelling as well as AROM and stretching . Progression to strengthening, functional training, gait training, gait device advancement, and balance / proprioception exercises once motion and edema are under control.     Outcome Measure:   Lower Extremity Functional Scale (LEFS) 95% impairment    Access Code: [email or web address]  URL: [email or web address]  Date: 2022  Prepared by: Hermelindo Hooker    Program Notes  Proper Elevation    -- Elevate limb on a stable stack of blankets / pillows so leg is higher than heart. -- Do this for 45 minutes, 3 times a day  -- You may apply ice for the first 15 minutes, then remove it while you continue your elevation  -- Be sure to lie flat in bed or on a couch with a comfortable pillow under your head. Lying back in a recliner is not helpful.  -- You may prop your head up if you cannot tolerate lying completely flat, but only as high as necessary for comfort. Exercises  Supine Quad Set - 3 x daily - 3 sets - 10-15 reps  Supine Heel Slide - 3 x daily - 3 sets - 10 reps  Seated Knee Extension AROM - 3 x daily - 3 sets - 10 reps    X = TO BE PERFORMED NEXT VISIT  > = PROGRESS TO THIS    S: Pt reports doing  Given HEP each day now  . O:    Time  1300- 1340      Visit  15 visit   61853 Therapeutic Exercise    12675 Neuromuscular Re-Education   51886 Therapeutic Activities 3/36   00413 Manual Therapy 8/12  16728 Vasopneumatic   Device 4/12     This is not a charge     Repeat outcome measure at mid point and end. Pain    Pain 3/10     ROM   -8  Ext,    110  Flex     5/25/2022    Modalities             Vasopneumatic   Device   MO   Manual      Stretching knee flexion   MT   Stretching       Patella mobs X 30  Pt needing frequent rest periods 4/14/2022 TE   Prone hangs 5#  x 5 min  x TE   Heel props   x TE   Knee flex stretch-seated 3 X 30 sec  x TE   Prone self flexion stretch    TE   Exercise       Nustep   x TE         Recumbent bike  X 10 min  full rotations . Seat    No 6   TE   Regular stat bike   Seat at no. 8          Quad sets  TE   Heel slides  TE   SLR Minimal assistance TE   LAQ   TE   Marching  TA   CR     Hip abduction      Sit to stands  2 x 15 from chair  At leg press bar  TA   Squat    TA   Step-ups - FWD    TA   Step-ups - LAT   TA   Step-ups - BWD    TA   Step up and over reciprocally    TA   [x] TG  [] Leg Press 2-leg L 20 3 x 20  / H 10 sec  ea no 20  For functional bending  TA   [] TG  [] Leg Press 1-leg   TA   CR (TG) L 20 2 x 20   TA   Knee Extension Machine for static flexion stretch    TA   Hamstring machine for ext stretches   40# 3 x 20      Marching gait   NR   Side stepping   NR   Seated  t band knee flexion stretch green/black band              A: Tolerated well. .     ( Emphasis on patella mobs, self flexion stretches, knee ext and manual  flexion  ) Discussed, physiology, body mechanics, principles of loading, and progressive loading/activity. Reviewed home exercise program extensively along with instructions for ice and elevation; written copy provided. P: Continue with rehab plan  .   RTD 6/7/2022   Lizzie Lara PTA    Treatment Charges: Mins Units   Initial Evaluation     Re-Evaluation     Ther Exercise         TE 15 1   Manual Therapy     MT     Ther Activities        TA 25 2   Gait Training          GT     Neuro Re-education NR     Modalities Non-Billable Service Time     Other     Total Time/Units 40 3

## 2022-05-31 ENCOUNTER — TELEPHONE (OUTPATIENT)
Dept: PHYSICAL THERAPY | Age: 57
End: 2022-05-31

## 2022-06-01 ENCOUNTER — TELEPHONE (OUTPATIENT)
Dept: ORTHOPEDIC SURGERY | Age: 57
End: 2022-06-01

## 2022-06-01 DIAGNOSIS — M17.12 PRIMARY OSTEOARTHRITIS OF LEFT KNEE: ICD-10-CM

## 2022-06-01 NOTE — TELEPHONE ENCOUNTER
Last appointment 4/12/2022  Next appointment   Future Appointments   Date Time Provider Riri Perez   6/2/2022  2:15 PM Rachel Roman, DPDUSTIN [facility]   6/3/2022  1:40 PM Parish Dumont PTA [facility]   6/6/2022  1:00 PM Parish Dumont PTA [facility]   6/7/2022  1:30 PM DO Nathan Giraldo [facility]   7/26/2022  1:30 PM DO KRISTI Callejas      Last refill 5/17/22- 5/24/22  DOS: 4/14/22       Patient called in requesting refill of   oxyCODONE-acetaminophen (PERCOCET)  MG per tablet     [facility] [address] [place], [place] - [address] Jessica Casas [phone] Melissa Leyva [phone]   [address] [place] [id number]   Phone:  [phone]  Fax:  [phone]

## 2022-06-02 ENCOUNTER — OFFICE VISIT (OUTPATIENT)
Dept: PODIATRY | Age: 57
End: 2022-06-02
Payer: COMMERCIAL

## 2022-06-02 VITALS — WEIGHT: 244 LBS | HEIGHT: 72 IN | BODY MASS INDEX: 33.05 KG/M2

## 2022-06-02 DIAGNOSIS — E11.42 TYPE 2 DIABETES MELLITUS WITH DIABETIC POLYNEUROPATHY, WITHOUT LONG-TERM CURRENT USE OF INSULIN (HCC): Chronic | ICD-10-CM

## 2022-06-02 DIAGNOSIS — B35.3 TINEA PEDIS OF LEFT FOOT: Primary | ICD-10-CM

## 2022-06-02 PROCEDURE — 99203 OFFICE O/P NEW LOW 30 MIN: CPT | Performed by: PODIATRIST

## 2022-06-02 PROCEDURE — 2022F DILAT RTA XM EVC RTNOPTHY: CPT | Performed by: PODIATRIST

## 2022-06-02 PROCEDURE — G8427 DOCREV CUR MEDS BY ELIG CLIN: HCPCS | Performed by: PODIATRIST

## 2022-06-02 PROCEDURE — G8417 CALC BMI ABV UP PARAM F/U: HCPCS | Performed by: PODIATRIST

## 2022-06-02 RX ORDER — TERBINAFINE HYDROCHLORIDE 250 MG/1
250 TABLET ORAL DAILY
Qty: 14 TABLET | Refills: 0 | Status: SHIPPED | OUTPATIENT
Start: 2022-06-02 | End: 2022-06-16

## 2022-06-02 RX ORDER — OXYCODONE AND ACETAMINOPHEN 10; 325 MG/1; MG/1
1 TABLET ORAL EVERY 8 HOURS PRN
Qty: 21 TABLET | Refills: 0 | Status: SHIPPED | OUTPATIENT
Start: 2022-06-02 | End: 2022-06-09

## 2022-06-02 NOTE — LETTER
[address] [place] [address] [place]   Phone: [phone]  Fax: [phone]    Jairo Grant  [id number]   1965  6/2/2022      Dear Gemini Slaughter,    I would like to thank you for the kind referral of Ana Rosa Grant. He presented to the office today for evaluation regarding chronic blister left foot. We did discuss treatment options in regards to his chronic tinea issues. Multiple treatment options were discussed and medications prescribed on today's visit. We will have continued care until issues are resolved. If you should have any questions concerning his visit today, please do not hesitate to contact me.     Sincerely,    Ayla Carver DPM

## 2022-06-02 NOTE — PROGRESS NOTES
22     Mis Hansen    : 1965 Sex: male   Age: 61 y.o. Patient was referred by: Lindy Barth DO  Patient's PCP/Provider is:  Lindy Barth DO    Subjective:    Patient seen today regarding chronic tinea issues left foot. Chief Complaint   Patient presents with    Diabetes     foot exam        HPI: Patient stated the issue has caused blistering to his left foot webspace. Patient was concerned due to his history of diabetes. Patient is is no acute distress. He denies any nausea, vomiting, fever, and/or chills. He denies any additional issues. ROS:  Const: Positives and pertinent negatives as per HPI. Musculo: Denies symptoms other than stated above. Neuro: Denies symptoms other than stated above. Skin: Denies symptoms other than stated above. Current Medications:    Current Outpatient Medications:     oxyCODONE-acetaminophen (PERCOCET)  MG per tablet, Take 1 tablet by mouth every 8 hours as needed for Pain for up to 7 days. Intended supply: 7 days, Disp: 21 tablet, Rfl: 0    terbinafine (LAMISIL) 250 MG tablet, Take 1 tablet by mouth daily for 14 days, Disp: 14 tablet, Rfl: 0    gabapentin (NEURONTIN) 100 MG capsule, Take 1 capsule by mouth 3 times daily for 30 days.  Intended supply: 30 days, Disp: 90 capsule, Rfl: 0    celecoxib (CELEBREX) 200 MG capsule, Take 1 capsule by mouth 2 times daily, Disp: 60 capsule, Rfl: 3    ondansetron (ZOFRAN ODT) 4 MG disintegrating tablet, Take 1 tablet by mouth every 8 hours as needed for Nausea or Vomiting, Disp: 21 tablet, Rfl: 0    pantoprazole (PROTONIX) 20 MG tablet, take 1 tablet by mouth every evening if needed (Patient taking differently: Take 20 mg by mouth daily AM), Disp: 30 tablet, Rfl: 5    simvastatin (ZOCOR) 20 MG tablet, Take 1 tablet by mouth every evening, Disp: 30 tablet, Rfl: 5    alogliptin (NESINA) 25 MG TABS tablet, Take 1 tablet by mouth daily, Disp: 30 tablet, Rfl: 5    Dulaglutide 3 MG/0.5ML SOPN, Inject 3 mg into the skin once a week, Disp: 4 pen, Rfl: 5    glipiZIDE (GLUCOTROL XL) 10 MG extended release tablet, take 1 tablet by mouth once daily, Disp: 30 tablet, Rfl: 5    metFORMIN (GLUCOPHAGE) 1000 MG tablet, take 1 tablet by mouth twice a day with meals, Disp: 60 tablet, Rfl: 5    senna-docusate (PERICOLACE) 8.6-50 MG per tablet, Take 1 tablet by mouth daily as needed for Constipation, Disp: 30 tablet, Rfl: 3    Alcohol Swabs PADS, 1 Units by Does not apply route as needed (checking glucose), Disp: 100 each, Rfl: 11    blood glucose monitor strips, FBS daily. Please dispense Freestyle brand, Disp: 100 strip, Rfl: 11    Lancets MISC, Test once daily, Disp: 100 each, Rfl: 3    Allergies:   Allergies   Allergen Reactions    Tobramycin Swelling     Eyes swelled from drops       Vitals:    06/02/22 1414   Weight: 244 lb (110.7 kg)   Height: 6' (1.829 m)        Past Medical History:   Diagnosis Date    Anxiety     Blood in stool     Chronic back pain     Constipation     GERD (gastroesophageal reflux disease)     Headache(784.0)     History of dental problems     Hyperlipidemia     Neck pain     Neck stiffness     Type II or unspecified type diabetes mellitus without mention of complication, not stated as uncontrolled      Family History   Problem Relation Age of Onset    Diabetes Father     High Blood Pressure Father     Heart Disease Father     Stroke Father     Heart Attack Father     Diabetes Mother     Heart Disease Mother     Emphysema Mother     Cancer Brother 54        colon    High Blood Pressure Brother     Cancer Maternal Cousin 40        colon    Cancer Sister     Diabetes Sister     Heart Disease Sister     Stroke Sister     Diabetes Maternal Aunt     Heart Disease Maternal Aunt     High Blood Pressure Maternal Aunt     High Cholesterol Maternal Aunt     Stroke Maternal Aunt     Diabetes Maternal Uncle     Heart Disease Maternal Uncle     High Blood Pressure Maternal Uncle     High Cholesterol Maternal Uncle     Stroke Maternal Uncle     Diabetes Paternal Aunt     Heart Disease Paternal Aunt     High Blood Pressure Paternal Aunt     High Cholesterol Paternal Aunt     Stroke Paternal Aunt     Diabetes Paternal Uncle     Heart Disease Paternal Uncle     High Blood Pressure Paternal Uncle     High Cholesterol Paternal Uncle     Stroke Paternal Uncle     Diabetes Maternal Grandmother     High Blood Pressure Maternal Grandmother     High Cholesterol Maternal Grandmother     Cancer Maternal Grandfather     Diabetes Maternal Grandfather     High Blood Pressure Maternal Grandfather     High Cholesterol Maternal Grandfather     Stroke Maternal Grandfather     Diabetes Paternal Grandfather     High Blood Pressure Paternal Grandfather     High Cholesterol Paternal Grandfather      Past Surgical History:   Procedure Laterality Date    COLONOSCOPY      FRACTURE SURGERY Left     femur    KNEE ARTHROSCOPY Left 07/18/2016    medial lateral menisectomy/synovectomy/chodroplasty    KNEE SURGERY Left 4/14/2022    LEFT KNEE MANIPULATION UNDER ANESTHESIA performed by Nadeem Calderón DO at [address] ARTHROSCOPY Right 2/11/2020    RIGHT SHOULDER ARTHROSCOPY SUBACHROMIAL DECOMPRESSION DEBRIDEMENT, BICEP TENODESIS ,LABRAL DEBRIDEMENT AND KHALIDA performed by Austin Martins DO at [address] ARTHROSCOPY Right 10/02/2020    SHOULDER ARTHROSCOPY Right 10/2/2020    RIGHT SHOULDER ARTHROSCOPY SUBACROMIAL DECOMPRESSION AND DEBRIDEMENT LABRIAL DEBRIDEMENT ROTATOR CUFF REPAIR KHALIDA performed by Nadeem Calderón DO at [place] [address] Left 3/2/2022    Left knee total knee arthroplasty [facility]) performed by Nadeem Calderón DO at [place] [address] History     Tobacco Use    Smoking status: Never Smoker    Smokeless tobacco: Never Used   Vaping Use    Vaping Use: Never used Substance Use Topics    Alcohol use: Not Currently     Alcohol/week: 0.0 standard drinks    Drug use: No           Diagnostic studies:    No results found. Procedures:    None    Exam:  VASCULAR: Pedal pulses palpable left foot. CFT brisk digits 1-5 left foot. Hair growth present left foot. NEUROLOGICAL: Epicritic sensations intact left foot. DERMATOLOGICAL: Blister noted fourth webspace left foot. No edema, erythema, ulceration, skin crepitation, or any ascending cellulitic issues noted left foot. MUSCULOSKELETAL: Mild contraction deformities noted left foot. Plan Per Assessment  Rashmi Canseco was seen today for diabetes. Diagnoses and all orders for this visit:    Tinea pedis of left foot  -     terbinafine (LAMISIL) 250 MG tablet; Take 1 tablet by mouth daily for 14 days    Type 2 diabetes mellitus with diabetic polyneuropathy, without long-term current use of insulin ([facility] [address]        1. New patient consultation and management  2. We did discuss appropriate diabetic foot care techniques with patient in detail today. 3. Patient was placed on oral Lamisil to treat the underlying and chronic tinea pedis issues for a 2-week timeframe. 4. Was to utilize topical Betadine to the region twice daily as well. Recommendations were discussed with patient in detail today to prevent other additional issues. 5. Will be followed up at a later date for continued evaluation and management. Seen By:    Bonnie Rojas DPM    Electronically signed by Bonnie Rojas DPM on 6/2/2022 at 5:57 PM      This note was created using voice recognition software. The note was reviewed however may contain grammatical errors.

## 2022-06-02 NOTE — PROGRESS NOTES
Patient is in today for evaluation of the diabetic foot. Patient says he does have a little sore on left 5th toe.  pcp is Lindy Barth DO  Last ov 4/25/22

## 2022-06-03 ENCOUNTER — TREATMENT (OUTPATIENT)
Dept: PHYSICAL THERAPY | Age: 57
End: 2022-06-03
Payer: COMMERCIAL

## 2022-06-03 DIAGNOSIS — Z96.652 STATUS POST LEFT KNEE REPLACEMENT: Primary | ICD-10-CM

## 2022-06-03 PROCEDURE — 97110 THERAPEUTIC EXERCISES: CPT

## 2022-06-03 PROCEDURE — 97530 THERAPEUTIC ACTIVITIES: CPT

## 2022-06-03 NOTE — PROGRESS NOTES
Pt cancelled rx for today . Physical Therapy Daily Treatment Note    Date: 6/3/2022  Patient Name: Rocío Linton  : 1965   MRN: [id number]  DOInjury: --  DOSx: 2022  Referring Provider: Kris Cortez DO  [address] [facility] Diagnosis:      Diagnosis Orders   1. Status post left knee replacement         Patient is 3 weeks post total knee arthroplasty . Patient has significant limitations in both flexion and extension as well as moderate edema along with impaired strength, function, gait, weightbearing tolerance. Treatment will start with methods to control swelling as well as AROM and stretching . Progression to strengthening, functional training, gait training, gait device advancement, and balance / proprioception exercises once motion and edema are under control.     Outcome Measure:   Lower Extremity Functional Scale (LEFS) 95% impairment    Access Code: [email or web address]  URL: [email or web address]  Date: 2022  Prepared by: Gregorio Pickens    Program Notes  Proper Elevation    -- Elevate limb on a stable stack of blankets / pillows so leg is higher than heart. -- Do this for 45 minutes, 3 times a day  -- You may apply ice for the first 15 minutes, then remove it while you continue your elevation  -- Be sure to lie flat in bed or on a couch with a comfortable pillow under your head. Lying back in a recliner is not helpful.  -- You may prop your head up if you cannot tolerate lying completely flat, but only as high as necessary for comfort. Exercises  Supine Quad Set - 3 x daily - 3 sets - 10-15 reps  Supine Heel Slide - 3 x daily - 3 sets - 10 reps  Seated Knee Extension AROM - 3 x daily - 3 sets - 10 reps    X = TO BE PERFORMED NEXT VISIT  > = PROGRESS TO THIS    S: Pt reports doing  Given HEP each day now  . O:    Time  1300- 1340      Visit  15 visit   11175 Therapeutic Exercise    09164 Neuromuscular Re-Education   16602 Therapeutic Activities 5/36   10854 Manual Therapy 8/12  80951 Vasopneumatic   Device 4/12     This is not a charge     Repeat outcome measure at mid point and end. Pain    Pain 3/10     ROM   -8  Ext,    110  Flex     5/25/2022    Modalities             Vasopneumatic   Device   MO   Manual      Stretching knee flexion   MT   Stretching       Patella mobs X 30  Pt needing frequent rest periods 4/14/2022 TE   Prone hangs 5#  x 5 min  x TE   Heel props   x TE   Knee flex stretch-seated 3 X 30 sec  x TE   Prone self flexion stretch    TE   Exercise       Nustep   x TE         Recumbent bike  X 10 min  full rotations . Seat    No 6   TE   Regular stat bike   Seat at no. 8          Quad sets  TE   Heel slides  TE   SLR Minimal assistance TE   LAQ   TE   Marching  TA   CR     Hip abduction      Sit to stands  2 x 15 from chair  At leg press bar  TA   Squat    TA   Step-ups - FWD    TA   Step-ups - LAT   TA   Step-ups - BWD    TA   Step up and over reciprocally    TA   [x] TG  [] Leg Press 2-leg L 20 3 x 20  / H 10 sec  ea no 20  For functional bending  TA   [] TG  [] Leg Press 1-leg   TA   CR (TG)   TA   Leg press calf raises  40# 2 x 20      Knee Extension Machine for static flexion stretch    TA   Hamstring machine for ext stretches   40# 3 x 20      Marching gait   NR   Side stepping   NR   Seated  t band knee flexion stretch green/black band              A: Tolerated well. .     ( Emphasis on patella mobs, self flexion stretches, knee ext and manual  flexion  ) Discussed, physiology, body mechanics, principles of loading, and progressive loading/activity. Reviewed home exercise program extensively along with instructions for ice and elevation; written copy provided. P: Continue with rehab plan  .   RTD 6/7/2022   Jake Sheffield PTA    Treatment Charges: Mins Units   Initial Evaluation     Re-Evaluation     Ther Exercise         TE 15 1   Manual Therapy     MT     Ther Activities        TA 25 2   Gait Training          GT Neuro Re-education NR     Modalities     Non-Billable Service Time     Other     Total Time/Units 40 3

## 2022-06-07 ENCOUNTER — TELEPHONE (OUTPATIENT)
Dept: ORTHOPEDIC SURGERY | Age: 57
End: 2022-06-07

## 2022-06-07 NOTE — TELEPHONE ENCOUNTER
Patient scheduled 6/7/22 for left knee - total 03/02/2022. Patient nauseated/stomach ache wanted to know if can still come in today, or if able to come in thurs or Monday.  Please advise patient [phone]

## 2022-06-10 DIAGNOSIS — Z96.652 STATUS POST LEFT KNEE REPLACEMENT: Primary | ICD-10-CM

## 2022-06-14 ENCOUNTER — OFFICE VISIT (OUTPATIENT)
Dept: ORTHOPEDIC SURGERY | Age: 57
End: 2022-06-14
Payer: COMMERCIAL

## 2022-06-14 VITALS — TEMPERATURE: 98 F | HEIGHT: 72 IN | BODY MASS INDEX: 32.6 KG/M2 | WEIGHT: 240.7 LBS

## 2022-06-14 DIAGNOSIS — Z96.652 STATUS POST LEFT KNEE REPLACEMENT: Primary | ICD-10-CM

## 2022-06-14 PROCEDURE — 99213 OFFICE O/P EST LOW 20 MIN: CPT | Performed by: ORTHOPAEDIC SURGERY

## 2022-06-14 PROCEDURE — 1036F TOBACCO NON-USER: CPT | Performed by: ORTHOPAEDIC SURGERY

## 2022-06-14 PROCEDURE — G8427 DOCREV CUR MEDS BY ELIG CLIN: HCPCS | Performed by: ORTHOPAEDIC SURGERY

## 2022-06-14 PROCEDURE — 3017F COLORECTAL CA SCREEN DOC REV: CPT | Performed by: ORTHOPAEDIC SURGERY

## 2022-06-14 PROCEDURE — G8417 CALC BMI ABV UP PARAM F/U: HCPCS | Performed by: ORTHOPAEDIC SURGERY

## 2022-06-14 NOTE — PROGRESS NOTES
Chief Complaint   Patient presents with    Knee Pain     left knee TKA f/u doing well just a little sore       Dora Taylor returns today for follow-up of his left TKA. DOS: 3/2/22 and MAKI 4/14/22.  He reports this is better than when I saw the patient last.      Past Medical History:   Diagnosis Date    Anxiety     Blood in stool     Chronic back pain     Constipation     GERD (gastroesophageal reflux disease)     Headache(784.0)     History of dental problems     Hyperlipidemia     Neck pain     Neck stiffness     Type II or unspecified type diabetes mellitus without mention of complication, not stated as uncontrolled      Past Surgical History:   Procedure Laterality Date    COLONOSCOPY      FRACTURE SURGERY Left     femur    KNEE ARTHROSCOPY Left 07/18/2016    medial lateral menisectomy/synovectomy/chodroplasty    KNEE SURGERY Left 4/14/2022    LEFT KNEE MANIPULATION UNDER ANESTHESIA performed by Mary Dickson DO at [address] ARTHROSCOPY Right 2/11/2020    RIGHT SHOULDER ARTHROSCOPY SUBACHROMIAL DECOMPRESSION DEBRIDEMENT, BICEP TENODESIS ,LABRAL DEBRIDEMENT AND KHALIDA performed by Ramsey Hoffman DO at [address] ARTHROSCOPY Right 10/02/2020    SHOULDER ARTHROSCOPY Right 10/2/2020    RIGHT SHOULDER ARTHROSCOPY SUBACROMIAL DECOMPRESSION AND DEBRIDEMENT LABRIAL DEBRIDEMENT ROTATOR CUFF REPAIR KHALIDA performed by Mary Dickson DO at [address] Left 3/2/2022    Left knee total knee arthroplasty [facility]) performed by Mary Dickson DO at [address]       Current Outpatient Medications:     terbinafine (LAMISIL) 250 MG tablet, Take 1 tablet by mouth daily for 14 days, Disp: 14 tablet, Rfl: 0    celecoxib (CELEBREX) 200 MG capsule, Take 1 capsule by mouth 2 times daily, Disp: 60 capsule, Rfl: 3    ondansetron (ZOFRAN ODT) 4 MG disintegrating tablet, Take 1 tablet by mouth every 8 hours as needed  Not on file   Social History Narrative    Not on file     Social Determinants of Health     Financial Resource Strain: Low Risk     Difficulty of Paying Living Expenses: Not hard at all   Food Insecurity: No Food Insecurity    Worried About Running Out of Food in the Last Year: Never true    [address] in the Last Year: Never true   Transportation Needs:     Lack of Transportation (Medical): Not on file    Lack of Transportation (Non-Medical): Not on file   Physical Activity:     Days of Exercise per Week: Not on file    Minutes of Exercise per Session: Not on file   Stress:     Feeling of Stress : Not on file   Social Connections:     Frequency of Communication with Friends and Family: Not on file    Frequency of Social Gatherings with Friends and Family: Not on file    Attends Congregational Services: Not on file    Active Member of [address] [place] [address] or Organizations: Not on file    Attends Club or Organization Meetings: Not on file    Marital Status: Not on file   Intimate Partner Violence:     Fear of Current or Ex-Partner: Not on file    Emotionally Abused: Not on file    Physically Abused: Not on file    Sexually Abused: Not on file   Housing Stability:     Unable to Pay for Housing in the Last Year: Not on file    Number of [place] in the Last Year: Not on file    Unstable Housing in the Last Year: Not on file       Review of Systems:     Skin: (-) rash,(-) psoriasis,(-) eczema, (-)skin cancer. Musculoskeletal: (-) fractures,  (-) dislocations,(-) collagen vascular disease, (-) fibromyalgia, (-) multiple sclerosis, (-) muscular dystrophy, (-) RSD,(-) joint pain (-)swelling, (-) joint pain,swelling. Neurologic: (-) epilepsy, (-)seizures,(-) brain tumor,(-) TIA, (-)stroke, (-)headaches, (-)Parkinson disease,(-) memory loss, (-) LOC. Cardiovascular: (-) Chest pain, (-) swelling in legs/feet, (-) SOB, (-) cramping in legs/feet with walking.     Subjective:    Constitutional:    The patient is alert and oriented x 3, appears to be stated age and in no distress. Temp 98 °F (36.7 °C)   Ht 6' (1.829 m)   Wt 240 lb 11.2 oz (109.2 kg)   BMI 32.64 kg/m²     Skin:  Upon inspection: the skin appears warm, dry and intact. There is a previous scar over the affected area. There is not any cellulitis, lymphedema or cutaneous lesions noted in the lower extremities. Upon palpation there is no induration noted. Neurologic:  Gait: normal;  Motor exam of the lower extremities show ; quadriceps, hamstrings, foot dorsi and plantar flexors intact R.  5/5 and L. 5/5. Deep tendon reflexes are 2/4 at the knees and 2/4 at the ankles with strong extensor hallicus longus motor strength bilaterally. Sensory to both feet is intact to all sensory roots. Cardiovascular: The vascular exam is normal and is well perfused to distal extremities. Distal pulses DP/PT: R. 2+; L. 2+. There is cap refill noted less than two seconds in all digits. There is not edema of the bilateral lower extremities. There is not varicosities noted in the distal extremities. Lymph:  Upon palpation,  there is no lymphadenopathy noted in bilateral lower extremities. Musculoskeletal:    Lumbar exam:  On visual inspection, there is not deformity of the spine. full range of motion, no tenderness, palpable spasm or pain on motion. Special tests: Straight Leg Raise negative, Rich testnegative. Hip exam:  Upon inspection, there is not deformity noted. Upon palpation there is not tenderness. ROM: is   full and symmetrical.   Strength: Hip Flexors 5/5; Hip Abductors 5/5; Hip Adduction 5/5. Knee exam:  Left knee exam shows;  range of motion of R. Knee is 0 to 120, and L. Knee is 0 to 115. The patient does not have  pain on motion, effusion is mild, there is tenderness over the  lateral region, there are not any masses, there is not ligamentous instability, there is not  deformity noted.   Knee exam: neither positive for moderate crepitations, some mild tenderness laxity is not noted with  stress. R. Knee:  Lachman's negative, Anterior Drawer negative, Posterior Drawer negative  Celestina's negative, Thallasy  negative,   PF grind test negative, Apprehension test negative, Patellar J sign  negative  L. Knee:  Lachman's negative, Anterior Drawer negative, Posterior Drawer negative  Celestina's negative, Thallasy  negative,   PF grind test negative, Apprehension test negative,  Patellar J sign  Negative    Xrays: left TKA well aligned with no signs of aseptic loosening    Radiographic findings reviewed with patient    Impression:   Encounter Diagnosis   Name Primary?     Status post left knee replacement Yes       Plan:  Hep  Activity as tolerated  Fu in 3 months with xr

## 2022-06-30 ENCOUNTER — OFFICE VISIT (OUTPATIENT)
Dept: PODIATRY | Age: 57
End: 2022-06-30
Payer: COMMERCIAL

## 2022-06-30 VITALS — HEIGHT: 72 IN | BODY MASS INDEX: 32.51 KG/M2 | WEIGHT: 240 LBS

## 2022-06-30 DIAGNOSIS — B35.3 TINEA PEDIS OF LEFT FOOT: Primary | ICD-10-CM

## 2022-06-30 DIAGNOSIS — E11.42 TYPE 2 DIABETES MELLITUS WITH DIABETIC POLYNEUROPATHY, WITHOUT LONG-TERM CURRENT USE OF INSULIN (HCC): Chronic | ICD-10-CM

## 2022-06-30 PROCEDURE — 2022F DILAT RTA XM EVC RTNOPTHY: CPT | Performed by: PODIATRIST

## 2022-06-30 PROCEDURE — 3017F COLORECTAL CA SCREEN DOC REV: CPT | Performed by: PODIATRIST

## 2022-06-30 PROCEDURE — 1036F TOBACCO NON-USER: CPT | Performed by: PODIATRIST

## 2022-06-30 PROCEDURE — G8427 DOCREV CUR MEDS BY ELIG CLIN: HCPCS | Performed by: PODIATRIST

## 2022-06-30 PROCEDURE — G8417 CALC BMI ABV UP PARAM F/U: HCPCS | Performed by: PODIATRIST

## 2022-06-30 PROCEDURE — 3051F HG A1C>EQUAL 7.0%<8.0%: CPT | Performed by: PODIATRIST

## 2022-06-30 PROCEDURE — 99213 OFFICE O/P EST LOW 20 MIN: CPT | Performed by: PODIATRIST

## 2022-06-30 NOTE — PROGRESS NOTES
Patient is in today for 2 week left 5th toe wound. Patient says the wound looks the same.  pcp is Pedro Chin DO  Last ov 4/25/22

## 2022-07-01 NOTE — PROGRESS NOTES
22     Aileen Hughes    : 1965   Sex: male    Age: 61 y.o. Patient's PCP/Provider is:  Khanh Tubbs DO    Subjective:  Patient is seen today for follow-up regarding evaluation regarding chronic tinea pedis and maceration into the left fourth webspace. Noticed modest improvement with current care. Aydee Mayer did the topical Betadine and oral Lamisil which was recently prescribed. No other additional abnormalities noted. Chief Complaint   Patient presents with    Wound Check     left 5th toe wound        ROS:  Const: Positives and pertinent negatives as per HPI. Musculo: Denies symptoms other than stated above. Neuro: Denies symptoms other than stated above. Skin: Denies symptoms other than stated above.     Current Medications:    Current Outpatient Medications:     celecoxib (CELEBREX) 200 MG capsule, Take 1 capsule by mouth 2 times daily, Disp: 60 capsule, Rfl: 3    ondansetron (ZOFRAN ODT) 4 MG disintegrating tablet, Take 1 tablet by mouth every 8 hours as needed for Nausea or Vomiting, Disp: 21 tablet, Rfl: 0    pantoprazole (PROTONIX) 20 MG tablet, take 1 tablet by mouth every evening if needed (Patient taking differently: Take 20 mg by mouth daily AM), Disp: 30 tablet, Rfl: 5    simvastatin (ZOCOR) 20 MG tablet, Take 1 tablet by mouth every evening, Disp: 30 tablet, Rfl: 5    alogliptin (NESINA) 25 MG TABS tablet, Take 1 tablet by mouth daily, Disp: 30 tablet, Rfl: 5    Dulaglutide 3 MG/0.5ML SOPN, Inject 3 mg into the skin once a week, Disp: 4 pen, Rfl: 5    glipiZIDE (GLUCOTROL XL) 10 MG extended release tablet, take 1 tablet by mouth once daily, Disp: 30 tablet, Rfl: 5    metFORMIN (GLUCOPHAGE) 1000 MG tablet, take 1 tablet by mouth twice a day with meals, Disp: 60 tablet, Rfl: 5    senna-docusate (PERICOLACE) 8.6-50 MG per tablet, Take 1 tablet by mouth daily as needed for Constipation, Disp: 30 tablet, Rfl: 3    Alcohol Swabs PADS, 1 Units by Does not apply route as needed (checking glucose), Disp: 100 each, Rfl: 11    blood glucose monitor strips, FBS daily. Please dispense Freestyle brand, Disp: 100 strip, Rfl: 11    Lancets MISC, Test once daily, Disp: 100 each, Rfl: 3    gabapentin (NEURONTIN) 100 MG capsule, Take 1 capsule by mouth 3 times daily for 30 days. Intended supply: 30 days, Disp: 90 capsule, Rfl: 0    Allergies: Allergies   Allergen Reactions    Tobramycin Swelling     Eyes swelled from drops       Vitals:    06/30/22 1455   Weight: 240 lb (108.9 kg)   Height: 6' (1.829 m)       Exam:  Neurovascular status unchanged. Maceration improved left fourth webspace. No signs of infection noted left foot. Edema, ecchymosis, skin crepitation, or any ascending cellulitic issues noted left foot. No plantar calluses left forefoot area. Diagnostic Studies:     No results found. Procedures:    None    Plan Per Assessment  Fabricio Childers was seen today for wound check. Diagnoses and all orders for this visit:    Tinea pedis of left foot    Type 2 diabetes mellitus with diabetic polyneuropathy, without long-term current use of insulin ([address]      1. Evaluation and management  2. Discussed continued use of the topical Betadine left foot. He was given samples of alginate AG to apply to the fourth webspace daily for continued care. 3. He was advised on shoe gear modifications for added room toe box area with every day ambulatory activities. 4. He will be followed up at a later date for continued evaluation and management. Seen By:    Henny Cardoso DPM    Electronically signed by Henny Cardoso DPM on 6/30/2022 at 10:20 PM    This note was created using voice recognition software. The note was reviewed however may contain grammatical errors.

## 2022-07-20 DIAGNOSIS — E11.42 TYPE 2 DIABETES MELLITUS WITH DIABETIC POLYNEUROPATHY, WITHOUT LONG-TERM CURRENT USE OF INSULIN (HCC): Chronic | ICD-10-CM

## 2022-07-20 RX ORDER — ASPIRIN 81 MG/1
TABLET ORAL
Qty: 30 TABLET | Refills: 11 | OUTPATIENT
Start: 2022-07-20

## 2022-07-26 DIAGNOSIS — R12 HEART BURN: ICD-10-CM

## 2022-07-26 DIAGNOSIS — E11.69 HYPERLIPIDEMIA ASSOCIATED WITH TYPE 2 DIABETES MELLITUS (HCC): Chronic | ICD-10-CM

## 2022-07-26 DIAGNOSIS — E78.5 HYPERLIPIDEMIA ASSOCIATED WITH TYPE 2 DIABETES MELLITUS (HCC): Chronic | ICD-10-CM

## 2022-07-26 DIAGNOSIS — E11.42 TYPE 2 DIABETES MELLITUS WITH DIABETIC POLYNEUROPATHY, WITHOUT LONG-TERM CURRENT USE OF INSULIN (HCC): Chronic | ICD-10-CM

## 2022-07-27 RX ORDER — PANTOPRAZOLE SODIUM 20 MG/1
TABLET, DELAYED RELEASE ORAL
Qty: 30 TABLET | Refills: 1 | Status: SHIPPED
Start: 2022-07-27 | End: 2022-10-17 | Stop reason: SDUPTHER

## 2022-07-27 RX ORDER — SIMVASTATIN 20 MG
20 TABLET ORAL EVERY EVENING
Qty: 30 TABLET | Refills: 1 | Status: SHIPPED
Start: 2022-07-27 | End: 2022-09-28 | Stop reason: SDUPTHER

## 2022-07-27 RX ORDER — ASPIRIN 81 MG/1
TABLET ORAL
Qty: 30 TABLET | Refills: 1 | Status: SHIPPED
Start: 2022-07-27 | End: 2022-09-28 | Stop reason: SDUPTHER

## 2022-08-31 PROBLEM — M15.9 GENERALIZED OSTEOARTHRITIS OF MULTIPLE SITES: Status: ACTIVE | Noted: 2022-08-31

## 2022-09-01 ENCOUNTER — OFFICE VISIT (OUTPATIENT)
Dept: PODIATRY | Age: 57
End: 2022-09-01
Payer: COMMERCIAL

## 2022-09-01 VITALS — BODY MASS INDEX: 33.18 KG/M2 | WEIGHT: 245 LBS | HEIGHT: 72 IN

## 2022-09-01 DIAGNOSIS — E11.42 TYPE 2 DIABETES MELLITUS WITH DIABETIC POLYNEUROPATHY, WITHOUT LONG-TERM CURRENT USE OF INSULIN (HCC): ICD-10-CM

## 2022-09-01 DIAGNOSIS — B35.3 TINEA PEDIS OF LEFT FOOT: Primary | ICD-10-CM

## 2022-09-01 PROCEDURE — 99213 OFFICE O/P EST LOW 20 MIN: CPT | Performed by: PODIATRIST

## 2022-09-01 PROCEDURE — 3017F COLORECTAL CA SCREEN DOC REV: CPT | Performed by: PODIATRIST

## 2022-09-01 PROCEDURE — 3052F HG A1C>EQUAL 8.0%<EQUAL 9.0%: CPT | Performed by: PODIATRIST

## 2022-09-01 PROCEDURE — 1036F TOBACCO NON-USER: CPT | Performed by: PODIATRIST

## 2022-09-01 PROCEDURE — 2022F DILAT RTA XM EVC RTNOPTHY: CPT | Performed by: PODIATRIST

## 2022-09-01 PROCEDURE — G8417 CALC BMI ABV UP PARAM F/U: HCPCS | Performed by: PODIATRIST

## 2022-09-01 PROCEDURE — G8427 DOCREV CUR MEDS BY ELIG CLIN: HCPCS | Performed by: PODIATRIST

## 2022-09-01 NOTE — PROGRESS NOTES
once a week, Disp: 4 pen, Rfl: 5    glipiZIDE (GLUCOTROL XL) 10 MG extended release tablet, take 1 tablet by mouth once daily, Disp: 30 tablet, Rfl: 5    senna-docusate (PERICOLACE) 8.6-50 MG per tablet, Take 1 tablet by mouth daily as needed for Constipation, Disp: 30 tablet, Rfl: 3    Alcohol Swabs PADS, 1 Units by Does not apply route as needed (checking glucose), Disp: 100 each, Rfl: 11    blood glucose monitor strips, FBS daily. Please dispense Freestyle brand, Disp: 100 strip, Rfl: 11    Lancets MISC, Test once daily, Disp: 100 each, Rfl: 3    gabapentin (NEURONTIN) 100 MG capsule, Take 1 capsule by mouth 3 times daily for 30 days. Intended supply: 30 days, Disp: 90 capsule, Rfl: 0    Allergies: Allergies   Allergen Reactions    Tobramycin Swelling     Eyes swelled from drops       Vitals:    09/01/22 1330   Weight: 245 lb (111.1 kg)   Height: 6' (1.829 m)       Exam:  Neurovascular status unchanged. Tinea pedis issues improved left webspaces. Mild maceration noted left fourth webspace. No signs of bacterial infection noted left foot. No edema, ecchymosis, calor, noted left lateral forefoot region. Adequate range of motion digital regions left foot. Diagnostic Studies:     No results found. Procedures:    None    Plan Per Assessment  Jocelin Pereira was seen today for wound check. Diagnoses and all orders for this visit:    Tinea pedis of left foot    Type 2 diabetes mellitus with diabetic polyneuropathy, without long-term current use of insulin (HCC)      Evaluation and management  We did discuss continued use of the topical Betadine fourth webspace daily, followed by alginate application and appropriate shoe gear and socks. We did discuss additional diabetic foot care techniques with patient in detail today. Patient will be followed up at a later date for continued evaluation and management.       Seen By:    Lana Ferrera DPM    Electronically signed by Lana Ferrera DPM on 9/1/2022 at 1:45 PM    This note was created using voice recognition software. The note was reviewed however may contain grammatical errors.

## 2022-09-01 NOTE — PROGRESS NOTES
Patient is in today for 2 week left 5th toe. Patient says the area is still the same.  Pcp is Shraddha Beltran DO  Last ov 8/31/22

## 2022-09-06 ENCOUNTER — OFFICE VISIT (OUTPATIENT)
Dept: ORTHOPEDIC SURGERY | Age: 57
End: 2022-09-06
Payer: COMMERCIAL

## 2022-09-06 VITALS — TEMPERATURE: 98 F | HEIGHT: 72 IN | WEIGHT: 245 LBS | BODY MASS INDEX: 33.18 KG/M2

## 2022-09-06 DIAGNOSIS — Z96.652 STATUS POST LEFT KNEE REPLACEMENT: Primary | ICD-10-CM

## 2022-09-06 PROCEDURE — 1036F TOBACCO NON-USER: CPT | Performed by: ORTHOPAEDIC SURGERY

## 2022-09-06 PROCEDURE — 3017F COLORECTAL CA SCREEN DOC REV: CPT | Performed by: ORTHOPAEDIC SURGERY

## 2022-09-06 PROCEDURE — G8427 DOCREV CUR MEDS BY ELIG CLIN: HCPCS | Performed by: ORTHOPAEDIC SURGERY

## 2022-09-06 PROCEDURE — 99213 OFFICE O/P EST LOW 20 MIN: CPT | Performed by: ORTHOPAEDIC SURGERY

## 2022-09-06 PROCEDURE — G8417 CALC BMI ABV UP PARAM F/U: HCPCS | Performed by: ORTHOPAEDIC SURGERY

## 2022-09-06 RX ORDER — GABAPENTIN 100 MG/1
100 CAPSULE ORAL 3 TIMES DAILY
Qty: 90 CAPSULE | Refills: 0 | Status: SHIPPED
Start: 2022-09-06 | End: 2022-10-17 | Stop reason: SDUPTHER

## 2022-09-06 NOTE — PROGRESS NOTES
Chief Complaint   Patient presents with    Knee Pain     S/p lt knee still having swelling, pain is unchanged constant 9/10 by end of day. Starts day with pain scale of 7/10. Still is unable to walk down the stairs the right way. Ye Costa returns today for follow-up of his left TKA. DOS: 3/2/22 and MAKI 4/14/22. He reports he has increased pain at night 9/10.     Past Medical History:   Diagnosis Date    Anxiety     Blood in stool     Chronic back pain     Constipation     GERD (gastroesophageal reflux disease)     Headache(784.0)     History of dental problems     Hyperlipidemia     Neck pain     Neck stiffness     Type II or unspecified type diabetes mellitus without mention of complication, not stated as uncontrolled      Past Surgical History:   Procedure Laterality Date    COLONOSCOPY      FRACTURE SURGERY Left     femur    KNEE ARTHROSCOPY Left 07/18/2016    medial lateral menisectomy/synovectomy/chodroplasty    KNEE SURGERY Left 4/14/2022    LEFT KNEE MANIPULATION UNDER ANESTHESIA performed by Husam Blue DO at [address] ARTHROSCOPY Right 2/11/2020    RIGHT SHOULDER ARTHROSCOPY SUBACHROMIAL DECOMPRESSION DEBRIDEMENT, BICEP TENODESIS ,LABRAL DEBRIDEMENT AND KHALIDA performed by Nacholavon Guerra DO at [address] [facility] ARTHROSCOPY Right 10/02/2020    SHOULDER ARTHROSCOPY Right 10/2/2020    RIGHT SHOULDER ARTHROSCOPY SUBACROMIAL DECOMPRESSION AND DEBRIDEMENT LABRIAL DEBRIDEMENT ROTATOR CUFF REPAIR KHALIDA performed by Husam Blue DO at [address] [facility] Left 3/2/2022    Left knee total knee arthroplasty [facility]) performed by Husam lBue DO at [address]       Current Outpatient Medications:     tiZANidine (ZANAFLEX) 2 MG tablet, Take 1 tablet by mouth every 8 hours as needed (muscle spasm), Disp: 30 tablet, Rfl: 0    empagliflozin (JARDIANCE) 10 MG tablet, Take 1 tablet by mouth daily, Disp: 90 tablet, Rfl: 1 alogliptin (NESINA) 25 MG TABS tablet, take 1 tablet by mouth once daily, Disp: 30 tablet, Rfl: 2    aspirin (ASPIRIN LOW DOSE) 81 MG EC tablet, take 1 tablet by mouth once daily, Disp: 30 tablet, Rfl: 1    pantoprazole (PROTONIX) 20 MG tablet, take 1 tablet by mouth every evening if needed, Disp: 30 tablet, Rfl: 1    simvastatin (ZOCOR) 20 MG tablet, take 1 tablet by mouth every evening, Disp: 30 tablet, Rfl: 1    metFORMIN (GLUCOPHAGE) 1000 MG tablet, take 1 tablet by mouth twice a day with meals, Disp: 60 tablet, Rfl: 1    ibuprofen (ADVIL;MOTRIN) 800 MG tablet, Take 1 tablet by mouth in the morning and 1 tablet at noon and 1 tablet in the evening. Take with meals. , Disp: 270 tablet, Rfl: 1    ondansetron (ZOFRAN ODT) 4 MG disintegrating tablet, Take 1 tablet by mouth every 8 hours as needed for Nausea or Vomiting, Disp: 21 tablet, Rfl: 0    Dulaglutide 3 MG/0.5ML SOPN, Inject 3 mg into the skin once a week, Disp: 4 pen, Rfl: 5    glipiZIDE (GLUCOTROL XL) 10 MG extended release tablet, take 1 tablet by mouth once daily, Disp: 30 tablet, Rfl: 5    senna-docusate (PERICOLACE) 8.6-50 MG per tablet, Take 1 tablet by mouth daily as needed for Constipation, Disp: 30 tablet, Rfl: 3    Alcohol Swabs PADS, 1 Units by Does not apply route as needed (checking glucose), Disp: 100 each, Rfl: 11    blood glucose monitor strips, FBS daily. Please dispense Freestyle brand, Disp: 100 strip, Rfl: 11    Lancets MISC, Test once daily, Disp: 100 each, Rfl: 3    gabapentin (NEURONTIN) 100 MG capsule, Take 1 capsule by mouth 3 times daily for 30 days.  Intended supply: 30 days, Disp: 90 capsule, Rfl: 0  Allergies   Allergen Reactions    Tobramycin Swelling     Eyes swelled from drops     Social History     Socioeconomic History    Marital status:      Spouse name: Not on file    Number of children: Not on file    Years of education: Not on file    Highest education level: Not on file   Occupational History    Occupation: [employer] Tobacco Use    Smoking status: Never    Smokeless tobacco: Never   Vaping Use    Vaping Use: Never used   Substance and Sexual Activity    Alcohol use: Not Currently     Alcohol/week: 0.0 standard drinks    Drug use: No    Sexual activity: Yes     Partners: Female   Other Topics Concern    Not on file   Social History Narrative    Not on file     Social Determinants of Health     Financial Resource Strain: Low Risk     Difficulty of Paying Living Expenses: Not hard at all   Food Insecurity: No Food Insecurity    Worried About Running Out of Food in the Last Year: Never true    Ran Out of Food in the Last Year: Never true   Transportation Needs: Not on file   Physical Activity: Not on file   Stress: Not on file   Social Connections: Not on file   Intimate Partner Violence: Not on file   Housing Stability: Not on file       Review of Systems:     Skin: (-) rash,(-) psoriasis,(-) eczema, (-)skin cancer. Musculoskeletal: (-) fractures,  (-) dislocations,(-) collagen vascular disease, (-) fibromyalgia, (-) multiple sclerosis, (-) muscular dystrophy, (-) RSD,(-) joint pain (-)swelling, (-) joint pain,swelling. Neurologic: (-) epilepsy, (-)seizures,(-) brain tumor,(-) TIA, (-)stroke, (-)headaches, (-)Parkinson disease,(-) memory loss, (-) LOC. Cardiovascular: (-) Chest pain, (-) swelling in legs/feet, (-) SOB, (-) cramping in legs/feet with walking. Subjective:    Constitutional:    The patient is alert and oriented x 3, appears to be stated age and in no distress. Temp 98 °F (36.7 °C)   Ht 6' (1.829 m)   Wt 245 lb (111.1 kg)   BMI 33.23 kg/m²     Skin:  Upon inspection: the skin appears warm, dry and intact. There is a previous scar over the affected area. There is not any cellulitis, lymphedema or cutaneous lesions noted in the lower extremities. Upon palpation there is no induration noted.       Neurologic:  Gait: normal;  Motor exam of the lower extremities show ; quadriceps, hamstrings, foot dorsi and plantar flexors intact R.  5/5 and L. 5/5. Deep tendon reflexes are 2/4 at the knees and 2/4 at the ankles with strong extensor hallicus longus motor strength bilaterally. Sensory to both feet is intact to all sensory roots. Cardiovascular: The vascular exam is normal and is well perfused to distal extremities. Distal pulses DP/PT: R. 2+; L. 2+. There is cap refill noted less than two seconds in all digits. There is not edema of the bilateral lower extremities. There is not varicosities noted in the distal extremities. Lymph:  Upon palpation,  there is no lymphadenopathy noted in bilateral lower extremities. Musculoskeletal:    Lumbar exam:  On visual inspection, there is not deformity of the spine. full range of motion, no tenderness, palpable spasm or pain on motion. Special tests: Straight Leg Raise negative, Rich testnegative. Hip exam:  Upon inspection, there is not deformity noted. Upon palpation there is not tenderness. ROM: is   full and symmetrical.   Strength: Hip Flexors 5/5; Hip Abductors 5/5; Hip Adduction 5/5. Knee exam:  Left knee exam shows;  range of motion of R. Knee is 0 to 120, and L. Knee is 0 to 115. The patient does not have  pain on motion, effusion is mild, there is tenderness over the  lateral region, there are not any masses, there is not ligamentous instability, there is not  deformity noted. Knee exam: neither positive for moderate crepitations, some mild tenderness laxity is not noted with  stress.       R. Knee:  Lachman's negative, Anterior Drawer negative, Posterior Drawer negative  Celestina's negative, Thallasy  negative,   PF grind test negative, Apprehension test negative, Patellar J sign  negative  L. Knee:  Lachman's negative, Anterior Drawer negative, Posterior Drawer negative  Celestina's negative, Thallasy  negative,   PF grind test negative, Apprehension test negative,  Patellar J sign  Negative    Xrays: left TKA well aligned with no signs of

## 2022-09-12 NOTE — OP NOTE
Operative Note      Patient: Regi Tovar  YOB: 1965  MRN: [id number]    Date of Procedure: 10/2/2020    Pre-Op Diagnosis: AC JOINT ARTHRITIS, RIGHT SHOULDER IMPINGEMENT    Post-Op Diagnosis: Same       Procedure(s):  RIGHT SHOULDER ARTHROSCOPY SUBACROMIAL DECOMPRESSION AND DEBRIDEMENT LABRIAL DEBRIDEMENT ROTATOR CUFF REPAIR CATRINA    Surgeon(s): Rebeka Vidales DO    Assistant:   Resident: Robert Du DO; Ramírez Zhu DO    Anesthesia: General    Estimated Blood Loss (mL): Minimal    Complications: None    Specimens:   * No specimens in log *    Implants:  Implant Name Type Inv. Item Serial No.  Lot No. LRB No. Used Action   ANCHOR SWIVELOCK BIO SLF PUNCHING 4.75X24.5MM Fastener ANCHOR SWIVELOCK BIO SLF PUNCHING 4.75X24.5MM  [address] [address] [id number] Right 1 Implanted         Drains: * No LDAs found *    Findings: as above    Detailed Description of Procedure:   Below        PREOPERATIVE DIAGNOSES: (1) right shoulder rotator cuff tear. (2)   Subacromial impingement syndrome . (3) labral tear (4) AC joint arthritis (5) biceps tear  POSTOPERATIVE DIAGNOSES: as above  OPERATION: (1)right shoulder arthroscopy with arthroscopic rotator cuff   repair. (2) Subacromial arch decompression. (3) catrina procedure (4)labral debridement  ANESTHESIA: General   Surgeon: Vanessa Fajardo   Assistant: dk andre  ESTIMATED BLOOD LOSS: Minimal  COMPLICATIONS: None. OPERATIVE IMPLANTS:1 swivel lock anchor    Brief Hospital Course: Regi Tovar is a patient known to Emy Giraldo DO's practice with persistent complaints of shoulder pain. shoulder pain has failed to be relieved by non-operative conservative measures, and has began affecting daily activities of living. After examination of the patient, review of the radiologic studies, and appropriate pre-operative risk assessment, Emy Giraldo DO recommended shoulder arthroscopy, which the patient was agreeable towards. Operative Procedure:    I aDVOCATE JERRICA ambulatory encounter   GESTATIONAL DIABETES EDUCATION    CHIEF COMPLAINT:   Chief Complaint   Patient presents with   • Gestational Diabetes   • Office Visit       PRIMARY CARE PROVIDER:  Akil Walker MD/ DR Hank Cohen    SUBJECTIVE:  Diane Adkins is a 38 year old female who is here for Gestational diabetes at 33 weeks of gestation  KIERA: 10/28/2022   GA: 33w3d. She had GDM in her previous pregnancy but control with diet and exercise.       OBJECTIVE:  PAST HISTORIES:  I have reviewed the past medical history, family history, social history, medications and allergies listed in the medical record as obtained by me.       PHYSICAL EXAM  VITAL SIGNS:    Visit Vitals  LMP 01/21/2022 (Exact Date)     There is no height or weight on file to calculate BMI.    WEIGHTS:   Wt Readings from Last 4 Encounters:   09/07/22 103.4 kg (227 lb 13.5 oz)   08/10/22 100 kg (220 lb 7.4 oz)   07/27/22 99.8 kg (220 lb)   06/29/22 97.1 kg (214 lb)     GENERAL:  Oriented x3. Appears well developed and well nourished. No acute distress.    PSYCHIATRIC: Normal mood and affect.      LAB RESULTS  Glucose (mg/dL)   Date Value   08/10/2022 98     Hemoglobin A1C (%)   Date Value   03/04/2022 5.1   02/04/2022 5.2   02/09/2021 5.1     TSH (mcUnits/mL)   Date Value   02/04/2022 1.368   02/09/2021 1.292       Vitamin D, 25-Hydroxy (ng/mL)   Date Value   05/24/2022 31.0        ASSESSMENT:  No diagnosis found.    PLAN:  Discussed in detail with family member and patient:  -  Blood sugar effect on fetal health  -  Blood sugar effect on maternal health  -  Blood sugar goals for pregnancy    We spent a great deal of time discussing:  - Basic diet including  · carbohydrates  · fruits  · proteins.  -  Weight and exercise in pregnancy.  -  Choices of medications, oral medications and types of insulin  -  Natural course of diabetes mellitus in pregnancy with increasing insulin resistance  -  Possible use of medication in the future  -   Postpartum risk of type 2 diabetes  -  Importance of followup    Recommendations:  - Continue to Check blood sugars 4 times a day.  Current BG levels are elevated fasting 102-127 and 2hr post meals are . Patient has not been following a meal plan eating large portions of carbs at meals like Oatmeal with yogurt at breakfast. Lunch and dinner she eats 2 cups of pasta or rice with egg or protein small amount of vegetables. Instructed to start following the meal plan as instructed today. If glucose levels remain elevated by Thursday 09/15/22, call OB for further instructions. Discussed the possibility of starting insulin to control the high glucose levels.   -  Keep fasting blood sugars <95 as close to 90 as possible  -  Keep blood sugars 2 hours after meals <120   -  Report blood sugars weekly by calling OB     Follow-up - No follow-ups on file.    I have spent 30 minutes of face to face time with this patient in which greater than 50% of the time was spent in counseling and coordination of care.         positioned the patient in the lateral decubitus position on a   beanbag, hung 10 pounds traction from the arm, prepped and draped the   arm in sterile fashion. I outlined an incision along the posterior, lateral, and anterior   acromion. I made an incision in the posterior side   of the shoulder, placed a blunt trocar within the glenohumeral joint and   carried out a diagnostic arthroscopy. Patient had a normal  glenohumeral   surface, glenoid and humeral head were in fair, there was grade 2--3  Chondromalacia humeral head/glenoid. Patient did have a tear involving the biceps with retraction. The suture from the prior tenodesis was seen at the anchor. There  WAS  a labral tear. The labrum  was debrided along with the suture reminent. There was evidence of a full-thickness tear   from the undersurface looking up into the subacromial space. There was a partial thickness rotator cuff tear. I did debride the rotator cuff tendon. I then went to the subacromial space, I completed a complete subacromial   bursectomy removing all bursal tissue from the subacromial space. Using an [employer]   electrode, I outlined the acromial borders. I then carried out an anterior inferior   Acromioplasty, using a 5.0 barrel carlyn, smoothing the hooked acromion to a flat type 1 contour. The end of the distal clavicle was exposed and a catrina procedure was performed using 5-0 carlyn. I then prepared the footprint of the greater tuberosity for implantation of the tendon down to nice bleeding bone. Tendon was debrided and cleaned up. The cuff tear measured about 1 cm in dimension and was full-thickness. I then choose to peform a single row repair. I then went to repair the rotator cuff, I placed an inverted horizontal mattress in the rtc. Using an scropion suture passer, I passed stitches within the cuff   Tear approximately 17 mm from edge of tendon.    I grabbed the stitches out through the superior lateral portals where I made the anchor portals trough. The  stitch was retreived   from the anterior portion of the cuff and one from the posterior cuff and   reapproximated it anteriorly with a lateral row anchor placed anterior lateral to the   greater tuberosity. This was impacted   into position. The cuff was reapproximated to its bed, had great fixation on   the cuff by pulling on the stitches to approximate it to its bed. The cuff was   nicely approximated to its bed and had no abnormalities. I did thoroughly irrigate the wound upon closure, I checked the cuff for stability using a   hook probe. The tendon repair was snug and well approximated to its footprint. I then removed all fluid from the shoulder joint and closed the incision with 4-0 Prolene in a horizontal mattress-type fashion. Sterile dressing was placed on the wound. Patient placed in a splint. The Patient tolerated the procedure well, woke up in the   recovery room without difficulty.        Electronically signed by Halyie Gold DO on 10/2/2020 at 8:29 AM

## 2022-09-19 ENCOUNTER — HOSPITAL ENCOUNTER (EMERGENCY)
Age: 57
Discharge: HOME | End: 2022-09-19
Payer: COMMERCIAL

## 2022-09-19 ENCOUNTER — APPOINTMENT (OUTPATIENT)
Dept: GENERAL RADIOLOGY | Age: 57
End: 2022-09-19
Payer: COMMERCIAL

## 2022-09-19 VITALS
HEART RATE: 76 BPM | HEIGHT: 72 IN | DIASTOLIC BLOOD PRESSURE: 83 MMHG | WEIGHT: 245 LBS | BODY MASS INDEX: 33.18 KG/M2 | SYSTOLIC BLOOD PRESSURE: 126 MMHG | OXYGEN SATURATION: 97 % | TEMPERATURE: 97.7 F | RESPIRATION RATE: 14 BRPM

## 2022-09-19 DIAGNOSIS — M77.9 TENDONITIS: ICD-10-CM

## 2022-09-19 DIAGNOSIS — M25.521 RIGHT ELBOW PAIN: Primary | ICD-10-CM

## 2022-09-19 LAB
ANION GAP SERPL CALCULATED.3IONS-SCNC: 12 MMOL/L (ref 7–16)
BASOPHILS ABSOLUTE: 0.03 E9/L (ref 0–0.2)
BASOPHILS RELATIVE PERCENT: 0.4 % (ref 0–2)
BUN BLDV-MCNC: 14 MG/DL (ref 6–20)
CALCIUM SERPL-MCNC: 9.2 MG/DL (ref 8.6–10.2)
CHLORIDE BLD-SCNC: 104 MMOL/L (ref 98–107)
CO2: 23 MMOL/L (ref 22–29)
CREAT SERPL-MCNC: 0.7 MG/DL (ref 0.7–1.2)
EOSINOPHILS ABSOLUTE: 0.09 E9/L (ref 0.05–0.5)
EOSINOPHILS RELATIVE PERCENT: 1.2 % (ref 0–6)
GFR AFRICAN AMERICAN: >60
GFR NON-AFRICAN AMERICAN: >60 ML/MIN/1.73
GLUCOSE BLD-MCNC: 129 MG/DL (ref 74–99)
HCT VFR BLD CALC: 42.6 % (ref 37–54)
HEMOGLOBIN: 14.2 G/DL (ref 12.5–16.5)
IMMATURE GRANULOCYTES #: 0.05 E9/L
IMMATURE GRANULOCYTES %: 0.7 % (ref 0–5)
LYMPHOCYTES ABSOLUTE: 2.1 E9/L (ref 1.5–4)
LYMPHOCYTES RELATIVE PERCENT: 28.6 % (ref 20–42)
MCH RBC QN AUTO: 28.7 PG (ref 26–35)
MCHC RBC AUTO-ENTMCNC: 33.3 % (ref 32–34.5)
MCV RBC AUTO: 86.1 FL (ref 80–99.9)
MONOCYTES ABSOLUTE: 0.56 E9/L (ref 0.1–0.95)
MONOCYTES RELATIVE PERCENT: 7.6 % (ref 2–12)
NEUTROPHILS ABSOLUTE: 4.5 E9/L (ref 1.8–7.3)
NEUTROPHILS RELATIVE PERCENT: 61.5 % (ref 43–80)
PDW BLD-RTO: 14 FL (ref 11.5–15)
PLATELET # BLD: 255 E9/L (ref 130–450)
PMV BLD AUTO: 10.4 FL (ref 7–12)
POTASSIUM REFLEX MAGNESIUM: 4.1 MMOL/L (ref 3.5–5)
RBC # BLD: 4.95 E12/L (ref 3.8–5.8)
SODIUM BLD-SCNC: 139 MMOL/L (ref 132–146)
URIC ACID, SERUM: 3.8 MG/DL (ref 3.4–7)
WBC # BLD: 7.3 E9/L (ref 4.5–11.5)

## 2022-09-19 PROCEDURE — 99284 EMERGENCY DEPT VISIT MOD MDM: CPT

## 2022-09-19 PROCEDURE — 80048 BASIC METABOLIC PNL TOTAL CA: CPT

## 2022-09-19 PROCEDURE — 85025 COMPLETE CBC W/AUTO DIFF WBC: CPT

## 2022-09-19 PROCEDURE — 6370000000 HC RX 637 (ALT 250 FOR IP): Performed by: PHYSICIAN ASSISTANT

## 2022-09-19 PROCEDURE — 84550 ASSAY OF BLOOD/URIC ACID: CPT

## 2022-09-19 PROCEDURE — 73070 X-RAY EXAM OF ELBOW: CPT

## 2022-09-19 RX ORDER — IBUPROFEN 800 MG/1
800 TABLET ORAL ONCE
Status: COMPLETED | OUTPATIENT
Start: 2022-09-19 | End: 2022-09-19

## 2022-09-19 RX ORDER — HYDROCODONE BITARTRATE AND ACETAMINOPHEN 5; 325 MG/1; MG/1
1 TABLET ORAL EVERY 6 HOURS PRN
Qty: 12 TABLET | Refills: 0 | Status: SHIPPED | OUTPATIENT
Start: 2022-09-19 | End: 2022-09-22

## 2022-09-19 RX ORDER — PROMETHAZINE HYDROCHLORIDE 25 MG/ML
12.5 INJECTION, SOLUTION INTRAMUSCULAR; INTRAVENOUS ONCE
Status: DISCONTINUED | OUTPATIENT
Start: 2022-09-19 | End: 2022-09-19

## 2022-09-19 RX ADMIN — IBUPROFEN 800 MG: 800 TABLET, FILM COATED ORAL at 11:53

## 2022-09-19 ASSESSMENT — PAIN SCALES - GENERAL
PAINLEVEL_OUTOF10: 10
PAINLEVEL_OUTOF10: 10

## 2022-09-19 ASSESSMENT — PAIN - FUNCTIONAL ASSESSMENT: PAIN_FUNCTIONAL_ASSESSMENT: 0-10

## 2022-09-19 ASSESSMENT — PAIN DESCRIPTION - DESCRIPTORS: DESCRIPTORS: BURNING

## 2022-09-19 ASSESSMENT — PAIN DESCRIPTION - LOCATION: LOCATION: ELBOW

## 2022-09-19 ASSESSMENT — PAIN DESCRIPTION - ORIENTATION: ORIENTATION: RIGHT

## 2022-09-19 NOTE — ED PROVIDER NOTES
[facility]   HPI:  9/19/22, Time: 10:34 AM EDT         Juan Bowen is a 61 y.o. male presenting to the ED for right elbow pain, beginning 10 days ago. The complaint has been persistent, moderate in severity, and worsened by movement of elbow . Patient comes in with complaint of right elbow pain over the last 10 days. He denies any injury. Pain does radiate towards the L forearm. He denies any numbness tingling or weakness. He has history of chronic right shoulder pain. He denies any pain radiation down from the proximal arm or shoulder. Denies any known injury. Pain is worse with movement. No fever or chills. Review of Systems:   A complete review of systems was performed and pertinent positives and negatives are stated within HPI, all other systems reviewed and are negative.          --------------------------------------------- PAST HISTORY ---------------------------------------------  Past Medical History:  has a past medical history of Anxiety, Blood in stool, Chronic back pain, Constipation, GERD (gastroesophageal reflux disease), Headache(784.0), History of dental problems, Hyperlipidemia, Neck pain, Neck stiffness, and Type II or unspecified type diabetes mellitus without mention of complication, not stated as uncontrolled. Past Surgical History:  has a past surgical history that includes fracture surgery (Left); Tonsillectomy; Knee arthroscopy (Left, 07/18/2016); Shoulder arthroscopy (Right, 2/11/2020); Shoulder arthroscopy (Right, 10/02/2020); Shoulder arthroscopy (Right, 10/2/2020); Colonoscopy; Total knee arthroplasty (Left, 3/2/2022); and knee surgery (Left, 4/14/2022). Social History:  reports that he has never smoked. He has never used smokeless tobacco. He reports that he does not currently use alcohol. He reports that he does not use drugs.     Family History: family history includes Cancer in his maternal grandfather and sister; Cancer (age of onset: 40) in his maternal cousin; Cancer (age of onset: 54) in his brother; Diabetes in his father, maternal aunt, maternal grandfather, maternal grandmother, maternal uncle, mother, paternal aunt, paternal grandfather, paternal uncle, and sister; Emphysema in his mother; Heart Attack in his father; Heart Disease in his father, maternal aunt, maternal uncle, mother, paternal aunt, paternal uncle, and sister; High Blood Pressure in his brother, father, maternal aunt, maternal grandfather, maternal grandmother, maternal uncle, paternal aunt, paternal grandfather, and paternal uncle; High Cholesterol in his maternal aunt, maternal grandfather, maternal grandmother, maternal uncle, paternal aunt, paternal grandfather, and paternal uncle; Stroke in his father, maternal aunt, maternal grandfather, maternal uncle, paternal aunt, paternal uncle, and sister. The patients home medications have been reviewed.     Allergies: Tobramycin    -------------------------------------------------- RESULTS -------------------------------------------------  All laboratory and radiology results have been personally reviewed by myself   LABS:  Results for orders placed or performed during the hospital encounter of 09/19/22   CBC with Auto Differential   Result Value Ref Range    WBC 7.3 4.5 - 11.5 E9/L    RBC 4.95 3.80 - 5.80 E12/L    Hemoglobin 14.2 12.5 - 16.5 g/dL    Hematocrit 42.6 37.0 - 54.0 %    MCV 86.1 80.0 - 99.9 fL    MCH 28.7 26.0 - 35.0 pg    MCHC 33.3 32.0 - 34.5 %    RDW 14.0 11.5 - 15.0 fL    Platelets 101 225 - 924 E9/L    MPV 10.4 7.0 - 12.0 fL    Neutrophils % 61.5 43.0 - 80.0 %    Immature Granulocytes % 0.7 0.0 - 5.0 %    Lymphocytes % 28.6 20.0 - 42.0 %    Monocytes % 7.6 2.0 - 12.0 %    Eosinophils % 1.2 0.0 - 6.0 %    Basophils % 0.4 0.0 - 2.0 %    Neutrophils Absolute 4.50 1.80 - 7.30 E9/L    Immature Granulocytes # 0.05 E9/L    Lymphocytes Absolute 2.10 1.50 - 4.00 E9/L    Monocytes Absolute 0.56 0.10 - 0.95 E9/L    Eosinophils Absolute 0.09 0.05 - 0.50 E9/L    Basophils Absolute 0.03 0.00 - 0.20 Q2/O   Basic Metabolic Panel w/ Reflex to MG   Result Value Ref Range    Sodium 139 132 - 146 mmol/L    Potassium reflex Magnesium 4.1 3.5 - 5.0 mmol/L    Chloride 104 98 - 107 mmol/L    CO2 23 22 - 29 mmol/L    Anion Gap 12 7 - 16 mmol/L    Glucose 129 (H) 74 - 99 mg/dL    BUN 14 6 - 20 mg/dL    Creatinine 0.7 0.7 - 1.2 mg/dL    GFR Non-African American >60 >=60 mL/min/1.73    GFR African American >60     Calcium 9.2 8.6 - 10.2 mg/dL   Uric Acid   Result Value Ref Range    Uric Acid, Serum 3.8 3.4 - 7.0 mg/dL       RADIOLOGY:  Interpreted by Radiologist.  XR ELBOW RIGHT (2 VIEWS)   Final Result   No acute osseous abnormality involving right elbow.             ------------------------- NURSING NOTES AND VITALS REVIEWED ---------------------------   The nursing notes within the ED encounter and vital signs as below have been reviewed. /83   Pulse 76   Temp 97.7 °F (36.5 °C)   Resp 14   Ht 6' (1.829 m)   Wt 245 lb (111.1 kg)   SpO2 97%   BMI 33.23 kg/m²   Oxygen Saturation Interpretation: Normal      ---------------------------------------------------PHYSICAL EXAM--------------------------------------      Constitutional/General: Alert and oriented x3, well appearing, non toxic in NAD  Head: Normocephalic and atraumatic  Eyes: PERRL, EOMI  Mouth: Oropharynx clear, handling secretions, no trismus  Neck: Supple, full ROM,   Pulmonary: Lungs clear to auscultation bilaterally, no wheezes, rales, or rhonchi. Not in respiratory distress  Cardiovascular:  Regular rate and rhythm, no murmurs, gallops, or rubs. 2+ distal pulses  Abdomen: Soft, non tender, non distended,   Extremities: Moves all extremities x 4. Warm and well perfused tender to palpation of the lateral epicondyle there is no erythema or swelling of the elbow present and he had full range of motion. No bony point tenderness of the forearm or hand.   Pulses normal cap refill less than 2 seconds  Skin: warm and dry without rash  Neurologic: GCS 15,  Psych: Normal Affect      ------------------------------ ED COURSE/MEDICAL DECISION MAKING----------------------  Medications   ibuprofen (ADVIL;MOTRIN) tablet 800 mg (800 mg Oral Given 9/19/22 1153)         ED COURSE:       Medical Decision Making:    Patient comes in with complaint of right elbow pain that started 10 days ago denied any injury pain is worse with movement of the elbow. He had tenderness over the epicondyles. X-ray no fracture dislocation no finding of effusion. Labs were obtained there is no leukocytosis normal uric acid. He was placed in Ace wrap he is to continue with antiinflammatories follow-up with his primary care 1 to 2 days he was given 3-day supply [place]    Counseling: The emergency provider has spoken with the patient and discussed todays results, in addition to providing specific details for the plan of care and counseling regarding the diagnosis and prognosis. Questions are answered at this time and they are agreeable with the plan.      --------------------------------- IMPRESSION AND DISPOSITION ---------------------------------    IMPRESSION  1. Right elbow pain    2. Tendonitis        DISPOSITION  Disposition: Discharge to home  Patient condition is good      NOTE: This report was transcribed using voice recognition software.  Every effort was made to ensure accuracy; however, inadvertent computerized transcription errors may be present      [facility] [place], [place]  09/19/22 3661

## 2022-09-21 DIAGNOSIS — E11.69 HYPERLIPIDEMIA ASSOCIATED WITH TYPE 2 DIABETES MELLITUS (HCC): Chronic | ICD-10-CM

## 2022-09-21 DIAGNOSIS — E78.5 HYPERLIPIDEMIA ASSOCIATED WITH TYPE 2 DIABETES MELLITUS (HCC): Chronic | ICD-10-CM

## 2022-09-21 DIAGNOSIS — E11.42 TYPE 2 DIABETES MELLITUS WITH DIABETIC POLYNEUROPATHY, WITHOUT LONG-TERM CURRENT USE OF INSULIN (HCC): Chronic | ICD-10-CM

## 2022-09-21 RX ORDER — ASPIRIN 81 MG/1
TABLET ORAL
Qty: 30 TABLET | Refills: 1 | OUTPATIENT
Start: 2022-09-21

## 2022-09-21 RX ORDER — SIMVASTATIN 20 MG
20 TABLET ORAL EVERY EVENING
Qty: 60 TABLET | OUTPATIENT
Start: 2022-09-21

## 2022-10-10 DIAGNOSIS — Z96.652 STATUS POST LEFT KNEE REPLACEMENT: Primary | ICD-10-CM

## 2022-10-11 ENCOUNTER — OFFICE VISIT (OUTPATIENT)
Dept: ORTHOPEDIC SURGERY | Age: 57
End: 2022-10-11
Payer: COMMERCIAL

## 2022-10-11 VITALS — WEIGHT: 244 LBS | TEMPERATURE: 98 F | BODY MASS INDEX: 33.05 KG/M2 | HEIGHT: 72 IN

## 2022-10-11 DIAGNOSIS — M77.11 LATERAL EPICONDYLITIS OF RIGHT ELBOW: ICD-10-CM

## 2022-10-11 DIAGNOSIS — Z96.652 STATUS POST LEFT KNEE REPLACEMENT: Primary | ICD-10-CM

## 2022-10-11 PROCEDURE — 20550 NJX 1 TENDON SHEATH/LIGAMENT: CPT | Performed by: ORTHOPAEDIC SURGERY

## 2022-10-11 PROCEDURE — 3017F COLORECTAL CA SCREEN DOC REV: CPT | Performed by: ORTHOPAEDIC SURGERY

## 2022-10-11 PROCEDURE — G8427 DOCREV CUR MEDS BY ELIG CLIN: HCPCS | Performed by: ORTHOPAEDIC SURGERY

## 2022-10-11 PROCEDURE — 99214 OFFICE O/P EST MOD 30 MIN: CPT | Performed by: ORTHOPAEDIC SURGERY

## 2022-10-11 PROCEDURE — 1036F TOBACCO NON-USER: CPT | Performed by: ORTHOPAEDIC SURGERY

## 2022-10-11 PROCEDURE — G8417 CALC BMI ABV UP PARAM F/U: HCPCS | Performed by: ORTHOPAEDIC SURGERY

## 2022-10-11 PROCEDURE — G8484 FLU IMMUNIZE NO ADMIN: HCPCS | Performed by: ORTHOPAEDIC SURGERY

## 2022-10-11 NOTE — PROGRESS NOTES
Chief Complaint   Patient presents with    Knee Pain     Lt TKA DOS 3/2/22 still having pain. Rt elbow pain started approximately three weeks ago. No swelling, no redness, having burning sensation. Gloria Khanna returns today for follow-up of his left TKA. DOS: 3/2/22 and MAKI 4/14/22. He reports he has  6/10.  Also having right lateral elbow pain, no injuries    Past Medical History:   Diagnosis Date    Anxiety     Blood in stool     Chronic back pain     Constipation     GERD (gastroesophageal reflux disease)     Headache(784.0)     History of dental problems     Hyperlipidemia     Neck pain     Neck stiffness     Type II or unspecified type diabetes mellitus without mention of complication, not stated as uncontrolled      Past Surgical History:   Procedure Laterality Date    COLONOSCOPY      FRACTURE SURGERY Left     femur    KNEE ARTHROSCOPY Left 07/18/2016    medial lateral menisectomy/synovectomy/chodroplasty    KNEE SURGERY Left 4/14/2022    LEFT KNEE MANIPULATION UNDER ANESTHESIA performed by Clark Martinez DO at [address] [facility] ARTHROSCOPY Right 2/11/2020    RIGHT SHOULDER ARTHROSCOPY SUBACHROMIAL DECOMPRESSION DEBRIDEMENT, BICEP TENODESIS ,LABRAL DEBRIDEMENT AND KHALIDA performed by Rosa Cornejo DO at [address] [facility] ARTHROSCOPY Right 10/02/2020    SHOULDER ARTHROSCOPY Right 10/2/2020    RIGHT SHOULDER ARTHROSCOPY SUBACROMIAL DECOMPRESSION AND DEBRIDEMENT LABRIAL DEBRIDEMENT ROTATOR CUFF REPAIR KHALIDA performed by Clark Martinez DO at [address] [facility] Left 3/2/2022    Left knee total knee arthroplasty [facility]) performed by Clark Martinez DO at [address]       Current Outpatient Medications:     alogliptin (NESINA) 25 MG TABS tablet, take 1 tablet by mouth once daily, Disp: 90 tablet, Rfl: 1    metFORMIN (GLUCOPHAGE) 1000 MG tablet, take 1 tablet by mouth twice a day with meals, Disp: 180 tablet, Rfl: 1    aspirin (ASPIRIN LOW DOSE) 81 MG EC tablet, take 1 tablet by mouth once daily, Disp: 90 tablet, Rfl: 1    simvastatin (ZOCOR) 20 MG tablet, Take 1 tablet by mouth every evening, Disp: 90 tablet, Rfl: 1    empagliflozin (JARDIANCE) 10 MG tablet, Take 1 tablet by mouth daily, Disp: 90 tablet, Rfl: 1    pantoprazole (PROTONIX) 20 MG tablet, take 1 tablet by mouth every evening if needed, Disp: 30 tablet, Rfl: 1    ibuprofen (ADVIL;MOTRIN) 800 MG tablet, Take 1 tablet by mouth in the morning and 1 tablet at noon and 1 tablet in the evening. Take with meals. , Disp: 270 tablet, Rfl: 1    ondansetron (ZOFRAN ODT) 4 MG disintegrating tablet, Take 1 tablet by mouth every 8 hours as needed for Nausea or Vomiting, Disp: 21 tablet, Rfl: 0    Dulaglutide 3 MG/0.5ML SOPN, Inject 3 mg into the skin once a week, Disp: 4 pen, Rfl: 5    glipiZIDE (GLUCOTROL XL) 10 MG extended release tablet, take 1 tablet by mouth once daily, Disp: 30 tablet, Rfl: 5    senna-docusate (PERICOLACE) 8.6-50 MG per tablet, Take 1 tablet by mouth daily as needed for Constipation, Disp: 30 tablet, Rfl: 3    Alcohol Swabs PADS, 1 Units by Does not apply route as needed (checking glucose), Disp: 100 each, Rfl: 11    blood glucose monitor strips, FBS daily. Please dispense Freestyle brand, Disp: 100 strip, Rfl: 11    Lancets MISC, Test once daily, Disp: 100 each, Rfl: 3    gabapentin (NEURONTIN) 100 MG capsule, Take 1 capsule by mouth 3 times daily for 30 days. Intended supply: 30 days, Disp: 90 capsule, Rfl: 0    gabapentin (NEURONTIN) 100 MG capsule, Take 1 capsule by mouth 3 times daily for 30 days.  Intended supply: 30 days, Disp: 90 capsule, Rfl: 0  Allergies   Allergen Reactions    Tobramycin Swelling     Eyes swelled from drops     Social History     Socioeconomic History    Marital status:      Spouse name: Not on file    Number of children: Not on file    Years of education: Not on file    Highest education level: Not on file   Occupational History Occupation: [employer]   Tobacco Use    Smoking status: Never    Smokeless tobacco: Never   Vaping Use    Vaping Use: Never used   Substance and Sexual Activity    Alcohol use: Not Currently     Alcohol/week: 0.0 standard drinks    Drug use: No    Sexual activity: Yes     Partners: Female   Other Topics Concern    Not on file   Social History Narrative    Not on file     Social Determinants of Health     Financial Resource Strain: Low Risk     Difficulty of Paying Living Expenses: Not hard at all   Food Insecurity: No Food Insecurity    Worried About Running Out of Food in the Last Year: Never true    Ran Out of Food in the Last Year: Never true   Transportation Needs: Not on file   Physical Activity: Not on file   Stress: Not on file   Social Connections: Not on file   Intimate Partner Violence: Not on file   Housing Stability: Not on file       Review of Systems:     Skin: (-) rash,(-) psoriasis,(-) eczema, (-)skin cancer. Musculoskeletal: (-) fractures,  (-) dislocations,(-) collagen vascular disease, (-) fibromyalgia, (-) multiple sclerosis, (-) muscular dystrophy, (-) RSD,(-) joint pain (-)swelling, (-) joint pain,swelling. Neurologic: (-) epilepsy, (-)seizures,(-) brain tumor,(-) TIA, (-)stroke, (-)headaches, (-)Parkinson disease,(-) memory loss, (-) LOC. Cardiovascular: (-) Chest pain, (-) swelling in legs/feet, (-) SOB, (-) cramping in legs/feet with walking. Subjective:    Constitutional:    The patient is alert and oriented x 3, appears to be stated age and in no distress. Temp 98 °F (36.7 °C)   Ht 6' (1.829 m)   Wt 244 lb (110.7 kg)   BMI 33.09 kg/m²     Skin:  Upon inspection: the skin appears warm, dry and intact. There is a previous scar over the affected area. There is not any cellulitis, lymphedema or cutaneous lesions noted in the lower extremities. Upon palpation there is no induration noted.       Neurologic:  Gait: normal;  Motor exam of the lower extremities show ; quadriceps, hamstrings, foot dorsi and plantar flexors intact R.  5/5 and L. 5/5. Deep tendon reflexes are 2/4 at the knees and 2/4 at the ankles with strong extensor hallicus longus motor strength bilaterally. Sensory to both feet is intact to all sensory roots. Cardiovascular: The vascular exam is normal and is well perfused to distal extremities. Distal pulses DP/PT: R. 2+; L. 2+. There is cap refill noted less than two seconds in all digits. There is not edema of the bilateral lower extremities. There is not varicosities noted in the distal extremities. Lymph:  Upon palpation,  there is no lymphadenopathy noted in bilateral lower extremities. Musculoskeletal:    Lumbar exam:  On visual inspection, there is not deformity of the spine. full range of motion, no tenderness, palpable spasm or pain on motion. Special tests: Straight Leg Raise negative, Rich testnegative. Hip exam:  Upon inspection, there is not deformity noted. Upon palpation there is not tenderness. ROM: is   full and symmetrical.   Strength: Hip Flexors 5/5; Hip Abductors 5/5; Hip Adduction 5/5. Knee exam:  Left knee exam shows;  range of motion of R. Knee is 0 to 120, and L. Knee is 0 to 115. The patient does not have  pain on motion, effusion is mild, there is tenderness over the  lateral region, there are not any masses, there is not ligamentous instability, there is not  deformity noted. Knee exam: neither positive for moderate crepitations, some mild tenderness laxity is not noted with  stress.       R. Knee:  Lachman's negative, Anterior Drawer negative, Posterior Drawer negative  Celestina's negative, Thallasy  negative,   PF grind test negative, Apprehension test negative, Patellar J sign  negative  L. Knee:  Lachman's negative, Anterior Drawer negative, Posterior Drawer negative  Celestina's negative, Thallasy  negative,   PF grind test negative, Apprehension test negative,  Patellar J sign  Negative    Right  elbow: pain is located lateral.  Range of motion: R.Elbow flexion 0/extension 140; L. Elbow flexion 0/extension 140. ; Wrist ROM R wrist DF 90, VF 90, L wrist DF 90, VF 90, R pronation 90/ supination 90, L pronation 90/supination 90. There is not swelling, there is not ecchymosis. Exam is compared bilaterally. Right:  Special tests: Cozen's sign positive. Reverse cozen sign negative    Left:  Special tests: Cozen's sign negative. Reverse cozen sign negative    Xrays: left TKA well aligned with no signs of aseptic loosening    Radiographic findings reviewed with patient    Impression:   Encounter Diagnoses   Name Primary? Status post left knee replacement Yes    Lateral epicondylitis of right elbow          Plan:  He will continue with ibuprofen 800mg as needed for pain and inflammation. I expect he will continue to improve. Verbal and written consent for the injection was given by the patient. He was placed in a supine position and the right lateral elbow was cleaned in prepped with alcohol and betadine. He was injected with . 5ml of lidocaine and .5ml of Kenalog 40 mg into the elbow. The patient tolerated the injection well.   Follow up  6months

## 2022-10-12 ENCOUNTER — OFFICE VISIT (OUTPATIENT)
Dept: PODIATRY | Age: 57
End: 2022-10-12
Payer: COMMERCIAL

## 2022-10-12 VITALS — WEIGHT: 246 LBS | HEIGHT: 72 IN | BODY MASS INDEX: 33.32 KG/M2

## 2022-10-12 DIAGNOSIS — B35.3 TINEA PEDIS OF LEFT FOOT: Primary | ICD-10-CM

## 2022-10-12 DIAGNOSIS — E11.42 TYPE 2 DIABETES MELLITUS WITH DIABETIC POLYNEUROPATHY, WITHOUT LONG-TERM CURRENT USE OF INSULIN (HCC): ICD-10-CM

## 2022-10-12 PROCEDURE — G8417 CALC BMI ABV UP PARAM F/U: HCPCS | Performed by: PODIATRIST

## 2022-10-12 PROCEDURE — 1036F TOBACCO NON-USER: CPT | Performed by: PODIATRIST

## 2022-10-12 PROCEDURE — G8484 FLU IMMUNIZE NO ADMIN: HCPCS | Performed by: PODIATRIST

## 2022-10-12 PROCEDURE — 3052F HG A1C>EQUAL 8.0%<EQUAL 9.0%: CPT | Performed by: PODIATRIST

## 2022-10-12 PROCEDURE — 2022F DILAT RTA XM EVC RTNOPTHY: CPT | Performed by: PODIATRIST

## 2022-10-12 PROCEDURE — 3017F COLORECTAL CA SCREEN DOC REV: CPT | Performed by: PODIATRIST

## 2022-10-12 PROCEDURE — 99213 OFFICE O/P EST LOW 20 MIN: CPT | Performed by: PODIATRIST

## 2022-10-12 PROCEDURE — G8427 DOCREV CUR MEDS BY ELIG CLIN: HCPCS | Performed by: PODIATRIST

## 2022-10-12 RX ORDER — TRIAMCINOLONE ACETONIDE 40 MG/ML
20 INJECTION, SUSPENSION INTRA-ARTICULAR; INTRAMUSCULAR ONCE
Status: COMPLETED | OUTPATIENT
Start: 2022-10-12 | End: 2022-10-12

## 2022-10-12 RX ADMIN — TRIAMCINOLONE ACETONIDE 20 MG: 40 INJECTION, SUSPENSION INTRA-ARTICULAR; INTRAMUSCULAR at 13:56

## 2022-10-12 NOTE — PROGRESS NOTES
Patient is in today for 3 wk left 5th toe. Patient says the area looks the same.  Pcp is Mickie Alfonso,   Last ov 8/31/22

## 2022-10-12 NOTE — PROGRESS NOTES
10/12/22     Bin Carlisle    : 1965   Sex: male    Age: 61 y.o. Patient's PCP/Provider is:  Katia Berry DO    Subjective:  Patient is seen today for follow-up regarding continued care regarding tinea pedis issues left foot. Overall patient has noticed improvement with the topical Betadine to the fourth webspace. She did want to discuss other treatment options in regards to the pruritus noted left forefoot. Patient denies any nausea, vomiting, fever, chills. Chief Complaint   Patient presents with    Wound Check     Left 5th toe        ROS:  Const: Positives and pertinent negatives as per HPI. Musculo: Denies symptoms other than stated above. Neuro: Denies symptoms other than stated above. Skin: Denies symptoms other than stated above. Current Medications:    Current Outpatient Medications:     nystatin-triamcinolone (MYCOLOG II) 943820-2.1 UNIT/GM-% cream, Apply topically 2 times daily. , Disp: 60 g, Rfl: 3    alogliptin (NESINA) 25 MG TABS tablet, take 1 tablet by mouth once daily, Disp: 90 tablet, Rfl: 1    metFORMIN (GLUCOPHAGE) 1000 MG tablet, take 1 tablet by mouth twice a day with meals, Disp: 180 tablet, Rfl: 1    aspirin (ASPIRIN LOW DOSE) 81 MG EC tablet, take 1 tablet by mouth once daily, Disp: 90 tablet, Rfl: 1    simvastatin (ZOCOR) 20 MG tablet, Take 1 tablet by mouth every evening, Disp: 90 tablet, Rfl: 1    empagliflozin (JARDIANCE) 10 MG tablet, Take 1 tablet by mouth daily, Disp: 90 tablet, Rfl: 1    pantoprazole (PROTONIX) 20 MG tablet, take 1 tablet by mouth every evening if needed, Disp: 30 tablet, Rfl: 1    ibuprofen (ADVIL;MOTRIN) 800 MG tablet, Take 1 tablet by mouth in the morning and 1 tablet at noon and 1 tablet in the evening. Take with meals. , Disp: 270 tablet, Rfl: 1    ondansetron (ZOFRAN ODT) 4 MG disintegrating tablet, Take 1 tablet by mouth every 8 hours as needed for Nausea or Vomiting, Disp: 21 tablet, Rfl: 0    Dulaglutide 3 MG/0.5ML SOPN, Inject 3 mg into the skin once a week, Disp: 4 pen, Rfl: 5    glipiZIDE (GLUCOTROL XL) 10 MG extended release tablet, take 1 tablet by mouth once daily, Disp: 30 tablet, Rfl: 5    senna-docusate (PERICOLACE) 8.6-50 MG per tablet, Take 1 tablet by mouth daily as needed for Constipation, Disp: 30 tablet, Rfl: 3    Alcohol Swabs PADS, 1 Units by Does not apply route as needed (checking glucose), Disp: 100 each, Rfl: 11    blood glucose monitor strips, FBS daily. Please dispense Freestyle brand, Disp: 100 strip, Rfl: 11    Lancets MISC, Test once daily, Disp: 100 each, Rfl: 3    gabapentin (NEURONTIN) 100 MG capsule, Take 1 capsule by mouth 3 times daily for 30 days. Intended supply: 30 days, Disp: 90 capsule, Rfl: 0    gabapentin (NEURONTIN) 100 MG capsule, Take 1 capsule by mouth 3 times daily for 30 days. Intended supply: 30 days, Disp: 90 capsule, Rfl: 0    Allergies: Allergies   Allergen Reactions    Tobramycin Swelling     Eyes swelled from drops       Vitals:    10/12/22 1440   Weight: 246 lb (111.6 kg)   Height: 6' (1.829 m)       Exam:  Neurovascular status unchanged. Webspace improved left fourth. No maceration noted webspaces left foot. Mild dry scaly skin noted to the dorsal left third and fourth webspace. No signs of bacterial infection noted left foot. Adequate range of motion digital regions left foot. Diagnostic Studies:     No results found. Procedures:    None    Plan Per Assessment  Ramses Johnson was seen today for wound check. Diagnoses and all orders for this visit:    Tinea pedis of left foot  -     nystatin-triamcinolone (MYCOLOG II) 507674-5.1 UNIT/GM-% cream; Apply topically 2 times daily. Type 2 diabetes mellitus with diabetic polyneuropathy, without long-term current use of insulin ([facility])      Evaluation and management  Prescription given today for topical Mycolog-II cream to apply to the pruritic areas left foot.   We did discuss additional diabetic foot care techniques with patient in detail today to prevent potential issues from occurring. Patient will be followed up at a later date for continued evaluation and management. Seen By:    Shea Linton DPM    Electronically signed by Shea Linton DPM on 10/12/2022 at 2:59 PM    This note was created using voice recognition software. The note was reviewed however may contain grammatical errors.

## 2022-11-26 ENCOUNTER — APPOINTMENT (OUTPATIENT)
Dept: GENERAL RADIOLOGY | Age: 57
End: 2022-11-26
Payer: COMMERCIAL

## 2022-11-26 ENCOUNTER — HOSPITAL ENCOUNTER (EMERGENCY)
Age: 57
Discharge: HOME | End: 2022-11-26
Payer: COMMERCIAL

## 2022-11-26 VITALS
BODY MASS INDEX: 33.36 KG/M2 | HEART RATE: 87 BPM | OXYGEN SATURATION: 96 % | WEIGHT: 246 LBS | SYSTOLIC BLOOD PRESSURE: 135 MMHG | TEMPERATURE: 97.7 F | RESPIRATION RATE: 20 BRPM | DIASTOLIC BLOOD PRESSURE: 93 MMHG

## 2022-11-26 DIAGNOSIS — S40.012A CONTUSION OF MULTIPLE SITES OF LEFT SHOULDER AND UPPER ARM, INITIAL ENCOUNTER: ICD-10-CM

## 2022-11-26 DIAGNOSIS — S81.011A LACERATION OF RIGHT KNEE, INITIAL ENCOUNTER: Primary | ICD-10-CM

## 2022-11-26 DIAGNOSIS — S40.022A CONTUSION OF MULTIPLE SITES OF LEFT SHOULDER AND UPPER ARM, INITIAL ENCOUNTER: ICD-10-CM

## 2022-11-26 PROCEDURE — 73060 X-RAY EXAM OF HUMERUS: CPT

## 2022-11-26 PROCEDURE — 90471 IMMUNIZATION ADMIN: CPT | Performed by: PHYSICIAN ASSISTANT

## 2022-11-26 PROCEDURE — 90714 TD VACC NO PRESV 7 YRS+ IM: CPT | Performed by: PHYSICIAN ASSISTANT

## 2022-11-26 PROCEDURE — 99284 EMERGENCY DEPT VISIT MOD MDM: CPT

## 2022-11-26 PROCEDURE — 73562 X-RAY EXAM OF KNEE 3: CPT

## 2022-11-26 PROCEDURE — 6360000002 HC RX W HCPCS: Performed by: PHYSICIAN ASSISTANT

## 2022-11-26 PROCEDURE — 6370000000 HC RX 637 (ALT 250 FOR IP): Performed by: PHYSICIAN ASSISTANT

## 2022-11-26 RX ORDER — TETANUS AND DIPHTHERIA TOXOIDS ADSORBED 2; 2 [LF]/.5ML; [LF]/.5ML
0.5 INJECTION INTRAMUSCULAR ONCE
Status: COMPLETED | OUTPATIENT
Start: 2022-11-26 | End: 2022-11-26

## 2022-11-26 RX ORDER — LIDOCAINE 50 MG/G
1 PATCH TOPICAL DAILY
Qty: 10 PATCH | Refills: 0 | Status: SHIPPED | OUTPATIENT
Start: 2022-11-26 | End: 2022-12-06

## 2022-11-26 RX ORDER — CEPHALEXIN 500 MG/1
500 CAPSULE ORAL 4 TIMES DAILY
Qty: 28 CAPSULE | Refills: 0 | Status: SHIPPED | OUTPATIENT
Start: 2022-11-26 | End: 2022-12-03

## 2022-11-26 RX ORDER — NAPROXEN 500 MG/1
500 TABLET ORAL 2 TIMES DAILY WITH MEALS
Qty: 20 TABLET | Refills: 0 | Status: SHIPPED | OUTPATIENT
Start: 2022-11-26 | End: 2023-01-10

## 2022-11-26 RX ORDER — CEPHALEXIN 250 MG/1
500 CAPSULE ORAL ONCE
Status: COMPLETED | OUTPATIENT
Start: 2022-11-26 | End: 2022-11-26

## 2022-11-26 RX ADMIN — CEPHALEXIN 500 MG: 250 CAPSULE ORAL at 19:09

## 2022-11-26 RX ADMIN — TETANUS AND DIPHTHERIA TOXOIDS ADSORBED 0.5 ML: 2; 2 INJECTION INTRAMUSCULAR at 19:11

## 2022-11-26 ASSESSMENT — PAIN - FUNCTIONAL ASSESSMENT: PAIN_FUNCTIONAL_ASSESSMENT: 0-10

## 2022-11-26 ASSESSMENT — PAIN DESCRIPTION - ORIENTATION: ORIENTATION: RIGHT

## 2022-11-27 NOTE — ED PROVIDER NOTES
Independent MLP        HPI:  11/26/22, Time: 7:45 PM JOSEPH Carpio is a 61 y.o. male presenting to the ED for injuries sustained from a fall, beginning 1 day ago after tripping over a metal  box in the garage. The complaint has been persistent, moderate in severity, and worsened by standing, walking. Patient reports that he stepped over a metal box in the garage with his right leg then buckled causing him to hit the box with his right knee and hit his left elbow. He did not hit his head or lose consciousness. Reports that this happened yesterday however he was still painful therefore prompting evaluation today. Reports that he did sustain a laceration to the right knee area which he cleansed at home and bandaged. Afebrile without recent travel or sick contacts. Denies any distal neurovascular complaints. Patient denies all other symptoms and injuries at this time. Review of Systems:   A complete review of systems was performed and pertinent positives and negatives are stated within HPI, all other systems reviewed and are negative.          --------------------------------------------- PAST HISTORY ---------------------------------------------  Past Medical History:  has a past medical history of Anxiety, Blood in stool, Chronic back pain, Constipation, GERD (gastroesophageal reflux disease), Headache(784.0), History of dental problems, Hyperlipidemia, Neck pain, Neck stiffness, and Type II or unspecified type diabetes mellitus without mention of complication, not stated as uncontrolled. Past Surgical History:  has a past surgical history that includes fracture surgery (Left); Tonsillectomy; Knee arthroscopy (Left, 07/18/2016); Shoulder arthroscopy (Right, 2/11/2020); Shoulder arthroscopy (Right, 10/02/2020); Shoulder arthroscopy (Right, 10/2/2020); Colonoscopy; Total knee arthroplasty (Left, 3/2/2022); and knee surgery (Left, 4/14/2022).     Social History:  reports that he has never smoked. He has never used smokeless tobacco. He reports that he does not currently use alcohol. He reports that he does not use drugs. Family History: family history includes Cancer in his maternal grandfather and sister; Cancer (age of onset: 40) in his maternal cousin; Cancer (age of onset: 54) in his brother; Diabetes in his father, maternal aunt, maternal grandfather, maternal grandmother, maternal uncle, mother, paternal aunt, paternal grandfather, paternal uncle, and sister; Emphysema in his mother; Heart Attack in his father; Heart Disease in his father, maternal aunt, maternal uncle, mother, paternal aunt, paternal uncle, and sister; High Blood Pressure in his brother, father, maternal aunt, maternal grandfather, maternal grandmother, maternal uncle, paternal aunt, paternal grandfather, and paternal uncle; High Cholesterol in his maternal aunt, maternal grandfather, maternal grandmother, maternal uncle, paternal aunt, paternal grandfather, and paternal uncle; Stroke in his father, maternal aunt, maternal grandfather, maternal uncle, paternal aunt, paternal uncle, and sister. The patients home medications have been reviewed. Allergies: Tobramycin    -------------------------------------------------- RESULTS -------------------------------------------------  All laboratory and radiology results have been personally reviewed by myself   LABS:  No results found for this visit on 11/26/22. RADIOLOGY:  Interpreted by Radiologist.  XR HUMERUS LEFT (MIN 2 VIEWS)   Final Result   No acute osseous abnormality. XR KNEE RIGHT (3 VIEWS)   Final Result   No acute abnormality of the knee.       Punctate hyperdensity projecting over the suprapatellar pouch on the lateral   view, likely representing structure external to the patient.             ------------------------- NURSING NOTES AND VITALS REVIEWED ---------------------------   The nursing notes within the ED encounter and vital signs as below have been reviewed. BP (!) 135/93   Pulse 87   Temp 97.7 °F (36.5 °C) (Oral)   Resp 20   Wt 246 lb (111.6 kg)   SpO2 96%   BMI 33.36 kg/m²   Oxygen Saturation Interpretation: Normal      ---------------------------------------------------PHYSICAL EXAM--------------------------------------      Constitutional/General: Alert and oriented x3, well appearing, non toxic in NAD  Head: Normocephalic and atraumatic  Eyes: PERRL, EOMI  Mouth: Oropharynx clear, handling secretions, no trismus  Neck: Supple, full ROM,   Extremities: Moves all extremities x 4. Warm and well perfused. Tenderness to palpation to the inferior right knee with an overlying laceration measuring approximately 2 cm. This is cleaned and bandaged. No active bleeding or gross contamination. Forage motion of the right knee. No pretibial edema on the right. 2+ dorsalis pedis pulse on the right. FROM of the right foot. Skin: warm and dry without rash  Neurologic: GCS 15,  Psych: Normal Affect      ------------------------------ ED COURSE/MEDICAL DECISION MAKING----------------------  Medications   diptheria-tetanus toxoids [facility]) 2-2 LF/0.5ML injection 0.5 mL (0.5 mLs IntraMUSCular Given 11/26/22 1911)   cephALEXin (KEFLEX) capsule 500 mg (500 mg Oral Given 11/26/22 1909)         ED COURSE:  ED Course as of 11/26/22 2152   Sat Nov 26, 2022 2028 Reassessed patient. Remained stable neurovascularly intact. Discussed results with patient. No acute pathology noted on x-rays in the emergency department today. Patient is nontoxic-appearing, afebrile, no acute distress therefore plan on outpatient management. Laceration has been about 24 hours therefore not amenable to suturing. We will go ahead and provide antibiotic coverage as he did cut it on the dirty metal box. Tetanus immunization updated in the emergency department today.   Advise follow-up very closely with PCP for recheck return the emergency department with any new or worsening symptoms. Patient voiced understanding and is agreeable to the above treatment plan. [MS]      ED Course User Index  [MS] Bryce Escobedo       Medical Decision Making:    See ED course above. Counseling: The emergency provider has spoken with the patient and discussed todays results, in addition to providing specific details for the plan of care and counseling regarding the diagnosis and prognosis. Questions are answered at this time and they are agreeable with the plan.      --------------------------------- IMPRESSION AND DISPOSITION ---------------------------------    IMPRESSION  1. Laceration of right knee, initial encounter    2. Contusion of multiple sites of left shoulder and upper arm, initial encounter        DISPOSITION  Disposition: Discharge to home  Patient condition is stable      NOTE: This report was transcribed using voice recognition software.  Every effort was made to ensure accuracy; however, inadvertent computerized transcription errors may be present        Bryce Escobedo  11/26/22 6245

## 2022-11-30 ENCOUNTER — OFFICE VISIT (OUTPATIENT)
Dept: PODIATRY | Age: 57
End: 2022-11-30
Payer: COMMERCIAL

## 2022-11-30 VITALS — HEIGHT: 72 IN | BODY MASS INDEX: 33.32 KG/M2 | WEIGHT: 246 LBS

## 2022-11-30 DIAGNOSIS — E11.42 TYPE 2 DIABETES MELLITUS WITH DIABETIC POLYNEUROPATHY, WITHOUT LONG-TERM CURRENT USE OF INSULIN (HCC): ICD-10-CM

## 2022-11-30 DIAGNOSIS — B35.3 TINEA PEDIS OF LEFT FOOT: Primary | ICD-10-CM

## 2022-11-30 PROCEDURE — 3017F COLORECTAL CA SCREEN DOC REV: CPT | Performed by: PODIATRIST

## 2022-11-30 PROCEDURE — 3052F HG A1C>EQUAL 8.0%<EQUAL 9.0%: CPT | Performed by: PODIATRIST

## 2022-11-30 PROCEDURE — 1036F TOBACCO NON-USER: CPT | Performed by: PODIATRIST

## 2022-11-30 PROCEDURE — 2022F DILAT RTA XM EVC RTNOPTHY: CPT | Performed by: PODIATRIST

## 2022-11-30 PROCEDURE — 99213 OFFICE O/P EST LOW 20 MIN: CPT | Performed by: PODIATRIST

## 2022-11-30 PROCEDURE — G8427 DOCREV CUR MEDS BY ELIG CLIN: HCPCS | Performed by: PODIATRIST

## 2022-11-30 PROCEDURE — G8417 CALC BMI ABV UP PARAM F/U: HCPCS | Performed by: PODIATRIST

## 2022-11-30 PROCEDURE — G8484 FLU IMMUNIZE NO ADMIN: HCPCS | Performed by: PODIATRIST

## 2022-11-30 NOTE — PROGRESS NOTES
22     Mihaela Nava    : 1965   Sex: male    Age: 61 y.o. Patient's PCP/Provider is:  Milon Hamman, DO    Subjective:  Patient is seen today for follow-up regarding continued care regarding tinea pedis issues fourth webspace left foot. Patient has been applying the topical medications as instructed without additional issues noted. No other abnormalities noted. Chief Complaint   Patient presents with    Wound Check     Left 5th toe        ROS:  Const: Positives and pertinent negatives as per HPI. Musculo: Denies symptoms other than stated above. Neuro: Denies symptoms other than stated above. Skin: Denies symptoms other than stated above.     Current Medications:    Current Outpatient Medications:     cephALEXin (KEFLEX) 500 MG capsule, Take 1 capsule by mouth 4 times daily for 7 days, Disp: 28 capsule, Rfl: 0    naproxen (NAPROSYN) 500 MG tablet, Take 1 tablet by mouth 2 times daily (with meals), Disp: 20 tablet, Rfl: 0    lidocaine (LIDODERM) 5 %, Place 1 patch onto the skin daily for 10 days 12 hours on, 12 hours off., Disp: 10 patch, Rfl: 0    omeprazole (PRILOSEC) 20 MG delayed release capsule, Take 1 capsule by mouth every morning (before breakfast), Disp: 90 capsule, Rfl: 1    alogliptin (NESINA) 25 MG TABS tablet, take 1 tablet by mouth once daily, Disp: 90 tablet, Rfl: 1    aspirin (ASPIRIN LOW DOSE) 81 MG EC tablet, take 1 tablet by mouth once daily, Disp: 90 tablet, Rfl: 1    Dulaglutide 3 MG/0.5ML SOPN, Inject 3 mg into the skin once a week, Disp: 4 Adjustable Dose Pre-filled Pen Syringe, Rfl: 3    empagliflozin (JARDIANCE) 10 MG tablet, Take 1 tablet by mouth daily, Disp: 90 tablet, Rfl: 1    glipiZIDE (GLUCOTROL XL) 10 MG extended release tablet, take 1 tablet by mouth once daily, Disp: 30 tablet, Rfl: 5    metFORMIN (GLUCOPHAGE) 1000 MG tablet, take 1 tablet by mouth twice a day with meals, Disp: 180 tablet, Rfl: 1    simvastatin (ZOCOR) 20 MG tablet, Take 1 tablet by mouth every evening, Disp: 90 tablet, Rfl: 1    senna-docusate (PERICOLACE) 8.6-50 MG per tablet, Take 1 tablet by mouth daily as needed for Constipation, Disp: 30 tablet, Rfl: 3    nystatin-triamcinolone (MYCOLOG II) 108366-2.1 UNIT/GM-% cream, Apply topically 2 times daily. , Disp: 60 g, Rfl: 3    ibuprofen (ADVIL;MOTRIN) 800 MG tablet, Take 1 tablet by mouth in the morning and 1 tablet at noon and 1 tablet in the evening. Take with meals. , Disp: 270 tablet, Rfl: 1    ondansetron (ZOFRAN ODT) 4 MG disintegrating tablet, Take 1 tablet by mouth every 8 hours as needed for Nausea or Vomiting, Disp: 21 tablet, Rfl: 0    Alcohol Swabs PADS, 1 Units by Does not apply route as needed (checking glucose), Disp: 100 each, Rfl: 11    blood glucose monitor strips, FBS daily. Please dispense Freestyle brand, Disp: 100 strip, Rfl: 11    Lancets MISC, Test once daily, Disp: 100 each, Rfl: 3    gabapentin (NEURONTIN) 100 MG capsule, Take 1 capsule by mouth 3 times daily for 30 days. Intended supply: 30 days, Disp: 90 capsule, Rfl: 0    gabapentin (NEURONTIN) 100 MG capsule, Take 1 capsule by mouth 3 times daily for 30 days. Intended supply: 30 days, Disp: 90 capsule, Rfl: 0    Allergies: Allergies   Allergen Reactions    Tobramycin Swelling     Eyes swelled from drops       Vitals:    11/30/22 1435   Weight: 246 lb (111.6 kg)   Height: 6' (1.829 m)       Exam:  Neurovascular status unchanged. Tinea pedis issues improved plantar webspaces left foot. Minimal maceration noted fourth webspace left foot. No signs of infection noted left foot. No plantar calluses, edema, or ecchymotic skin changes noted plantar forefoot area. Diagnostic Studies:     No results found. Procedures:    None    Plan Per Assessment  Sanjiv Parkinson was seen today for wound check.     Diagnoses and all orders for this visit:    Tinea pedis of left foot    Type 2 diabetes mellitus with diabetic polyneuropathy, without long-term current use of insulin [facility])      Evaluation and management  Patient was advised continued use of the topical Betadine and alginate silver which was dispensed. We did discuss additional diabetic foot care techniques with patient in detail today. Patient will be followed up at a later date for continued evaluation and management, if any additional Podiatric issues arise. Seen By:    Ghada Marmolejo DPM    Electronically signed by Ghada Marmolejo DPM on 11/30/2022 at 2:55 PM    This note was created using voice recognition software. The note was reviewed however may contain grammatical errors.

## 2023-01-31 ENCOUNTER — OFFICE VISIT (OUTPATIENT)
Dept: ORTHOPEDIC SURGERY | Age: 58
End: 2023-01-31
Payer: COMMERCIAL

## 2023-01-31 VITALS — TEMPERATURE: 98 F | BODY MASS INDEX: 33.32 KG/M2 | HEIGHT: 72 IN | WEIGHT: 246 LBS

## 2023-01-31 DIAGNOSIS — M77.12 LATERAL EPICONDYLITIS OF LEFT ELBOW: Primary | ICD-10-CM

## 2023-01-31 PROCEDURE — 3017F COLORECTAL CA SCREEN DOC REV: CPT | Performed by: ORTHOPAEDIC SURGERY

## 2023-01-31 PROCEDURE — 20550 NJX 1 TENDON SHEATH/LIGAMENT: CPT | Performed by: ORTHOPAEDIC SURGERY

## 2023-01-31 PROCEDURE — G8417 CALC BMI ABV UP PARAM F/U: HCPCS | Performed by: ORTHOPAEDIC SURGERY

## 2023-01-31 PROCEDURE — G8484 FLU IMMUNIZE NO ADMIN: HCPCS | Performed by: ORTHOPAEDIC SURGERY

## 2023-01-31 PROCEDURE — G8427 DOCREV CUR MEDS BY ELIG CLIN: HCPCS | Performed by: ORTHOPAEDIC SURGERY

## 2023-01-31 PROCEDURE — 1036F TOBACCO NON-USER: CPT | Performed by: ORTHOPAEDIC SURGERY

## 2023-01-31 PROCEDURE — 99213 OFFICE O/P EST LOW 20 MIN: CPT | Performed by: ORTHOPAEDIC SURGERY

## 2023-01-31 RX ORDER — TRIAMCINOLONE ACETONIDE 40 MG/ML
40 INJECTION, SUSPENSION INTRA-ARTICULAR; INTRAMUSCULAR ONCE
Status: COMPLETED | OUTPATIENT
Start: 2023-01-31 | End: 2023-01-31

## 2023-01-31 RX ADMIN — TRIAMCINOLONE ACETONIDE 40 MG: 40 INJECTION, SUSPENSION INTRA-ARTICULAR; INTRAMUSCULAR at 14:39

## 2023-01-31 NOTE — PROGRESS NOTES
Chief Complaint   Patient presents with    Elbow Injury     Left elbow pain resulting from about a month ago. He landed on knee and elbow causing some pain. Pain has been the same over the last month and not really improved. Ching Tom  male  presents today for evaluation of his left elbow pain. The patient states the pain started  2 months ago. The pain is aching, sharp, shooting, and stabbing in nature. It is worse with activity and better with rest.  The patient does complain of night pain. The patient is right hand dominant. The patient is not working at this time. His occupation is disability.       Past Medical History:   Diagnosis Date    Anxiety     Blood in stool     Chronic back pain     Constipation     GERD (gastroesophageal reflux disease)     Headache(784.0)     History of dental problems     Hyperlipidemia     Neck pain     Neck stiffness     Type II or unspecified type diabetes mellitus without mention of complication, not stated as uncontrolled      Past Surgical History:   Procedure Laterality Date    COLONOSCOPY      FRACTURE SURGERY Left     femur    KNEE ARTHROSCOPY Left 07/18/2016    medial lateral menisectomy/synovectomy/chodroplasty    KNEE SURGERY Left 4/14/2022    LEFT KNEE MANIPULATION UNDER ANESTHESIA performed by Gus Garrido DO at [address] ARTHROSCOPY Right 2/11/2020    RIGHT SHOULDER ARTHROSCOPY SUBACHROMIAL DECOMPRESSION DEBRIDEMENT, BICEP TENODESIS ,LABRAL DEBRIDEMENT AND KHALIDA performed by Annalise Bowles DO at [address] ARTHROSCOPY Right 10/02/2020    SHOULDER ARTHROSCOPY Right 10/2/2020    RIGHT SHOULDER ARTHROSCOPY SUBACROMIAL DECOMPRESSION AND DEBRIDEMENT LABRIAL DEBRIDEMENT ROTATOR CUFF REPAIR KHALIDA performed by Gus Garrido DO at [address] Left 3/2/2022    Left knee total knee arthroplasty [facility] & NEPH) performed by Gsu Garrido DO at [address] [address] Current Outpatient Medications:     empagliflozin (JARDIANCE) 25 MG tablet, Take 1 tablet by mouth daily, Disp: 90 tablet, Rfl: 1    alogliptin (NESINA) 25 MG TABS tablet, take 1 tablet by mouth once daily, Disp: 90 tablet, Rfl: 1    Dulaglutide 3 MG/0.5ML SOPN, Inject 3 mg into the skin once a week, Disp: 4 Adjustable Dose Pre-filled Pen Syringe, Rfl: 3    ibuprofen (ADVIL;MOTRIN) 800 MG tablet, Take 1 tablet by mouth 3 times daily (with meals), Disp: 270 tablet, Rfl: 1    glipiZIDE (GLUCOTROL XL) 10 MG extended release tablet, take 1 tablet by mouth once daily, Disp: 90 tablet, Rfl: 1    metFORMIN (GLUCOPHAGE) 1000 MG tablet, take 1 tablet by mouth twice a day with meals, Disp: 180 tablet, Rfl: 1    omeprazole (PRILOSEC) 20 MG delayed release capsule, Take 1 capsule by mouth every morning (before breakfast), Disp: 90 capsule, Rfl: 1    simvastatin (ZOCOR) 20 MG tablet, Take 1 tablet by mouth every evening, Disp: 90 tablet, Rfl: 1    aspirin (ASPIRIN LOW DOSE) 81 MG EC tablet, take 1 tablet by mouth once daily, Disp: 90 tablet, Rfl: 1    senna-docusate (PERICOLACE) 8.6-50 MG per tablet, Take 1 tablet by mouth daily as needed for Constipation, Disp: 30 tablet, Rfl: 3  Allergies   Allergen Reactions    Tobramycin Swelling     Eyes swelled from drops     Social History     Socioeconomic History    Marital status:      Spouse name: Not on file    Number of children: Not on file    Years of education: Not on file    Highest education level: Not on file   Occupational History    Occupation: [employer]   Tobacco Use    Smoking status: Never    Smokeless tobacco: Never   Vaping Use    Vaping Use: Never used   Substance and Sexual Activity    Alcohol use: Not Currently     Alcohol/week: 0.0 standard drinks    Drug use: No    Sexual activity: Yes     Partners: Female   Other Topics Concern    Not on file   Social History Narrative    Not on file     Social Determinants of Health     Financial Resource Strain: Not on file   Food Insecurity: Not on file   Transportation Needs: Not on file   Physical Activity: Not on file   Stress: Not on file   Social Connections: Not on file   Intimate Partner Violence: Not on file   Housing Stability: Not on file     Family History   Problem Relation Age of Onset    Diabetes Father     High Blood Pressure Father     Heart Disease Father     Stroke Father     Heart Attack Father     Diabetes Mother     Heart Disease Mother     Emphysema Mother     Cancer Brother 54        colon    High Blood Pressure Brother     Cancer Maternal Cousin 40        colon    Cancer Sister     Diabetes Sister     Heart Disease Sister     Stroke Sister     Diabetes Maternal Aunt     Heart Disease Maternal Aunt     High Blood Pressure Maternal Aunt     High Cholesterol Maternal Aunt     Stroke Maternal Aunt     Diabetes Maternal Uncle     Heart Disease Maternal Uncle     High Blood Pressure Maternal Uncle     High Cholesterol Maternal Uncle     Stroke Maternal Uncle     Diabetes Paternal Aunt     Heart Disease Paternal Aunt     High Blood Pressure Paternal Aunt     High Cholesterol Paternal Aunt     Stroke Paternal Aunt     Diabetes Paternal Uncle     Heart Disease Paternal Uncle     High Blood Pressure Paternal Uncle     High Cholesterol Paternal Uncle     Stroke Paternal Uncle     Diabetes Maternal Grandmother     High Blood Pressure Maternal Grandmother     High Cholesterol Maternal Grandmother     Cancer Maternal Grandfather     Diabetes Maternal Grandfather     High Blood Pressure Maternal Grandfather     High Cholesterol Maternal Grandfather     Stroke Maternal Grandfather     Diabetes Paternal Grandfather     High Blood Pressure Paternal Grandfather     High Cholesterol Paternal Grandfather        REVIEW OF SYSTEMS:     General/Constitution:  (-)weight loss, (-)fever, (-)chills, (-)weakness. Skin: (-) rash,(-) psoriasis,(-) eczema, (-)skin cancer.    Musculoskeletal: (-) fractures,  (-) dislocations,(-) collagen vascular disease, (-) fibromyalgia, (-) multiple sclerosis, (-) muscular dystrophy, (-) RSD,(-) joint pain (-)swelling, (-) joint pain,swelling. Neurologic: (-) epilepsy, (-)seizures,(-) brain tumor,(-) TIA, (-)stroke, (-)headaches, (-)Parkinson disease,(-) memory loss, (-) LOC. Cardiovascular: (-) Chest pain, (-) swelling in legs/feet, (-) SOB, (-) cramping in legs/feet with walking. Respiratory: (-) SOB, (-) Coughing, (-) night sweats. GI: (-) nausea, (-) vomiting, (-) diarrhea, (-) blood in stool, (-) gastric ulcer. Psychiatric: (-) Depression, (-) Anxiety, (-) bipolar disease, (-) Alzheimer's Disease  Allergic/Immunologic: (-) allergies latex, (-) allergies metal, (-) skin sensitivity. Hematlogic: (-) anemia, (-) blood transfusion, (-) DVT/PE, (-) Clotting disorders       Subjective:      Constitution:    Temp 98 °F (36.7 °C)   Ht 6' (1.829 m)   Wt 246 lb (111.6 kg)   BMI 33.36 kg/m²     Psycihatric:    The patient is alert and oriented x 3, appears to be stated age and in no distress. Respiratory:    Respiratory effort is not labored. Patient is not gasping. Palpation of the chest reveals no tactile fremitus. Skin:    Upon inspection: the skin appears warm, dry and intact. There is not a previous scar over the affected area. There is not any cellulitis, lymphedema or cutaneous lesions noted in the lower extremities. Upon palpation there is no induration noted. Neurologic:      Motor exam of the upper extremities show: The reflexes in biceps/triceps/brachioradialis are equal and symmetric. Sensory exam C5-T1 are normal bilaterally. Cardiovascular: The vascular exam is normal and is well perfused to distal extremities. There are 2+ radial pulses bilaterally, and motor and sensation is intact to median, ulnar, and radial, musclocutaneus, and axillary nerve distribution and grossly symmetric bilaterally.   There is cap refill noted less than two seconds in all digits. There is not edema of the bilateral upper extremities. There is not varicosities noted in the distal extremities. Lymph:    Upon palpation,  there is no lymphadenopathy noted in bilateral upper extremities. Musculoskeletal:    Gait: normal; examination of the nails and digits reveal no cyanosis or clubbing. Cervical Exam:    On physical exam, Lan Perez is well-developed, well-nourished, oriented to person, place and time. his gait is normal.  On evaluation of his cervical spine, he has full range of motion of the cervical spine without pain. There is no cervical tenderness to palpation. Shoulder Exam:     On evaluation of his bilaterally upper extremities, his bilateral shoulder has no deformity. There is not evidence of scapular dyskinesis. There is not muscle atrophy in shoulder girdle. The range of motion for the Right Shoulder is 150/50/t8 and for the Left shoulder is 150/50/t8. Right shoulder Motor strength is 5/5 in the supraspinatus, 5/5 internal rotation and 5/5 in external rotation, and Left shoulder motor strength 5/5 in supraspinatus, 5/5 in internal rotation, 5/5 in external rotation. Right shoulder:  (-) Impingement , (-) Arrington , (-) Speeds, (-) Apprehension ,(-) Ross Load Shift, (-) [employer], (-) Cross arm test.     Left shoulder:  (-) Impingement, (-) Arrington, (-) Speeds, (-) Apprehension,(-) Ross, (-) Load Shift, (-) [employer],(-)  Cross arm test        Left  elbow: pain is located laterl epicondyle. Range of motion: R.Elbow flexion 140/extension 0; L. Elbow flexion 140/extension 0. ; Wrist ROM R wrist DF 90, VF 90, L wrist DF 90, VF 90, R pronation 90/ supination 90, L pronation 90/supination 90. There is not swelling, there is not ecchymosis. Exam is compared bilaterally. Right:  Special tests: Cozen's sign negative. Reverse cozen sign negative    Left:  Special tests: Cozen's sign positive.   Reverse cozen sign negative    Xray:  unremarkable    MRI:  n/a      Radiographic findings reviewed with patient    Assessment:   Encounter Diagnosis   Name Primary? Lateral epicondylitis of left elbow Yes       Plan:    Natural history and expected course discussed. Questions answered. Rest, ice, compression, and elevation (RICE) therapy. Reduction in offending activity. Verbal and written consent for the injection was given by the patient. He was placed in a supine position and the left lateral elbow was cleaned in prepped with alcohol and betadine. He was injected with . 5ml of lidocaine and .5ml of Kenalog 40 mg into the elbow. The patient tolerated the injection well.   Follow up prn

## 2023-05-12 DIAGNOSIS — M77.11 LATERAL EPICONDYLITIS OF RIGHT ELBOW: Primary | ICD-10-CM

## 2023-05-16 ENCOUNTER — OFFICE VISIT (OUTPATIENT)
Dept: ORTHOPEDIC SURGERY | Age: 58
End: 2023-05-16

## 2023-05-16 VITALS — HEIGHT: 72 IN | WEIGHT: 246 LBS | BODY MASS INDEX: 33.32 KG/M2 | TEMPERATURE: 98 F

## 2023-05-16 DIAGNOSIS — M77.11 LATERAL EPICONDYLITIS OF RIGHT ELBOW: ICD-10-CM

## 2023-05-16 DIAGNOSIS — M77.12 LATERAL EPICONDYLITIS OF LEFT ELBOW: Primary | ICD-10-CM

## 2023-05-16 RX ORDER — TRIAMCINOLONE ACETONIDE 40 MG/ML
20 INJECTION, SUSPENSION INTRA-ARTICULAR; INTRAMUSCULAR ONCE
Status: COMPLETED | OUTPATIENT
Start: 2023-05-16 | End: 2023-05-16

## 2023-05-16 RX ADMIN — TRIAMCINOLONE ACETONIDE 20 MG: 40 INJECTION, SUSPENSION INTRA-ARTICULAR; INTRAMUSCULAR at 15:30

## 2023-05-16 RX ADMIN — TRIAMCINOLONE ACETONIDE 20 MG: 40 INJECTION, SUSPENSION INTRA-ARTICULAR; INTRAMUSCULAR at 15:29

## 2023-05-16 NOTE — PROGRESS NOTES
epicondylitis of left elbow Yes    Lateral epicondylitis of right elbow        Plan:    Natural history and expected course discussed. Questions answered. Rest, ice, compression, and elevation (RICE) therapy. Reduction in offending activity. Verbal and written consent for the injection was given by the patient. He was placed in a supine position and the b/l  lateral elbow was cleaned in prepped with alcohol and betadine. He was injected with . 5ml of lidocaine and .5ml of Kenalog 40 mg into the elbow. The patient tolerated the injection well.   Follow up prn

## 2023-08-01 ENCOUNTER — OFFICE VISIT (OUTPATIENT)
Dept: ORTHOPEDIC SURGERY | Age: 58
End: 2023-08-01

## 2023-08-01 VITALS — WEIGHT: 246 LBS | BODY MASS INDEX: 33.32 KG/M2 | HEIGHT: 72 IN

## 2023-08-01 DIAGNOSIS — M77.11 LATERAL EPICONDYLITIS OF RIGHT ELBOW: ICD-10-CM

## 2023-08-01 DIAGNOSIS — M77.12 LATERAL EPICONDYLITIS OF LEFT ELBOW: Primary | ICD-10-CM

## 2023-08-08 RX ORDER — TRIAMCINOLONE ACETONIDE 40 MG/ML
20 INJECTION, SUSPENSION INTRA-ARTICULAR; INTRAMUSCULAR ONCE
Status: COMPLETED | OUTPATIENT
Start: 2023-08-08 | End: 2023-08-08

## 2023-08-08 RX ADMIN — TRIAMCINOLONE ACETONIDE 20 MG: 40 INJECTION, SUSPENSION INTRA-ARTICULAR; INTRAMUSCULAR at 22:02

## 2023-08-08 RX ADMIN — TRIAMCINOLONE ACETONIDE 20 MG: 40 INJECTION, SUSPENSION INTRA-ARTICULAR; INTRAMUSCULAR at 22:03

## 2023-08-09 NOTE — PROGRESS NOTES
Father     Heart Disease Father     Stroke Father     Heart Attack Father     Diabetes Mother     Heart Disease Mother     Emphysema Mother     Cancer Brother 54        colon    High Blood Pressure Brother     Cancer Maternal Cousin 40        colon    Cancer Sister     Diabetes Sister     Heart Disease Sister     Stroke Sister     Diabetes Maternal Aunt     Heart Disease Maternal Aunt     High Blood Pressure Maternal Aunt     High Cholesterol Maternal Aunt     Stroke Maternal Aunt     Diabetes Maternal Uncle     Heart Disease Maternal Uncle     High Blood Pressure Maternal Uncle     High Cholesterol Maternal Uncle     Stroke Maternal Uncle     Diabetes Paternal Aunt     Heart Disease Paternal Aunt     High Blood Pressure Paternal Aunt     High Cholesterol Paternal Aunt     Stroke Paternal Aunt     Diabetes Paternal Uncle     Heart Disease Paternal Uncle     High Blood Pressure Paternal Uncle     High Cholesterol Paternal Uncle     Stroke Paternal Uncle     Diabetes Maternal Grandmother     High Blood Pressure Maternal Grandmother     High Cholesterol Maternal Grandmother     Cancer Maternal Grandfather     Diabetes Maternal Grandfather     High Blood Pressure Maternal Grandfather     High Cholesterol Maternal Grandfather     Stroke Maternal Grandfather     Diabetes Paternal Grandfather     High Blood Pressure Paternal Grandfather     High Cholesterol Paternal Grandfather        REVIEW OF SYSTEMS:     General/Constitution:  (-)weight loss, (-)fever, (-)chills, (-)weakness. Skin: (-) rash,(-) psoriasis,(-) eczema, (-)skin cancer. Musculoskeletal: (-) fractures,  (-) dislocations,(-) collagen vascular disease, (-) fibromyalgia, (-) multiple sclerosis, (-) muscular dystrophy, (-) RSD,(-) joint pain (-)swelling, (-) joint pain,swelling. Neurologic: (-) epilepsy, (-)seizures,(-) brain tumor,(-) TIA, (-)stroke, (-)headaches, (-)Parkinson disease,(-) memory loss, (-) LOC.   Cardiovascular: (-) Chest pain, (-) swelling

## 2023-09-20 ENCOUNTER — TELEPHONE (OUTPATIENT)
Dept: NON INVASIVE DIAGNOSTICS | Age: 58
End: 2023-09-20

## 2023-09-20 NOTE — TELEPHONE ENCOUNTER
Spoke with patient and confirmed Echo appointment on 9/22 at 1:30. Instructed patient to arrive 15 minutes prior to appointment time, Enter through Entrance B, register to right of entrance, and report to Cardiac Services for test. Patient verbalized understanding. Questions answered.

## 2023-10-10 ENCOUNTER — OFFICE VISIT (OUTPATIENT)
Dept: ORTHOPEDIC SURGERY | Age: 58
End: 2023-10-10
Payer: COMMERCIAL

## 2023-10-10 VITALS — WEIGHT: 243 LBS | TEMPERATURE: 98 F | BODY MASS INDEX: 32.91 KG/M2 | HEIGHT: 72 IN

## 2023-10-10 DIAGNOSIS — M77.11 LATERAL EPICONDYLITIS OF RIGHT ELBOW: ICD-10-CM

## 2023-10-10 DIAGNOSIS — M77.12 LATERAL EPICONDYLITIS OF LEFT ELBOW: Primary | ICD-10-CM

## 2023-10-10 PROCEDURE — 99212 OFFICE O/P EST SF 10 MIN: CPT | Performed by: ORTHOPAEDIC SURGERY

## 2023-10-10 PROCEDURE — 3017F COLORECTAL CA SCREEN DOC REV: CPT | Performed by: ORTHOPAEDIC SURGERY

## 2023-10-10 PROCEDURE — 1036F TOBACCO NON-USER: CPT | Performed by: ORTHOPAEDIC SURGERY

## 2023-10-10 PROCEDURE — G8417 CALC BMI ABV UP PARAM F/U: HCPCS | Performed by: ORTHOPAEDIC SURGERY

## 2023-10-10 PROCEDURE — G8484 FLU IMMUNIZE NO ADMIN: HCPCS | Performed by: ORTHOPAEDIC SURGERY

## 2023-10-10 PROCEDURE — G8428 CUR MEDS NOT DOCUMENT: HCPCS | Performed by: ORTHOPAEDIC SURGERY

## 2023-10-10 RX ORDER — METHYLPREDNISOLONE 4 MG/1
TABLET ORAL
Qty: 1 KIT | Refills: 2 | Status: SHIPPED | OUTPATIENT
Start: 2023-10-10 | End: 2023-10-16

## 2023-10-10 NOTE — PROGRESS NOTES
Chief Complaint   Patient presents with    Elbow Injury     Pt states doing well. Pt has minor increased in the Right Elbow Joint. Constantine Garnica  male  presents today for follow up with his bilateral elbow pain. He states he gets mild relief from injections. He is doing well today.     Past Medical History:   Diagnosis Date    Anxiety     Blood in stool     Chronic back pain     Constipation     GERD (gastroesophageal reflux disease)     Headache(784.0)     History of dental problems     Hyperlipidemia     Neck pain     Neck stiffness     Type II or unspecified type diabetes mellitus without mention of complication, not stated as uncontrolled      Past Surgical History:   Procedure Laterality Date    COLONOSCOPY      FRACTURE SURGERY Left     femur    KNEE ARTHROSCOPY Left 07/18/2016    medial lateral menisectomy/synovectomy/chodroplasty    KNEE SURGERY Left 4/14/2022    LEFT KNEE MANIPULATION UNDER ANESTHESIA performed by Migue Garcia DO at [address] [facility] ARTHROSCOPY Right 2/11/2020    RIGHT SHOULDER ARTHROSCOPY SUBACHROMIAL DECOMPRESSION DEBRIDEMENT, BICEP TENODESIS ,LABRAL DEBRIDEMENT AND KHALIDA performed by Nikky Nam DO at [address] [facility] ARTHROSCOPY Right 10/02/2020    SHOULDER ARTHROSCOPY Right 10/2/2020    RIGHT SHOULDER ARTHROSCOPY SUBACROMIAL DECOMPRESSION AND DEBRIDEMENT LABRIAL DEBRIDEMENT ROTATOR CUFF REPAIR KHALIDA performed by Migue Garcia DO at [facility] Left 3/2/2022    Left knee total knee arthroplasty [facility] NEPH) performed by Migue Garcia DO at [facility]       Current Outpatient Medications:     methylPREDNISolone (MEDROL, JETT,) 4 MG tablet, Take by mouth., Disp: 1 kit, Rfl: 2    omeprazole (PRILOSEC) 20 MG delayed release capsule, take 1 capsule by mouth EVERY MORNING BEFORE BREAKFAST, Disp: 90 capsule, Rfl: 1    simvastatin (ZOCOR) 20 MG tablet, take 1 tablet by mouth every evening,

## 2023-10-20 ENCOUNTER — TELEPHONE (OUTPATIENT)
Dept: NON INVASIVE DIAGNOSTICS | Age: 58
End: 2023-10-20

## 2023-10-20 NOTE — TELEPHONE ENCOUNTER
Spoke with patient and confirmed Holter appointment on 10/23 at 1:30 pm. Instructed patient to arrive 15 minutes prior to appointment time, Enter through Entrance B, register to right of entrance, and report to Cardiac Services for test. Patient verbalized understanding. Questions answered.

## 2023-10-27 ENCOUNTER — HOSPITAL ENCOUNTER (OUTPATIENT)
Dept: NON INVASIVE DIAGNOSTICS | Age: 58
Discharge: HOME | End: 2023-10-27
Attending: FAMILY MEDICINE
Payer: COMMERCIAL

## 2023-10-27 DIAGNOSIS — R00.2 PALPITATIONS: ICD-10-CM

## 2023-10-27 DIAGNOSIS — E11.42 TYPE 2 DIABETES MELLITUS WITH DIABETIC POLYNEUROPATHY, WITHOUT LONG-TERM CURRENT USE OF INSULIN (HCC): ICD-10-CM

## 2023-10-27 DIAGNOSIS — E11.69 HYPERLIPIDEMIA ASSOCIATED WITH TYPE 2 DIABETES MELLITUS (HCC): ICD-10-CM

## 2023-10-27 DIAGNOSIS — E78.5 HYPERLIPIDEMIA ASSOCIATED WITH TYPE 2 DIABETES MELLITUS (HCC): ICD-10-CM

## 2023-10-27 PROCEDURE — 93225 XTRNL ECG REC<48 HRS REC: CPT

## 2023-10-27 PROCEDURE — 93306 TTE W/DOPPLER COMPLETE: CPT

## 2024-01-02 ENCOUNTER — OFFICE VISIT (OUTPATIENT)
Dept: ORTHOPEDIC SURGERY | Age: 59
End: 2024-01-02
Payer: COMMERCIAL

## 2024-01-02 VITALS — HEIGHT: 72 IN | BODY MASS INDEX: 32.91 KG/M2 | WEIGHT: 243 LBS | TEMPERATURE: 98 F

## 2024-01-02 DIAGNOSIS — M77.12 LATERAL EPICONDYLITIS OF LEFT ELBOW: Primary | ICD-10-CM

## 2024-01-02 DIAGNOSIS — M77.11 LATERAL EPICONDYLITIS OF RIGHT ELBOW: ICD-10-CM

## 2024-01-02 PROCEDURE — 1036F TOBACCO NON-USER: CPT | Performed by: ORTHOPAEDIC SURGERY

## 2024-01-02 PROCEDURE — G8417 CALC BMI ABV UP PARAM F/U: HCPCS | Performed by: ORTHOPAEDIC SURGERY

## 2024-01-02 PROCEDURE — G8427 DOCREV CUR MEDS BY ELIG CLIN: HCPCS | Performed by: ORTHOPAEDIC SURGERY

## 2024-01-02 PROCEDURE — 99213 OFFICE O/P EST LOW 20 MIN: CPT | Performed by: ORTHOPAEDIC SURGERY

## 2024-01-02 PROCEDURE — G8484 FLU IMMUNIZE NO ADMIN: HCPCS | Performed by: ORTHOPAEDIC SURGERY

## 2024-01-02 PROCEDURE — 3017F COLORECTAL CA SCREEN DOC REV: CPT | Performed by: ORTHOPAEDIC SURGERY

## 2024-01-16 ENCOUNTER — HOSPITAL ENCOUNTER (OUTPATIENT)
Dept: MRI IMAGING | Age: 59
Discharge: HOME | End: 2024-01-18
Attending: ORTHOPAEDIC SURGERY
Payer: COMMERCIAL

## 2024-01-16 DIAGNOSIS — M77.11 LATERAL EPICONDYLITIS OF RIGHT ELBOW: ICD-10-CM

## 2024-01-16 PROCEDURE — 73221 MRI JOINT UPR EXTREM W/O DYE: CPT

## 2024-01-29 ENCOUNTER — OFFICE VISIT (OUTPATIENT)
Dept: ORTHOPEDIC SURGERY | Age: 59
End: 2024-01-29
Payer: COMMERCIAL

## 2024-01-29 VITALS — TEMPERATURE: 98 F | HEIGHT: 72 IN | WEIGHT: 243 LBS | BODY MASS INDEX: 32.91 KG/M2

## 2024-01-29 DIAGNOSIS — M77.11 LATERAL EPICONDYLITIS OF RIGHT ELBOW: Primary | ICD-10-CM

## 2024-01-29 PROCEDURE — G8417 CALC BMI ABV UP PARAM F/U: HCPCS | Performed by: ORTHOPAEDIC SURGERY

## 2024-01-29 PROCEDURE — 99214 OFFICE O/P EST MOD 30 MIN: CPT | Performed by: ORTHOPAEDIC SURGERY

## 2024-01-29 PROCEDURE — G8482 FLU IMMUNIZE ORDER/ADMIN: HCPCS | Performed by: ORTHOPAEDIC SURGERY

## 2024-01-29 PROCEDURE — 1036F TOBACCO NON-USER: CPT | Performed by: ORTHOPAEDIC SURGERY

## 2024-01-29 PROCEDURE — 3017F COLORECTAL CA SCREEN DOC REV: CPT | Performed by: ORTHOPAEDIC SURGERY

## 2024-01-29 PROCEDURE — G8427 DOCREV CUR MEDS BY ELIG CLIN: HCPCS | Performed by: ORTHOPAEDIC SURGERY

## 2024-01-29 NOTE — PROGRESS NOTES
Chief Complaint   Patient presents with    Elbow Pain     Right Elbow MRI F/U        Jude Ashfordzarry  male  presents today for follow up with his right elbow pain.  He recently underwent an MRI and is here to discuss the results.    Past Surgical History:   Procedure Laterality Date    COLONOSCOPY      FRACTURE SURGERY Left     femur    KNEE ARTHROSCOPY Left 07/18/2016    medial lateral menisectomy/synovectomy/chodroplasty    KNEE SURGERY Left 4/14/2022    LEFT KNEE MANIPULATION UNDER ANESTHESIA performed by Ian Arthur DO at [facility] OR    SHOULDER ARTHROSCOPY Right 2/11/2020    RIGHT SHOULDER ARTHROSCOPY SUBACHROMIAL DECOMPRESSION DEBRIDEMENT, BICEP TENODESIS ,LABRAL DEBRIDEMENT AND KHALIDA performed by Jones Jensen DO at [facility] OR    SHOULDER ARTHROSCOPY Right 10/02/2020    SHOULDER ARTHROSCOPY Right 10/2/2020    RIGHT SHOULDER ARTHROSCOPY SUBACROMIAL DECOMPRESSION AND DEBRIDEMENT LABRIAL DEBRIDEMENT ROTATOR CUFF REPAIR KHALIDA performed by Ian Arthur DO at [facility] OR    TONSILLECTOMY      TOTAL KNEE ARTHROPLASTY Left 3/2/2022    Left knee total knee arthroplasty ([facility]) performed by Ian Arthur DO at [facility] OR       Current Outpatient Medications:     ibuprofen (ADVIL;MOTRIN) 800 MG tablet, Take 1 tablet by mouth 3 times daily (with meals), Disp: 270 tablet, Rfl: 1    senna-docusate (PERICOLACE) 8.6-50 MG per tablet, Take 1 tablet by mouth daily as needed for Constipation, Disp: 30 tablet, Rfl: 3    omeprazole (PRILOSEC) 20 MG delayed release capsule, Take 1 capsule by mouth every morning (before breakfast), Disp: 90 capsule, Rfl: 1    simvastatin (ZOCOR) 20 MG tablet, Take 1 tablet by mouth every evening, Disp: 90 tablet, Rfl: 1    empagliflozin (JARDIANCE) 25 MG tablet, Take 1 tablet by mouth daily, Disp: 90 tablet, Rfl: 1    alogliptin (NESINA) 25 MG TABS tablet, Take 1 tablet by mouth daily, Disp: 90 tablet, Rfl: 1    Tirzepatide (MOUNJARO) 2.5 MG/0.5ML SOPN SC

## 2024-02-02 ENCOUNTER — TELEPHONE (OUTPATIENT)
Dept: ORTHOPEDIC SURGERY | Age: 59
End: 2024-02-02

## 2024-02-02 ENCOUNTER — PREP FOR PROCEDURE (OUTPATIENT)
Dept: ORTHOPEDIC SURGERY | Age: 59
End: 2024-02-02

## 2024-02-02 DIAGNOSIS — M77.11 RIGHT LATERAL EPICONDYLITIS: ICD-10-CM

## 2024-02-02 NOTE — TELEPHONE ENCOUNTER
Prior Authorization Form:      DEMOGRAPHICS:                     Patient Name:  Jude Sarah  Patient :  1965            Insurance:  Payor: [facility] / Plan: [facility] MEDICAID / Product Type: *No Product type* /   Insurance ID Number:    Payer/Plan Subscr  Sex Relation Sub. Ins. ID Effective Group Num   1. AMY - * JUDE SARAH 1965 Male Self [id number] [address] [facility] [address]         DIAGNOSIS & PROCEDURE:                       Procedure/Operation: RIGHT ELBOW MICROTENOTOMY LATERAL EPICONDYLE WITH PRP INJECTION           CPT Code: 88010    Diagnosis:  RIGHT ELBOW LATERAL EPICONDYLITIS    ICD10 Code: M77.11    Location:  [facility]    Surgeon:  SURENDRA RAM    SCHEDULING INFORMATION:                          Date: 24    Time: TO FOLLOW              Anesthesia:  General                                                       Status:  Outpatient        Special Comments:  ARTHREX       Electronically signed by Jocelyn Nina ATC on 2024 at 12:25 PM

## 2024-02-09 RX ORDER — GLIPIZIDE 10 MG/1
10 TABLET ORAL
COMMUNITY

## 2024-02-12 ENCOUNTER — ANESTHESIA EVENT (OUTPATIENT)
Dept: OPERATING ROOM | Age: 59
End: 2024-02-12
Payer: COMMERCIAL

## 2024-02-12 NOTE — ANESTHESIA PRE PROCEDURE
Department of Anesthesiology  Preprocedure Note       Name:  Jude Sarah   Age:  58 y.o.  :  1965                                          MRN:  [id number]         Date:  2024      Surgeon: Surgeon(s):  Ian Arthur DO    Procedure: Procedure(s):  RIGHT ELBOW MICROTENOTOMY LATERAL EPICONDYLE WITH PRP INJECTION- 24 ARTHREX    Medications prior to admission:   Prior to Admission medications    Medication Sig Start Date End Date Taking? Authorizing Provider   glipiZIDE (GLUCOTROL) 10 MG tablet Take 1 tablet by mouth 2 times daily (before meals)   Yes Provider, MD Agustin   ibuprofen (ADVIL;MOTRIN) 800 MG tablet Take 1 tablet by mouth 3 times daily (with meals) 24   Tae Carbajal DO   senna-docusate (PERICOLACE) 8.6-50 MG per tablet Take 1 tablet by mouth daily as needed for Constipation 24   Tae Carbajal DO   omeprazole (PRILOSEC) 20 MG delayed release capsule Take 1 capsule by mouth every morning (before breakfast) 24   Tae Carbajal DO   simvastatin (ZOCOR) 20 MG tablet Take 1 tablet by mouth every evening 24   Tae Carbajal DO   empagliflozin (JARDIANCE) 25 MG tablet Take 1 tablet by mouth daily  Patient taking differently: Take 1 tablet by mouth daily Stopped 3 days pre op 24   Tae Carbajal DO   Tirzepatide (MOUNJARO) 2.5 MG/0.5ML SOPN SC injection Inject 0.5 mLs into the skin once a week  Patient taking differently: Inject 0.5 mLs into the skin once a week HAS NOT STARTED YET 24  Tae Carbajal DO   Tirzepatide (MOUNJARO) 5 MG/0.5ML SOPN SC injection Inject 0.5 mLs into the skin once a week  Patient taking differently: Inject 0.5 mLs into the skin once a week HAS NOT STARTED YET 24  Tae Carbajal DO   metFORMIN (GLUCOPHAGE) 1000 MG tablet take 1 tablet by mouth twice a day with meals 10/18/23   Caicco, Vincent, DO   aspirin (ASPIRIN LOW DOSE) 81 MG EC tablet take 1 tablet by mouth once daily  Patient taking

## 2024-02-16 ENCOUNTER — ANESTHESIA (OUTPATIENT)
Dept: OPERATING ROOM | Age: 59
End: 2024-02-16
Payer: COMMERCIAL

## 2024-02-16 ENCOUNTER — HOSPITAL ENCOUNTER (OUTPATIENT)
Age: 59
Setting detail: OUTPATIENT SURGERY
Discharge: HOME | End: 2024-02-16
Attending: ORTHOPAEDIC SURGERY | Admitting: ORTHOPAEDIC SURGERY
Payer: COMMERCIAL

## 2024-02-16 VITALS
BODY MASS INDEX: 33.32 KG/M2 | SYSTOLIC BLOOD PRESSURE: 143 MMHG | RESPIRATION RATE: 16 BRPM | DIASTOLIC BLOOD PRESSURE: 83 MMHG | OXYGEN SATURATION: 93 % | TEMPERATURE: 97.9 F | HEIGHT: 72 IN | WEIGHT: 246 LBS | HEART RATE: 89 BPM

## 2024-02-16 DIAGNOSIS — M77.11 RIGHT LATERAL EPICONDYLITIS: Primary | ICD-10-CM

## 2024-02-16 LAB — GLUCOSE BLD-MCNC: 203 MG/DL (ref 74–99)

## 2024-02-16 PROCEDURE — 6370000000 HC RX 637 (ALT 250 FOR IP): Performed by: ANESTHESIOLOGY

## 2024-02-16 PROCEDURE — 24358 REPAIR ELBOW W/DEB OPEN: CPT | Performed by: ORTHOPAEDIC SURGERY

## 2024-02-16 PROCEDURE — 2580000003 HC RX 258

## 2024-02-16 PROCEDURE — 6360000002 HC RX W HCPCS

## 2024-02-16 PROCEDURE — 6370000000 HC RX 637 (ALT 250 FOR IP): Performed by: STUDENT IN AN ORGANIZED HEALTH CARE EDUCATION/TRAINING PROGRAM

## 2024-02-16 PROCEDURE — 6360000002 HC RX W HCPCS: Performed by: STUDENT IN AN ORGANIZED HEALTH CARE EDUCATION/TRAINING PROGRAM

## 2024-02-16 PROCEDURE — 3700000000 HC ANESTHESIA ATTENDED CARE: Performed by: ORTHOPAEDIC SURGERY

## 2024-02-16 PROCEDURE — 3600000004 HC SURGERY LEVEL 4 BASE: Performed by: ORTHOPAEDIC SURGERY

## 2024-02-16 PROCEDURE — 7100000000 HC PACU RECOVERY - FIRST 15 MIN: Performed by: ORTHOPAEDIC SURGERY

## 2024-02-16 PROCEDURE — 3600000014 HC SURGERY LEVEL 4 ADDTL 15MIN: Performed by: ORTHOPAEDIC SURGERY

## 2024-02-16 PROCEDURE — 7100000010 HC PHASE II RECOVERY - FIRST 15 MIN: Performed by: ORTHOPAEDIC SURGERY

## 2024-02-16 PROCEDURE — 82962 GLUCOSE BLOOD TEST: CPT

## 2024-02-16 PROCEDURE — 2720000010 HC SURG SUPPLY STERILE: Performed by: ORTHOPAEDIC SURGERY

## 2024-02-16 PROCEDURE — 2709999900 HC NON-CHARGEABLE SUPPLY: Performed by: ORTHOPAEDIC SURGERY

## 2024-02-16 PROCEDURE — 2580000003 HC RX 258: Performed by: NURSE ANESTHETIST, CERTIFIED REGISTERED

## 2024-02-16 PROCEDURE — 7100000011 HC PHASE II RECOVERY - ADDTL 15 MIN: Performed by: ORTHOPAEDIC SURGERY

## 2024-02-16 PROCEDURE — 3700000001 HC ADD 15 MINUTES (ANESTHESIA): Performed by: ORTHOPAEDIC SURGERY

## 2024-02-16 PROCEDURE — 7100000001 HC PACU RECOVERY - ADDTL 15 MIN: Performed by: ORTHOPAEDIC SURGERY

## 2024-02-16 PROCEDURE — 2580000003 HC RX 258: Performed by: ANESTHESIOLOGY

## 2024-02-16 RX ORDER — FAMOTIDINE 20 MG/1
20 TABLET, FILM COATED ORAL ONCE
Status: COMPLETED | OUTPATIENT
Start: 2024-02-16 | End: 2024-02-16

## 2024-02-16 RX ORDER — OXYCODONE HYDROCHLORIDE AND ACETAMINOPHEN 5; 325 MG/1; MG/1
1 TABLET ORAL EVERY 6 HOURS PRN
Qty: 28 TABLET | Refills: 0 | Status: SHIPPED | OUTPATIENT
Start: 2024-02-16 | End: 2024-02-23

## 2024-02-16 RX ORDER — OXYCODONE HYDROCHLORIDE AND ACETAMINOPHEN 5; 325 MG/1; MG/1
1 TABLET ORAL ONCE
Status: COMPLETED | OUTPATIENT
Start: 2024-02-16 | End: 2024-02-16

## 2024-02-16 RX ORDER — LIDOCAINE HYDROCHLORIDE 20 MG/ML
INJECTION, SOLUTION INTRAVENOUS PRN
Status: DISCONTINUED | OUTPATIENT
Start: 2024-02-16 | End: 2024-02-16 | Stop reason: SDUPTHER

## 2024-02-16 RX ORDER — ONDANSETRON 2 MG/ML
INJECTION INTRAMUSCULAR; INTRAVENOUS PRN
Status: DISCONTINUED | OUTPATIENT
Start: 2024-02-16 | End: 2024-02-16 | Stop reason: SDUPTHER

## 2024-02-16 RX ORDER — PROPOFOL 10 MG/ML
INJECTION, EMULSION INTRAVENOUS PRN
Status: DISCONTINUED | OUTPATIENT
Start: 2024-02-16 | End: 2024-02-16 | Stop reason: SDUPTHER

## 2024-02-16 RX ORDER — MIDAZOLAM HYDROCHLORIDE 1 MG/ML
INJECTION INTRAMUSCULAR; INTRAVENOUS PRN
Status: DISCONTINUED | OUTPATIENT
Start: 2024-02-16 | End: 2024-02-16 | Stop reason: SDUPTHER

## 2024-02-16 RX ORDER — SODIUM CHLORIDE, SODIUM LACTATE, POTASSIUM CHLORIDE, CALCIUM CHLORIDE 600; 310; 30; 20 MG/100ML; MG/100ML; MG/100ML; MG/100ML
INJECTION, SOLUTION INTRAVENOUS CONTINUOUS
Status: DISCONTINUED | OUTPATIENT
Start: 2024-02-16 | End: 2024-02-16 | Stop reason: HOSPADM

## 2024-02-16 RX ORDER — HYDROMORPHONE HYDROCHLORIDE 1 MG/ML
0.5 INJECTION, SOLUTION INTRAMUSCULAR; INTRAVENOUS; SUBCUTANEOUS ONCE
Status: COMPLETED | OUTPATIENT
Start: 2024-02-16 | End: 2024-02-16

## 2024-02-16 RX ORDER — SODIUM CHLORIDE, SODIUM LACTATE, POTASSIUM CHLORIDE, CALCIUM CHLORIDE 600; 310; 30; 20 MG/100ML; MG/100ML; MG/100ML; MG/100ML
INJECTION, SOLUTION INTRAVENOUS CONTINUOUS PRN
Status: DISCONTINUED | OUTPATIENT
Start: 2024-02-16 | End: 2024-02-16 | Stop reason: SDUPTHER

## 2024-02-16 RX ORDER — DEXAMETHASONE SODIUM PHOSPHATE 10 MG/ML
INJECTION, SOLUTION INTRAMUSCULAR; INTRAVENOUS PRN
Status: DISCONTINUED | OUTPATIENT
Start: 2024-02-16 | End: 2024-02-16 | Stop reason: SDUPTHER

## 2024-02-16 RX ORDER — FENTANYL CITRATE 50 UG/ML
INJECTION, SOLUTION INTRAMUSCULAR; INTRAVENOUS PRN
Status: DISCONTINUED | OUTPATIENT
Start: 2024-02-16 | End: 2024-02-16 | Stop reason: SDUPTHER

## 2024-02-16 RX ORDER — METOCLOPRAMIDE 5 MG/1
10 TABLET ORAL ONCE
Status: COMPLETED | OUTPATIENT
Start: 2024-02-16 | End: 2024-02-16

## 2024-02-16 RX ADMIN — SODIUM CHLORIDE, POTASSIUM CHLORIDE, SODIUM LACTATE AND CALCIUM CHLORIDE: 600; 310; 30; 20 INJECTION, SOLUTION INTRAVENOUS at 11:44

## 2024-02-16 RX ADMIN — PROPOFOL 200 MG: 10 INJECTION, EMULSION INTRAVENOUS at 12:50

## 2024-02-16 RX ADMIN — SODIUM CHLORIDE, POTASSIUM CHLORIDE, SODIUM LACTATE AND CALCIUM CHLORIDE: 600; 310; 30; 20 INJECTION, SOLUTION INTRAVENOUS at 12:19

## 2024-02-16 RX ADMIN — FAMOTIDINE 20 MG: 20 TABLET ORAL at 11:40

## 2024-02-16 RX ADMIN — METOCLOPRAMIDE 10 MG: 5 TABLET ORAL at 11:40

## 2024-02-16 RX ADMIN — FENTANYL CITRATE 25 MCG: 50 INJECTION INTRAMUSCULAR; INTRAVENOUS at 13:39

## 2024-02-16 RX ADMIN — OXYCODONE AND ACETAMINOPHEN 1 TABLET: 5; 325 TABLET ORAL at 14:31

## 2024-02-16 RX ADMIN — DEXAMETHASONE SODIUM PHOSPHATE 10 MG: 10 INJECTION, SOLUTION INTRAMUSCULAR; INTRAVENOUS at 13:08

## 2024-02-16 RX ADMIN — FENTANYL CITRATE 25 MCG: 50 INJECTION INTRAMUSCULAR; INTRAVENOUS at 13:25

## 2024-02-16 RX ADMIN — FENTANYL CITRATE 25 MCG: 50 INJECTION INTRAMUSCULAR; INTRAVENOUS at 13:12

## 2024-02-16 RX ADMIN — MIDAZOLAM 2 MG: 1 INJECTION INTRAMUSCULAR; INTRAVENOUS at 12:46

## 2024-02-16 RX ADMIN — LIDOCAINE HYDROCHLORIDE 60 MG: 20 INJECTION, SOLUTION INTRAVENOUS at 12:50

## 2024-02-16 RX ADMIN — FENTANYL CITRATE 25 MCG: 50 INJECTION INTRAMUSCULAR; INTRAVENOUS at 13:07

## 2024-02-16 RX ADMIN — CEFAZOLIN 2000 MG: 2 INJECTION, POWDER, FOR SOLUTION INTRAMUSCULAR; INTRAVENOUS at 12:47

## 2024-02-16 RX ADMIN — HYDROMORPHONE HYDROCHLORIDE 0.5 MG: 1 INJECTION, SOLUTION INTRAMUSCULAR; INTRAVENOUS; SUBCUTANEOUS at 14:00

## 2024-02-16 RX ADMIN — ONDANSETRON 4 MG: 2 INJECTION INTRAMUSCULAR; INTRAVENOUS at 13:12

## 2024-02-16 ASSESSMENT — PAIN DESCRIPTION - ORIENTATION
ORIENTATION: RIGHT
ORIENTATION: RIGHT

## 2024-02-16 ASSESSMENT — PAIN - FUNCTIONAL ASSESSMENT
PAIN_FUNCTIONAL_ASSESSMENT: 0-10
PAIN_FUNCTIONAL_ASSESSMENT: 0-10

## 2024-02-16 ASSESSMENT — PAIN DESCRIPTION - LOCATION
LOCATION: ELBOW
LOCATION: ELBOW

## 2024-02-16 ASSESSMENT — PAIN SCALES - GENERAL
PAINLEVEL_OUTOF10: 10
PAINLEVEL_OUTOF10: 10

## 2024-02-16 ASSESSMENT — PAIN DESCRIPTION - DESCRIPTORS
DESCRIPTORS: ACHING
DESCRIPTORS: ACHING

## 2024-02-16 NOTE — ANESTHESIA POSTPROCEDURE EVALUATION
Department of Anesthesiology  Postprocedure Note    Patient: Jude Sarah  MRN: [id number]  YOB: 1965  Date of evaluation: 2/16/2024    Procedure Summary       Date: 02/16/24 Room / Location: [address]    Anesthesia Start: 1246 Anesthesia Stop: 1347    Procedure: RIGHT ELBOW MICROTENOTOMY LATERAL EPICONDYLE WITH PRP INJECTION- 2/16/24 ARTHREX (Right) Diagnosis:       Right lateral epicondylitis      (Right lateral epicondylitis [M77.11])    Surgeons: Ian Arthur DO Responsible Provider: Annel Nixon MD    Anesthesia Type: General ASA Status: 3            Anesthesia Type: General    Yesi Phase I: Yesi Score: 10    Yesi Phase II:      Anesthesia Post Evaluation    Patient location during evaluation: PACU  Patient participation: complete - patient participated  Level of consciousness: awake and alert  Pain score: 0  Airway patency: patent  Nausea & Vomiting: no vomiting and no nausea  Cardiovascular status: blood pressure returned to baseline and hemodynamically stable  Respiratory status: acceptable, nonlabored ventilation, spontaneous ventilation and room air  Hydration status: stable  Pain management: satisfactory to patient and adequate        No notable events documented.

## 2024-02-16 NOTE — OP NOTE
Operative Note      Patient: Jude Sarah  YOB: 1965  MRN: [id number]    Date of Procedure: 2/16/2024    Pre-Op Diagnosis Codes:     * Right lateral epicondylitis [M77.11]    Post-Op Diagnosis: Same       Procedure(s):  RIGHT ELBOW MICROTENOTOMY LATERAL EPICONDYLE WITH PRP INJECTION- 2/16/24 ARTHREX    Surgeon(s):  Ian Arthur DO    Assistant:   Resident: Isaiah Barragan DO; Doyle Munoz DO    Anesthesia: General    Estimated Blood Loss (mL): less than 100     Complications: None    Specimens:   * No specimens in log *    Implants:  * No implants in log *      Drains: * No LDAs found *    Findings: as above        Detailed Description of Procedure:   below      PREOPERATIVE DIAGNOSIS:  rightelbowlateral epicondylitis.     POSTOPERATIVE DIAGNOSIS:  as above     PROCEDURE:   right elbow microtenotomy lateral epicondyle with platelet injection.     SURGEON:  Ian Arthur DO     ASSISTANT:  as above     BLOOD LOSS:  Minimal.     COMPLICATIONS:  None.     OPERATIVE PROCEDURE:  The patient taken to operative suite, was given general  anesthesia, the right arm was identified with a preoperative timeout.  Placed  tourniquet right arm, prepped and draped the arm in sterile fashion.  Outlined  an incision along the lateral epicondyle origin of the elbow.  Made an incision  through with a 15 blade through skin, subcutaneous tissue, dissected down to  the level of the laterall epicondyle.  All soft tissues were freed, no signs of  tear within the origin of the tendon insertion.  I then obtained the TOPAZ  wand from the back table.  Using the TOPAZ wand, I carried out a microtenotomy  debriding all devitalized tissue around the origin of the epicondyle in a grid  type fashion with varying degrees of depth.  We made approximately 10 holes  within the origin to stimulate healing.  I then thoroughly irrigated the wound  upon closure.  Closed the incision with 4-0 Prolene in a horizontal mattress  type

## 2024-02-16 NOTE — DISCHARGE INSTRUCTIONS
make sure the infection has gone away.  Follow-up care is a key part of your treatment and safety. Be sure to make and go to all appointments, and call your doctor if you are having problems. It's also a good idea to know your test results and keep a list of the medicines you take.  How can you care for yourself at home?  Make sure your surgeon knows about the infection, especially if you saw another doctor about your symptoms.  If your doctor prescribed antibiotics, take them as directed. Do not stop taking them just because you feel better. You need to take the full course of antibiotics.  Ask your doctor if you can take an over-the-counter pain medicine, such as acetaminophen (Tylenol), ibuprofen (Advil, Motrin), or naproxen (Aleve). Be safe with medicines. Read and follow all instructions on the label.  Do not take two or more pain medicines at the same time unless the doctor told you to. Many pain medicines have acetaminophen, which is Tylenol. Too much acetaminophen (Tylenol) can be harmful.  Prop up the area on a pillow anytime you sit or lie down during the next 3 days. Try to keep it above the level of your heart. This will help reduce swelling.  Keep the skin clean and dry.  You may have a bandage over the cut (incision). A bandage helps the incision heal and protects it. Your doctor will tell you how to take care of this. Keep it clean and dry. You may have drainage from the wound.  If your doctor told you how to care for your incision, follow your doctor's instructions. If you did not get instructions, follow this general advice:  Wash around the incision with clean water 2 times a day. Don't use hydrogen peroxide or alcohol, which can slow healing.  When should you call for help?   Call your doctor now or seek immediate medical care if:    You have signs that your infection is getting worse, such as:  Increased pain, swelling, warmth, or redness in the area.  Red streaks leading from the area.  Pus  draining from the wound.  A new or higher fever.   Watch closely for changes in your health, and be sure to contact your doctor if you have any problems.           Nausea and Vomiting After Surgery: Care Instructions  Your Care Instructions     After you've had surgery, you may feel sick to your stomach (nauseated) or you may vomit. Sometimes anesthesia can make you feel sick. It's a common side effect and often doesn't last long. Pain also can make you feel sick or vomit. After the anesthesia wears off, you may feel pain from the incision (cut). That pain could then upset your stomach. Taking pain medicine can also make you feel sick to your stomach.  Whatever the cause, you may get medicine that can help. There are also some things you can do at home to prevent nausea and feel better.  The doctor has checked you carefully, but problems can develop later. If you notice any problems or new symptoms, get medical treatment right away.  Follow-up care is a key part of your treatment and safety. Be sure to make and go to all appointments, and call your doctor if you are having problems. It's also a good idea to know your test results and keep a list of the medicines you take.  How can you care for yourself at home?  Be safe with medicines. Read and follow all instructions on the label.  If the doctor gave you a prescription medicine for pain, take it as prescribed.  If you are not taking a prescription pain medicine, ask your doctor if you can take an over-the-counter medicine.  Take your pain medicine as soon as you have pain. It works better if you take it before the pain gets bad.  Call your doctor if you have any problems with your medicine.  Rest in bed until you feel better.  To prevent dehydration, drink plenty of fluids. Choose water and other clear liquids until you feel better. If you have kidney, heart, or liver disease and have to limit fluids, talk with your doctor before you increase the amount of fluids

## 2024-02-16 NOTE — H&P
Updated H&P    Chief Complaint   Patient presents with    Elbow Pain       Right Elbow MRI F/U          Jude Ashfordzarry  male  presents today for follow up with his right elbow pain.  He recently underwent an MRI and is here to discuss the results.     Past Surgical History         Past Surgical History:   Procedure Laterality Date    COLONOSCOPY        FRACTURE SURGERY Left       femur    KNEE ARTHROSCOPY Left 07/18/2016     medial lateral menisectomy/synovectomy/chodroplasty    KNEE SURGERY Left 4/14/2022     LEFT KNEE MANIPULATION UNDER ANESTHESIA performed by Ian Arthur DO at [facility] OR    SHOULDER ARTHROSCOPY Right 2/11/2020     RIGHT SHOULDER ARTHROSCOPY SUBACHROMIAL DECOMPRESSION DEBRIDEMENT, BICEP TENODESIS ,LABRAL DEBRIDEMENT AND KHALIDA performed by Jones Jensen DO at [facility] OR    SHOULDER ARTHROSCOPY Right 10/02/2020    SHOULDER ARTHROSCOPY Right 10/2/2020     RIGHT SHOULDER ARTHROSCOPY SUBACROMIAL DECOMPRESSION AND DEBRIDEMENT LABRIAL DEBRIDEMENT ROTATOR CUFF REPAIR KHALIDA performed by Ian Arthur DO at [facility] OR    TONSILLECTOMY        TOTAL KNEE ARTHROPLASTY Left 3/2/2022     Left knee total knee arthroplasty (SM[employer] NEPHEW) performed by Ian Arthur DO at [facility] OR           Current Medication      Current Outpatient Medications:     ibuprofen (ADVIL;MOTRIN) 800 MG tablet, Take 1 tablet by mouth 3 times daily (with meals), Disp: 270 tablet, Rfl: 1    senna-docusate (PERICOLACE) 8.6-50 MG per tablet, Take 1 tablet by mouth daily as needed for Constipation, Disp: 30 tablet, Rfl: 3    omeprazole (PRILOSEC) 20 MG delayed release capsule, Take 1 capsule by mouth every morning (before breakfast), Disp: 90 capsule, Rfl: 1    simvastatin (ZOCOR) 20 MG tablet, Take 1 tablet by mouth every evening, Disp: 90 tablet, Rfl: 1    empagliflozin (JARDIANCE) 25 MG tablet, Take 1 tablet by mouth daily, Disp: 90 tablet, Rfl: 1    alogliptin (NESINA) 25 MG TABS tablet, Take 1  extremities.There are 2+ radial pulses bilaterally, and motor and sensation is intact to median, ulnar, and radial, musclocutaneus, and axillary nerve distribution and grossly symmetric bilaterally.  There is cap refill noted less than two seconds in all digits. There is not edema of the bilateral upper extremities.  There is not varicosities noted in the distal extremities.       Lymph:     Upon palpation,  there is no lymphadenopathy noted in bilateral upper extremities.       Musculoskeletal:     Gait: normal; examination of the nails and digits reveal no cyanosis or clubbing.        Cervical Exam:     On physical exam, Jude Sarah is well-developed, well-nourished, oriented to person, place and time.  his gait is normal.  On evaluation of his cervical spine, he has full range of motion of the cervical spine without pain.  There is no cervical tenderness to palpation.      Shoulder Exam:      On evaluation of his bilaterally upper extremities, his bilateral shoulder has no deformity.     There is not evidence of scapular dyskinesis.  There is not muscle atrophy in shoulder girdle. The range of motion for the Right Shoulder is 150/50/t8 and for the Left shoulder is 150/50/t8.Right shoulder Motor strength is 5/5 in the supraspinatus, 5/5 internal rotation and 5/5 in external rotation, and Left shoulder motor strength 5/5 in supraspinatus, 5/5 in internal rotation, 5/5 in external rotation.        Right shoulder:  (-) Impingement , (-) Arrington , (-) Speeds, (-) Apprehension ,(-) Ross Load Shift, (-) Cole manuver, (-) Cross arm test.      Left shoulder:  (-) Impingement, (-) Arrington, (-) Speeds, (-) Apprehension,(-) Ross, (-) Load Shift, (-) Cole manuver,(-)  Cross arm test     B/l  elbow: pain is located laterl epicondyle.  Range of motion: R.Elbow flexion 140/extension 0; L. Elbow flexion 140/extension 0. ; Wrist ROM R wrist DF 90, VF 90, L wrist DF 90, VF 90, R pronation 90/ supination 90, L

## 2024-02-22 ENCOUNTER — TELEPHONE (OUTPATIENT)
Dept: ORTHOPEDIC SURGERY | Age: 59
End: 2024-02-22

## 2024-02-22 DIAGNOSIS — G89.18 POST-OP PAIN: Primary | ICD-10-CM

## 2024-02-22 RX ORDER — OXYCODONE HYDROCHLORIDE AND ACETAMINOPHEN 5; 325 MG/1; MG/1
1 TABLET ORAL EVERY 6 HOURS PRN
Qty: 28 TABLET | Refills: 0 | Status: SHIPPED | OUTPATIENT
Start: 2024-02-22 | End: 2024-02-29

## 2024-02-22 NOTE — TELEPHONE ENCOUNTER
Last appointment 1/29/2024  Next appointment   Future Appointments   Date Time Provider Department Center   2/29/2024  1:15 PM Lila Steven PA-C [facility]   4/24/2024  2:30 PM Tae Carbajal DO [facility]      Last refill 2/16/24  DOS:  2/16/24      Patient called in requesting refill of     oxyCODONE-acetaminophen (PERCOCET) 5-325 MG per tablet     [id number] - [place], [place] - [address] [phone] -  [phone]

## 2024-02-23 ENCOUNTER — TELEPHONE (OUTPATIENT)
Dept: ORTHOPEDIC SURGERY | Age: 59
End: 2024-02-23

## 2024-02-23 NOTE — TELEPHONE ENCOUNTER
S/p right elbow microtenotomy lateral epicondyle 2/16/24.    Patient called in stating he talked to someone in the office this morning about red itchy arm and sending picture through [employer]. Calling to check if we had received them. No images or mychart messages found. Patient denies fevers, chills, drainage, odor, dizziness, palpitations, or nausea/vomiting. Just reports red and itchy arm; no heat. Advised patient to continue to monitor and report any changes. Should patient become concerned or have worsening symptoms this weekend he may report to ED for evaluation. Patient verbalized understanding.     PO 2/29/24.

## 2024-02-29 ENCOUNTER — OFFICE VISIT (OUTPATIENT)
Dept: ORTHOPEDIC SURGERY | Age: 59
End: 2024-02-29

## 2024-02-29 VITALS — TEMPERATURE: 98 F | WEIGHT: 245 LBS | BODY MASS INDEX: 33.18 KG/M2 | HEIGHT: 72 IN

## 2024-02-29 DIAGNOSIS — M77.11 RIGHT LATERAL EPICONDYLITIS: Primary | ICD-10-CM

## 2024-02-29 PROCEDURE — 99024 POSTOP FOLLOW-UP VISIT: CPT

## 2024-02-29 NOTE — PROGRESS NOTES
Jude Sarah is here for followup after right microtenotomy lateral epicondyle surgery. Pain is controlled with current analgesics.  Medication(s) being used: Percocet.. The patient notes improvement in the following symptoms:numbness, pain, sensation.  The patient denies fever, wound drainage, increasing redness, pus, increasing pain, increasing swelling. Post op problems reported: none.         Objective:         General :    alert, appears stated age, and cooperative   Sutures:   Sutures out.   Incision:  healing well, no significant drainage, no dehiscence, no significant erythema   Tenderness:  none   Flexion ROM:  full range of motion   Extension ROM:  full range of motion   Effusion:  mild         Assessment:        Status post right elbow microtenotomy lateral epicondyle surgery. Doing well postoperatively.        Plan:     Sutures removed today.  HEP  Follow up: 4 weeks.   No heavy lifting, pushing, pulling  Wrist splint, can come out for gentle ROM of wrist and fingers

## 2024-03-11 ENCOUNTER — TELEPHONE (OUTPATIENT)
Dept: ORTHOPEDIC SURGERY | Age: 59
End: 2024-03-11

## 2024-03-11 DIAGNOSIS — G89.18 POST-OP PAIN: Primary | ICD-10-CM

## 2024-03-11 RX ORDER — OXYCODONE HYDROCHLORIDE AND ACETAMINOPHEN 5; 325 MG/1; MG/1
1 TABLET ORAL EVERY 6 HOURS PRN
Qty: 21 TABLET | Refills: 0 | Status: SHIPPED | OUTPATIENT
Start: 2024-03-11 | End: 2024-03-18

## 2024-03-11 NOTE — TELEPHONE ENCOUNTER
.Last appointment 1/29/2024  Next appointment   Future Appointments   Date Time Provider Department Center   4/1/2024  1:30 PM Ian Arthur DO [facility]   4/24/2024  2:30 PM Tae Carbajal DO [facility]      Last refill 02/22/2024  DOS: 02/16/2024      Patient called in requesting refill of:    oxyCODONE-acetaminophen (PERCOCET) 5-325 MG per tablet     [id number] - [place], [place] - [address] -  [phone] -  [phone]

## 2024-04-01 ENCOUNTER — TELEPHONE (OUTPATIENT)
Dept: ORTHOPEDIC SURGERY | Age: 59
End: 2024-04-01

## 2024-04-01 NOTE — TELEPHONE ENCOUNTER
Pt would like to know if he can r/s his appt for today for later this week.  If not he will keep the current appt at 1:30.  Please call and let him know [phone]

## 2024-04-16 ENCOUNTER — OFFICE VISIT (OUTPATIENT)
Dept: ORTHOPEDIC SURGERY | Age: 59
End: 2024-04-16

## 2024-04-16 VITALS — HEIGHT: 72 IN | WEIGHT: 245 LBS | BODY MASS INDEX: 33.18 KG/M2

## 2024-04-16 DIAGNOSIS — M77.11 RIGHT LATERAL EPICONDYLITIS: Primary | ICD-10-CM

## 2024-04-16 PROCEDURE — 99024 POSTOP FOLLOW-UP VISIT: CPT | Performed by: ORTHOPAEDIC SURGERY

## 2024-04-16 NOTE — PROGRESS NOTES
Jude Sarah is here for followup after right microtenotomy lateral epicondyle surgery. Pain is controlled with current analgesics.  Medication(s) being used: Percocet.. The patient notes improvement in the following symptoms:numbness, pain, sensation.  The patient denies fever, wound drainage, increasing redness, pus, increasing pain, increasing swelling. Post op problems reported: none.  He stated he is doing good.       Objective:         General :    alert, appears stated age, and cooperative   Sutures:   Sutures out.   Incision:  healing well, no significant drainage, no dehiscence, no significant erythema   Tenderness:  none   Flexion ROM:  full range of motion   Extension ROM:  full range of motion   Effusion:  mild         Assessment:        Status post right elbow microtenotomy lateral epicondyle surgery. Doing well postoperatively.        Plan:     HEP  Continue no heavy lifting

## 2024-04-29 DIAGNOSIS — M77.12 LATERAL EPICONDYLITIS OF LEFT ELBOW: Primary | ICD-10-CM

## 2024-05-10 ENCOUNTER — HOSPITAL ENCOUNTER (OUTPATIENT)
Age: 59
End: 2024-05-10
Payer: COMMERCIAL

## 2024-05-10 ENCOUNTER — HOSPITAL ENCOUNTER (OUTPATIENT)
Dept: NUCLEAR MEDICINE | Age: 59
Discharge: HOME | End: 2024-05-10
Payer: COMMERCIAL

## 2024-05-10 DIAGNOSIS — E78.5 HYPERLIPIDEMIA ASSOCIATED WITH TYPE 2 DIABETES MELLITUS (HCC): ICD-10-CM

## 2024-05-10 DIAGNOSIS — E11.69 HYPERLIPIDEMIA ASSOCIATED WITH TYPE 2 DIABETES MELLITUS (HCC): ICD-10-CM

## 2024-05-10 DIAGNOSIS — R07.9 CHEST PAIN, UNSPECIFIED TYPE: ICD-10-CM

## 2024-05-10 DIAGNOSIS — E11.42 TYPE 2 DIABETES MELLITUS WITH DIABETIC POLYNEUROPATHY, WITHOUT LONG-TERM CURRENT USE OF INSULIN (HCC): ICD-10-CM

## 2024-05-10 LAB
NUC STRESS EJECTION FRACTION: 75 %
STRESS BASELINE DIAS BP: 97 MMHG
STRESS BASELINE HR: 75 BPM
STRESS BASELINE SYS BP: 177 MMHG
STRESS O2 SAT REST: 98 %
STRESS STAGE 1 BP: NORMAL MMHG
STRESS STAGE 1 COMMENTS: NORMAL
STRESS STAGE 1 DURATION: 1 MIN:SEC
STRESS STAGE 1 HR: 112 BPM
STRESS STAGE RECOVERY 1 BP: NORMAL MMHG
STRESS STAGE RECOVERY 1 COMMENTS: NORMAL
STRESS STAGE RECOVERY 1 DURATION: 1 MIN:SEC
STRESS STAGE RECOVERY 1 HR: 107 BPM
STRESS STAGE RECOVERY 2 BP: NORMAL MMHG
STRESS STAGE RECOVERY 2 COMMENTS: NORMAL
STRESS STAGE RECOVERY 2 DURATION: 1 MIN:SEC
STRESS STAGE RECOVERY 2 HR: 96 BPM
STRESS STAGE RECOVERY 3 BP: NORMAL MMHG
STRESS STAGE RECOVERY 3 COMMENTS: NORMAL
STRESS STAGE RECOVERY 3 DURATION: 1 MIN:SEC
STRESS STAGE RECOVERY 3 HR: 91 BPM
STRESS STAGE RECOVERY 4 BP: NORMAL MMHG
STRESS STAGE RECOVERY 4 COMMENTS: NORMAL
STRESS STAGE RECOVERY 4 DURATION: 1 MIN:SEC
STRESS STAGE RECOVERY 4 HR: 81 BPM
STRESS TARGET HR: 161 BPM

## 2024-05-10 PROCEDURE — 6360000002 HC RX W HCPCS: Performed by: FAMILY MEDICINE

## 2024-05-10 RX ORDER — TETRAKIS(2-METHOXYISOBUTYLISOCYANIDE)COPPER(I) TETRAFLUOROBORATE 1 MG/ML
10 INJECTION, POWDER, LYOPHILIZED, FOR SOLUTION INTRAVENOUS
Status: DISCONTINUED | OUTPATIENT
Start: 2024-05-10 | End: 2024-05-11 | Stop reason: HOSPADM

## 2024-05-10 RX ORDER — TETRAKIS(2-METHOXYISOBUTYLISOCYANIDE)COPPER(I) TETRAFLUOROBORATE 1 MG/ML
30 INJECTION, POWDER, LYOPHILIZED, FOR SOLUTION INTRAVENOUS
Status: DISCONTINUED | OUTPATIENT
Start: 2024-05-10 | End: 2024-05-11 | Stop reason: HOSPADM

## 2024-05-10 RX ORDER — REGADENOSON 0.08 MG/ML
0.4 INJECTION, SOLUTION INTRAVENOUS
Status: COMPLETED | OUTPATIENT
Start: 2024-05-10 | End: 2024-05-10

## 2024-05-10 RX ORDER — GLIPIZIDE 10 MG/1
10 TABLET ORAL ONCE
COMMUNITY

## 2024-05-10 RX ADMIN — REGADENOSON 0.4 MG: 0.08 INJECTION, SOLUTION INTRAVENOUS at 10:18

## 2024-05-13 DIAGNOSIS — M77.12 LATERAL EPICONDYLITIS OF LEFT ELBOW: Primary | ICD-10-CM

## 2024-06-17 DIAGNOSIS — M77.12 LATERAL EPICONDYLITIS OF LEFT ELBOW: Primary | ICD-10-CM

## 2024-06-24 ENCOUNTER — OFFICE VISIT (OUTPATIENT)
Dept: ORTHOPEDIC SURGERY | Age: 59
End: 2024-06-24

## 2024-06-24 VITALS — HEIGHT: 72 IN | WEIGHT: 245 LBS | BODY MASS INDEX: 33.18 KG/M2 | TEMPERATURE: 98 F

## 2024-06-24 DIAGNOSIS — M77.12 LATERAL EPICONDYLITIS OF LEFT ELBOW: Primary | ICD-10-CM

## 2024-06-24 RX ORDER — MELOXICAM 15 MG/1
15 TABLET ORAL DAILY
Qty: 30 TABLET | Refills: 3 | Status: SHIPPED | OUTPATIENT
Start: 2024-06-24 | End: 2024-10-22

## 2024-06-24 RX ORDER — METHYLPREDNISOLONE 4 MG/1
TABLET ORAL
Qty: 1 KIT | Refills: 0 | Status: SHIPPED | OUTPATIENT
Start: 2024-06-24 | End: 2024-06-30

## 2024-06-24 NOTE — PROGRESS NOTES
Chief Complaint   Patient presents with    Elbow Injury     Left elbow pain  pain on the lateral side of the elbow.       Jude Sarah is in for a follow up for left elbow pain.  He is having increased pain over the lateral epicondyle.  He has failed injection x 2.    Past Medical History:   Diagnosis Date    Anxiety     Blood in stool     Chronic back pain     Constipation     COVID-19 06/2023    had RSV at the same time    GERD (gastroesophageal reflux disease)     Headache(784.0)     History of dental problems     History of Holter monitoring 10/27/2023    Hyperlipidemia     Neck pain     Neck stiffness     Type II or unspecified type diabetes mellitus without mention of complication, not stated as uncontrolled      Past Surgical History:   Procedure Laterality Date    COLONOSCOPY      FRACTURE SURGERY Left     femur    KNEE ARTHROSCOPY Left 07/18/2016    medial lateral menisectomy/synovectomy/chodroplasty    KNEE SURGERY Left 04/14/2022    LEFT KNEE MANIPULATION UNDER ANESTHESIA performed by Ian Arthur DO at [facility] OR    SHOULDER ARTHROSCOPY Right 02/11/2020    RIGHT SHOULDER ARTHROSCOPY SUBACHROMIAL DECOMPRESSION DEBRIDEMENT, BICEP TENODESIS ,LABRAL DEBRIDEMENT AND KHALIDA performed by Jones Jensen DO at [facility] OR    SHOULDER ARTHROSCOPY Right 10/02/2020    RIGHT SHOULDER ARTHROSCOPY SUBACROMIAL DECOMPRESSION AND DEBRIDEMENT LABRIAL DEBRIDEMENT ROTATOR CUFF REPAIR KHALIDA performed by Ian Arthur DO at [facility] OR    TENOTOMY Right 2/16/2024    RIGHT ELBOW MICROTENOTOMY LATERAL EPICONDYLE WITH PRP INJECTION- 2/16/24 ARTHREX performed by Ian Arthur DO at [facility] OR    TONSILLECTOMY      TOTAL KNEE ARTHROPLASTY Left 03/02/2022    Left knee total knee arthroplasty (SM[employer] NEPHEW) performed by Ian Arthur DO at [facility] OR       Current Outpatient Medications:     meloxicam (MOBIC) 15 MG tablet, Take 1 tablet by mouth daily, Disp: 30 tablet, Rfl: 3    methylPREDNISolone

## 2024-06-28 RX ORDER — TRIAMCINOLONE ACETONIDE 40 MG/ML
20 INJECTION, SUSPENSION INTRA-ARTICULAR; INTRAMUSCULAR ONCE
Status: COMPLETED | OUTPATIENT
Start: 2024-06-28 | End: 2024-06-28

## 2024-06-28 RX ADMIN — TRIAMCINOLONE ACETONIDE 20 MG: 40 INJECTION, SUSPENSION INTRA-ARTICULAR; INTRAMUSCULAR at 13:03

## 2024-07-17 ENCOUNTER — HOSPITAL ENCOUNTER (EMERGENCY)
Age: 59
Discharge: HOME | End: 2024-07-17
Payer: COMMERCIAL

## 2024-07-17 ENCOUNTER — APPOINTMENT (OUTPATIENT)
Dept: GENERAL RADIOLOGY | Age: 59
End: 2024-07-17
Payer: COMMERCIAL

## 2024-07-17 VITALS
DIASTOLIC BLOOD PRESSURE: 91 MMHG | OXYGEN SATURATION: 95 % | TEMPERATURE: 98.2 F | RESPIRATION RATE: 18 BRPM | SYSTOLIC BLOOD PRESSURE: 152 MMHG | HEART RATE: 85 BPM | BODY MASS INDEX: 33.06 KG/M2 | WEIGHT: 244 LBS

## 2024-07-17 DIAGNOSIS — S46.911A STRAIN OF RIGHT UPPER ARM, INITIAL ENCOUNTER: Primary | ICD-10-CM

## 2024-07-17 PROCEDURE — 73060 X-RAY EXAM OF HUMERUS: CPT

## 2024-07-17 PROCEDURE — 99211 OFF/OP EST MAY X REQ PHY/QHP: CPT

## 2024-07-17 RX ORDER — TIZANIDINE 4 MG/1
4 TABLET ORAL 2 TIMES DAILY PRN
Qty: 12 TABLET | Refills: 0 | Status: SHIPPED | OUTPATIENT
Start: 2024-07-17

## 2024-07-17 NOTE — ED PROVIDER NOTES
[facility] URGENT CARE  EMERGENCY DEPARTMENT ENCOUNTER        NAME: Jude Sarah  :  1965  MRN:  [id number]  Date of evaluation: 2024  Provider: Chilo Connor PA-C  PCP: Tae Carbajal DO  Note Started : 11:22 AM EDT 24    Chief Complaint: Arm Pain (Right arm pain for weeks, it is the whole arm, hx of surgery in February, saw Dr Arthur 24, states he may have done something to it changing a tire)      This is a 58-year-old male that presents to urgent care complaining of right upper arm pain.  He states pain is from the right elbow to the right shoulder.  He states he has had right upper arm problems for years and has had surgeries to his arm.  He states about a week and a half ago he was changing a tire and felt more pain in the elbow and upper arm area.  No numbness or tingling.  No chest pain.  On first contact patient he appears to be in no acute distress.            Review of Systems  Pertinent positives and negatives are stated within HPI, all other systems reviewed and are negative.     Allergies: Tobramycin     --------------------------------------------- PAST HISTORY ---------------------------------------------  Past Medical History:  has a past medical history of Anxiety, Blood in stool, Chronic back pain, Constipation, COVID-19, GERD (gastroesophageal reflux disease), Headache(784.0), History of dental problems, History of Holter monitoring, Hyperlipidemia, Neck pain, Neck stiffness, and Type II or unspecified type diabetes mellitus without mention of complication, not stated as uncontrolled.    Past Surgical History:  has a past surgical history that includes fracture surgery (Left); Tonsillectomy; Knee arthroscopy (Left, 2016); Shoulder arthroscopy (Right, 2020); Shoulder arthroscopy (Right, 10/02/2020); Colonoscopy; Total knee arthroplasty (Left, 2022); knee surgery (Left, 2022); and tenotomy (Right, 2024).    Social History:

## 2024-07-17 NOTE — DISCHARGE INSTRUCTIONS
Tylenol 650 to 1000 mg every 8 hours as needed for pain/fever.  Ibuprofen 600-800 mg every 8 hours as needed for inflammation/pain/fever. Take with food and water.  Aspercreme/biofreeze

## 2024-07-18 ENCOUNTER — TELEPHONE (OUTPATIENT)
Dept: FAMILY MEDICINE CLINIC | Age: 59
End: 2024-07-18

## 2024-07-23 ENCOUNTER — OFFICE VISIT (OUTPATIENT)
Dept: ORTHOPEDIC SURGERY | Age: 59
End: 2024-07-23
Payer: COMMERCIAL

## 2024-07-23 VITALS — HEIGHT: 72 IN | WEIGHT: 244 LBS | BODY MASS INDEX: 33.05 KG/M2

## 2024-07-23 DIAGNOSIS — M75.51 ACUTE BURSITIS OF RIGHT SHOULDER: ICD-10-CM

## 2024-07-23 DIAGNOSIS — M25.811 SHOULDER IMPINGEMENT, RIGHT: Primary | ICD-10-CM

## 2024-07-23 PROCEDURE — 99213 OFFICE O/P EST LOW 20 MIN: CPT | Performed by: ORTHOPAEDIC SURGERY

## 2024-07-23 PROCEDURE — G8427 DOCREV CUR MEDS BY ELIG CLIN: HCPCS | Performed by: ORTHOPAEDIC SURGERY

## 2024-07-23 PROCEDURE — 20610 DRAIN/INJ JOINT/BURSA W/O US: CPT | Performed by: ORTHOPAEDIC SURGERY

## 2024-07-23 PROCEDURE — G8417 CALC BMI ABV UP PARAM F/U: HCPCS | Performed by: ORTHOPAEDIC SURGERY

## 2024-07-23 PROCEDURE — 3017F COLORECTAL CA SCREEN DOC REV: CPT | Performed by: ORTHOPAEDIC SURGERY

## 2024-07-23 PROCEDURE — 1036F TOBACCO NON-USER: CPT | Performed by: ORTHOPAEDIC SURGERY

## 2024-07-23 RX ORDER — TRIAMCINOLONE ACETONIDE 40 MG/ML
40 INJECTION, SUSPENSION INTRA-ARTICULAR; INTRAMUSCULAR ONCE
Status: COMPLETED | OUTPATIENT
Start: 2024-07-23 | End: 2024-07-23

## 2024-07-23 RX ADMIN — TRIAMCINOLONE ACETONIDE 40 MG: 40 INJECTION, SUSPENSION INTRA-ARTICULAR; INTRAMUSCULAR at 21:20

## 2024-07-24 NOTE — PROGRESS NOTES
100/30/BL and for the Left shoulder is 160/45/T8.  Right shoulder Motor strength is 5/5 in the supraspinatus, 5/5 internal rotation and 5/5 in external rotation, and Left shoulder motor strength 5/5 in supraspinatus, 5/5 in internal rotation, 5/5 in external rotation.        Right shoulder:  positive Impingement , positive Arrington ,negative  Speeds,negative  Apprehension ,negative Ross Load Shift, negative Cole manuver, negative Cross arm test.     Left shoulder:  negative Impingement , negative Arrington ,negative  Speeds,negative  Apprehension ,negative Ross Load Shift, negative Cole manuver, negative Cross arm test.     XRAY:    Right humerus xray-  normal    MRI:  not performed    Radiographic findings reviewed with patient    Impression:   Encounter Diagnoses   Name Primary?    Shoulder impingement, right Yes    Acute bursitis of right shoulder        Plan: Natural history and expected course discussed. Questions answered.  Educational material distributed.  Reduction in offending activity.     I will proceed with a cortisone injection in the Right shoulder. Verbal and written consent was obtained for the injection.  Skin was prepped with alcohol, 1 ml of Kenalog 40 mg and 9 ml of 0.25% Marcaine was injected to the posterior shoulder through the subacromial space of the Right Shoulder. The patient tolerated the injections well. I will see the patient back as needed..

## (undated) DEVICE — PROBE ABLAT 90DEG ASPIR MULTIPORT BPLR RF 1 PC ELECTRD ERGO

## (undated) DEVICE — TOWEL OR BLUEE 16X26IN ST 8 PACK ORB08 16X26ORTWL

## (undated) DEVICE — TOWEL,OR,DSP,ST,BLUE,STD,6/PK,12PK/CS: Brand: MEDLINE

## (undated) DEVICE — NEEDLE HYPO 18GA L1.5IN PNK POLYPR HUB S STL THN WALL FILL

## (undated) DEVICE — CHLORAPREP 26ML ORANGE

## (undated) DEVICE — STANDARD HYPODERMIC NEEDLE,POLYPROPYLENE HUB: Brand: MONOJECT

## (undated) DEVICE — SOLUTION IRRIG 1000ML 09% SOD CHL USP PIC PLAS CONTAINER

## (undated) DEVICE — GOWN,SIRUS,POLYRNF,BRTHSLV,XLN/XL,20/CS: Brand: MEDLINE

## (undated) DEVICE — SOLUTION IV IRRIG 500ML 0.9% SODIUM CHL 2F7123

## (undated) DEVICE — Z DISCONTINUED GLOVE SURG SZ 7.5 L12IN FNGR THK13MIL WHT ISOLEX

## (undated) DEVICE — HANDLE CVR PATENTED RETENTION DISC STRL LIGHT SHLD

## (undated) DEVICE — DRESSING GZ XRFRM 4X4(25/BX 6BX/CS)

## (undated) DEVICE — DRAPE SURG W88XL116IN SMS BODY SPL ORTH N FEN REINF FLD PCH

## (undated) DEVICE — SUTURE SUTTAPE L40IN DIA1.3MM NONABSORBABLE WHT BLU L26.5MM AR7500

## (undated) DEVICE — T4 HOOD

## (undated) DEVICE — SYSTEM PRP DBL SYR W/ CAP FOR AUTOLGS PLSM SYS ACP

## (undated) DEVICE — 3M™ STERI-DRAPE™ U-DRAPE 1015: Brand: STERI-DRAPE™

## (undated) DEVICE — INTENDED FOR TISSUE SEPARATION, AND OTHER PROCEDURES THAT REQUIRE A SHARP SURGICAL BLADE TO PUNCTURE OR CUT.: Brand: BARD-PARKER ® STAINLESS STEEL BLADES

## (undated) DEVICE — TUBING PMP L16FT MAIN DISP FOR AR-6400 AR-6475

## (undated) DEVICE — MARKER,SKIN,WI/RULER AND LABELS: Brand: MEDLINE

## (undated) DEVICE — PACK,EXTREMITY,ORTHOMAX,5/CS: Brand: MEDLINE

## (undated) DEVICE — DOUBLE BASIN SET: Brand: MEDLINE INDUSTRIES, INC.

## (undated) DEVICE — STRYKER PERFORMANCE SERIES SAGITTAL BLADE: Brand: STRYKER PERFORMANCE SERIES

## (undated) DEVICE — SOLUTION IV 1000ML 0.9% SOD CHL PH 5 INJ USP VIAFLX PLAS

## (undated) DEVICE — SOLUTION IV IRRIG POUR BRL 0.9% SODIUM CHL 2F7124

## (undated) DEVICE — SUTURE FIBERTAPE FIBERWIRE SZ 2-0 30IN NONABSORB BLU AR72377

## (undated) DEVICE — CANNULA ARTHSCP L7CM ID8.25MM TRNSLUC THRD FLX W/ NO SQUIRT

## (undated) DEVICE — SYRINGE MED 10ML LUERLOCK TIP W/O SFTY DISP

## (undated) DEVICE — 3M™ IOBAN™ 2 ANTIMICROBIAL INCISE DRAPE 6640EZ: Brand: IOBAN™ 2

## (undated) DEVICE — ELECTRODE PT RET AD L9FT HI MOIST COND ADH HYDRGEL CORDED

## (undated) DEVICE — Z CONVERTED USE 2275207 CLOTH PREP W7.5XL7.5IN 2% CHG SKIN ALC AND RNS FREE

## (undated) DEVICE — SOLUTION SURG PREP ANTIMICROBIAL 4 OZ SKIN WND EXIDINE

## (undated) DEVICE — BANDAGE COMPR W4XL108IN WHT LAYERED NO CLSR SYN RUB ESMARCH

## (undated) DEVICE — BASIC PACK: Brand: CONVERTORS

## (undated) DEVICE — BANDAGE COMPR W6INXL5YD SELF ADH COHESIVE CO FLX

## (undated) DEVICE — 5-IN-1 BARBED CONNECTOR POLYPROPYLENE 3/16 - 9/16 IN. (5 - 14.3 MM): Brand: ARGYLE

## (undated) DEVICE — Z DISCONTINUED USE 2275686 GLOVE SURG SZ 8 L12IN FNGR THK13MIL WHT ISOLEX POLYISOPRENE

## (undated) DEVICE — DRAPE,SHOULDER,ORTHOMAX,W/POUCH,5/CS: Brand: MEDLINE

## (undated) DEVICE — 1810 FOAM BLOCK NEEDLE COUNTER: Brand: DEVON

## (undated) DEVICE — GAUZE,SPONGE,4"X4",16PLY,XRAY,STRL,LF: Brand: MEDLINE

## (undated) DEVICE — MEDI-VAC NON-CONDUCTIVE SUCTION TUBING: Brand: CARDINAL HEALTH

## (undated) DEVICE — GOWN,SIRUS,FABRNF,XL,20/CS: Brand: MEDLINE

## (undated) DEVICE — SET MAJOR INSTR ORTHO

## (undated) DEVICE — TOPAZ EZ MICRODEBRIDER COBLATION WAND WITH INTEGRATED FINGER SWITCH IFS: Brand: COBLATION

## (undated) DEVICE — Z DISCONTINUED USE 2272124 DRAPE SURG XL N INVASIVE 2 LAYR DISP

## (undated) DEVICE — BLADE SHV L13CM DIA4MM DBL CUT COOLCUT

## (undated) DEVICE — CANNULA IV 18GA L15IN BLNT FILL LUERLOCK HUB MJCT

## (undated) DEVICE — 20 ML SYRINGE REGULAR TIP: Brand: MONOJECT

## (undated) DEVICE — GARMENT,MEDLINE,DVT,INT,CALF,MED, GEN2: Brand: MEDLINE

## (undated) DEVICE — CART DISP LID TRANSPOSAL

## (undated) DEVICE — GAUZE,SPONGE,4"X4",16PLY,STRL,LF,10/TRAY: Brand: MEDLINE

## (undated) DEVICE — SPONGE LAP W18XL18IN WHT COT 4 PLY FLD STRUNG RADPQ DISP ST

## (undated) DEVICE — 1010 S-DRAPE TOWEL DRAPE 10/BX: Brand: STERI-DRAPE™

## (undated) DEVICE — 3M™ STERI-DRAPE™ U-DRAPE, LONG 1019: Brand: STERI-DRAPE™

## (undated) DEVICE — 12 ML SYRINGE,LUER-LOCK TIP: Brand: MONOJECT

## (undated) DEVICE — SUPER TURBOVAC 90 INTEGRATED CABLE WAND ICW: Brand: COBLATION

## (undated) DEVICE — GOWN SURG XL LNG LEN SPUNBOND REINF VELC TIE LEV 4 IMPERV

## (undated) DEVICE — SYRINGE IRRIG 60ML SFT PLIABLE BLB EZ TO GRP 1 HND USE W/

## (undated) DEVICE — 3M™ IOBAN™ 2 ANTIMICROBIAL INCISE DRAPE 6650EZ: Brand: IOBAN™ 2

## (undated) DEVICE — CANNULA ARTHSCP L7CM DIA7MM TRNSLUC THRD FLX W/ NO SQUIRT

## (undated) DEVICE — SLING ARM 9 1/2X20IN L MULTIPAK

## (undated) DEVICE — GLOVE ORANGE PI 7 1/2   MSG9075

## (undated) DEVICE — SUTURE PROL SZ 3-0 L18IN NONABSORBABLE BLU L19MM PS-2 3/8 8687H

## (undated) DEVICE — HANDPIECE SET WITH BONE CLEANING TIP: Brand: INTERPULSE

## (undated) DEVICE — NEEDLE SPNL L3.5IN PNK HUB S STL REG WALL FIT STYL W/ QNCKE

## (undated) DEVICE — 3M™ MEDIPORE™ SOFT CLOTH TAPE, 4 INCH X 10 YARDS, 12 ROLLS/CASE, 2964: Brand: 3M™ MEDIPORE™

## (undated) DEVICE — CANNULA ARTHSCP L5CM DIA8.25MM TRNSLUC THRD FLX W/ NO

## (undated) DEVICE — MEDI-VAC YANKAUER SUCTION HANDLE: Brand: CARDINAL HEALTH

## (undated) DEVICE — COVER,TABLE,60X90,STERILE: Brand: MEDLINE

## (undated) DEVICE — PITCHER PT 1200ML W HNDL CSR WRP

## (undated) DEVICE — BANDAGE COMPR L W4INXL11YD 100% COT WVN E DBL LEN CLP CLSR

## (undated) DEVICE — ELECTRODE NDL 2.8IN COAT VALLEYLAB

## (undated) DEVICE — MEDI-VAC YANKAUER SUCTION HANDLE W/BULBOUS TIP: Brand: CARDINAL HEALTH

## (undated) DEVICE — REAMER SURG DIA8.5MM PEEK FORKED TIP PILOTED HD W/ BLT IN

## (undated) DEVICE — SUTURE PROL SZ 2-0 L30IN NONABSORBABLE BLU L26MM SH 1/2 CIR 8833H

## (undated) DEVICE — GLOVE SURG SZ 8 L11.2IN FNGR THK12.7MIL CUF THK9.7MIL BRN

## (undated) DEVICE — SLEEVE TRAC SPANDEX LAT W/ 4IN COBAN SUPERFICIAL RAD NRV PD

## (undated) DEVICE — GARMENT COMPR STD FOR 17IN CALF UNIF THER FLOTRN

## (undated) DEVICE — BUR SHAVER 5 MMX13 CM 8 FLUT OVL FOR AGGRESSIVE BNE COOLCUT

## (undated) DEVICE — APPLICATOR MEDICATED 26 CC SOLUTION HI LT ORNG CHLORAPREP

## (undated) DEVICE — PADDING CAST 4 YDX3 IN COTTON NS WBRL

## (undated) DEVICE — SHEET SUPPORT

## (undated) DEVICE — BANDAGE COMPR W6INXL12FT SMOOTH FOR LIMB EXSANG ESMARCH

## (undated) DEVICE — Z DISCONTINUED USE 2516375 APPLICATOR MEDICATED 3 CC CLR STRL CHLORAPREP

## (undated) DEVICE — GLOVE ORANGE PI 7   MSG9070

## (undated) DEVICE — PEN: MARKING STD 100/CS: Brand: MEDICAL ACTION INDUSTRIES

## (undated) DEVICE — COVER,LIGHT HANDLE,FLX,1/PK: Brand: MEDLINE INDUSTRIES, INC.

## (undated) DEVICE — PENCIL ES L3M BTTN SWCH HOLSTER W/ BLDE ELECTRD EDGE

## (undated) DEVICE — GOWN SURG XL SMS FAB NONREINFORCED RAGLAN SLV HK LOOP CLSR

## (undated) DEVICE — NDL CNTR 40CT FM MAG: Brand: MEDLINE INDUSTRIES, INC.

## (undated) DEVICE — NEEDLE FLTR 18GA L1.5IN MEM THK5UM BLNT DISP

## (undated) DEVICE — HOOK LOCK LATEX FREE ELASTIC BANDAGE 3INX5YD

## (undated) DEVICE — SOLUTION SCRB 32OZ 7.5% POVIDONE IOD BTL GENTLE EFFECTIVE

## (undated) DEVICE — GLOVE SURG 7 LTX TRIFLEX WIDE FINGER PWDR

## (undated) DEVICE — NEEDLE SUT PASS FOR ROT CUF LABRAL REP MULTFI SCORPION

## (undated) DEVICE — NEEDLE HYPO 25GA L1.5IN BLU POLYPR HUB S STL REG BVL STR

## (undated) DEVICE — CANNULA ARTHSCP L4CM DIA8MM PASSPRT BTTN

## (undated) DEVICE — TUBING, SUCTION, 1/4" X 10', STRAIGHT: Brand: MEDLINE

## (undated) DEVICE — SYRINGE MED 50ML LUERLOCK TIP

## (undated) DEVICE — SUTURE ETHLN SZ 3-0 L18IN NONABSORBABLE BLK PS-2 L19MM 3/8 1669H

## (undated) DEVICE — SUTURE MCRYL SZ 3-0 L18IN ABSRB UD L19MM PS-2 3/8 CIR PRIM Y497G